# Patient Record
Sex: MALE | Race: BLACK OR AFRICAN AMERICAN | NOT HISPANIC OR LATINO | Employment: FULL TIME | URBAN - METROPOLITAN AREA
[De-identification: names, ages, dates, MRNs, and addresses within clinical notes are randomized per-mention and may not be internally consistent; named-entity substitution may affect disease eponyms.]

---

## 2017-03-06 ENCOUNTER — RESULTS ONLY (OUTPATIENT)
Dept: OTHER | Facility: CLINIC | Age: 32
End: 2017-03-06

## 2017-03-06 DIAGNOSIS — I10 HYPERTENSION: ICD-10-CM

## 2017-03-06 DIAGNOSIS — Z99.2 END STAGE RENAL DISEASE ON DIALYSIS (H): ICD-10-CM

## 2017-03-06 DIAGNOSIS — N18.6 END STAGE RENAL DISEASE ON DIALYSIS (H): ICD-10-CM

## 2017-03-06 PROCEDURE — 86832 HLA CLASS I HIGH DEFIN QUAL: CPT | Performed by: PHYSICIAN ASSISTANT

## 2017-03-06 PROCEDURE — 86833 HLA CLASS II HIGH DEFIN QUAL: CPT | Performed by: PHYSICIAN ASSISTANT

## 2017-03-09 LAB — PRA SINGLE ANTIGEN IGG ANTIBODY: NORMAL

## 2017-03-15 LAB
SA1 CELL: NORMAL
SA1 COMMENTS: NORMAL
SA1 HI RISK ABY: NORMAL
SA1 MOD RISK ABY: NORMAL
SA1 TEST METHOD: NORMAL
SA2 CELL: NORMAL
SA2 COMMENTS: NORMAL
SA2 HI RISK ABY UA: NORMAL
SA2 MOD RISK ABY: NORMAL
SA2 TEST METHOD: NORMAL

## 2017-10-06 DIAGNOSIS — Z76.82 ORGAN TRANSPLANT CANDIDATE: Primary | ICD-10-CM

## 2017-10-06 DIAGNOSIS — N18.6 END STAGE RENAL DISEASE (H): ICD-10-CM

## 2017-11-06 ENCOUNTER — RESULTS ONLY (OUTPATIENT)
Dept: OTHER | Facility: CLINIC | Age: 32
End: 2017-11-06

## 2017-11-06 DIAGNOSIS — Z76.82 ORGAN TRANSPLANT CANDIDATE: ICD-10-CM

## 2017-11-06 DIAGNOSIS — N18.6 END STAGE RENAL DISEASE (H): ICD-10-CM

## 2017-11-06 PROCEDURE — 86833 HLA CLASS II HIGH DEFIN QUAL: CPT | Performed by: PHYSICIAN ASSISTANT

## 2017-11-06 PROCEDURE — 86832 HLA CLASS I HIGH DEFIN QUAL: CPT | Performed by: PHYSICIAN ASSISTANT

## 2017-11-10 LAB — PRA SINGLE ANTIGEN IGG ANTIBODY: NORMAL

## 2018-04-09 ENCOUNTER — RESULTS ONLY (OUTPATIENT)
Dept: OTHER | Facility: CLINIC | Age: 33
End: 2018-04-09

## 2018-04-09 DIAGNOSIS — N18.6 END STAGE RENAL DISEASE (H): ICD-10-CM

## 2018-04-09 DIAGNOSIS — Z76.82 ORGAN TRANSPLANT CANDIDATE: ICD-10-CM

## 2018-04-12 LAB — PRA SINGLE ANTIGEN IGG ANTIBODY: NORMAL

## 2018-05-04 DIAGNOSIS — N18.6 END STAGE RENAL DISEASE (H): ICD-10-CM

## 2018-05-04 DIAGNOSIS — Z76.82 ORGAN TRANSPLANT CANDIDATE: Primary | ICD-10-CM

## 2018-05-31 ENCOUNTER — ALLIED HEALTH/NURSE VISIT (OUTPATIENT)
Dept: TRANSPLANT | Facility: CLINIC | Age: 33
End: 2018-05-31
Attending: INTERNAL MEDICINE
Payer: COMMERCIAL

## 2018-05-31 ENCOUNTER — OFFICE VISIT (OUTPATIENT)
Dept: NEPHROLOGY | Facility: CLINIC | Age: 33
End: 2018-05-31
Attending: INTERNAL MEDICINE
Payer: COMMERCIAL

## 2018-05-31 VITALS
SYSTOLIC BLOOD PRESSURE: 121 MMHG | HEART RATE: 65 BPM | BODY MASS INDEX: 27.92 KG/M2 | OXYGEN SATURATION: 95 % | WEIGHT: 173 LBS | DIASTOLIC BLOOD PRESSURE: 74 MMHG | TEMPERATURE: 98.3 F

## 2018-05-31 DIAGNOSIS — N18.6 END STAGE RENAL DISEASE (H): ICD-10-CM

## 2018-05-31 DIAGNOSIS — N18.6 END STAGE KIDNEY DISEASE (H): Primary | ICD-10-CM

## 2018-05-31 DIAGNOSIS — Z76.82 ORGAN TRANSPLANT CANDIDATE: ICD-10-CM

## 2018-05-31 DIAGNOSIS — D70.9 NEUTROPENIA, UNSPECIFIED TYPE (H): ICD-10-CM

## 2018-05-31 DIAGNOSIS — Z76.82 AWAITING ORGAN TRANSPLANT: Primary | ICD-10-CM

## 2018-05-31 LAB
ABO + RH BLD: NORMAL
ABO + RH BLD: NORMAL
BLD GP AB SCN SERPL QL: NORMAL
BLOOD BANK CMNT PATIENT-IMP: NORMAL
ERYTHROCYTE [DISTWIDTH] IN BLOOD BY AUTOMATED COUNT: 11.4 % (ref 10–15)
HCT VFR BLD AUTO: 35.5 % (ref 40–53)
HGB BLD-MCNC: 12.4 G/DL (ref 13.3–17.7)
MCH RBC QN AUTO: 30.5 PG (ref 26.5–33)
MCHC RBC AUTO-ENTMCNC: 34.9 G/DL (ref 31.5–36.5)
MCV RBC AUTO: 87 FL (ref 78–100)
PLATELET # BLD AUTO: 172 10E9/L (ref 150–450)
RBC # BLD AUTO: 4.06 10E12/L (ref 4.4–5.9)
SPECIMEN EXP DATE BLD: NORMAL
WBC # BLD AUTO: 4.7 10E9/L (ref 4–11)

## 2018-05-31 PROCEDURE — 86850 RBC ANTIBODY SCREEN: CPT | Performed by: INTERNAL MEDICINE

## 2018-05-31 PROCEDURE — 86901 BLOOD TYPING SEROLOGIC RH(D): CPT | Performed by: INTERNAL MEDICINE

## 2018-05-31 PROCEDURE — 86886 COOMBS TEST INDIRECT TITER: CPT | Performed by: INTERNAL MEDICINE

## 2018-05-31 PROCEDURE — 85027 COMPLETE CBC AUTOMATED: CPT | Performed by: NURSE PRACTITIONER

## 2018-05-31 PROCEDURE — 86900 BLOOD TYPING SEROLOGIC ABO: CPT | Performed by: INTERNAL MEDICINE

## 2018-05-31 PROCEDURE — G0463 HOSPITAL OUTPT CLINIC VISIT: HCPCS | Mod: ZF

## 2018-05-31 PROCEDURE — 36415 COLL VENOUS BLD VENIPUNCTURE: CPT | Performed by: INTERNAL MEDICINE

## 2018-05-31 ASSESSMENT — PAIN SCALES - GENERAL: PAINLEVEL: NO PAIN (0)

## 2018-05-31 NOTE — NURSING NOTE
Chief Complaint   Patient presents with     RECHECK     wait list follow up     Wt 78.5 kg (173 lb)  BMI 27.92 kg/m2   Marcela White

## 2018-05-31 NOTE — LETTER
2018       RE: Kailash Sorto  [address]  [facility] [address]     Dear Colleague,    Thank you for referring your patient, Kailash Sorto, to the [facility] NEPHROLOGY at [facility]. Please see a copy of my visit note below.    Assessment and Plan:  1. Kidney transplant wait list evaluation - Mr. Sorto is an good candidate overall. He should be active on the wait list.  2. 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 On hemodialysis since  2016 when he presented with renal failure, proteinuria (urine protein-to-creatinine ratio 1 g/g), hematuria and hypertensive crisis and no previous significant medical history. Renal ultrasound showed increased echogenicity of bilateral kidneys. He also had one episode of gross hematuria 2 months prior to starting dialysis. .    3. Cardiac risk - he has no history of cardiac disease or events. His last ECHO in   showed an EF of 60-65%, mild LVH.No exertional symptoms with regular exercise.   4. Health maintenance-UTD     Discussed the risks and benefits of a transplant, including the risk of surgery and immunosuppression medications.    KDPI: We discussed approximate remaining wait time and how that is influenced by issues such as blood type and sensitization (PRA) and access to a living donor. I contrasted potential waiting time for living vs  donor kidneys from  normal (0-85%) or higher (%) kidney donor profile index (KDPI) donors and their associated outcomes. I would not recommend Mr. Sorto to consider kidneys from high KDPI donors. The reason for this decision is best summarized as: improved long term graft survival.    Patient s overall re-evaluation may require further discussion in the Transplant Program s multidisciplinary selection committee for a final recommendation on the patient s suitability for transplant.     Reason for Visit:  Kailash Sorto is a 32 year old male with ESKD  from unclear etiology, who presents for Kidney transplant wait list evaluation.     Last Evaluation Clinic Visit Date:  8/2016 (Yadira)        Wait List Date: 5/13/2016  Current phase/status: Waitlist: Active as of 8/18/2016  Transplant coordinator: Maryjane Leroy Transplant Office phone number [phone]     Previous Medical Issues:  1.Leukopenia -  repeat WBC normal today.      HPI: Mr. 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 He started hemodialysis in 5/2016 when he presented with renal failure, proteinuria (urine protein-to-creatinine ratio 1 g/g), hematuria and hypertensive crisis and no previous significant medical history. Renal ultrasound showed increased echogenicity of bilateral kidneys. He also had one episode of gross hematuria 2 months prior to starting dialysis. .               Interim events/issues/events - since he was last seeen in 8/2016, he shortly after was treated for influenza, but otherwise has not had any other infections, hospitalizations or blood transfusions. He has continued to work full time at [employer] in the [place] and lives with his long time girlfriend, who is very supportive. They may be doing some traveling this summer to [facility] or [place] to see his family. Overall, he is feeling fairly well and denies fatigue, nausea, vomiting or  poor appetite. Denies fevers, chills or sweats    Cardiac   8/2016- ECHO showed an EF of 60-65%, mild LVH.   He exercises at least once per week by walking/jogging around [facility] and denies chest pain, shortness of breath or claudication symptoms with exertion.   ESKD- He currently is making urine 3-5 times per day and denies dysuria, hematuria or trouble emptying his bladder or starting his stream. No new donors. BPs have been averaging 120-130s/70s at dialysis and  he denies dizziness, lightheaded or syncope. 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             Kidney Disease Hx       Kidney Disease Dx: unclear etiology       Biopsy Proven: No        On Dialysis: Yes, Date initiated: 2012 and Dialysis Type: [facility];       Primary Nephrologist: Dr. Cobos           Uremic Symptoms: Fatigue: No; Nausea: No; Poor Appetite: No;          Potential Donor(s): No          Cardiac history:       Last cardiac risk assessment: N/A       Last stress test/coronary angiogram: N/A       Exertional symptoms: none       Recent cardiac events: none     Pertinent issues:   Blood transfusion: No  Jehovah s Witness: No  Pregnancies: No  Previous transplant: No  Urine output: Yes;   Bladder dysfunction: No  Claudication: No  Previous amputation: No  Cancer: No  Recurrent infection: No  Chronic anticoagulation: No      ROS:  A comprehensive review of systems was obtained and negative, except as noted in the HPI or PMH.     PMH:  Medical records were obtained and reviewed.  Past Medical History:   Diagnosis Date     Anemia of chronic kidney failure      Dialysis patient (H)      End stage kidney disease (H)      Hypertension         PSH:  Past Surgical History:   Procedure Laterality Date     CREATE FISTULA ARTERIOVENOUS UPPER EXTREMITY Right 5/15/2016    Procedure: CREATE FISTULA ARTERIOVENOUS UPPER EXTREMITY;  Surgeon: Ricardo Gallo MD;  Location:  OR     CREATE FISTULA ARTERIOVENOUS UPPER EXTREMITY Right 7/12/2016    Procedure: CREATE FISTULA ARTERIOVENOUS UPPER EXTREMITY;  Surgeon: Payam Denise MD;  Location:  OR     VASCULAR SURGERY      placement of jugular tunnel catheter        Personal Hx:  Social History     Social History     Marital status: Single     Spouse name: N/A     Number of children: N/A     Years of education: N/A     Occupational History     Not on file.     Social History Main Topics     Smoking status: Never Smoker     Smokeless tobacco: Never Used      Alcohol use No     Drug use: No     Sexual activity: Yes     Partners: Female     Other Topics Concern     Parent/Sibling W/ Cabg, Mi Or Angioplasty Before 65f 55m? No     Social History Narrative        Allergies:  No Known Allergies     Medications:  Prior to Admission medications    Medication Sig Start Date End Date Taking? Authorizing Provider   AMLODIPINE BESYLATE PO Take 10 mg by mouth daily    Reported, Patient   B Complex-C-Folic Acid (GARDENIA CAPS PO) Take 1 tablet by mouth daily    Reported, Patient   calcium acetate (PHOSLO) 667 MG CAPS Take 667 mg by mouth One tablet with every meal and snack    Reported, Patient   HYDRALAZINE HCL PO Take 25 mg by mouth 4 times daily    Reported, Patient   LABETALOL HCL PO Take 200 mg by mouth 2 times daily    Reported, Patient        Vitals:  /74 (BP Location: Left arm)  Pulse 65  Temp 98.3  F (36.8  C) (Oral)  Wt 78.5 kg (173 lb)  SpO2 95%  BMI 27.92 kg/m2     Exam:  GENERAL APPEARANCE: alert and no distress  HENT: mouth without ulcers or lesions. Good   LYMPHATICS: no cervical or supraclavicular nodes  RESP: lungs clear to auscultation - no rales, rhonchi or wheezes  CV: regular rhythm, normal rate, no rub, no murmur  FEMORAL PULSES: 2+ equal bilaterally.   EDEMA: no LE edema bilaterally  ABDOMEN: soft, nondistended, nontender, bowel sounds normal  MS: extremities normal - no gross deformities noted, no evidence of inflammation in joints, no muscle tenderness  SKIN: no rash     Results:  Orders Only on 05/31/2018   Component Date Value Ref Range Status     ABO 05/31/2018 B   Final     RH(D) 05/31/2018 Neg   Final     Antibody Screen 05/31/2018 Neg   Final     Test Valid Only At 05/31/2018 [facility]      Final     Specimen Expires 05/31/2018 06/03/2018   Final     Antibody Titer 05/31/2018    Final                    Value:ANTI A1: IgM 8, IgG 8 .  ANTI A2 : IgM 4, IgG 2 .         Again, thank you for allowing me  to participate in the care of your patient.      Sincerely,    CASIMIRO Bobo CNP

## 2018-05-31 NOTE — MR AVS SNAPSHOT
"              After Visit Summary   2018    Kailash Sorto    MRN: [id number]           Patient Information     Date Of Birth          1985        Visit Information        Provider Department      2018 3:00 PM Laura Zuniga MSW [facility] Solid Organ Transplant        Today's Diagnoses     Awaiting organ transplant    -  1       Follow-ups after your visit        Who to contact     If you have questions or need follow up information about today's clinic visit or your schedule please contact [facility] ORGAN TRANSPLANT directly at [phone].  Normal or non-critical lab and imaging results will be communicated to you by Your Policy Managerhart, letter or phone within 4 business days after the clinic has received the results. If you do not hear from us within 7 days, please contact the clinic through [employer] or phone. If you have a critical or abnormal lab result, we will notify you by phone as soon as possible.  Submit refill requests through [employer] or call your pharmacy and they will forward the refill request to us. Please allow 3 business days for your refill to be completed.          Additional Information About Your Visit        MyChart Information     [employer] lets you send messages to your doctor, view your test results, renew your prescriptions, schedule appointments and more. To sign up, go to [email or web address] . Click on \"Log in\" on the left side of the screen, which will take you to the Welcome page. Then click on \"Sign up Now\" on the right side of the page.     You will be asked to enter the access code listed below, as well as some personal information. Please follow the directions to create your username and password.     Your access code is: [email or web address]  Expires: 8/15/2018  6:31 AM     Your access code will  in 90 days. If you need help or a new code, please call your [place] clinic or [phone].        Care EveryWhere ID     This is your Care EveryWhere ID. This could " be used by other organizations to access your [place] medical records  [id number]         Blood Pressure from Last 3 Encounters:   05/31/18 121/74   08/04/16 134/86   08/04/16 134/86    Weight from Last 3 Encounters:   05/31/18 78.5 kg (173 lb)   08/04/16 75.1 kg (165 lb 8 oz)   08/04/16 75.1 kg (165 lb 8 oz)              Today, you had the following     No orders found for display       Primary Care Provider Office Phone # Fax #    Homero Dumont [phone]       [address] [facility] [address]        Equal Access to Services     MAURICE DEY : Hadii jazlyn Renner, wabrian vee, qaesterta kaalmada tawanna, elizabeth gallegos. So [facility] [phone].    ATENCIÓN: Si habla español, tiene a rod disposición servicios gratuitos de asistencia lingüística. Llame al [phone].    We comply with applicable federal civil rights laws and [place] laws. We do not discriminate on the basis of race, color, national origin, age, disability, sex, sexual orientation, or gender identity.            Thank you!     Thank you for choosing [facility]GAN TRANSPL[facility]  for your care. Our goal is always to provide you with excellent care. Hearing back from our patients is one way we can continue to improve our services. Please take a few minutes to complete the written survey that you may receive in the mail after your visit with us. Thank you!             Your Updated Medication List - Protect others around you: Learn how to safely use, store and throw away your medicines at www.disposemymeds.org.          This list is accurate as of 5/31/18 11:59 PM.  Always use your most recent med list.                   Brand Name Dispense Instructions for use Diagnosis    AMLODIPINE BESYLATE PO      Take 10 mg by mouth daily        calcium acetate 667 MG Caps capsule    PHOSLO     Take 667 mg by mouth One tablet with every meal and snack        HYDRALAZINE HCL PO      Take 25 mg  by mouth 4 times daily        LABETALOL HCL PO      Take 200 mg by mouth 2 times daily        GARDENIA CAPS PO      Take 1 tablet by mouth daily

## 2018-05-31 NOTE — PROGRESS NOTES
Assessment and Plan:  1. Kidney transplant wait list evaluation - Mr. Sorto is an good candidate overall. He should be active on the wait list.  2. 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 On hemodialysis since  2016 when he presented with renal failure, proteinuria (urine protein-to-creatinine ratio 1 g/g), hematuria and hypertensive crisis and no previous significant medical history. Renal ultrasound showed increased echogenicity of bilateral kidneys. He also had one episode of gross hematuria 2 months prior to starting dialysis. .    3. Cardiac risk - he has no history of cardiac disease or events. His last ECHO in   showed an EF of 60-65%, mild LVH.No exertional symptoms with regular exercise.   4. Health maintenance-UTD     Discussed the risks and benefits of a transplant, including the risk of surgery and immunosuppression medications.    KDPI: We discussed approximate remaining wait time and how that is influenced by issues such as blood type and sensitization (PRA) and access to a living donor. I contrasted potential waiting time for living vs  donor kidneys from  normal (0-85%) or higher (%) kidney donor profile index (KDPI) donors and their associated outcomes. I would not recommend Mr. Sorto to consider kidneys from high KDPI donors. The reason for this decision is best summarized as: improved long term graft survival.    Patient s overall re-evaluation may require further discussion in the Transplant Program s multidisciplinary selection committee for a final recommendation on the patient s suitability for transplant.     Reason for Visit:  Kailash Sorto is a 32 year old male with ESKD from unclear etiology, who presents for Kidney transplant wait list evaluation.     Last Evaluation Clinic Visit Date:  2016 (Yadira)        Wait List Date: 2016  Current phase/status: Waitlist: Active as of 2016  Transplant coordinator: Maryjane Leroy Transplant Office  phone number [phone]     Previous Medical Issues:  1.Leukopenia -  repeat WBC normal today.      HPI: Mr. 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 He started hemodialysis in 5/2016 when he presented with renal failure, proteinuria (urine protein-to-creatinine ratio 1 g/g), hematuria and hypertensive crisis and no previous significant medical history. Renal ultrasound showed increased echogenicity of bilateral kidneys. He also had one episode of gross hematuria 2 months prior to starting dialysis. .               Interim events/issues/events - since he was last seeen in 8/2016, he shortly after was treated for influenza, but otherwise has not had any other infections, hospitalizations or blood transfusions. He has continued to work full time at [employer] in the [place] [employer] and lives with his long time girlfriend, who is very supportive. They may be doing some traveling this summer to [place] or [place] to see his family. Overall, he is feeling fairly well and denies fatigue, nausea, vomiting or  poor appetite. Denies fevers, chills or sweats    Cardiac   8/2016- ECHO showed an EF of 60-65%, mild LVH.   He exercises at least once per week by walking/jogging around [facility] and denies chest pain, shortness of breath or claudication symptoms with exertion.   ESKD- He currently is making urine 3-5 times per day and denies dysuria, hematuria or trouble emptying his bladder or starting his stream. No new donors. BPs have been averaging 120-130s/70s at dialysis and  he denies dizziness, lightheaded or syncope. His right UE AVF has had some aneurysmal changes, but is being monitored closely at dialysis.             Kidney Disease Hx       Kidney Disease Dx: unclear etiology       Biopsy Proven: No        On Dialysis: Yes, Date initiated: 2012 and [facility]ialysis [facility]: [facility];       Primary Nephrologist: Dr. Coobs            Uremic Symptoms: Fatigue: No; Nausea: No; Poor Appetite: No;          Potential Donor(s): No          Cardiac history:       Last cardiac risk assessment: N/A       Last stress test/coronary angiogram: N/A       Exertional symptoms: none       Recent cardiac events: none     Pertinent issues:   Blood transfusion: No  Jehovah s Witness: No  Pregnancies: No  Previous transplant: No  Urine output: Yes;   Bladder dysfunction: No  Claudication: No  Previous amputation: No  Cancer: No  Recurrent infection: No  Chronic anticoagulation: No      ROS:  A comprehensive review of systems was obtained and negative, except as noted in the HPI or PMH.     PMH:  Medical records were obtained and reviewed.  Past Medical History:   Diagnosis Date     Anemia of chronic kidney failure      Dialysis patient (H)      End stage kidney disease (H)      Hypertension         PSH:  Past Surgical History:   Procedure Laterality Date     CREATE FISTULA ARTERIOVENOUS UPPER EXTREMITY Right 5/15/2016    Procedure: CREATE FISTULA ARTERIOVENOUS UPPER EXTREMITY;  Surgeon: Ricardo Gallo MD;  Location:  OR     CREATE FISTULA ARTERIOVENOUS UPPER EXTREMITY Right 7/12/2016    Procedure: CREATE FISTULA ARTERIOVENOUS UPPER EXTREMITY;  Surgeon: Payam Denise MD;  Location:  OR     VASCULAR SURGERY      placement of jugular tunnel catheter        Personal Hx:  Social History     Social History     Marital status: Single     Spouse name: N/A     Number of children: N/A     Years of education: N/A     Occupational History     Not on file.     Social History Main Topics     Smoking status: Never Smoker     Smokeless tobacco: Never Used     Alcohol use No     Drug use: No     Sexual activity: Yes     Partners: Female     Other Topics Concern     Parent/Sibling W/ Cabg, Mi Or Angioplasty Before 65f 55m? No     Social History Narrative        Allergies:  No Known Allergies     Medications:  Prior to Admission medications     Medication Sig Start Date End Date Taking? Authorizing Provider   AMLODIPINE BESYLATE PO Take 10 mg by mouth daily    Reported, Patient   B Complex-C-Folic Acid (GARDENIA CAPS PO) Take 1 tablet by mouth daily    Reported, Patient   calcium acetate (PHOSLO) 667 MG CAPS Take 667 mg by mouth One tablet with every meal and snack    Reported, Patient   HYDRALAZINE HCL PO Take 25 mg by mouth 4 times daily    Reported, Patient   LABETALOL HCL PO Take 200 mg by mouth 2 times daily    Reported, Patient        Vitals:  /74 (BP Location: Left arm)  Pulse 65  Temp 98.3  F (36.8  C) (Oral)  Wt 78.5 kg (173 lb)  SpO2 95%  BMI 27.92 kg/m2     Exam:  GENERAL APPEARANCE: alert and no distress  HENT: mouth without ulcers or lesions. Good   LYMPHATICS: no cervical or supraclavicular nodes  RESP: lungs clear to auscultation - no rales, rhonchi or wheezes  CV: regular rhythm, normal rate, no rub, no murmur  FEMORAL PULSES: 2+ equal bilaterally.   EDEMA: no LE edema bilaterally  ABDOMEN: soft, nondistended, nontender, bowel sounds normal  MS: extremities normal - no gross deformities noted, no evidence of inflammation in joints, no muscle tenderness  SKIN: no rash     Results:  Orders Only on 05/31/2018   Component Date Value Ref Range Status     ABO 05/31/2018 B   Final     RH(D) 05/31/2018 Neg   Final     Antibody Screen 05/31/2018 Neg   Final     Test Valid Only At 05/31/2018 [facility]      Final     Specimen Expires 05/31/2018 06/03/2018   Final     Antibody Titer 05/31/2018    Final                    Value:ANTI A1: IgM 8, IgG 8 .  ANTI A2 : IgM 4, IgG 2 .

## 2018-06-01 LAB — BLD GP AB SCN TITR SERPL: NORMAL {TITER}

## 2018-06-08 NOTE — PROGRESS NOTES
"Patient Name: Kailash Sorto  : 1985  Age: 32 year old  MRN: [id number]  Date of Initial Social Work Evaluation: 16    Patient on kidney transplant wait list active.  Saw today to update psychosocial assessment.      Presenting Information   Living Situation: in [place], [place] with his girlfriend Sonja  If not local, plans for short term stay:  N/A  Previous Functional Status: Independent  Cultural/Language/Spiritual Considerations: N/A    Support System  Primary Support Person: Sonja  Other support:  Friends  Plan for support in immediate post-transplant period: Sonja    Health Care Directive  Decision Maker: Self  Alternate Decision Maker:  Legally it would be his father but Kailash was provided with a Health Care Directive and indicated he would name Sonja and his Uncle.  Health Care Directive: Provided Copy and Provided education    Mental Health/Coping:   History of Mental Health: No known history  History of Chemical Health: Kailash he may drink once a week.  Current status: Appropriate  Coping: Kailash indicated when under stress he will \"stay busy\", go to the [employer] or [employer].  Services Needed/Recommended: None at this time.    Financial   Income: Employed full time with [employer]  Impact of transplant on income: Should be able to return to work.  Insurance and medication coverage: [employer] through his employer  Financial concerns: None at this time.  Resources needed: None at this time.    Assessment and recommendations and plan:  Kailash continues to be an appropriate transplant candidate.  Reviewed transplant education (Medicare, rehabilitation, donor issues, community/financial resources, and psych/family adjustment) as well as psychosocial risks of transplant. Discussed information on potential costs of medications, Medicare ESRD, post-transplant lodging, etc. Patient seemed to process information well. Appeared well informed, motivated, and able to follow post transplant requirements. " Behavior was appropriate during interview. Has adequate income and insurance coverage. Adequate social support. No major contraindications noted for transplant. At this time, patient appears to understand the risks and benefits of transplant.

## 2018-07-19 ENCOUNTER — HOSPITAL ENCOUNTER (INPATIENT)
Facility: CLINIC | Age: 33
LOS: 2 days | Discharge: HOME | DRG: 157 | End: 2018-07-21
Attending: EMERGENCY MEDICINE | Admitting: INTERNAL MEDICINE
Payer: COMMERCIAL

## 2018-07-19 ENCOUNTER — APPOINTMENT (OUTPATIENT)
Dept: CT IMAGING | Facility: CLINIC | Age: 33
DRG: 157 | End: 2018-07-19
Attending: EMERGENCY MEDICINE
Payer: COMMERCIAL

## 2018-07-19 DIAGNOSIS — M27.2 ODONTOGENIC INFECTION OF JAW: ICD-10-CM

## 2018-07-19 LAB
ANION GAP SERPL CALCULATED.3IONS-SCNC: 10 MMOL/L (ref 3–14)
BASOPHILS # BLD AUTO: 0 10E9/L (ref 0–0.2)
BASOPHILS NFR BLD AUTO: 0.1 %
BUN SERPL-MCNC: 49 MG/DL (ref 7–30)
CALCIUM SERPL-MCNC: 9.4 MG/DL (ref 8.5–10.1)
CHLORIDE SERPL-SCNC: 100 MMOL/L (ref 94–109)
CO2 SERPL-SCNC: 27 MMOL/L (ref 20–32)
CREAT SERPL-MCNC: 7.02 MG/DL (ref 0.66–1.25)
DIFFERENTIAL METHOD BLD: ABNORMAL
EOSINOPHIL # BLD AUTO: 0.1 10E9/L (ref 0–0.7)
EOSINOPHIL NFR BLD AUTO: 0.6 %
ERYTHROCYTE [DISTWIDTH] IN BLOOD BY AUTOMATED COUNT: 13.1 % (ref 10–15)
GFR SERPL CREATININE-BSD FRML MDRD: 9 ML/MIN/1.7M2
GLUCOSE SERPL-MCNC: 104 MG/DL (ref 70–99)
HCT VFR BLD AUTO: 32.3 % (ref 40–53)
HGB BLD-MCNC: 11 G/DL (ref 13.3–17.7)
IMM GRANULOCYTES # BLD: 0 10E9/L (ref 0–0.4)
IMM GRANULOCYTES NFR BLD: 0.1 %
LYMPHOCYTES # BLD AUTO: 0.4 10E9/L (ref 0.8–5.3)
LYMPHOCYTES NFR BLD AUTO: 4.2 %
MCH RBC QN AUTO: 30.4 PG (ref 26.5–33)
MCHC RBC AUTO-ENTMCNC: 34.1 G/DL (ref 31.5–36.5)
MCV RBC AUTO: 89 FL (ref 78–100)
MONOCYTES # BLD AUTO: 1 10E9/L (ref 0–1.3)
MONOCYTES NFR BLD AUTO: 11.1 %
NEUTROPHILS # BLD AUTO: 7.4 10E9/L (ref 1.6–8.3)
NEUTROPHILS NFR BLD AUTO: 83.9 %
NRBC # BLD AUTO: 0 10*3/UL
NRBC BLD AUTO-RTO: 0 /100
PLATELET # BLD AUTO: 247 10E9/L (ref 150–450)
POTASSIUM SERPL-SCNC: 4.2 MMOL/L (ref 3.4–5.3)
RBC # BLD AUTO: 3.62 10E12/L (ref 4.4–5.9)
SODIUM SERPL-SCNC: 137 MMOL/L (ref 133–144)
WBC # BLD AUTO: 8.8 10E9/L (ref 4–11)

## 2018-07-19 PROCEDURE — 87181 SC STD AGAR DILUTION PER AGT: CPT | Performed by: EMERGENCY MEDICINE

## 2018-07-19 PROCEDURE — 25000132 ZZH RX MED GY IP 250 OP 250 PS 637: Performed by: INTERNAL MEDICINE

## 2018-07-19 PROCEDURE — 87040 BLOOD CULTURE FOR BACTERIA: CPT | Performed by: EMERGENCY MEDICINE

## 2018-07-19 PROCEDURE — 80048 BASIC METABOLIC PNL TOTAL CA: CPT | Performed by: EMERGENCY MEDICINE

## 2018-07-19 PROCEDURE — 12000000 ZZH R&B MED SURG/OB

## 2018-07-19 PROCEDURE — 87076 CULTURE ANAEROBE IDENT EACH: CPT | Performed by: EMERGENCY MEDICINE

## 2018-07-19 PROCEDURE — 99285 EMERGENCY DEPT VISIT HI MDM: CPT | Mod: 25

## 2018-07-19 PROCEDURE — 70490 CT SOFT TISSUE NECK W/O DYE: CPT

## 2018-07-19 PROCEDURE — 25000128 H RX IP 250 OP 636: Performed by: EMERGENCY MEDICINE

## 2018-07-19 PROCEDURE — 87800 DETECT AGNT MULT DNA DIREC: CPT | Performed by: EMERGENCY MEDICINE

## 2018-07-19 PROCEDURE — 99223 1ST HOSP IP/OBS HIGH 75: CPT | Mod: AI | Performed by: INTERNAL MEDICINE

## 2018-07-19 PROCEDURE — 99207 ZZC CDG-MDM COMPONENT: MEETS MODERATE - UP CODED: CPT | Performed by: INTERNAL MEDICINE

## 2018-07-19 PROCEDURE — 96365 THER/PROPH/DIAG IV INF INIT: CPT

## 2018-07-19 PROCEDURE — 85025 COMPLETE CBC W/AUTO DIFF WBC: CPT | Performed by: EMERGENCY MEDICINE

## 2018-07-19 RX ORDER — LABETALOL 200 MG/1
200 TABLET, FILM COATED ORAL 2 TIMES DAILY
Status: DISCONTINUED | OUTPATIENT
Start: 2018-07-19 | End: 2018-07-21 | Stop reason: HOSPADM

## 2018-07-19 RX ORDER — ONDANSETRON 4 MG/1
4 TABLET, ORALLY DISINTEGRATING ORAL EVERY 6 HOURS PRN
Status: DISCONTINUED | OUTPATIENT
Start: 2018-07-19 | End: 2018-07-21 | Stop reason: HOSPADM

## 2018-07-19 RX ORDER — ACETAMINOPHEN 325 MG/1
650 TABLET ORAL EVERY 4 HOURS PRN
Status: DISCONTINUED | OUTPATIENT
Start: 2018-07-19 | End: 2018-07-21 | Stop reason: HOSPADM

## 2018-07-19 RX ORDER — OXYCODONE HYDROCHLORIDE 5 MG/1
5-10 TABLET ORAL
Status: DISCONTINUED | OUTPATIENT
Start: 2018-07-19 | End: 2018-07-21 | Stop reason: HOSPADM

## 2018-07-19 RX ORDER — AMLODIPINE BESYLATE 10 MG/1
10 TABLET ORAL DAILY
Status: DISCONTINUED | OUTPATIENT
Start: 2018-07-19 | End: 2018-07-21 | Stop reason: HOSPADM

## 2018-07-19 RX ORDER — ONDANSETRON 2 MG/ML
4 INJECTION INTRAMUSCULAR; INTRAVENOUS EVERY 6 HOURS PRN
Status: DISCONTINUED | OUTPATIENT
Start: 2018-07-19 | End: 2018-07-21 | Stop reason: HOSPADM

## 2018-07-19 RX ORDER — HYDRALAZINE HYDROCHLORIDE 25 MG/1
25 TABLET, FILM COATED ORAL 3 TIMES DAILY
Status: DISCONTINUED | OUTPATIENT
Start: 2018-07-19 | End: 2018-07-21 | Stop reason: HOSPADM

## 2018-07-19 RX ORDER — ACETAMINOPHEN 650 MG/1
650 SUPPOSITORY RECTAL EVERY 4 HOURS PRN
Status: DISCONTINUED | OUTPATIENT
Start: 2018-07-19 | End: 2018-07-21 | Stop reason: HOSPADM

## 2018-07-19 RX ORDER — NALOXONE HYDROCHLORIDE 0.4 MG/ML
.1-.4 INJECTION, SOLUTION INTRAMUSCULAR; INTRAVENOUS; SUBCUTANEOUS
Status: DISCONTINUED | OUTPATIENT
Start: 2018-07-19 | End: 2018-07-21 | Stop reason: HOSPADM

## 2018-07-19 RX ORDER — LIDOCAINE 40 MG/G
CREAM TOPICAL
Status: DISCONTINUED | OUTPATIENT
Start: 2018-07-19 | End: 2018-07-21 | Stop reason: HOSPADM

## 2018-07-19 RX ORDER — AMOXICILLIN 250 MG
1 CAPSULE ORAL 2 TIMES DAILY PRN
Status: DISCONTINUED | OUTPATIENT
Start: 2018-07-19 | End: 2018-07-21 | Stop reason: HOSPADM

## 2018-07-19 RX ORDER — AMOXICILLIN 250 MG
2 CAPSULE ORAL 2 TIMES DAILY PRN
Status: DISCONTINUED | OUTPATIENT
Start: 2018-07-19 | End: 2018-07-21 | Stop reason: HOSPADM

## 2018-07-19 RX ORDER — HYDROMORPHONE HYDROCHLORIDE 1 MG/ML
.3-.5 INJECTION, SOLUTION INTRAMUSCULAR; INTRAVENOUS; SUBCUTANEOUS
Status: DISCONTINUED | OUTPATIENT
Start: 2018-07-19 | End: 2018-07-21 | Stop reason: HOSPADM

## 2018-07-19 RX ORDER — PIPERACILLIN SODIUM, TAZOBACTAM SODIUM 2; .25 G/10ML; G/10ML
2.25 INJECTION, POWDER, LYOPHILIZED, FOR SOLUTION INTRAVENOUS EVERY 8 HOURS
Status: DISCONTINUED | OUTPATIENT
Start: 2018-07-20 | End: 2018-07-20

## 2018-07-19 RX ORDER — PIPERACILLIN SODIUM, TAZOBACTAM SODIUM 2; .25 G/10ML; G/10ML
2.25 INJECTION, POWDER, LYOPHILIZED, FOR SOLUTION INTRAVENOUS ONCE
Status: COMPLETED | OUTPATIENT
Start: 2018-07-19 | End: 2018-07-19

## 2018-07-19 RX ADMIN — LABETALOL HCL 200 MG: 200 TABLET, FILM COATED ORAL at 22:29

## 2018-07-19 RX ADMIN — ACETAMINOPHEN 650 MG: 325 TABLET, FILM COATED ORAL at 17:57

## 2018-07-19 RX ADMIN — AMLODIPINE BESYLATE 10 MG: 10 TABLET ORAL at 17:57

## 2018-07-19 RX ADMIN — PIPERACILLIN SODIUM,TAZOBACTAM SODIUM 2.25 G: 2; .25 INJECTION, POWDER, FOR SOLUTION INTRAVENOUS at 16:24

## 2018-07-19 RX ADMIN — CALCIUM ACETATE 667 MG: 667 CAPSULE ORAL at 18:39

## 2018-07-19 RX ADMIN — ACETAMINOPHEN 650 MG: 325 TABLET, FILM COATED ORAL at 22:29

## 2018-07-19 RX ADMIN — HYDRALAZINE HYDROCHLORIDE 25 MG: 25 TABLET ORAL at 22:29

## 2018-07-19 RX ADMIN — HYDRALAZINE HYDROCHLORIDE 25 MG: 25 TABLET ORAL at 17:57

## 2018-07-19 ASSESSMENT — ACTIVITIES OF DAILY LIVING (ADL)
RETIRED_EATING: 0-->INDEPENDENT
SWALLOWING: 0-->SWALLOWS FOODS/LIQUIDS WITHOUT DIFFICULTY
TRANSFERRING: 0-->INDEPENDENT
BATHING: 0-->INDEPENDENT
FALL_HISTORY_WITHIN_LAST_SIX_MONTHS: NO
AMBULATION: 0-->INDEPENDENT
TOILETING: 0-->INDEPENDENT
ADLS_ACUITY_SCORE: 9
COGNITION: 0 - NO COGNITION ISSUES REPORTED
RETIRED_COMMUNICATION: 0-->UNDERSTANDS/COMMUNICATES WITHOUT DIFFICULTY
DRESS: 0-->INDEPENDENT

## 2018-07-19 ASSESSMENT — PAIN DESCRIPTION - DESCRIPTORS: DESCRIPTORS: DISCOMFORT;SORE

## 2018-07-19 ASSESSMENT — ENCOUNTER SYMPTOMS
TROUBLE SWALLOWING: 1
FEVER: 0
VOMITING: 0
NAUSEA: 0
CHILLS: 1
SHORTNESS OF BREATH: 0
ABDOMINAL PAIN: 0

## 2018-07-19 NOTE — PROGRESS NOTES
RECEIVING UNIT ED HANDOFF REVIEW    ED Nurse Handoff Report was reviewed by: Leelee Blue on July 19, 2018 at 5:11 PM

## 2018-07-19 NOTE — IP AVS SNAPSHOT
MRN:[id number]                      After Visit Summary   7/19/2018    Kailash Sorto    MRN: [id number]           Thank you!     Thank you for choosing [facility] for your care. Our goal is always to provide you with excellent care. Hearing back from our patients is one way we can continue to improve our services. Please take a few minutes to complete the written survey that you may receive in the mail after you visit with us. Thank you!        Patient Information     Date Of Birth          1985        Designated Caregiver       Most Recent Value    Caregiver    Will someone help with your care after discharge? yes    Name of designated caregiver Sonja Miller    Phone number of caregiver [phone]    Caregiver address [place]      About your hospital stay     You were admitted on:  July 19, 2018 You last received care in the:  [facility] [address] [facility]    You were discharged on:  July 21, 2018        Reason for your hospital stay       Odontogenic infection of the jaw                  Who to Call     For medical emergencies, please call 911.  For non-urgent questions about your medical care, please call your primary care provider or clinic, [phone]          Attending Provider     Provider Specialty    Juan Manuel Parham MD Emergency Medicine    [facility], Gabi Barajas MD Internal Medicine       Primary Care Provider Office Phone # Fax #    Homero Dumont [phone]      After Care Instructions     Activity       Your activity upon discharge: activity as tolerated            Diet       Follow this diet upon discharge: Orders Placed This Encounter      Combination Diet Regular Diet Adult; Renal Diet; Mechanical/Dental Soft Diet                  Follow-up Appointments     Follow-up and recommended labs and tests        Follow up with primary care provider, Homero Dumont, within 7 days for hospital follow- up.  The following labs/tests are recommended:  "CBC/BMP.    FU with Dr Flowers next available   FU with Dialysis clinic and nephrologist as per previous schedule                  Pending Results     Date and Time Order Name Status Description    2018 1455 Blood culture Preliminary     2018 1455 Blood culture Preliminary             Statement of Approval     Ordered          18 1136  I have reviewed and agree with all the recommendations and orders detailed in this document.  EFFECTIVE NOW     Approved and electronically signed by:  Naye Carter MD             Admission Information     Date & Time Provider Department Dept. Phone    2018 Gabi Sparks MD [place] [address] [facility] [phone]      Your Vitals Were     Blood Pressure Temperature Respirations Height Weight Pulse Oximetry    121/63 97.6  F (36.4  C) (Oral) 16 1.676 m (5' 6\") 74.4 kg (164 lb 0.4 oz) 98%    BMI (Body Mass Index)                   26.47 kg/m2           MyChart Information     [facility] lets you send messages to your doctor, view your test results, renew your prescriptions, schedule appointments and more. To sign up, go to [email or web address] . Click on \"Log in\" on the left side of the screen, which will take you to the Welcome page. Then click on \"Sign up Now\" on the right side of the page.     You will be asked to enter the access code listed below, as well as some personal information. Please follow the directions to create your username and password.     Your access code is: [email or web address]  Expires: 8/15/2018  6:31 AM     Your access code will  in 90 days. If you need help or a new code, please call your [facility] clinic or [phone].        Care EveryWhere ID     This is your Care EveryWhere ID. This could be used by other organizations to access your [facility] medical records  [id number]        Equal Access to Services     PATRICK DEY AH: Audra Renner, mohini vee, qaybta erick stacy, elizabeth holcomb " lizet wahlaan ah. So [facility] [phone].    ATENCIÓN: Si louann vuong, tiene a rod disposición servicios gratuitos de asistencia lingüística. Paula al [phone].    We comply with applicable federal civil rights laws and [place] laws. We do not discriminate on the basis of race, color, national origin, age, disability, sex, sexual orientation, or gender identity.               Review of your medicines      START taking        Dose / Directions    amoxicillin-clavulanate 500-125 MG per tablet   Commonly known as:  AUGMENTIN        Dose:  1 tablet   Take 1 tablet by mouth daily   Quantity:  10 tablet   Refills:  0       chlorhexidine 0.12 % solution   Commonly known as:  PERIDEX        Dose:  15 mL   Swish and spit 15 mLs in mouth 2 times daily   Quantity:  473 mL   Refills:  0         CONTINUE these medicines which have NOT CHANGED        Dose / Directions    AMLODIPINE BESYLATE PO        Dose:  10 mg   Take 10 mg by mouth daily   Refills:  0       * calcium acetate 667 MG Caps capsule   Commonly known as:  PHOSLO        Dose:  667 mg   Take 667 mg by mouth 3 times daily (with meals)   Refills:  0       * calcium acetate 667 MG Caps capsule   Commonly known as:  PHOSLO        Dose:  667 mg   Take 667 mg by mouth Take with snacks or supplements   Refills:  0       HYDRALAZINE HCL PO        Dose:  25 mg   Take 25 mg by mouth 3 times daily   Refills:  0       LABETALOL HCL PO        Dose:  200 mg   Take 200 mg by mouth 2 times daily   Refills:  0       * Notice:  This list has 2 medication(s) that are the same as other medications prescribed for you. Read the directions carefully, and ask your doctor or other care provider to review them with you.         Where to get your medicines      These medications were sent to [facility] - [address]  [address], [place] [address]     Phone:  [phone]     amoxicillin-clavulanate 500-125 MG per tablet    chlorhexidine 0.12  % solution                Protect others around you: Learn how to safely use, store and throw away your medicines at www.disposemymeds.org.        ANTIBIOTIC INSTRUCTION     You've Been Prescribed an Antibiotic - Now What?  Your healthcare team thinks that you or your loved one might have an infection. Some infections can be treated with antibiotics, which are powerful, life-saving drugs. Like all medications, antibiotics have side effects and should only be used when necessary. There are some important things you should know about your antibiotic treatment.      Your healthcare team may run tests before you start taking an antibiotic.    Your team may take samples (e.g., from your blood, urine or other areas) to run tests to look for bacteria. These test can be important to determine if you need an antibiotic at all and, if you do, which antibiotic will work best.      Within a few days, your healthcare team might change or even stop your antibiotic.    Your team may start you on an antibiotic while they are working to find out what is making you sick.    Your team might change your antibiotic because test results show that a different antibiotic would be better to treat your infection.    In some cases, once your team has more information, they learn that you do not need an antibiotic at all. They may find out that you don't have an infection, or that the antibiotic you're taking won't work against your infection. For example, an infection caused by a virus can't be treated with antibiotics. Staying on an antibiotic when you don't need it is more likely to be harmful than helpful.      You may experience side effects from your antibiotic.    Like all medications, antibiotics have side effects. Some of these can be serious.    Let you healthcare team know if you have any known allergies when you are admitted to the hospital.    One significant side effect of nearly all antibiotics is the risk of severe and  sometimes deadly diarrhea caused by Clostridium difficile (C. Difficile). This occurs when a person takes antibiotics because some good germs are destroyed. Antibiotic use allows C. diificile to take over, putting patients at high risk for this serious infection.    As a patient or caregiver, it is important to understand your or your loved one's antibiotic treatment. It is especially important for caregivers to speak up when patients can't speak for themselves. Here are some important questions to ask your healthcare team.    What infection is this antibiotic treating and how do you know I have that infection?    What side effects might occur from this antibiotic?    How long will I need to take this antibiotic?    Is it safe to take this antibiotic with other medications or supplements (e.g., vitamins) that I am taking?     Are there any special directions I need to know about taking this antibiotic? For example, should I take it with food?    How will I be monitored to know whether my infection is responding to the antibiotic?    What tests may help to make sure the right antibiotic is prescribed for me?      Information provided by:  www.cdc.gov/getsmart  U.S. Department of Health and Human Services  Centers for disease Control and Prevention  National Center for Emerging and Zoonotic Infectious Diseases  Division of Healthcare Quality Promotion             Medication List: This is a list of all your medications and when to take them. Check marks below indicate your daily home schedule. Keep this list as a reference.      Medications           Morning Afternoon Evening Bedtime As Needed    AMLODIPINE BESYLATE PO   Take 10 mg by mouth daily   Last time this was given:  10 mg on 7/21/2018 10:22 AM   Next Dose Due:  7/22                                   amoxicillin-clavulanate 500-125 MG per tablet   Commonly known as:  AUGMENTIN   Take 1 tablet by mouth daily   Next Dose Due:  7/21                                    * calcium acetate 667 MG Caps capsule   Commonly known as:  PHOSLO   Take 667 mg by mouth 3 times daily (with meals)   Last time this was given:  667 mg on 7/21/2018 10:23 AM   Next Dose Due:  7/21                                   * calcium acetate 667 MG Caps capsule   Commonly known as:  PHOSLO   Take 667 mg by mouth Take with snacks or supplements   Last time this was given:  667 mg on 7/21/2018 10:23 AM                                chlorhexidine 0.12 % solution   Commonly known as:  PERIDEX   Swish and spit 15 mLs in mouth 2 times daily                                HYDRALAZINE HCL PO   Take 25 mg by mouth 3 times daily   Last time this was given:  25 mg on 7/21/2018 10:23 AM   Next Dose Due:  7/21                                   LABETALOL HCL PO   Take 200 mg by mouth 2 times daily   Last time this was given:  200 mg on 7/21/2018 10:23 AM   Next Dose Due:  7/21                                   * Notice:  This list has 2 medication(s) that are the same as other medications prescribed for you. Read the directions carefully, and ask your doctor or other care provider to review them with you.

## 2018-07-19 NOTE — PHARMACY-ADMISSION MEDICATION HISTORY
Admission medication history interview status for the 7/19/2018  admission is complete. See EPIC admission navigator for prior to admission medications     Medication history source reliability:Good    Actions taken by pharmacist (provider contacted, etc):  Spoke w/ patient.  Reviewed prescription bottles brought in with the patient.      Additional medication history information not noted on PTA med list :None    Medication reconciliation/reorder completed by provider prior to medication history? No    Time spent in this activity: 15 minutes    Prior to Admission medications    Medication Sig Last Dose Taking? Auth Provider   AMLODIPINE BESYLATE PO Take 10 mg by mouth daily 7/18/2018 at Unknown time Yes Reported, Patient   calcium acetate (PHOSLO) 667 MG CAPS capsule Take 667 mg by mouth Take with snacks or supplements 7/18/2018 at Unknown time Yes Unknown, Entered By History   calcium acetate (PHOSLO) 667 MG CAPS Take 667 mg by mouth 3 times daily (with meals)  7/18/2018 at Unknown time Yes Reported, Patient   HYDRALAZINE HCL PO Take 25 mg by mouth 3 times daily  7/18/2018 at Unknown time Yes Reported, Patient   LABETALOL HCL PO Take 200 mg by mouth 2 times daily 7/18/2018 at Unknown time Yes Reported, Patient     María Jackson, BryD, BCPS

## 2018-07-19 NOTE — ED NOTES
[facility]  ED Nurse Handoff Report    ED Chief complaint: Mouth Problem (pt had a wisdom tooth pulled today. told to come here for infection. pt is a dialysis pt)      ED Diagnosis:   Final diagnoses:   Odontogenic infection of jaw       Code Status: Full Code    Allergies: No Known Allergies    Activity level - Baseline/Home:  Independent    Activity Level - Current:   Independent     Needed?: No    Isolation: No  Infection: Not Applicable  Bariatric?: No    Vital Signs:   Vitals:    07/19/18 1402   BP: 139/75   Resp: 20   Temp: 99.5  F (37.5  C)   TempSrc: Oral   SpO2: 100%       Cardiac Rhythm: ,        Pain level: 0-10 Pain Scale: 3    Is this patient confused?: No   [place] - Suicide Severity Rating Scale Completed?  Yes  If yes, what color did the patient score?  White    Patient Report: Initial Complaint: mouth problem/infection  Focused Assessment:   Pt got his infected wisdom tooth pulled today. Pt noticed the infection on Monday. When he was at the Dentist/Oral surgeon today they told him to come to the ED due to the extensiveness of the infection and due to patient's known kidney/dialysis history. Patient on scheduled dialysis for many years.  Tests Performed: labs,   Abnormal Results: CT and kidney function.   Treatments provided: antibiotics    Family Comments: SO at bedside    OBS brochure/video discussed/provided to patient: N/A    ED Medications:   Medications   piperacillin-tazobactam (ZOSYN) 2.25 g vial to attach to  ml bag (0 g Intravenous Stopped 7/19/18 1701)       Drips infusing?:  No    For the majority of the shift this patient was Green.   Interventions performed were n/a.    Severe Sepsis OR Septic Shock Diagnosis Present: No      ED NURSE PHONE NUMBER: [phone]

## 2018-07-19 NOTE — IP AVS SNAPSHOT
[address], [address]    [facility] [address]    Phone:  [phone]                                       After Visit Summary   7/19/2018    Kailash Sorto    MRN: [id number]           After Visit Summary Signature Page     I have received my discharge instructions, and my questions have been answered. I have discussed any challenges I see with this plan with the nurse or doctor.    ..........................................................................................................................................  Patient/Patient Representative Signature      ..........................................................................................................................................  Patient Representative Print Name and Relationship to Patient    ..................................................               ................................................  Date                                            Time    ..........................................................................................................................................  Reviewed by Signature/Title    ...................................................              ..............................................  Date                                                            Time

## 2018-07-19 NOTE — ED PROVIDER NOTES
History     Chief Complaint:  Mouth infection      HPI   Kailash Sorto is a 32 year old male with a history of kidney disease (MWF dialysis) who presents with a mouth infection. The patient states that 4 days ago he started experiencing pain in his left lower wisdom tooth. 2 days ago, a dentist found that he has an infection in the area of the painful tooth and the patient was started on penicillin. Today, he had an oral surgeon remove the infected tooth and drained pus out of the mouth. The oral surgeon was concerned for a serious infection and instructed the patient to come to the ED. The patient also reports chills, tongue swelling, and inability to eat due to the pain. He denies any fevers, breathing issues, abdominal pain, vomiting, or nausea. He also denies a history of mouth infections. The patient missed dialysis yesterday due to this acute issue.         Additional history provided by Dr. Flowers  I discussed the patient s case with his oral surgeon who referred him to the ED, Dr. Flowers. He states that the patient has significant infection of the left lower mandibular wisdom tooth, extending into the mandible, and along the ramus of the mandible up into the lateral tonsillar area. Dr. Flowers performed extraction of the wisdom tooth with expression of >10 mLs of adela pus surrounding and involving the mandible. Following this , he reports that he dissected along the lateral ramus up to the lateral tonsillar area where he encountered a second pocket of a large amount of pus. He was able to decompress this as well. He states he is concerned for other possible areas of infection or extending deep tissue infection. He recommended admission to the hospital with observation and IV antibiotics given the extent of the infection, which he states is the worst one he has seen all year.     Allergies:  No known allergies    Medications:    Amlodipine besylate  Phoslo  Hydralazine  Labetalol     Past Medical History:     Anemia of chronic kidney failure  Dialysis patient  End stage kidney disease  Hypertension    Past Surgical History:    Fistula creating  Vascular surgery    Family History:    Diabetes  Lung cancer  Hypertension    Social History:  Patient presents with family.  Negative for tobacco use.  Negative for alcohol use.     Review of Systems   Constitutional: Positive for chills. Negative for fever.   HENT: Positive for dental problem and trouble swallowing.    Respiratory: Negative for shortness of breath.    Gastrointestinal: Negative for abdominal pain, nausea and vomiting.   All other systems reviewed and are negative.      Physical Exam   First Vitals:  BP: 139/75  Heart Rate: 88  Temp: 99.5  F (37.5  C)  Resp: 20  SpO2: 100 %      Physical Exam  General: Well appearing, nontoxic. Resting comfortably  Head:  Scalp, face, and head appear normal  Eyes:  Pupils are equal, round, and reactive to light    Conjunctivae non-injected and sclerae white  ENT:    The external nose is normal    Pinnae are normal    The oropharynx is normal, mucous membranes moist    + mild trismus    Tooth #17 (left mandibular wisdom tooth) s/p extraction with gauze packing in place. No significant associated gingival swelling or fluctuance. No active bleeding or purulent drainage. Tongue normal. No sublingual swelling or tongue elevation. Remainder of teeth appear normal.     Uvula is in the midline  Neck:  Normal range of motion. Soft tissues of the anterior and lateral neck without swelling, erythema, induration or fluctuance. Minimal tenderness to palpation to soft tissues inferior to angle of left mandible.     There is no rigidity noted    Trachea is in the midline  CV:  Regular rate and rhythm     Normal S1/S2, no S3/S4    No murmur or rub  Resp:  Lungs are clear and equal bilaterally    There is no tachypnea    No increased work of breathing    No rales, wheezing, or rhonchi  GI:  Abdomen is soft, no rigidity or guarding    No  tenderness or rebound tenderness   MS:  Normal muscular tone    Symmetric motor strength    No lower extremity edema  Skin:  No rash or acute skin lesions noted  Neuro:  Awake and alert    Speech is normal and fluent    Moves all extremities spontaneously  Psych: Normal affect.  Appropriate interactions.     Emergency Department Course     Imaging:  Radiographic findings were communicated with the patient who voiced understanding of the findings.  CT Soft Tissue Neck w/o Contrast:   1. Complex gas collection lateral to the body of the mandible adjacent  to the site of the tooth extraction. This could represent a abscess.  It could also be due to packing material within the bone defect in the  mandible extending to the lateral cheek region. Clinical correlation  suggested.  2. No deep space abscess is identified but there is significant soft  tissue edema medial to the mandible extending to the level of the left  palatine tonsil.  3. Soft tissue edema extending into the left submandibular space. This  would be compatible with cellulitis.        Laboratory:  Blood culture x2: Pending  CBC: WBC: 8.88, HGB: 11.0, PLT: 247  BMP: Glucose 104 (H), Bun: 49 (H), Creatinine: 7.02 (H), GFR: 11 (L), o/w WNL    Interventions:  1624 - Zosyn 2.25g IV      Emergency Department Course:  Nursing notes and vitals reviewed.  1434: I performed an exam of the patient as documented above.     1450: I discussed the case with oral surgeon Dr. Flowers regarding the patient.     1636: I discussed the case with ENT Dr. Kennedy regarding the patient. Dr. Kennedy felt that no surgical intervention is needed at this time.     1645: Discussed the patient with Dr. Sparks, who will admit the patient  for further monitoring, evaluation, and treatment.     1650 -Findings and plan explained to the Patient who consents to admission.       Impression & Plan    Medical Decision Making:  Kailash Sorto is a 32 year old male with a history of end stage renal  disease, currently listed for possible renal transplant referred to the ED by Dr. Flowers of [facility] for complicated mandibular left third molar odontogenic abscess and soft tissue infection. Patient had the wisdom tooth extracted today and description of the procedure is noted above in the HPI. Patient apparently had extensive purulence tracking along the mandible and into the soft tissues of the neck, therefore he was referred to the ED for further evaluation and treatment. Clinically, the patient is well appearing, afebrile, and does not appear to be systemically ill or septic at this time. Patient is symptomatically feeling improved since his wisdom tooth removal and drainage of abscess. CT scan was obtained which does not reveal any definitive evidence of deep space infection, although there is significant facial and neck edema, consistent with underlying cellulitis. I discussed the case with Dr. Brent TEMPLE who felt that no further surgical intervention needed at this time. Dr. Flowers felt very strongly that the patient should be admitted for IV antibiotics and observation given the extensive nature of his infection. Source control appears to have been obtained by Dr. Flowers. Zosyn is started in the ED at renal dosing. The case was discussed with the hospitalist who will admit the patient for further evaluation and treatment. Patient was admitted in stable condition.       Diagnosis:    ICD-10-CM    1. Odontogenic infection of jaw M27.2      Juan Manuel MONAE, am serving as a scribe on 7/19/2018 at 2:34 PM to personally document services performed by Juan Manuel Parham MD based on my observations and the provider's statements to me.       Juan Manuel Stinson  7/19/2018    EMERGENCY DEPARTMENT       Juan Manuel Parham MD  07/19/18 2006

## 2018-07-20 ENCOUNTER — DOCUMENTATION ONLY (OUTPATIENT)
Dept: TRANSPLANT | Facility: CLINIC | Age: 33
End: 2018-07-20

## 2018-07-20 ENCOUNTER — TELEPHONE (OUTPATIENT)
Dept: TRANSPLANT | Facility: CLINIC | Age: 33
End: 2018-07-20

## 2018-07-20 LAB
ANION GAP SERPL CALCULATED.3IONS-SCNC: 10 MMOL/L (ref 3–14)
BUN SERPL-MCNC: 53 MG/DL (ref 7–30)
CALCIUM SERPL-MCNC: 9.2 MG/DL (ref 8.5–10.1)
CHLORIDE SERPL-SCNC: 102 MMOL/L (ref 94–109)
CO2 SERPL-SCNC: 24 MMOL/L (ref 20–32)
CREAT SERPL-MCNC: 6.99 MG/DL (ref 0.66–1.25)
ERYTHROCYTE [DISTWIDTH] IN BLOOD BY AUTOMATED COUNT: 13.1 % (ref 10–15)
GFR SERPL CREATININE-BSD FRML MDRD: 9 ML/MIN/1.7M2
GLUCOSE SERPL-MCNC: 97 MG/DL (ref 70–99)
HCT VFR BLD AUTO: 30.9 % (ref 40–53)
HGB BLD-MCNC: 10.4 G/DL (ref 13.3–17.7)
MCH RBC QN AUTO: 29.8 PG (ref 26.5–33)
MCHC RBC AUTO-ENTMCNC: 33.7 G/DL (ref 31.5–36.5)
MCV RBC AUTO: 89 FL (ref 78–100)
PLATELET # BLD AUTO: 234 10E9/L (ref 150–450)
POTASSIUM SERPL-SCNC: 3.8 MMOL/L (ref 3.4–5.3)
RBC # BLD AUTO: 3.49 10E12/L (ref 4.4–5.9)
SODIUM SERPL-SCNC: 136 MMOL/L (ref 133–144)
WBC # BLD AUTO: 4.9 10E9/L (ref 4–11)

## 2018-07-20 PROCEDURE — 90937 HEMODIALYSIS REPEATED EVAL: CPT

## 2018-07-20 PROCEDURE — 12000000 ZZH R&B MED SURG/OB

## 2018-07-20 PROCEDURE — 63400005 ZZH RX 634: Performed by: INTERNAL MEDICINE

## 2018-07-20 PROCEDURE — 25000128 H RX IP 250 OP 636: Performed by: INTERNAL MEDICINE

## 2018-07-20 PROCEDURE — 85027 COMPLETE CBC AUTOMATED: CPT | Performed by: INTERNAL MEDICINE

## 2018-07-20 PROCEDURE — 80048 BASIC METABOLIC PNL TOTAL CA: CPT | Performed by: INTERNAL MEDICINE

## 2018-07-20 PROCEDURE — 25000132 ZZH RX MED GY IP 250 OP 250 PS 637: Performed by: INTERNAL MEDICINE

## 2018-07-20 PROCEDURE — 5A1D70Z PERFORMANCE OF URINARY FILTRATION, INTERMITTENT, LESS THAN 6 HOURS PER DAY: ICD-10-PCS | Performed by: INTERNAL MEDICINE

## 2018-07-20 PROCEDURE — 36415 COLL VENOUS BLD VENIPUNCTURE: CPT | Performed by: INTERNAL MEDICINE

## 2018-07-20 PROCEDURE — 99232 SBSQ HOSP IP/OBS MODERATE 35: CPT | Performed by: INTERNAL MEDICINE

## 2018-07-20 RX ORDER — DOXERCALCIFEROL 4 UG/2ML
1 INJECTION INTRAVENOUS
Status: DISCONTINUED | OUTPATIENT
Start: 2018-07-20 | End: 2018-07-21 | Stop reason: HOSPADM

## 2018-07-20 RX ADMIN — SODIUM CHLORIDE 500 MG: 9 INJECTION, SOLUTION INTRAVENOUS at 14:24

## 2018-07-20 RX ADMIN — AMLODIPINE BESYLATE 10 MG: 10 TABLET ORAL at 08:05

## 2018-07-20 RX ADMIN — LABETALOL HCL 200 MG: 200 TABLET, FILM COATED ORAL at 08:05

## 2018-07-20 RX ADMIN — EPOETIN ALFA 5000 UNITS: 3000 SOLUTION INTRAVENOUS; SUBCUTANEOUS at 13:22

## 2018-07-20 RX ADMIN — CALCIUM ACETATE 667 MG: 667 CAPSULE ORAL at 16:56

## 2018-07-20 RX ADMIN — LABETALOL HCL 200 MG: 200 TABLET, FILM COATED ORAL at 22:06

## 2018-07-20 RX ADMIN — SODIUM CHLORIDE 250 ML: 9 INJECTION, SOLUTION INTRAVENOUS at 10:25

## 2018-07-20 RX ADMIN — ACETAMINOPHEN 650 MG: 325 TABLET, FILM COATED ORAL at 14:32

## 2018-07-20 RX ADMIN — PIPERACILLIN SODIUM,TAZOBACTAM SODIUM 2.25 G: 2; .25 INJECTION, POWDER, FOR SOLUTION INTRAVENOUS at 00:52

## 2018-07-20 RX ADMIN — HYDRALAZINE HYDROCHLORIDE 25 MG: 25 TABLET ORAL at 16:56

## 2018-07-20 RX ADMIN — HYDRALAZINE HYDROCHLORIDE 25 MG: 25 TABLET ORAL at 22:06

## 2018-07-20 RX ADMIN — ACETAMINOPHEN 650 MG: 325 TABLET, FILM COATED ORAL at 22:10

## 2018-07-20 RX ADMIN — HYDRALAZINE HYDROCHLORIDE 25 MG: 25 TABLET ORAL at 08:05

## 2018-07-20 RX ADMIN — SODIUM CHLORIDE 300 ML: 9 INJECTION, SOLUTION INTRAVENOUS at 10:25

## 2018-07-20 RX ADMIN — DOXERCALCIFEROL 1 MCG: 4 INJECTION INTRAVENOUS at 13:22

## 2018-07-20 ASSESSMENT — ACTIVITIES OF DAILY LIVING (ADL)
ADLS_ACUITY_SCORE: 9

## 2018-07-20 ASSESSMENT — PAIN DESCRIPTION - DESCRIPTORS
DESCRIPTORS: DISCOMFORT
DESCRIPTORS: ACHING

## 2018-07-20 NOTE — CONSULTS
Consult Date:  07/20/2018      REQUESTING PHYSICIAN:  Gabi Sparks MD      HISTORY OF PRESENT ILLNESS:  Kailash Sorto is a 32-year-old man whom we were asked to see to assist in evaluation and management of ESRD requirements.  The patient has been admitted yesterday afternoon following emergency room evaluation of a nonhealing odontogenic infection involving the left mandible following extraction of an infected wisdom tooth.  The patient's oral surgeon has been concerned about the need for IV antibiotic therapy.  The patient has consultations pending with Otolaryngology and also with Infectious Disease.      Mr. Sorto is well known to our group.  He has end-stage renal disease secondary to hypertensive nephrosclerosis.  He has been on maintenance dialysis for approximately 2 years and in general has done well.  He has a well-functioning right upper arm AV fistula.  His dialysis care is every Monday, Wednesday and Friday at the [facility] under the care of Dr. Cobos from our group.  The patient's dialysis duration is 3-1/2-hours.  His target post-run weight is 77 kg.  He manages his fluids well and seldom gains as much as 2 kilograms between dialysis runs.  He requires intravenous erythropoietin for chronic anemia and Hectorol for secondary hyperparathyroidism, which he receives each dialysis run.      Mr. Sorto ran as usual on 07/18 without complication.  Recent lab results in the dialysis unit have been stable.  He has good electrolyte balance.  Recent potassium is 4.6.  His calcium and phosphorus levels are satisfactory on the current regimen of Hectorol, mealtime calcium acetate and a daily vitamin D3 supplement.  A recent hemoglobin level is about 10.      He is on an antihypertensive regimen of amlodipine, hydralazine and labetalol chronically, and these have been continued during the hospitalization.  He was given Zosyn in a dose-adjusted fashion initially, which has been switched by   Shyam after Infectious Disease consultation to ertapenem in a 500 mg every 24-hour dose.      Mr. Sorto's admission labs show no findings of concern.  He has normal electrolytes.  His BUN and creatinine are approximately 50 and 7.0, respectively.  Calcium is normal.  He has no history of glucose intolerance, and glucoses have been good.  His hemoglobin is between 10 and 11.  His platelets and white blood cell count are in normal range.  Blood culture is pending from admission and is negative thus far.  The left side of his neck is scanned by CT.  There is soft tissue swelling lateral to the mandible and a small amount of gas in the tissues laterally.  The platysmus is thickened.  There is also soft tissue edema medial to the mandible, and the left palatine tonsil is edematous as well.      PHYSICAL EXAMINATION:   GENERAL:  Mr. Sorto is examined during the course of a stable dialysis run.  He is afebrile at this time.   VITAL SIGNS:  Temperature was 101 yesterday on admission.  Pulse is regular, sinus rhythm at about 80.  Blood pressure is 135/72.  Respirations are unlabored at 16.  Room air saturation is 100%.   SKIN:  Shows satisfactory color and turgor.  There is no lymphadenopathy.  The patient's left jaw area is moderately swollen.  Otherwise, his facial features seem normal.   HEENT:  His mucous membranes are pink and moist.  He has no stridor and, in general, appears quite comfortable.   NECK:  There is no jugular venous distention.   LUNGS:  Clear.   HEART:  Normal without murmur, rub or gallop.   ABDOMEN:  Negative without tenderness, masses or organomegaly.  Bowel sounds are normally active.   EXTREMITIES:  Warm.  There is no peripheral edema.  Peripheral pulses are normal.  Right upper arm AV fistula looks fine and is performing well on dialysis.   NEUROLOGIC:  Demonstrates no focal deficit.      ASSESSMENT:  Mr. Sorto's end-stage renal disease appears well managed in the outpatient setting.  We will  continue his usual dialysis orders during the inpatient stay.  We expect a satisfactory dialysis run today.  We will provide dialysis treatment on an ongoing Monday, Wednesday, Friday schedule and will await further input by other physicians with regard to the timing of hospital discharge.      Thank you for the opportunity to assist again in Mr. Cooper's care.  Our group will follow as appropriate during the remainder of his hospital stay.         ANDREA HOLDER MD             D: 2018   T: 2018   MT: DT      Name:     DIANELYS COOPER   MRN:      [id number]        Account:       [id number]   :      1985           Consult Date:  2018      Document: [id number]       cc: Gabi Dumont MD

## 2018-07-20 NOTE — PLAN OF CARE
Problem: Patient Care Overview  Goal: Plan of Care/Patient Progress Review  Outcome: No Change    Pt is  A and O X 4. VSS on RA. Afebrile this shift. Went down for dialysis today. Iv antibiotics held until dialysis completed.  Pt is on Renal/ mechanical soft diet.  Up independently. Reports mild pain this shift to L side jaw/neck. Refused pain intervention prior to going down to dialysis.  Renal/mechanical soft diet: refused to eat this shift due to face/mouth discomfort.  Fistula to RUE. Dialysis MWF. LPIV SL, IV abx,. Pt arrived back to floor at about 1400. Plans for discharge tomorrow evening per reuben/surgery report. Continue to monitor.

## 2018-07-20 NOTE — PROGRESS NOTES
Inpatient Dialysis Progress Note           Assessment and Plan:   ESRD status quo.  Stable dialysis today.  Expect good chemical exchange and satisfactory fluid balance by end of run.  Next HD 7/23.  Await further input from other MD's re: expected duration of in-pt stay.      Odontogenic infection of jaw    * No resolved hospital problems. *               Interval History:   no new complaints, up and ambulating, alert, oriented to person, place and time and doing well; no cp, sob, n/v/d, or abd pain.  Examined during run.  VS ok.  Earlier fever resolved.  Meds and labs reviewed.                   Dialysis Details:   Current weight: 74.4 kg (actual weight)  Dry Weight: 77  Dialysis Temp: 36.5  C  Access Device: AVF  Access Site: right  Dialyzer: Optiflux 160  Dialysis Bath: K+ 3  Sodium Profile: no  UF Goal: keep even  Blood Flow Rate (mL/min): 450  Total Treatment Time (hrs): 3.5  Heparin: none    Medications:  EPO dose: 5000  Zemplar: 1 mcg  IV Fe: no            Medications:       - MEDICATION INSTRUCTIONS for Dialysis Patients -   Does not apply See Admin Instructions     amLODIPine (NORVASC) tablet 10 mg  10 mg Oral Daily     calcium acetate  667 mg Oral TID w/meals     doxercalciferol  1 mcg Intravenous Once per day on Mon Wed Fri     epoetin manny (EPOGEN,PROCRIT) inj ESRD  5,000 Units Intravenous Once per day on Mon Wed Fri     ertapenem (INVanz) IV  500 mg Intravenous Q24H     hydrALAZINE (APRESOLINE) tablet 25 mg  25 mg Oral TID     labetalol (NORMODYNE) tablet 200 mg  200 mg Oral BID     - MEDICATION INSTRUCTIONS -   Does not apply Once     sodium chloride (PF)  3 mL Intracatheter Q8H                      Physical Exam:       Vital Sign Ranges  Temp:  [98.3  F (36.8  C)-101  F (38.3  C)] 98.5  F (36.9  C)  Heart Rate:  [74-94] 85  Resp:  [16-20] 16  BP: (111-143)/(58-80) 135/72  SpO2:  [98 %-100 %] 100 %    Weight, current: 74.4 kg (actual weight)  Weight change:     I/O last 3 completed shifts:  In: 240  [P.O.:240]  Out: -     Physical Exam:   General:  Patient comfortable, in no apparent distress.  Awake, alert, oriented x3.  Neck:  Supple, no JVD.  Swelling on left.  No stridor.  Lungs:  Clear to auscultation bilaterally.  Cardiac:  Regular rate and rhythm, no murmurs, rub, or gallops.  Abdomen:  Soft, nontender, physiologic sounds.  Extremities:  Without edema.  2+ pulses.  Skin:  Warm, dry.  Neurologic:  No focal deficits.             Data:        Lab Results   Component Value Date     07/20/2018    Lab Results   Component Value Date    CHLORIDE 102 07/20/2018    Lab Results   Component Value Date    BUN 53 (H) 07/20/2018      Lab Results   Component Value Date    POTASSIUM 3.8 07/20/2018    Lab Results   Component Value Date    CO2 24 07/20/2018    Lab Results   Component Value Date    CR 6.99 (H) 07/20/2018        Lab Results   Component Value Date     07/20/2018     07/19/2018     08/04/2016     Lab Results   Component Value Date    POTASSIUM 3.8 07/20/2018    POTASSIUM 4.2 07/19/2018    POTASSIUM 3.4 08/04/2016     Lab Results   Component Value Date    CHLORIDE 102 07/20/2018    CHLORIDE 100 07/19/2018    CHLORIDE 100 08/04/2016     Lab Results   Component Value Date    CO2 24 07/20/2018    CO2 27 07/19/2018    CO2 30 08/04/2016     Lab Results   Component Value Date    CR 6.99 (H) 07/20/2018    CR 7.02 (H) 07/19/2018    CR 5.40 (H) 08/04/2016     Lab Results   Component Value Date    BUN 53 (H) 07/20/2018    BUN 49 (H) 07/19/2018    BUN 25 08/04/2016     Lab Results   Component Value Date    HGB 10.4 (L) 07/20/2018    HGB 11.0 (L) 07/19/2018    HGB 12.4 (L) 05/31/2018     No results found for: PH, PHARTERIAL, PO2, [id number], SAT, PCO2, HCO3, BASEEXCESS, YONATAN, BEB        Levi Mo MD  Nephrology; [employer]  [phone]

## 2018-07-20 NOTE — PROGRESS NOTES
[facility]    Hospitalist Progress Note    Assessment & Plan    Mr. Kailash Sorto is a very pleasant 32-year-old gentleman with past medical history of hypertension, end-stage renal disease on hemodialysis and anemia of chronic kidney disease who was sent from Oral [facility]ill[facility] Surgery because of concern for a jaw infection.       Dental abscess/odontogenic jaw infection.  He started having swelling and pain of his left side of the jaw on Monday.  He started taking penicillin on Tuesday prescribed by his dentist.  He saw Dr. Flowers of Oral Maxillofacial Surgery . He was found to have significant infection of the left lower mandibular wisdom tooth that was extracted and 10 mL of adela pus surrounding the infected tooth was I and Ds, but then he dissected along the lateral ramus of the mandible and there was a second pocket of a large amount of pus that was decompressed as well.  Because he was concerned for possible areas of infection extending into the deep tissues of the neck, he was sent to ER for IV antibiotics.   - The CT of the soft tissues of the neck showed some complex gas collection lateral to the body of the mandible adjacent to the site of the tooth extraction that could represent an abscess versus packing material within the bone defect.  There is no deep space abscess identified.  There is some soft tissue edema extending into the left submandibular space compatible with cellulitis.   -started on IV zosyn . Appreciate ID and OMFS input   -no further surgical indication per OMFS , abx changed to ertapenem, continue , await final culture data      Hypertension.    His blood pressure seems to be reasonably well controlled at this time on his prior to admission Norvasc, hydralazine and labetalol, continue with holding parameters.     End-stage renal disease, on hemodialysis.  His schedule is Monday, Wednesday, Friday.   He is on the kidney transplant list.    Getting dialysis while inhouse  , appreciate nephrology input     Pain assessment :  Current Pain Score 7/20/2018   Patient currently in pain? no   Pain score (0-10) 1   Pain location Face   Pain descriptors -   Kailash rao pain level was assessed and he currently denies pain.      DVT Prophylaxis: Pneumatic Compression Devices  Code Status: Full Code    Disposition: Expected discharge in 1-2 days once cleared by all consultants and antibiotic plan formulated.    Naye Carter MD  Text page (7am - 6pm)    Interval History   Patient without any new complaints.  Wanted to go home today.  No new nursing concerns.  Seen patient at dialysis.  He reports that his swelling and pain has significantly improved    -Data reviewed today: I reviewed all new labs and imaging results over the last 24 hours. I personally reviewed no images or EKG's today.    Physical Exam   Temp: 98  F (36.7  C) Temp src: Axillary BP: 129/72   Heart Rate: 87 Resp: 16 SpO2: 98 % O2 Device: None (Room air)    Vitals:    07/20/18 0945   Weight: 74.4 kg (164 lb 0.4 oz)     Vital Signs with Ranges  Temp:  [98  F (36.7  C)-101  F (38.3  C)] 98  F (36.7  C)  Heart Rate:  [74-94] 87  Resp:  [16-18] 16  BP: (111-143)/(58-80) 129/72  SpO2:  [98 %-100 %] 98 %  I/O last 3 completed shifts:  In: 240 [P.O.:240]  Out: 0     Exam:  Constitutional: Awake, alert and no distress. Appears comfortable  Head: Normocephalic. No masses, lesions, or abnormalities.  Dressing in place at the  Left mandibular region.  Oral opening limited.  Some swelling on the left mandibular region   ENT: no neck nodes or sinus tenderness  Cardiovascular: RRR. no Murmurs, no rub or gallop no JVD  Respiratory normal WOB, no wheezes or crackles   Gastrointestinal: Abdomen soft, non-tender. BS normal. No masses, organomegaly  : Deferred  Extremities:no   edema , no clubbing /cyanosis   Skin: Warm and dry, no rash        Medications       - MEDICATION INSTRUCTIONS for Dialysis Patients -   Does not apply See Admin Instructions      amLODIPine (NORVASC) tablet 10 mg  10 mg Oral Daily     calcium acetate  667 mg Oral TID w/meals     doxercalciferol  1 mcg Intravenous Once per day on Mon Wed Fri     epoetin manny (EPOGEN,PROCRIT) inj ESRD  5,000 Units Intravenous Once per day on Mon Wed Fri     ertapenem (INVanz) IV  500 mg Intravenous Q24H     hydrALAZINE (APRESOLINE) tablet 25 mg  25 mg Oral TID     labetalol (NORMODYNE) tablet 200 mg  200 mg Oral BID     - MEDICATION INSTRUCTIONS -   Does not apply Once     sodium chloride (PF)  3 mL Intracatheter Q8H       Data     Recent Labs  Lab 07/20/18  0750 07/19/18  1458   WBC 4.9 8.8   HGB 10.4* 11.0*   MCV 89 89    247    137   POTASSIUM 3.8 4.2   CHLORIDE 102 100   CO2 24 27   BUN 53* 49*   CR 6.99* 7.02*   ANIONGAP 10 10   GRAHAM 9.2 9.4   GLC 97 104*       Imaging:  No results found for this or any previous visit (from the past 24 hour(s)).

## 2018-07-20 NOTE — H&P
Admitted:     07/19/2018      PRIMARY NEPHROLOGIST:  Dr. Cobos.      CHIEF COMPLAINT:  Jaw infection.      HISTORY OF PRESENT ILLNESS:  Has been obtained from the patient who is a relatively good historian.  I also discussed with ER attending, Dr. Parham.      Mr. Kailash Sorto is a very pleasant 32-year-old -American gentleman with past medical history of hypertension, end-stage renal disease on hemodialysis and anemia of chronic kidney disease who was sent from oral maxillofacial office because of concern for a jaw infection.      The patient has a history of end-stage renal disease, thought to be due to hypertensive nephrosclerosis.  He is on hemodialysis, scheduled Monday, Wednesday and Friday.  He follows up with Dr. Cobos.  Apparently, he is on the transplant list.  He states that for a while he had problems with his left lower wisdom teeth.  He was planning to have his wisdom teeth removed eventually.  On Monday morning, he states that he started having swelling and pain associated with his left lower wisdom tooth.  He started having facial swelling extending to his submandibular area.  He denies having fevers at home, but he reported having some chills.  He does have some mild pain with swallowing, but able to swallow.  He saw his dentist on Tuesday who started him on penicillin every 6 hours and ibuprofen.  He did start taking the penicillin and the NSAIDs.  He was referred to see an oral and maxillofacial surgeon today.  He saw Dr. Tom Flowers earlier today who found that the patient was having significant infection of the left lower mandibular wisdom tooth extending into the mandible along with the ramus of the mandible and up into tonsillar area where Dr. Flowers extracted a wisdom tooth with draining of 10 mL of pus.  Apparently, he also dissected along the lateral ramus up to the lateral tonsillar area where there was a second pocket of a large amount of pus.  Because he was concerned for other  possible areas of infection extending deep to his neck tissues, he recommended the patient to come to the ER for IV antibiotics and further management.      Upon further questioning, the patient reported that the pain improved after he had the procedure earlier today.  He reported feeling better.  He still cannot open his mouth completely.  He denies any headaches.  He denies any chest pain, no shortness of breath, no palpitations, no abdominal pain, no nausea, no vomiting, no diarrhea, no constipation, no leg swelling.  Of note, he missed hemodialysis on Wednesday because he was not feeling well.      In the ER, he was seen by Dr. Parham.  His initial vitals showed a blood pressure of 139/75, heart rate was 88, respiratory rate 20, oxygen saturation 100% on room air and he was febrile at 101 in the ER.  He had basic blood work done in the ER, which showed the BMP with elevated BUN of 49, creatinine 7 due to his end-stage renal disease.  White blood cells was normal at 8.8, hemoglobin 11, hematocrit 32.3 and normal platelet count.  He had blood cultures checked in the ER, pending at this time.  He had a CT of the soft tissues of the neck without contrast, which showed a complex gas collection lateral to the body of the mandible adjacent to the site of the tooth extraction that could represent abscess, but it may be also due to packing material within the bone defect in the mandible.  There is no deep space abscess identified, but there is significant soft tissue edema medial to the mandible extending to the level of the left palatine tonsil.  There is also soft tissue edema extending into the left submandibular space compatible with cellulitis.  In the ER, he received 1 dose of Zosyn IV.  Dr. Parham discussed with Dr. Flowers who confirmed the above-mentioned findings.  Dr. Parham also discussed with ENT on-call, Dr. Kennedy who reviewed his CAT scan.  As per report, no further surgical interventions recommended at this  time and Hospitalist Service was called regarding the admission.      PAST MEDICAL HISTORY:   1.  Hypertension.   2.  End-stage renal disease, on hemodialysis.  It was felt that his end-stage renal disease was due to hypertensive nephrosclerosis.  He is on hemodialysis for the last 2 years.  His schedule is Monday, Wednesday, Friday.  He missed hemodialysis last Wednesday.  He is on the kidney transplant list.   3.  Anemia of chronic kidney disease.      PAST SURGICAL HISTORY:    Past Surgical History:   Procedure Laterality Date     CREATE FISTULA ARTERIOVENOUS UPPER EXTREMITY Right 5/15/2016    Procedure: CREATE FISTULA ARTERIOVENOUS UPPER EXTREMITY;  Surgeon: Ricardo Gallo MD;  Location:  OR     CREATE FISTULA ARTERIOVENOUS UPPER EXTREMITY Right 2016    Procedure: CREATE FISTULA ARTERIOVENOUS UPPER EXTREMITY;  Surgeon: Payam Denise MD;  Location:  OR     VASCULAR SURGERY      placement of jugular tunnel catheter        SOCIAL HISTORY:  The patient denies smoking.  He denies illicit drug abuse.  He states that he drinks once a week, a few drinks at a time.      FAMILY HISTORY:  Reviewed with the patient.  His father had diabetes mellitus.  His mother  when she was in her 30s in a car accident.  He does not have children.      PRIOR TO ADMISSION MEDICATIONS:   1.  Norvasc 10 mg p.o. daily.   2.  Hydralazine 25 mg p.o. 3 times daily.   3.  Labetalol 200 mg p.o. twice daily.   4.  PhosLo 600 mg p.o. 3 times daily with meals and with snacks.      ALLERGIES:  NO KNOWN DRUG ALLERGIES.      REVIEW OF SYSTEMS:  A 10-point review of systems was conducted and it was negative except for pertinent positives mentioned in history of present illness.      PHYSICAL EXAMINATION:   VITAL SIGNS:  Blood pressure is 143/69, heart rate 94, temperature 101 in ER, respiratory rate 18, oxygen saturation 98% on room air.   GENERAL:  The patient is awake, alert, no acute distress at the time of my  examination.   HEENT:  Head is normocephalic, atraumatic.  Pupils are equally round and reactive to light.  He has mild swelling of the left side of the mandible extending to the left submandibular area.  He has mild trismus.  I did not examine his mouth as he is having packing in place and also difficult for him to open his mouth.     NECK:  Mild swelling over left lateral side of the neck without erythema or fluctuance.   RESPIRATORY:  There is bilateral air entry, no wheezing, no rales, no crackles.   CARDIOVASCULAR:  There is normal S1 and S2, regular rate and rhythm, no murmurs, no rubs.   ABDOMEN:  Soft, nontender, nondistended.  Bowel sounds are present.   EXTREMITIES:  There is no leg swelling, no calf tenderness, 2+ peripheral pulses are palpable.  He has an AV fistula with good thrill over his left upper extremity.     NEUROLOGICAL:  The patient is awake, alert, oriented to self, place and time.  No focal neurological deficits.   PSYCHIATRIC:  Normal mood, normal affect.   SKIN:  No rashes, no cyanosis.   MUSCULOSKELETAL:  He moves all extremities freely.  There are no obvious joint deformities.      LABORATORY DATA:  BMP with sodium 137, potassium 4.2, chloride 100, bicarb 27, BUN 49, creatinine 7, calcium is 9.4, anion gap of 10, glucose 104.  White blood cells 8.8, hemoglobin 11, hematocrit 32.3, platelet count 247.  Blood cultures are pending.      IMAGING:  CT of the soft tissues of the neck without contrast with findings as above.      ASSESSMENT:  Mr. Kailash Sorto is a very pleasant 32-year-old gentleman with past medical history of hypertension, end-stage renal disease on hemodialysis and anemia of chronic kidney disease who was sent from Oral Maxillofacial Surgery because of concern for a jaw infection.      PLAN:   1.  Dental abscess/odontogenic jaw infection.  He started having swelling and pain of his left side of the jaw on Monday.  He started taking penicillin on Tuesday prescribed by his  dentist.  He saw Dr. Flowers of Oral Maxillofacial Surgery today.  He was found to have significant infection of the left lower mandibular wisdom tooth that was extracted.  It seems that the infection was extending into the mandible, along the ramus of the mandible up to the lateral tonsillar area.  Dr. Flowers drained 10 mL of adela pus surrounding the infected tooth, but then he dissected along the lateral ramus of the mandible and there was a second pocket of a large amount of pus that was decompressed as well.  Because he was concerned for possible areas of infection extending into the deep tissues of the neck, he was sent to ER for IV antibiotics.  The CT of the soft tissues of the neck showed some complex gas collection lateral to the body of the mandible adjacent to the site of the tooth extraction that could represent an abscess versus packing material within the bone defect.  There is no deep space abscess identified.  There is some soft tissue edema extending into the left submandibular space compatible with cellulitis.  Dr. Parham discussed this with Dr. Flowers of [facility] and also with ENT on-call, Dr. Kennedy, who did not feel that he needs any further surgical intervention at this time.  He was given 1 dose of Zosyn.  The plan for now is to admit him to medical floor.  We will continue with IV Zosyn for now.  We will follow up fever curve and white blood cells trend.  We will provide supportive management with Tylenol p.r.n. for fever and mild pain as well as oxycodone and Dilaudid IV for more severe pain.  A mechanical soft diet has been ordered.  An ENT consult was requested as well as Oral Surgery and Infectious Disease consults.   2.  Hypertension.  His blood pressure seems to be reasonably well controlled at this time.  We will resume his prior to admission Norvasc, hydralazine and labetalol with holding parameters.   3.  End-stage renal disease, on hemodialysis.  His schedule is Monday, Wednesday, Friday.   He is being followed by Dr. Cobos.  He is on the kidney transplant list.  He missed hemodialysis yesterday because he was not feeling well.  His labs showed a potassium of 4.2.  He does not seem to be in fluid overload.  He does not need emergent hemodialysis today.  We will place a Nephrology consult as likely he will have hemodialysis tomorrow on Friday.      DEEP VEIN THROMBOSIS PROPHYLAXIS:  Ambulation at this time.      CODE STATUS:  Discussed with the patient and the patient is full code.      DISPOSITION:  Admit to inpatient.  Anticipate 2 days of hospitalization given the need of IV antibiotics given the extent of his infection.         CHAI LEWIS MD             D: 2018   T: 2018   MT: DAVID      Name:     DIANELYS COOPER   MRN:      [id number]        Account:      [id number]   :      1985        Admitted:     2018                   Document: [id number]       cc: Homero Dumont MD

## 2018-07-20 NOTE — PROGRESS NOTES
Potassium   Date Value Ref Range Status   07/20/2018 3.8 3.4 - 5.3 mmol/L Final   ]  Lab Results   Component Value Date    HGB 10.4 07/20/2018     Weight: 74.4 kg (164 lb 0.4 oz)    DIALYSIS PROCEDURE NOTE    Patient dialyzed for 3.5 hrs on a 3 K bath with a  net fluid removal of 0L.  A BFR of 450ml/min was obtained via RUAF and all connections secured.   Patient was seen by  during treatment.  Total heparin received during treatment:: 0units.   Meds given:EPO, Hectoral. Complications:none   Procedure and ESRD teaching done and questions answered.  See flowsheet in EPIC for further details.  Hepatitis status confirmed on previous patient.    RO log completed  Prime given: NS  Saline double clamped and Arterial/Venous parameters set.   Patient transported via wc to the dialysis unit   Aseptic prep done for both on/off.  Transducer connectors checked q15 minutes with vital sign check  :  Report received from: LONG Larson RN  Report given to: LONG Larson RN  Outpatient Dialysis at  [place]    Hepatitis Antigen neg 12/12/16  Hepatitis Antibody immune 10/9/17

## 2018-07-20 NOTE — CONSULTS
"ORAL AND MAXILLOFACIAL SURGERY CONSULTATION    Kailash is a 32 year black old male admitted to the hospitalist service for left pharyngeal and mandibular swelling secondary to odontogenic infection.  Patient had tooth #17 extracted and incision and drainage on 7/19 (POD #1) with Dr. Flowers, who instructed the patient to go to the ED for admission for IV antibiotics.  Patient reports swelling since Tuesday, prior to extraction.  Patient is admitted to hospitalist service and is on IV ertapenem, as recommended by infectious disease.      Patient was seen on PM rounds resting comfortably in bed, in good spirits, does not appear to be in any distress.  He reports tonsillar swelling, and that his voice sounds different when he is laying down.  Denies dyspnea or dysphagia.  Trismus reported.  Pain is minimal.  He had dialysis today for his kidney failure, and is followed by nephrology.  Denies nausea, vomiting, diarrhea.  He is ambulatory and voiding to the bathroom.    Vital signs:  Temp: 98.5  F (36.9  C) Temp src: Oral BP: 135/72   Heart Rate: 85 Resp: 16 SpO2: 100 % O2 Device: None (Room air)     Weight: 74.4 kg (164 lb 0.4 oz)  Estimated body mass index is 26.47 kg/(m^2) as calculated from the following:    Height as of 8/4/16: 1.676 m (5' 6\").    Weight as of this encounter: 74.4 kg (164 lb 0.4 oz).    WBC 4.9 (8.0 yesterday)    Maxillofacial Exam:  Minimal left mandibular swelling, soft, non-tender  V3 intact  Trismus noted, CHELSEY 15mm with hard stop  Pharynx minimally visible due to trismus, however left tonsillar pillar noted to be moderately edematous  Extraction site #17 hemostatic, no drainage noted  Grossly intact remaining dentition    Imaging: neck CT with contrast (7/19)  Soft tissue swelling to left tonsillar pillar  Socket #17 as expected post-extraction  Gas noted to left lateral mandible  No fluid collection noted    Assessment: Resolving abscess to the left pterygoid and lateral pharyngeal spaces s/p " extraction of tooth #17 and incision and drainage.  No acute collection noted, and patient appears to be improving on IV ertapenem; extraction of tooth #17 and I&D has certainly improved condition as well.     Plan: No surgical intervention required at this time.  Patient understands that improvement ceases or swelling increases, incision and drainage of left tonsillar area may be warranted.  At this time, patient is to continue IV antibiotics on schedule as recommended by ID.  Gauze can be removed from #17 site as bleeding has ceased.  OMS will continue to follow while admitted.  Please page OMS with concerns.    Bernardo Ferrer DDS

## 2018-07-20 NOTE — TELEPHONE ENCOUNTER
Called and left Jaycee message stating that due to his admission today for an extensive mandible jaw infection requiring excision of tooth and pus in 2 area, he is now inactive to receive  donor offers. He is not off the list, only ineligible at this time during his hospitalization. Please notify me when you are clear and discharged. My contact name and number is left on cell number.

## 2018-07-20 NOTE — CONSULTS
[facility] Consultation by ENT    Kailash Sorto MRN# [id number]   Age: 32 year old YOB: 1985     Date of Admission:  7/19/2018    Reason for consult:  odontogenic infection        Requesting physician: Dr. Yancey       Level of consult: One-time consult to assist in determining a diagnosis and to recommend an appropriate treatment plan           Impression and Recommendation:   Impression:    dental abscess, improved       Recommendations:    The patient is improving on antibiotics. If the patient is tolerating oral diet, he could be discharged home and treated with oral antibiotics. I would like to follow-up disease has resolution, and to discuss his recurrent tonsil issues in 2 to 3 weeks.              Chief Complaint:   Neck swelling     History is obtained from the patient         History of Present Illness:   This patient is a 32 year old -American male with a significant past medical history of recurrent tonsil infection/problems, and dental issues who presents with a dental abscess, after a sore throat for about 1 week, that was treated by tooth extraction and drainage of the abscess by his dentist as an outpatient. The patient was referred to the hospital for inpatient IV antibiotic management. ENT was consulted because of the severity of the infection and for recommendations.              Past Medical History:     Past Medical History:   Diagnosis Date     Anemia of chronic kidney failure      Dialysis patient (H)      End stage kidney disease (H)      Hypertension              Past Surgical History:     Past Surgical History:   Procedure Laterality Date     CREATE FISTULA ARTERIOVENOUS UPPER EXTREMITY Right 5/15/2016    Procedure: CREATE FISTULA ARTERIOVENOUS UPPER EXTREMITY;  Surgeon: Ricardo Gallo MD;  Location:  OR     CREATE FISTULA ARTERIOVENOUS UPPER EXTREMITY Right 7/12/2016    Procedure: CREATE FISTULA ARTERIOVENOUS UPPER EXTREMITY;  Surgeon: Camelia  Payam Vallejo MD;  Location: [facility] OR     VASCULAR SURGERY      placement of jugular tunnel catheter             Social History:     Social History   Substance Use Topics     Smoking status: Never Smoker     Smokeless tobacco: Never Used     Alcohol use No             Family History:     Family History   Problem Relation Age of Onset     Diabetes Father      Lung Cancer Maternal Grandmother      Hypertension Other      uncle     Hypertension Maternal Grandmother      Family history reviewed          Immunizations:     Immunization History   Administered Date(s) Administered     Tdap (Adacel,Boostrix) 08/25/2008             Allergies:   No Known Allergies          Medications:     Current Facility-Administered Medications   Medication     - MEDICATION INSTRUCTIONS for Dialysis Patients -     0.9% sodium chloride BOLUS     acetaminophen (TYLENOL) Suppository 650 mg     acetaminophen (TYLENOL) tablet 650 mg     amLODIPine (NORVASC) tablet 10 mg     calcium acetate (PHOSLO) capsule 667 mg     calcium acetate (PHOSLO) capsule 667 mg     doxercalciferol (HECTOROL) injection 1 mcg     epoetin manny (EPOGEN/PROCRIT) 5,000 Units injection     ertapenem (INVanz) 500 mg in sodium chloride 0.9 % 50 mL intermittent infusion     hydrALAZINE (APRESOLINE) tablet 25 mg     HYDROmorphone (PF) (DILAUDID) injection 0.3-0.5 mg     labetalol (NORMODYNE) tablet 200 mg     lidocaine (LMX4) cream     lidocaine 1 % 1 mL     naloxone (NARCAN) injection 0.1-0.4 mg     No heparin via hemodialysis machine     ondansetron (ZOFRAN-ODT) ODT tab 4 mg    Or     ondansetron (ZOFRAN) injection 4 mg     oxyCODONE IR (ROXICODONE) tablet 5-10 mg     senna-docusate (SENOKOT-S;PERICOLACE) 8.6-50 MG per tablet 1 tablet    Or     senna-docusate (SENOKOT-S;PERICOLACE) 8.6-50 MG per tablet 2 tablet     sodium chloride (PF) 0.9% PF flush 3 mL     sodium chloride (PF) 0.9% PF flush 3 mL             Review of Systems:   CONSTITUTIONAL: NEGATIVE for fever, chills,  change in weight  INTEGUMENTARY/SKIN: NEGATIVE for worrisome rashes, moles or lesions  EYES: NEGATIVE for vision changes or irritation  ENT/MOUTH: NEGATIVE for ear problems, positive for neck swelling, trismus, sore throat  RESP: NEGATIVE for significant cough or SOB  CV: NEGATIVE for chest pain, palpitations or peripheral edema  GI: NEGATIVE for nausea, abdominal pain, heartburn, or change in bowel habits  : NEGATIVE for frequency, dysuria, or hematuria  MUSCULOSKELETAL: NEGATIVE for significant arthralgias or myalgia  NEURO: NEGATIVE for weakness, dizziness or paresthesias  ENDOCRINE: NEGATIVE for temperature intolerance, skin/hair changes  HEME: NEGATIVE for bleeding problems  PSYCHIATRIC: NEGATIVE for changes in mood or affect          Physical Exam:   Vitals were reviewed  Temp: 98  F (36.7  C) Temp src: Axillary BP: 129/72   Heart Rate: 87 Resp: 16 SpO2: 98 % O2 Device: None (Room air)    Constitutional:   awake, alert, cooperative, no apparent distress, and appears stated age     Eyes:   Lids and lashes normal, pupils equal, round and reactive to light, extra ocular muscles intact, sclera clear, conjunctiva normal     ENT:   Normocephalic, without obvious abnormality, atraumatic, sinuses nontender on palpation, external ears without lesions, oral pharynx with moist mucous membranes, trismus, tonsils symmetric, insistent site noted, no pus. Dental extraction noted. This is the lower left jaw molar region.     Neck:   Supple, symmetrical, trachea midline, no adenopathy, no masses are tenderness.     Hematologic / Lymphatic:   no cervical lymphadenopathy     Neurologic:   Awake, alert, oriented to name, place and time.  Cranial nerves II-XII are grossly intact.  Motor is 5 out of 5 bilaterally.  Cerebellar finger to nose, heel to shin intact.  Sensory is intact.  Babinski down going, Romberg negative, and gait is normal.     Skin:   Normal             Data:     Lab Results   Component Value Date    WBC 4.9  07/20/2018    HGB 10.4 (L) 07/20/2018    HCT 30.9 (L) 07/20/2018    MCV 89 07/20/2018     07/20/2018       CT scan of the neck:   Soft tissue swelling in the pterygoid space on the left side, at the side of the abscess drainage, no recurrent or persistent fluid collection noted.        Attestation:  I have reviewed today's vital signs, notes, medications, labs and imaging.    Mark Kennedy MD

## 2018-07-20 NOTE — PLAN OF CARE
Problem: Patient Care Overview  Goal: Plan of Care/Patient Progress Review  Pt arrived to the unit at 1800 from ED. A&Ox4. T max 101, other VSS on RA. PRN tylenol x2 for fever and mild L sided mouth/neck pain 1-4/10. Fistula to RUE, on dialysis MWF. Mechanical soft + renal diet, good appetite. Speech slightly slurred 2/2 L sided neck/jaw swelling. Guaze packing in place for bleeding. Pt up independently. Will continued to monitor.

## 2018-07-20 NOTE — CONSULTS
Consult Date:  07/20/2018      INFECTIOUS DISEASE CONSULTATION      DATE OF SERVICE: 07/20/2018.      ATTENDING PHYSICIAN:  Gabi Sparks MD.      REASON FOR CONSULTATION:  Asked to assess for odontogenic infection.      IMPRESSION:   1.  A 32-year-old male with end-stage renal disease.  On hemodialysis.   2.  Admitted on this occasion with a left mandibular odontogenic infection after extraction of an infected wisdom tooth.  A CT scan shows a complex gas collection lateral to the body of the mandible, adjacent to the site of tooth extraction, possibly representing abscess.  No osteomyelitis described.   3.  Hypertension.      RECOMMENDATIONS:   1.  Will switch to IV ertapenem.   2.  Surgeons to see. Question of whether he needs further drainage of possible abscess.   3.  Follow clinical progress on IV antibiotic therapy.      HISTORY OF PRESENT ILLNESS:  This is a 32-year-old -American male with a history of hypertension and hypertensive nephrosclerosis, who is now on hemodialysis for end-stage renal disease.  He describes having issues with his wisdom teeth intermittently over several years.  Four days ago, he began to note more pain and swelling in the left mandibular area with some accompanying fever.  He saw his dentist on Tuesday and was started on oral penicillin.  Despite this, he noted progression of his symptoms and yesterday was seen by Oral Surgery, who extracted an infected left lower wisdom tooth, reportedly with drainage of accompanying 10 mL of purulent material.  He reportedly also dissected along the lateral ramus to the lateral tonsillar area where there was a second pocket of pus.  The patient was then advised on hospital admission.  A CT scan was done in the Emergency Department with the above findings.  White blood count was 8800.  After admission, the patient was started on IV Zosyn.  He feels that pain and swelling are reduced in comparison today.  He has accompanying trismus and has  only been able to take liquids and eat a soft diet.  Admission blood cultures are negative to date.      PAST MEDICAL HISTORY:   1.  Hypertension.   2.  Hypertensive nephrosclerosis with end-stage renal disease.  On hemodialysis.   3.  Anemia of chronic disease.      SOCIAL HISTORY:  Tobacco, none.  Alcohol, rare.  Drug use, none.      FAMILY HISTORY:  Positive for diabetes mellitus.  Mother  in a car accident.      ALLERGIES:  None known.      REVIEW OF SYSTEMS:  Negative other than that described above.      PHYSICAL EXAMINATION:   VITAL SIGNS:  Temperature 99.3, blood pressure 119/58, heart rate 83 and regular.   GENERAL:  Well-developed, well-nourished, alert and oriented, does not look acutely ill.   SKIN:  No rashes or nodules.   HEENT:  Eyes:  No subconjunctival hemorrhages or scleral icterus.  Oropharynx without erythema or exudate. There is mild to moderate swelling in the left mandibular area. The patient has evidence of trismus.   NECK:  Supple, no thyromegaly.   LYMPH:  No cervical or axillary lymphadenopathy.   LUNGS:  Lungs clear to auscultation.  No use of accessory muscles of respiration.   COR:  S1, S2 normal, no S3, S4 or murmur.   ABDOMEN:  Soft, nontender, no mass or hepatosplenomegaly.   EXTREMITIES:  No calf tenderness or pedal edema.  Right arm dialysis fistula present.      For further details of the patient's history, please refer to the chart.  Thank you very much for this consultation.         FLORA RAMIREZ MD             D: 2018   T: 2018   MT: JORDY      Name:     DIANELYS COOPER   MRN:      [id number]        Account:       [id number]   :      1985           Consult Date:  2018      Document: [id number]       cc: Gabi Sparks MD

## 2018-07-20 NOTE — PLAN OF CARE
Problem: Patient Care Overview  Goal: Plan of Care/Patient Progress Review  Outcome: No Change  Pt is A&Ox4. Tmax 99.3 this shift. All other VSS on RA. Renal/mechanical soft diet. Up independently in room. PRN Tylenol and Oxycodone available for pain. L side jaw/neck swollen, speech slightly slurred d/t swelling. Gauze packing in place for bleeding. Fistula to RUE, dialysis on MWF. L PIV SL, IV abx. Plans for discharge pending. Will continue to monitor.

## 2018-07-21 VITALS
WEIGHT: 164.02 LBS | BODY MASS INDEX: 26.36 KG/M2 | OXYGEN SATURATION: 98 % | RESPIRATION RATE: 16 BRPM | DIASTOLIC BLOOD PRESSURE: 63 MMHG | TEMPERATURE: 97.6 F | HEIGHT: 66 IN | SYSTOLIC BLOOD PRESSURE: 121 MMHG

## 2018-07-21 PROCEDURE — 99239 HOSP IP/OBS DSCHRG MGMT >30: CPT | Performed by: INTERNAL MEDICINE

## 2018-07-21 PROCEDURE — 25000132 ZZH RX MED GY IP 250 OP 250 PS 637: Performed by: INTERNAL MEDICINE

## 2018-07-21 RX ORDER — AMOXICILLIN AND CLAVULANATE POTASSIUM 500; 125 MG/1; MG/1
1 TABLET, FILM COATED ORAL DAILY
Qty: 10 TABLET | Refills: 0 | Status: SHIPPED | OUTPATIENT
Start: 2018-07-21 | End: 2019-01-24

## 2018-07-21 RX ORDER — CHLORHEXIDINE GLUCONATE ORAL RINSE 1.2 MG/ML
15 SOLUTION DENTAL 2 TIMES DAILY
Qty: 473 ML | Refills: 0 | Status: SHIPPED | OUTPATIENT
Start: 2018-07-21 | End: 2019-01-24

## 2018-07-21 RX ADMIN — ACETAMINOPHEN 650 MG: 325 TABLET, FILM COATED ORAL at 10:25

## 2018-07-21 RX ADMIN — LABETALOL HCL 200 MG: 200 TABLET, FILM COATED ORAL at 10:23

## 2018-07-21 RX ADMIN — AMLODIPINE BESYLATE 10 MG: 10 TABLET ORAL at 10:22

## 2018-07-21 RX ADMIN — HYDRALAZINE HYDROCHLORIDE 25 MG: 25 TABLET ORAL at 10:23

## 2018-07-21 RX ADMIN — CALCIUM ACETATE 667 MG: 667 CAPSULE ORAL at 10:23

## 2018-07-21 ASSESSMENT — ACTIVITIES OF DAILY LIVING (ADL)
ADLS_ACUITY_SCORE: 9

## 2018-07-21 NOTE — PHARMACY
Antibiotic Recommendation Note    Per request from MD. Recommend dosing Augmentin 500 mg po daily on discharge (give dose after dialysis on dialysis days)  for antimicrobial therapy.    Suhas Galeas  h

## 2018-07-21 NOTE — DISCHARGE SUMMARY
[facility]    Discharge Summary  Hospitalist    Date of Admission:  7/19/2018  Date of Discharge:  7/21/2018  1:54 PM  Discharging Provider: Naye Carter MD    Discharge Diagnoses   Dental abscess, odontogenic infection  End-stage renal disease on hemodialysis  Hypertension  Anemia of chronic disease    History of Present Illness    Mr. Kailash Sorto is a very pleasant 32-year-old gentleman with past medical history of hypertension, end-stage renal disease on hemodialysis and anemia of chronic kidney disease who was sent from Oral Maxillofacial Surgery because of concern for a jaw infection.  Please see admission H&P for details        Hospital Course   Kailash Sorto was admitted on 7/19/2018.  The following problems were addressed during his hospitalization:      Dental abscess/odontogenic jaw infection.  He started having swelling and pain of his left side of the jaw on Monday.  He started taking penicillin on Tuesday prescribed by his dentist.  He saw Dr. Flowers of Oral Maxillofacial Surgery . He was found to have significant infection of the left lower mandibular wisdom tooth that was extracted and 10 mL of adela pus surrounding the infected tooth was I and Ds, but then he dissected along the lateral ramus of the mandible and there was a second pocket of a large amount of pus that was decompressed as well.  Because he was concerned for possible areas of infection extending into the deep tissues of the neck, he was sent to ER for IV antibiotics.   - The CT of the soft tissues of the neck showed some complex gas collection lateral to the body of the mandible adjacent to the site of the tooth extraction that could represent an abscess versus packing material within the bone defect.  There is no deep space abscess identified.  There is some soft tissue edema extending into the left submandibular space compatible with cellulitis.   -started on IV zosyn .  ID and OMFS consulted  -no further surgical  indication per OMFS , abx changed to ertapenem,   Patient continued to improve and did not have any further trismus, OMFS recommended discharging patient on 10 days of Augmentin and infectious disease approved.  Patient was discharged with renally dosed Augmentin as per his dialysis      Hypertension.    His blood pressure seems to be reasonably well controlled during hospital stay on his prior to admission Norvasc, hydralazine and labetalol, continued  and no changes made at the time of discharge     End-stage renal disease, on hemodialysis.  His schedule is Monday, Wednesday, Friday.   He is on the kidney transplant list.    Continued dialysis while inhouse , nephrology followed patient in house.  Follow-up with his regular dialysis center after discharge     # Discharge Pain Plan:   - Patient currently has NO PAIN and is not being prescribed pain medications on discharge.    Active Problems:    Odontogenic infection of jaw      Naye Carter MD    Significant Results and Procedures   See below    Pending Results   These results will be followed up by PCP  Unresulted Labs Ordered in the Past 30 Days of this Admission     Date and Time Order Name Status Description    7/19/2018 1455 Blood culture Preliminary     7/19/2018 1455 Blood culture Preliminary           Code Status   Full Code       Primary Care Physician   Homero Dumont    Physical Exam   Temp: 97.6  F (36.4  C) Temp src: Oral BP: 121/63   Heart Rate: 84 Resp: 16 SpO2: 98 % O2 Device: None (Room air)    Vitals:    07/20/18 0945 07/21/18 0913   Weight: 74.4 kg (164 lb 0.4 oz) 74.4 kg (164 lb 0.4 oz)     Vital Signs with Ranges  Temp:  [97.6  F (36.4  C)-98.5  F (36.9  C)] 97.6  F (36.4  C)  Heart Rate:  [76-89] 84  Resp:  [16] 16  BP: (112-139)/(59-80) 121/63  SpO2:  [98 %] 98 %       Exam:  Constitutional: Awake, alert and no distress. Appears comfortable  Head: Normocephalic. No masses, lesions, tenderness or abnormalities  ENT: ENT exam normal, no  neck nodes or sinus tenderness  Cardiovascular: RRR.  No murmurs, no rubs or JVD  Respiratory: Normal WOB,b/l equal air entry, no wheezes or crackles   Gastrointestinal: Abdomen soft, non-tender. BS normal. No masses, organomegaly  : Deferred   extremities : No edema , no clubbing or cyanosis    Musculoakeletal : No joint swelling , erythema . No obvious deformity     Discharge Disposition   Discharged to home  Condition at discharge: Stable    Consultations This Hospital Stay   NEPHROLOGY IP CONSULT  ENT IP CONSULT  INFECTIOUS DISEASES IP CONSULT  ORAL SURGERY IP CONSULT  ENT IP CONSULT    Time Spent on this Encounter   I Naye Carter, personally saw the patient today and spent greater than 30 minutes discharging this patient.    Discharge Orders     Reason for your hospital stay   Odontogenic infection of the jaw     Follow-up and recommended labs and tests    Follow up with primary care provider, Homero Dumont, within 7 days for hospital follow- up.  The following labs/tests are recommended: CBC/BMP.    FU with Dr Flowers next available   FU with Dialysis clinic and nephrologist as per previous schedule     Activity   Your activity upon discharge: activity as tolerated     Full Code     Diet   Follow this diet upon discharge: Orders Placed This Encounter     Combination Diet Regular Diet Adult; Renal Diet; Mechanical/Dental Soft Diet       Discharge Medications   Current Discharge Medication List      START taking these medications    Details   amoxicillin-clavulanate (AUGMENTIN) 500-125 MG per tablet Take 1 tablet by mouth daily  Qty: 10 tablet, Refills: 0    Comments: Take after dialysis on dialysis days  Associated Diagnoses: Odontogenic infection of jaw      chlorhexidine (PERIDEX) 0.12 % solution Swish and spit 15 mLs in mouth 2 times daily  Qty: 473 mL, Refills: 0    Associated Diagnoses: Odontogenic infection of jaw         CONTINUE these medications which have NOT CHANGED    Details   AMLODIPINE  BESYLATE PO Take 10 mg by mouth daily      !! calcium acetate (PHOSLO) 667 MG CAPS capsule Take 667 mg by mouth Take with snacks or supplements      !! calcium acetate (PHOSLO) 667 MG CAPS Take 667 mg by mouth 3 times daily (with meals)       HYDRALAZINE HCL PO Take 25 mg by mouth 3 times daily       LABETALOL HCL PO Take 200 mg by mouth 2 times daily       !! - Potential duplicate medications found. Please discuss with provider.        Allergies   No Known Allergies  Data   Most Recent 3 CBC's:  Recent Labs   Lab Test  07/20/18   0750  07/19/18   1458  05/31/18   1412   WBC  4.9  8.8  4.7   HGB  10.4*  11.0*  12.4*   MCV  89  89  87   PLT  234  247  172      Most Recent 3 BMP's:  Recent Labs   Lab Test  07/20/18   0750  07/19/18   1458  08/04/16   0711   NA  136  137  139   POTASSIUM  3.8  4.2  3.4   CHLORIDE  102  100  100   CO2  24  27  30   BUN  53*  49*  25   CR  6.99*  7.02*  5.40*   ANIONGAP  10  10  9   GRAHAM  9.2  9.4  9.2   GLC  97  104*  92     Most Recent 2 LFT's:  Recent Labs   Lab Test  08/04/16   0711  05/11/16   0450   AST  20  19   ALT  46  19   ALKPHOS  120  118   BILITOTAL  0.4  0.5     Most Recent INR's and Anticoagulation Dosing History:  Anticoagulation Dose History     Recent Dosing and Labs Latest Ref Rng & Units 5/11/2016 5/15/2016 8/4/2016    INR 0.86 - 1.14 0.99 1.01 0.95        Most Recent 3 Troponin's:  Recent Labs   Lab Test  05/12/16   1327  05/11/16   1052  05/11/16   0450   05/10/16   2012   TROPI  6.950*  2.507*  1.163*   < >   --    TROPONIN   --    --    --    --   0.12*    < > = values in this interval not displayed.     Most Recent Cholesterol Panel:  Recent Labs   Lab Test  08/04/16   0711   CHOL  190   LDL  104*   HDL  73   TRIG  62     Most Recent 6 Bacteria Isolates From Any Culture (See EPIC Reports for Culture Details):  Recent Labs   Lab Test  07/19/18   1550  07/19/18   1500  03/09/16   1850   CULT  No growth after 2 days  No growth after 2 days  No growth     Most Recent  TSH, T4 and A1c Labs:  Recent Labs   Lab Test  05/10/16   2325   TSH  0.89     Results for orders placed or performed during the hospital encounter of 07/19/18   CT Soft Tissue Neck w/o Contrast    Narrative    CT SCAN OF THE NECK WITHOUT CONTRAST  7/19/2018 3:29 PM     HISTORY: Left mandibular 3rd molar infection with extension into the  mandible, and soft tissues of the neck s/p tooth removal, dissection  along ramus of mandible and lateral tonsillar area. Eval for retained  abscess/deep tissue infection;     TECHNIQUE:  Axial images and coronal reformations. No IV contrast. No  contrast was administered because of the patient is on dialysis.  Radiation dose for this scan was reduced using automated exposure  control, adjustment of the mA and/or kV according to patient size, or  iterative reconstruction technique.    COMPARISON: None.    FINDINGS: The patient is status post a tooth extraction of the left  third mandibular molar, tooth 17. There is soft tissue swelling  lateral to the mandible and there is a small amount of gas in the soft  tissues lateral to the mandible. The gas collection measures  approximately 1.7 cm in AP dimension by 1 cm in transverse dimension.  This could represent a small soft tissue abscess. There is soft tissue  swelling extending inferior to the level of the mandible. The  platysmas muscle is thickened. There is soft tissue edema medial to  the mandible extending inferiorly to the level of the left palatine  tonsil. The left palatine tonsil appears edematous. No deep space  fluid collections are identified on this noncontrast scan. Small  polyps or retention cysts are seen in the left maxillary sinus.      Impression    IMPRESSION:     1. Complex gas collection lateral to the body of the mandible adjacent  to the site of the tooth extraction. This could represent a abscess.  It could also be due to packing material within the bone defect in the  mandible extending to the lateral cheek  region. Clinical correlation  suggested.  2. No deep space abscess is identified but there is significant soft  tissue edema medial to the mandible extending to the level of the left  palatine tonsil.  3. Soft tissue edema extending into the left submandibular space. This  would be compatible with cellulitis.    NIKO ESTRADA MD

## 2018-07-21 NOTE — PROGRESS NOTES
Doing well.  Assume discharge later today on oral antibiotic.  Next dialysis 7/23, per usual schedule.  I have called Shelli Tabor with updated orders.    Thanks.    Levi Mo MD  Nephrology; [facility]  [phone]

## 2018-07-21 NOTE — PLAN OF CARE
Problem: Patient Care Overview  Goal: Plan of Care/Patient Progress Review  Outcome: Improving  A/O x4. VSS. C/o mild left jaw pain at bedtime, prn tylenol given x1, effective. Renal mechanical diet, tolerating well. Ambulated halls this evening and took a shower. Plan to transition to oral abx and discharge home tomorrow.

## 2018-07-21 NOTE — PLAN OF CARE
Problem: Patient Care Overview  Goal: Plan of Care/Patient Progress Review  Outcome: No Change  A&Ox4. VSS. Pt. denies pain and nausea. Localized swelling on left side of face from wisdom tooth infection. Pt. continues on IV antibiotics. Dialysis fistula on right arm, bruit and thrill felt. Renal mechanical diet. Voiding spontaneously. No BM overnight. Up independently ambulating in room. No acute events. Pt. slept comfortably between cares. Will continue POC and notify MD with changes.

## 2018-07-21 NOTE — PROGRESS NOTES
Page to ID on call answering service, regarding pt ok to discharge from Oral Surg standpoint and hospitalist would like recommendation on oral antibiotics such as Augmentin. Awaiting call back    Addendum: Phsycian phoned back. From his standpoint PO augmentin ok. Differ to hospitalist for duration of course. Writer will update hospitalist.

## 2018-07-21 NOTE — PLAN OF CARE
Problem: Patient Care Overview  Goal: Plan of Care/Patient Progress Review  Outcome: Adequate for Discharge Date Met: 07/21/18  Pt A&O. VSS on RA. Fistulas in L arm. Bowel sounds active, voiding. Renal/mechanical soft diet, tolerating well. Slight swelling in L side of face. C/o mild pain to jaw decreased with tylenol. Patient discharge instructions, and follow up appointments reviewed and given to patient. Patient knows to make follow up appointments with PCP, dialysis and Oral surgery and was provided contacts previously. Prescriptions reviewed and given to patient. Patient discharge to home via significant other with belongings.

## 2018-07-21 NOTE — PROGRESS NOTES
ORAL AND MAXILLOFACIAL SURGERY PROGRESS NOTE    Kailash was seen on AM rounds, found ambulating in his room, slept well last night, no acute events overnight.  He reports now absence of dysphonia when supine.  Pain is minimal.  No reported bleeding from extraction #17 site.  States feels better.  Hopes that he can go home today.      Vital signs:  Blood pressure 112/59, temperature 98.3  F (36.8  C), temperature source Oral, resp. rate 16, weight 74.4 kg (164 lb 0.4 oz), SpO2 98 %.    Maxillofacial Exam:   No facial swelling  V3/VII intact  Trismus improved, CHELSEY 18mm with right deviation  Occlusion stable and reproducible  Floor of mouth soft, nontender  Soft palate visible today, improved left tonsillar swelling  Masticatory trauma to left buccal mucosa secondary to swelling  Uvula visible, midline, airway patent    Assessment/Plan: Improvement since yesterday afternoon.  Edema is resolving and there appears to be no abscess collection present.  Patient may be discharged to home from OMS point of view.  DC to home with peridex rinse bid, augmentin 875mg po bid x 10 days or as per ID recommendations.  Patient may consider follow up with his treating OMS Dr. Flowers for update on status.  No OMS intervention required at this time.  Please page OMS for concerns.  Thank you for the opportunity to participate in this patient's care.    Bernardo Ferrer DDS

## 2018-07-23 ENCOUNTER — TELEPHONE (OUTPATIENT)
Dept: INTERNAL MEDICINE | Facility: CLINIC | Age: 33
End: 2018-07-23

## 2018-07-23 ENCOUNTER — TELEPHONE (OUTPATIENT)
Dept: NURSING | Facility: CLINIC | Age: 33
End: 2018-07-23

## 2018-07-23 ENCOUNTER — HOSPITAL ENCOUNTER (EMERGENCY)
Facility: CLINIC | Age: 33
Discharge: HOME | End: 2018-07-24
Attending: EMERGENCY MEDICINE | Admitting: EMERGENCY MEDICINE
Payer: COMMERCIAL

## 2018-07-23 DIAGNOSIS — M27.2 ODONTOGENIC INFECTION OF JAW: ICD-10-CM

## 2018-07-23 DIAGNOSIS — R78.81 POSITIVE BLOOD CULTURE: ICD-10-CM

## 2018-07-23 LAB
ANION GAP SERPL CALCULATED.3IONS-SCNC: 6 MMOL/L (ref 3–14)
BASOPHILS # BLD AUTO: 0 10E9/L (ref 0–0.2)
BASOPHILS NFR BLD AUTO: 0.7 %
BUN SERPL-MCNC: 22 MG/DL (ref 7–30)
CALCIUM SERPL-MCNC: 8.8 MG/DL (ref 8.5–10.1)
CHLORIDE SERPL-SCNC: 100 MMOL/L (ref 94–109)
CO2 SERPL-SCNC: 33 MMOL/L (ref 20–32)
CREAT SERPL-MCNC: 3.45 MG/DL (ref 0.66–1.25)
DIFFERENTIAL METHOD BLD: ABNORMAL
EOSINOPHIL # BLD AUTO: 0.3 10E9/L (ref 0–0.7)
EOSINOPHIL NFR BLD AUTO: 4.8 %
ERYTHROCYTE [DISTWIDTH] IN BLOOD BY AUTOMATED COUNT: 12.6 % (ref 10–15)
GFR SERPL CREATININE-BSD FRML MDRD: 21 ML/MIN/1.7M2
GLUCOSE SERPL-MCNC: 93 MG/DL (ref 70–99)
HCT VFR BLD AUTO: 31.4 % (ref 40–53)
HGB BLD-MCNC: 10.7 G/DL (ref 13.3–17.7)
IMM GRANULOCYTES # BLD: 0 10E9/L (ref 0–0.4)
IMM GRANULOCYTES NFR BLD: 0.4 %
LACTATE BLD-SCNC: 1.7 MMOL/L (ref 0.7–2)
LYMPHOCYTES # BLD AUTO: 1.2 10E9/L (ref 0.8–5.3)
LYMPHOCYTES NFR BLD AUTO: 22.7 %
MCH RBC QN AUTO: 30 PG (ref 26.5–33)
MCHC RBC AUTO-ENTMCNC: 34.1 G/DL (ref 31.5–36.5)
MCV RBC AUTO: 88 FL (ref 78–100)
MONOCYTES # BLD AUTO: 0.8 10E9/L (ref 0–1.3)
MONOCYTES NFR BLD AUTO: 13.9 %
NEUTROPHILS # BLD AUTO: 3.2 10E9/L (ref 1.6–8.3)
NEUTROPHILS NFR BLD AUTO: 57.5 %
NRBC # BLD AUTO: 0 10*3/UL
NRBC BLD AUTO-RTO: 0 /100
PLATELET # BLD AUTO: 298 10E9/L (ref 150–450)
POTASSIUM SERPL-SCNC: 3.4 MMOL/L (ref 3.4–5.3)
RBC # BLD AUTO: 3.57 10E12/L (ref 4.4–5.9)
SODIUM SERPL-SCNC: 139 MMOL/L (ref 133–144)
WBC # BLD AUTO: 5.5 10E9/L (ref 4–11)

## 2018-07-23 PROCEDURE — 85025 COMPLETE CBC W/AUTO DIFF WBC: CPT | Performed by: EMERGENCY MEDICINE

## 2018-07-23 PROCEDURE — 83605 ASSAY OF LACTIC ACID: CPT | Performed by: EMERGENCY MEDICINE

## 2018-07-23 PROCEDURE — 80048 BASIC METABOLIC PNL TOTAL CA: CPT | Performed by: EMERGENCY MEDICINE

## 2018-07-23 PROCEDURE — 99283 EMERGENCY DEPT VISIT LOW MDM: CPT

## 2018-07-23 NOTE — ED AVS SNAPSHOT
Emergency Department    [address] [facility] [address]    Phone:  [phone]    Fax:  [phone]                                       Kailash Sorto   MRN: [id number]    Department:   Emergency Department   Date of Visit:  7/23/2018           Patient Information     Date Of Birth          1985        Your diagnoses for this visit were:     Odontogenic infection of jaw     Positive blood culture        You were seen by Parish Steward MD.      Follow-up Information     Follow up with Homero Dumont MD. Schedule an appointment as soon as possible for a visit in 2 days.    Specialty:  Internal Medicine    Why:  For repeat evaluation and symptom check ( I believe you already have this scheduled)     Contact information:    [address] [facility] [address] [phone]          Follow up with  Emergency Department.    Specialty:  EMERGENCY MEDICINE    Why:  If symptoms worsen    Contact information:    [address] [facility] [address] [phone]        Discharge Instructions         Understanding Jaw (Orthognathic) Anatomy and Problems  A jaw that s too small, too large, or crooked can cause problems with chewing, speaking, breathing, and even sleeping. The shape of your jaws also affects the way your face looks. This sheet helps you understand how the teeth and jaws work. It also describes common jaw problems that may need treatment.    How bones and teeth shape the face  Bones are the framework for the face. The size and position of facial bones determine how well the teeth fit together. Together, the positions of the jaws and teeth affect chewing, speaking, and the working of the jaw joint. The jaws also hold and support soft tissues, like the muscles, lips, and tongue. And of course the jaws and teeth are factors in the face s shape and appearance.  How the jaws work  The lower jaw holds the tongue, which moves freely as you  speak and eat. The upper jaw shapes the floor of the nasal cavity, allowing normal airflow. Normally, muscles are evenly developed on both sides of the face.  Common problems  Some common jaw alignment problems are described below. It s also common to have a combination of these problems.    Lower jaw too far back. When the lower jaw is too far back (retrognathia), biting can be difficult. The chin appears weak or receding.    Lower jaw too far forward. A lower jaw that is too far forward (prognathia) causes the chin to protrude. Lower teeth may jut outward to overlap the upper teeth.    Open bite (teeth don t meet). An open bite is often due to a long upper jaw. This can cause a  gummy smile.  Or the problem may be that the rear of the lower jaw is too short. An open bite can also be caused by prolonged thumb sucking or improper tongue position at rest and when swallowing. An open bite can make it impossible to close the lips.    Asymmetry (jaws are uneven). Uneven jaws are larger or smaller on 1 side than on the other. Or 1 side may be too far forward or back. The face may look off-center or crooked.  When jaws are not aligned  Poorly aligned jaws can result in a variety of problems, including:    Chewing problems. You may find it difficult to bite into a sandwich or an apple, or difficult to keep food in your mouth as you chew. The TMJs may be stiff or painful.    Speech problems. It may be difficult to make certain sounds or to speak clearly.    Breathing problems. If the airway is narrow or blocked, breathing may be noisy or difficult. You may have sleep apnea (breathing that stops during sleep).    Problems with appearance. You may be unhappy with the way you look. This can make you self-conscious and may affect your confidence.  Date Last Reviewed: 7/5/2015 2000-2017 [employer]. [address] [place] [address], [place] [address]. All rights reserved. This information is not intended as a substitute  for professional medical care. Always follow your healthcare professional's instructions.          Bacteremia, Suspected (Adult)  Bacteremia is a bacterial infection that has spread to the bloodstream. This is serious because it can spread to other organs, including the kidneys, brain, and lungs.  Your healthcare provider will order lab and imaging tests, including blood cultures to check for bacteremia and to determine the type of bacteria that may be present. Antibiotics are often started before the results of the blood cultures are available.  Causes  Bacteremia usually starts with a local infection, but it then spreads to the blood. Almost any type of infection can cause bacteremia, including a urinary tract infection, skin infection, gastrointestinal problem, surgical complication, or pneumonia.  Symptoms  At first symptoms may seem like any local infection or illness, but then they worsen. Symptoms of bacteremia can include:    Fever and chills    Loss of appetite    Nausea or vomiting    Trouble breathing or fast breathing    Fast heart rate    Feeling lightheaded or faint    Skin rashes or blotches    Confusion, severe sleepiness, or loss of consciousness  Home care  People with bacteremia are almost always treated in the hospital. After the most severe part of the illness has gotten better, you may be discharged home to complete treatment.  Follow these guidelines when caring for yourself at home.    Rest at home for the first 2 to 3 days. When resuming activity, don't let yourself become overly tired.    You can take acetaminophen or ibuprofen for pain, unless you were given a different pain medicine to use. (Note: If you have chronic liver or kidney disease or have ever had a stomach ulcer or gastrointestinal bleeding, talk with your healthcare provider before using these medicines. Also talk to your provider if you are taking medicine to prevent blood clots.) Aspirin should never be given to anyone  younger than 18 years of age who is ill with a viral infection or fever. It may cause severe liver or brain damage.    If you were given antibiotics, take them until they are used up, or your healthcare provider tells you to stop. It is important to finish the antibiotics even though you feel better. This is to make sure the infection has cleared.    Your appetite may be poor, so a light diet is fine. Drink plenty of fluids (6 to 8 glasses of fluid per day). This includs water, soft drinks, sports drinks, juices, tea, or soup.  Follow-up care  Follow up with your healthcare provider, or as advised.    Once the results of the blood culture are available, your healthcare provider may change your antibiotic. You can call as directed for the results.    If X-rays, a CT, or an ultrasound were done, a specialist will review them. You will be notified of any findings that may affect your care.  Call 911  Call 911 if any of these occur:    Trouble breathing or swallowing, or wheezing    Chest pain    Confusion or sudden change in behavior    Extreme drowsiness or trouble awakening    Fainting or loss of consciousness    Rapid heart rate    Low blood pressure    Vomiting blood, or large amounts of blood in stool    Seizure  When to seek medical advice  Call your healthcare provider right away if any of these occur:    Cough with lots of colored mucus, or blood in your mucus    Severe headache    Severe face, neck, throat, or ear pain    Pain in the abdomen    Weakness, dizziness, repeated vomiting, or diarrhea    Joint pain or a new rash    Burning when urinating    Fever of 100.4 F (38 C) or higher, or as directed by your healthcare provider  Date Last Reviewed: 11/1/2017 2000-2017 [employer]. [address] [place] [address], [place] [address]. All rights reserved. This information is not intended as a substitute for professional medical care. Always follow your healthcare professional's instructions.          Your  next 10 appointments already scheduled     Jul 26, 2018  6:40 AM CDT   Office Visit with Homero Dumont MD   [facility]    [address] [address] [phone]           Bring a current list of meds and any records pertaining to this visit. For Physicals, please bring immunization records and any forms needing to be filled out. Please arrive 10 minutes early to complete paperwork.              24 Hour Appointment Hotline       To make an appointment at any [facility], call [phone] ([phone]). If you don't have a family doctor or clinic, we will help you find one. [place] clinics are conveniently located to serve the needs of you and your family.             Review of your medicines      Our records show that you are taking the medicines listed below. If these are incorrect, please call your family doctor or clinic.        Dose / Directions Last dose taken    AMLODIPINE BESYLATE PO   Dose:  10 mg        Take 10 mg by mouth daily   Refills:  0        amoxicillin-clavulanate 500-125 MG per tablet   Commonly known as:  AUGMENTIN   Dose:  1 tablet   Quantity:  10 tablet        Take 1 tablet by mouth daily   Refills:  0        * calcium acetate 667 MG Caps capsule   Commonly known as:  PHOSLO   Dose:  667 mg        Take 667 mg by mouth 3 times daily (with meals)   Refills:  0        * calcium acetate 667 MG Caps capsule   Commonly known as:  PHOSLO   Dose:  667 mg        Take 667 mg by mouth Take with snacks or supplements   Refills:  0        chlorhexidine 0.12 % solution   Commonly known as:  PERIDEX   Dose:  15 mL   Quantity:  473 mL        Swish and spit 15 mLs in mouth 2 times daily   Refills:  0        HYDRALAZINE HCL PO   Dose:  25 mg        Take 25 mg by mouth 3 times daily   Refills:  0        LABETALOL HCL PO   Dose:  200 mg        Take 200 mg by mouth 2 times daily   Refills:  0        * Notice:  This  list has 2 medication(s) that are the same as other medications prescribed for you. Read the directions carefully, and ask your doctor or other care provider to review them with you.            Procedures and tests performed during your visit     Basic metabolic panel    CBC with platelets differential    Lactic acid whole blood      Orders Needing Specimen Collection     None      Pending Results     No orders found from 7/21/2018 to 7/24/2018.            Pending Culture Results     No orders found from 7/21/2018 to 7/24/2018.            Pending Results Instructions     If you had any lab results that were not finalized at the time of your Discharge, you can call the ED Lab Result RN at [phone]. You will be contacted by this team for any positive Lab results or changes in treatment. The nurses are available 7 days a week from 10A to 6:30P.  You can leave a message 24 hours per day and they will return your call.        Test Results From Your Hospital Stay        7/23/2018 11:33 PM      Component Results     Component Value Ref Range & Units Status    WBC 5.5 4.0 - 11.0 10e9/L Final    RBC Count 3.57 (L) 4.4 - 5.9 10e12/L Final    Hemoglobin 10.7 (L) 13.3 - 17.7 g/dL Final    Hematocrit 31.4 (L) 40.0 - 53.0 % Final    MCV 88 78 - 100 fl Final    MCH 30.0 26.5 - 33.0 pg Final    MCHC 34.1 31.5 - 36.5 g/dL Final    RDW 12.6 10.0 - 15.0 % Final    Platelet Count 298 150 - 450 10e9/L Final    Diff Method Automated Method  Final    % Neutrophils 57.5 % Final    % Lymphocytes 22.7 % Final    % Monocytes 13.9 % Final    % Eosinophils 4.8 % Final    % Basophils 0.7 % Final    % Immature Granulocytes 0.4 % Final    Nucleated RBCs 0 0 /100 Final    Absolute Neutrophil 3.2 1.6 - 8.3 10e9/L Final    Absolute Lymphocytes 1.2 0.8 - 5.3 10e9/L Final    Absolute Monocytes 0.8 0.0 - 1.3 10e9/L Final    Absolute Eosinophils 0.3 0.0 - 0.7 10e9/L Final    Absolute Basophils 0.0 0.0 - 0.2 10e9/L Final    Abs Immature Granulocytes  0.0 0 - 0.4 10e9/L Final    Absolute Nucleated RBC 0.0  Final         7/23/2018 11:41 PM      Component Results     Component Value Ref Range & Units Status    Lactic Acid 1.7 0.7 - 2.0 mmol/L Final         7/23/2018 11:49 PM      Component Results     Component Value Ref Range & Units Status    Sodium 139 133 - 144 mmol/L Final    Potassium 3.4 3.4 - 5.3 mmol/L Final    Chloride 100 94 - 109 mmol/L Final    Carbon Dioxide 33 (H) 20 - 32 mmol/L Final    Anion Gap 6 3 - 14 mmol/L Final    Glucose 93 70 - 99 mg/dL Final    Urea Nitrogen 22 7 - 30 mg/dL Final    Creatinine 3.45 (H) 0.66 - 1.25 mg/dL Final    GFR Estimate 21 (L) >60 mL/min/1.7m2 Final    Non  GFR Calc    GFR Estimate If Black 25 (L) >60 mL/min/1.7m2 Final    African American GFR Calc    Calcium 8.8 8.5 - 10.1 mg/dL Final                Clinical Quality Measure: Blood Pressure Screening     Your blood pressure was checked while you were in the emergency department today. The last reading we obtained was  BP: 124/71 . Please read the guidelines below about what these numbers mean and what you should do about them.  If your systolic blood pressure (the top number) is less than 120 and your diastolic blood pressure (the bottom number) is less than 80, then your blood pressure is normal. There is nothing more that you need to do about it.  If your systolic blood pressure (the top number) is 120-139 or your diastolic blood pressure (the bottom number) is 80-89, your blood pressure may be higher than it should be. You should have your blood pressure rechecked within a year by a primary care provider.  If your systolic blood pressure (the top number) is 140 or greater or your diastolic blood pressure (the bottom number) is 90 or greater, you may have high blood pressure. High blood pressure is treatable, but if left untreated over time it can put you at risk for heart attack, stroke, or kidney failure. You should have your blood pressure  "rechecked by a primary care provider within the next 4 weeks.  If your provider in the emergency department today gave you specific instructions to follow-up with your doctor or provider even sooner than that, you should follow that instruction and not wait for up to 4 weeks for your follow-up visit.        Thank you for choosing [facility]       Thank you for choosing [facility] for your care. Our goal is always to provide you with excellent care. Hearing back from our patients is one way we can continue to improve our services. Please take a few minutes to complete the written survey that you may receive in the mail after you visit with us. Thank you!        [employer] Information     [employer] lets you send messages to your doctor, view your test results, renew your prescriptions, schedule appointments and more. To sign up, go to [email or web address] . Click on \"Log in\" on the left side of the screen, which will take you to the Welcome page. Then click on \"Sign up Now\" on the right side of the page.     You will be asked to enter the access code listed below, as well as some personal information. Please follow the directions to create your username and password.     Your access code is: [email or web address]  Expires: 8/15/2018  6:31 AM     Your access code will  in 90 days. If you need help or a new code, please call your [facility] clinic or [phone].        Care EveryWhere ID     This is your Care EveryWhere ID. This could be used by other organizations to access your [facility] medical records  [id number]        Equal Access to Services     MAURICE DEY : Hadii jazlyn camarena hadasho Sobraxtonali, waaxda luqadaha, qaybta kaalmada adeegyada, elizabeth jacob . So [facility] [phone].    ATENCIÓN: Si habla español, tiene a rod disposición servicios gratuitos de asistencia lingüística. Llame al [phone].    We comply with applicable federal civil rights laws and [place] laws. We do not discriminate on " the basis of race, color, national origin, age, disability, sex, sexual orientation, or gender identity.            After Visit Summary       This is your record. Keep this with you and show to your community pharmacist(s) and doctor(s) at your next visit.

## 2018-07-23 NOTE — TELEPHONE ENCOUNTER
Pt calling. Informed pt that ID rec & Dr. Dumont rec that pt return to [facility]-ED for evaluation, re-assessment, and to re-check his labs, and blood cultures. Pt currently has appt pending with Dr. Dumont for Thurs 7/26.  Patient stated understanding, and agreed to plan of care. He will go back to [facility] ER this evening.

## 2018-07-23 NOTE — ED AVS SNAPSHOT
Emergency Department    [address] [place] [address]    Phone:  [phone]    Fax:  [phone]                                       Kailash Sorto   MRN: [id number]    Department:   Emergency Department   Date of Visit:  7/23/2018           After Visit Summary Signature Page     I have received my discharge instructions, and my questions have been answered. I have discussed any challenges I see with this plan with the nurse or doctor.    ..........................................................................................................................................  Patient/Patient Representative Signature      ..........................................................................................................................................  Patient Representative Print Name and Relationship to Patient    ..................................................               ................................................  Date                                            Time    ..........................................................................................................................................  Reviewed by Signature/Title    ...................................................              ..............................................  Date                                                            Time

## 2018-07-23 NOTE — TELEPHONE ENCOUNTER
Left message for patient to call back ASAP.      Also, called emergency contact (patient's relative John) to call back.

## 2018-07-23 NOTE — TELEPHONE ENCOUNTER
I have not been involved in this patient's care and have not seen this patient in the clinic for more than 1 year.  I did contact the infectious disease clinic who saw the patient over the hospitalization and they advised that this patient be seen back in the emergency room for potential readmission for IV antibiotics.  I would suggest we contact patient to inform him of these positive cultures and guide him back to the emergency room for potential readmission and IV antibiotics per ID recommendations.

## 2018-07-23 NOTE — TELEPHONE ENCOUNTER
Dr. Ahn from [facility] calling.  Dr. Ahn received a call from micro lab just now.  Was notified of preliminary positive blood cultures from labs drawn on 7/19/18.      Culture grew gram positive cocci in pairs and chains.  Patient discharged from hospital 2 days ago.  Discharged on Augmentin.  Please advise.

## 2018-07-24 VITALS
OXYGEN SATURATION: 98 % | RESPIRATION RATE: 16 BRPM | DIASTOLIC BLOOD PRESSURE: 71 MMHG | BODY MASS INDEX: 27.32 KG/M2 | TEMPERATURE: 99 F | SYSTOLIC BLOOD PRESSURE: 124 MMHG | HEIGHT: 66 IN | HEART RATE: 82 BPM | WEIGHT: 170 LBS

## 2018-07-24 NOTE — ED PROVIDER NOTES
"  History     Chief Complaint:  Abnormal Labs    The history is provided by the patient.      Kailash Sorto is a 32 year old male with a recent history of wisdom tooth removal who presents for evaluation of abnormal labs. Per chart review, the patient was admitted to the hospital on 7/19/2018 after a dental infection secondary to wisdom tooth removal. He was given IV Zosyn at that time and subsequently sent home on Augmentin. Patient states that just prior to presentation, he received a call from his general practitioners office telling him to come into the emergency department due to an abnormal blood culture result. On presentation, the patient states that he feels well. Patient notes that he consulted an infectious disease specialist on the phone and was again prompted to come into the emergency department. Patient denies pain or other complaint at this time.    Allergies:  No known drug allergies     Medications:    Augmentin  Phoslo  Hydralazine  Labetalol    Past Medical History:    Odontogenic infection of jaw  Hypertension  Anemia of chronic kidney failure  Dialysis patient    Past Surgical History:    Create fistula arteriovenous upper extremity  Placement of jugular tunnel catheter    Family History:    Diabetes  Lung cancer  Hypertension     Social History:  Presents with spouse   Tobacco use: Never smoker   Alcohol use: No  PCP: Homero Dumont    Marital Status:  Single     Review of Systems   All other systems reviewed and are negative.    Physical Exam     Patient Vitals for the past 24 hrs:   BP Temp Temp src Pulse Heart Rate Resp SpO2 Height Weight   07/24/18 0006 - - - - 80 16 98 % - -   07/23/18 2159 124/71 99  F (37.2  C) Oral 82 - 16 98 % 1.676 m (5' 6\") 77.1 kg (170 lb)     Physical Exam  Vitals: reviewed by me  General: Pt seen on [facility], pleasant, cooperative, and alert to conversation, well-appearing, nondiaphoretic.  Eyes: Tracking well, clear conjunctiva BL  ENT: MMM, midline " trachea.  Left mandibular molar is status post removal, no erythema, no induration noted to this area, nontender to palpation.  No trismus noted.  Full range of motion to neck, no tenderness to palpation in the submandibular splace.  Lymph: No lymphadenopathy or masses noted  CV: Rate as above, regular rhythm.    MSK: no peripheral edema or joint effusion.  No evidence of trauma  Skin: No rash, normal turgor and temperature  Neuro: Clear speech and no facial droop.  Psych: Not RIS, no e/o AH/VH      Emergency Department Course     Laboratory:  CBC: HGB 10.7 (L) o/w WNL (WBC 5.5, )   BMP: CO2 33 (H), Creatinine 3.45 (H), GFR 21 (L) o/w WNL   Lactic Acid: 1.7     Emergency Department Course:  Past medical records, nursing notes, and vitals reviewed.  2256: I performed an exam of the patient and obtained history, as documented above.    Blood drawn. This was sent to the lab for further testing, results above.    2354: I rechecked the patient. Findings and plan explained to the Patient and spouse. Patient discharged home with instructions regarding supportive care, medications, and reasons to return. The importance of close follow-up was reviewed.      Impression & Plan      Medical Decision Making:  Kailash Sorto is a 32 year old male who presents to the emergency department with a positive blood culture as a call back. Four days ago, he did have what appears to be gram positive cocci in chains and clusters that only today grew out a positive result. He has been on antibiotics including Zosyn and Augmentin for a number of days and he looks to be clinically improving. Most importantly, he states that the feels much improved, has no trismus, no lingering fevers at home, and states he has no drainage or pain at the site of his tooth extraction. Out of an abundance of caution, white count and lactate was also checked and these were reassuringly within normal limits as well. I think the benefit of having the patient  be admitted for IV antibiotics is quite low as he does appear to be tolerating his orals, is taking his Augmentin as directed, and does appear to be motivated to participate in his own follow up care in the next 48 - 72 hours. I do appreciate his dialysis, but this appears to be a chronic issue and is not interfering with his odontogenic infection. Will discharge with very clear return to ED precautions. Patient is very pleased to be going home and states he will follow up with his primary care doctor, Dr. Britt.     Diagnosis:    ICD-10-CM   1. Odontogenic infection of jaw M27.2   2. Positive blood culture R78.81       Disposition:  Discharged to home with plan as outlined.       Scribe Disclosure:  I, Iban Reyna, am serving as a scribe at 10:59 PM on 7/23/2018 to document services personally performed by Parish Steward MD based on my observations and the provider's statements to me.   7/23/2018    EMERGENCY DEPARTMENT     Parish Steward MD  07/24/18 0559

## 2018-07-24 NOTE — DISCHARGE INSTRUCTIONS
Understanding Jaw (Orthognathic) Anatomy and Problems  A jaw that s too small, too large, or crooked can cause problems with chewing, speaking, breathing, and even sleeping. The shape of your jaws also affects the way your face looks. This sheet helps you understand how the teeth and jaws work. It also describes common jaw problems that may need treatment.    How bones and teeth shape the face  Bones are the framework for the face. The size and position of facial bones determine how well the teeth fit together. Together, the positions of the jaws and teeth affect chewing, speaking, and the working of the jaw joint. The jaws also hold and support soft tissues, like the muscles, lips, and tongue. And of course the jaws and teeth are factors in the face s shape and appearance.  How the jaws work  The lower jaw holds the tongue, which moves freely as you speak and eat. The upper jaw shapes the floor of the nasal cavity, allowing normal airflow. Normally, muscles are evenly developed on both sides of the face.  Common problems  Some common jaw alignment problems are described below. It s also common to have a combination of these problems.    Lower jaw too far back. When the lower jaw is too far back (retrognathia), biting can be difficult. The chin appears weak or receding.    Lower jaw too far forward. A lower jaw that is too far forward (prognathia) causes the chin to protrude. Lower teeth may jut outward to overlap the upper teeth.    Open bite (teeth don t meet). An open bite is often due to a long upper jaw. This can cause a  gummy smile.  Or the problem may be that the rear of the lower jaw is too short. An open bite can also be caused by prolonged thumb sucking or improper tongue position at rest and when swallowing. An open bite can make it impossible to close the lips.    Asymmetry (jaws are uneven). Uneven jaws are larger or smaller on 1 side than on the other. Or 1 side may be too far forward or back. The  face may look off-center or crooked.  When jaws are not aligned  Poorly aligned jaws can result in a variety of problems, including:    Chewing problems. You may find it difficult to bite into a sandwich or an apple, or difficult to keep food in your mouth as you chew. The TMJs may be stiff or painful.    Speech problems. It may be difficult to make certain sounds or to speak clearly.    Breathing problems. If the airway is narrow or blocked, breathing may be noisy or difficult. You may have sleep apnea (breathing that stops during sleep).    Problems with appearance. You may be unhappy with the way you look. This can make you self-conscious and may affect your confidence.  Date Last Reviewed: 7/5/2015 2000-2017 [employer]. [address] [place] [address], [place] [address]. All rights reserved. This information is not intended as a substitute for professional medical care. Always follow your healthcare professional's instructions.          Bacteremia, Suspected (Adult)  Bacteremia is a bacterial infection that has spread to the bloodstream. This is serious because it can spread to other organs, including the kidneys, brain, and lungs.  Your healthcare provider will order lab and imaging tests, including blood cultures to check for bacteremia and to determine the type of bacteria that may be present. Antibiotics are often started before the results of the blood cultures are available.  Causes  Bacteremia usually starts with a local infection, but it then spreads to the blood. Almost any type of infection can cause bacteremia, including a urinary tract infection, skin infection, gastrointestinal problem, surgical complication, or pneumonia.  Symptoms  At first symptoms may seem like any local infection or illness, but then they worsen. Symptoms of bacteremia can include:    Fever and chills    Loss of appetite    Nausea or vomiting    Trouble breathing or fast breathing    Fast heart rate    Feeling  lightheaded or faint    Skin rashes or blotches    Confusion, severe sleepiness, or loss of consciousness  Home care  People with bacteremia are almost always treated in the hospital. After the most severe part of the illness has gotten better, you may be discharged home to complete treatment.  Follow these guidelines when caring for yourself at home.    Rest at home for the first 2 to 3 days. When resuming activity, don't let yourself become overly tired.    You can take acetaminophen or ibuprofen for pain, unless you were given a different pain medicine to use. (Note: If you have chronic liver or kidney disease or have ever had a stomach ulcer or gastrointestinal bleeding, talk with your healthcare provider before using these medicines. Also talk to your provider if you are taking medicine to prevent blood clots.) Aspirin should never be given to anyone younger than 18 years of age who is ill with a viral infection or fever. It may cause severe liver or brain damage.    If you were given antibiotics, take them until they are used up, or your healthcare provider tells you to stop. It is important to finish the antibiotics even though you feel better. This is to make sure the infection has cleared.    Your appetite may be poor, so a light diet is fine. Drink plenty of fluids (6 to 8 glasses of fluid per day). This includs water, soft drinks, sports drinks, juices, tea, or soup.  Follow-up care  Follow up with your healthcare provider, or as advised.    Once the results of the blood culture are available, your healthcare provider may change your antibiotic. You can call as directed for the results.    If X-rays, a CT, or an ultrasound were done, a specialist will review them. You will be notified of any findings that may affect your care.  Call 911  Call 911 if any of these occur:    Trouble breathing or swallowing, or wheezing    Chest pain    Confusion or sudden change in behavior    Extreme drowsiness or trouble  awakening    Fainting or loss of consciousness    Rapid heart rate    Low blood pressure    Vomiting blood, or large amounts of blood in stool    Seizure  When to seek medical advice  Call your healthcare provider right away if any of these occur:    Cough with lots of colored mucus, or blood in your mucus    Severe headache    Severe face, neck, throat, or ear pain    Pain in the abdomen    Weakness, dizziness, repeated vomiting, or diarrhea    Joint pain or a new rash    Burning when urinating    Fever of 100.4 F (38 C) or higher, or as directed by your healthcare provider  Date Last Reviewed: 11/1/2017 2000-2017 [employer]. [address] [place] [address]. All rights reserved. This information is not intended as a substitute for professional medical care. Always follow your healthcare professional's instructions.

## 2018-07-24 NOTE — TELEPHONE ENCOUNTER
FYI patient was able to go home from ED yesterday with instructions to see PCP in 2 days. Has appt that was previously scheduled for 7/26, and patient said ED MD was ok with this.

## 2018-07-24 NOTE — TELEPHONE ENCOUNTER
"Hospital/TCU/ED for chronic condition Discharge Protocol    \"Hi, my name is Max Cerrato, a registered nurse, and I am calling from [facility].  I am calling to follow up and see how things are going for you after your recent emergency visit/hospital/TCU stay.\"    Tell me how you are doing now that you are home?\" feeling ok, everything going well at home, glad that he got to go home from ED yesterday      Discharge Instructions    \"Let's review your discharge instructions.  What is/are the follow-up recommendations?  Pt. Response: seeing Dr. Dumont Thursday, f.u with Dr. Flowers, has dialysis and sees nephrology    \"Has an appointment with your primary care provider been scheduled?\"   Yes. (confirm)    \"When you see the provider, I would recommend that you bring your medications with you.\"    Medications    \"Tell me what changed about your medicines when you discharged?\"    Changes to chronic meds?    0-1    \"What questions do you have about your medications?\"    None     New diagnoses of heart failure, COPD, diabetes, or MI?    No              Medication reconciliation completed? Yes  Was MTM referral placed (*Make sure to put transitions as reason for referral)?   No    Call Summary    \"What questions or concerns do you have about your recent visit and your follow-up care?\"     none    \"If you have questions or things don't continue to improve, we encourage you contact us through the main clinic number (give number).  Even if the clinic is not open, triage nurses are available 24/7 to help you.     We would like you to know that our clinic has extended hours (provide information).  We also have urgent care (provide details on closest location and hours/contact info)\"      \"Thank you for your time and take care!\"             "

## 2018-07-25 ENCOUNTER — TEAM CONFERENCE (OUTPATIENT)
Dept: TRANSPLANT | Facility: CLINIC | Age: 33
End: 2018-07-25

## 2018-07-25 LAB
BACTERIA SPEC CULT: NO GROWTH
Lab: NORMAL
SPECIMEN SOURCE: NORMAL

## 2018-07-25 NOTE — TELEPHONE ENCOUNTER
Team Conference:      Attendees: Mario Abdi Keys, Matas, Schenk,   Coordinator, , Dietician, Pharmacy    Discussion and Outcome: Patient status changed to Inactive approved. Patient temporarily too ill.

## 2018-07-26 ENCOUNTER — OFFICE VISIT (OUTPATIENT)
Dept: INTERNAL MEDICINE | Facility: CLINIC | Age: 33
End: 2018-07-26
Payer: COMMERCIAL

## 2018-07-26 VITALS
SYSTOLIC BLOOD PRESSURE: 116 MMHG | HEIGHT: 66 IN | HEART RATE: 73 BPM | OXYGEN SATURATION: 99 % | TEMPERATURE: 98.4 F | BODY MASS INDEX: 27.69 KG/M2 | DIASTOLIC BLOOD PRESSURE: 70 MMHG | RESPIRATION RATE: 14 BRPM | WEIGHT: 172.3 LBS

## 2018-07-26 DIAGNOSIS — N18.6 END STAGE KIDNEY DISEASE (H): ICD-10-CM

## 2018-07-26 DIAGNOSIS — M27.2 ODONTOGENIC INFECTION OF JAW: ICD-10-CM

## 2018-07-26 DIAGNOSIS — Z09 HOSPITAL DISCHARGE FOLLOW-UP: Primary | ICD-10-CM

## 2018-07-26 DIAGNOSIS — I10 ESSENTIAL HYPERTENSION: ICD-10-CM

## 2018-07-26 PROCEDURE — 87040 BLOOD CULTURE FOR BACTERIA: CPT | Performed by: INTERNAL MEDICINE

## 2018-07-26 PROCEDURE — 36415 COLL VENOUS BLD VENIPUNCTURE: CPT | Performed by: INTERNAL MEDICINE

## 2018-07-26 PROCEDURE — 99495 TRANSJ CARE MGMT MOD F2F 14D: CPT | Performed by: INTERNAL MEDICINE

## 2018-07-26 NOTE — PROGRESS NOTES
SUBJECTIVE:   Kailash Sorto is a 32 year old male who presents to clinic today for the following health issues:        Hospital Follow-up Visit:    Hospital/Nursing Home/IP Rehab Facility: [facility]  Date of Admission: 7/19/18  Date of Discharge: 7/21/18  Reason(s) for Admission: Odontogenic infection of jaw            Problems taking medications regularly:  None       Medication changes since discharge: noted       Problems adhering to non-medication therapy:  None    Summary of hospitalization:  [facility] discharge summary reviewed  Diagnostic Tests/Treatments reviewed.  Follow up needed: Dr. Flowers  Other Healthcare Providers Involved in Patient s Care:         None  Update since discharge: stable.     Post Discharge Medication Reconciliation: discharge medications reconciled, continue medications without change.  Plan of care communicated with patient     Coding guidelines for this visit:  Type of Medical   Decision Making Face-to-Face Visit       within 7 Days of discharge Face-to-Face Visit        within 14 days of discharge   Moderate Complexity [id number]   High Complexity 94251 [id number]          Dental abscess/odontogenic jaw infection.  He started having swelling and pain of his left side of the jaw on Monday.  He started taking penicillin on Tuesday prescribed by his dentist.  He saw Dr. Flowers of Oral Maxillofacial Surgery . He was found to have significant infection of the left lower mandibular wisdom tooth that was extracted and 10 mL of adela pus surrounding the infected tooth was I and Ds, but then he dissected along the lateral ramus of the mandible and there was a second pocket of a large amount of pus that was decompressed as well.  Because he was concerned for possible areas of infection extending into the deep tissues of the neck, he was sent to ER for IV antibiotics.   - The CT of the soft tissues of the neck showed some complex gas collection lateral to the body of the  mandible adjacent to the site of the tooth extraction that could represent an abscess versus packing material within the bone defect.  There is no deep space abscess identified.  There is some soft tissue edema extending into the left submandibular space compatible with cellulitis.   -started on IV zosyn .  ID and OMFS consulted  -no further surgical indication per OMFS , abx changed to ertapenem,   Patient continued to improve and did not have any further trismus, OMFS recommended discharging patient on 10 days of Augmentin and infectious disease approved.  Patient was discharged with renally dosed Augmentin as per his dialysis       Hypertension.    His blood pressure seems to be reasonably well controlled during hospital stay on his prior to admission Norvasc, hydralazine and labetalol, continued  and no changes made at the time of discharge     End-stage renal disease, on hemodialysis.  His schedule is Monday, Wednesday, Friday.   He is on the kidney transplant list.    Continued dialysis while inhouse , nephrology followed patient in house.  Follow-up with his regular dialysis center after discharge     Problem list and histories reviewed & adjusted, as indicated.  Additional history: as documented    Patient Active Problem List   Diagnosis     Hyperlipidemia LDL goal <100     End stage renal failure on dialysis (H)     End stage kidney disease (H)     Anemia of chronic kidney failure     Odontogenic infection of jaw     Essential hypertension     Past Surgical History:   Procedure Laterality Date     CREATE FISTULA ARTERIOVENOUS UPPER EXTREMITY Right 5/15/2016    Procedure: CREATE FISTULA ARTERIOVENOUS UPPER EXTREMITY;  Surgeon: Ricardo Gallo MD;  Location:  OR     CREATE FISTULA ARTERIOVENOUS UPPER EXTREMITY Right 7/12/2016    Procedure: CREATE FISTULA ARTERIOVENOUS UPPER EXTREMITY;  Surgeon: Payam Denise MD;  Location:  OR     VASCULAR SURGERY      placement of jugular tunnel  "catheter       Social History   Substance Use Topics     Smoking status: Never Smoker     Smokeless tobacco: Never Used     Alcohol use No     Family History   Problem Relation Age of Onset     Diabetes Father      Lung Cancer Maternal Grandmother      Hypertension Other      uncle     Hypertension Maternal Grandmother          Current Outpatient Prescriptions   Medication Sig Dispense Refill     AMLODIPINE BESYLATE PO Take 10 mg by mouth daily       amoxicillin-clavulanate (AUGMENTIN) 500-125 MG per tablet Take 1 tablet by mouth daily 10 tablet 0     calcium acetate (PHOSLO) 667 MG CAPS capsule Take 667 mg by mouth Take with snacks or supplements       chlorhexidine (PERIDEX) 0.12 % solution Swish and spit 15 mLs in mouth 2 times daily 473 mL 0     HYDRALAZINE HCL PO Take 25 mg by mouth 3 times daily        LABETALOL HCL PO Take 200 mg by mouth 2 times daily       No Known Allergies  BP Readings from Last 3 Encounters:   07/23/18 124/71   07/21/18 121/63   05/31/18 121/74    Wt Readings from Last 3 Encounters:   07/23/18 170 lb (77.1 kg)   07/21/18 164 lb 0.4 oz (74.4 kg)   05/31/18 173 lb (78.5 kg)            Reviewed and updated as needed this visit by clinical staff       Reviewed and updated as needed this visit by Provider         ROS:  CONSTITUTIONAL: NEGATIVE for fever, chills, change in weight  RESP: NEGATIVE for significant cough or SOB  CV: NEGATIVE for chest pain, palpitations or peripheral edema  GI: NEGATIVE for nausea, abdominal pain, heartburn, or change in bowel habits  : NEGATIVE for frequency, dysuria, or hematuria  MUSCULOSKELETAL: NEGATIVE for significant arthralgias or myalgia  HEME: NEGATIVE for bleeding problems  PSYCHIATRIC: NEGATIVE for changes in mood or affect    OBJECTIVE:                                                    /70  Pulse 73  Temp 98.4  F (36.9  C) (Oral)  Resp 14  Ht 5' 6\" (1.676 m)  Wt 172 lb 4.8 oz (78.2 kg)  SpO2 99%  BMI 27.81 kg/m2  Body mass index is " 27.81 kg/(m^2).  GENERAL: healthy, alert and no distress  EYES: Eyes grossly normal to inspection, extraocular movements - intact, and PERRL  HENT: ear canals- normal; TMs- normal; Nose- normal; Mouth- no ulcers, no lesions  NECK: no tenderness, no adenopathy, no asymmetry, no masses, no stiffness; thyroid- normal to palpation  RESP: lungs clear to auscultation - no rales, no rhonchi, no wheezes  CV: regular rates and rhythm, normal S1 S2, no S3 or S4 and no click or rub   MS: extremities- no gross deformities noted, no edema with a noted right forearm dialysis access site that is intact  PSYCH: Alert and oriented times 3; speech- coherent , normal rate and volume; able to articulate logical thoughts, able to abstract reason, no tangential thoughts, no hallucinations or delusions, affect- normal     ASSESSMENT/PLAN:                                                      (Z09) Hospital discharge follow-up  (primary encounter diagnosis)  Comment: Stable and doing well without associated symptoms.  Plan:     (M27.2) Odontogenic infection of jaw  Comment: Completing oral antibiotics and has no current complaints of fevers chills or sweats.  Plan: Blood culture, Blood culture ×2 recommended for repeat to ensure clearing of bacteremia            (N18.6) End stage kidney disease (H)  Comment: Dialysis as directed tolerating without difficulty.  Plan: Patient is being assessed for transplant status    (I10) Essential hypertension  Comment: Stable at goal on therapy continue with medical management  Plan:       See Patient Instructions    Homero Dumont MD  [facility]    25 minutes spent with this patient, face to face, discussing treatment options for listed problems above as well as side effects of appropriate medications.  Counseling time extended beyond 50% of the clinic visit.  Medication dosing, treatment plan and follow-up were discussed. Also reviewed need for primary care testing for  patient.

## 2018-07-26 NOTE — MR AVS SNAPSHOT
"              After Visit Summary   7/26/2018    Kailash Sorto    MRN: [id number]           Patient Information     Date Of Birth          1985        Visit Information        Provider Department      7/26/2018 6:40 AM Homero Dumont MD [facility]        Today's Diagnoses     Hospital discharge follow-up    -  1    Odontogenic infection of jaw        End stage kidney disease (H)        Essential hypertension           Follow-ups after your visit        Follow-up notes from your care team     Return if symptoms worsen or fail to improve.      Who to contact     If you have questions or need follow up information about today's clinic visit or your schedule please contact [facility] directly at [phone].  Normal or non-critical lab and imaging results will be communicated to you by MyChart, letter or phone within 4 business days after the clinic has received the results. If you do not hear from us within 7 days, please contact the clinic through MyChart or phone. If you have a critical or abnormal lab result, we will notify you by phone as soon as possible.  Submit refill requests through [employer] or call your pharmacy and they will forward the refill request to us. Please allow 3 business days for your refill to be completed.          Additional Information About Your Visit        Care EveryWhere ID     This is your Care EveryWhere ID. This could be used by other organizations to access your [facility] medical records  [id number]        Your Vitals Were     Pulse Temperature Respirations Height Pulse Oximetry BMI (Body Mass Index)    73 98.4  F (36.9  C) (Oral) 14 5' 6\" (1.676 m) 99% 27.81 kg/m2       Blood Pressure from Last 3 Encounters:   07/26/18 116/70   07/23/18 124/71   07/21/18 121/63    Weight from Last 3 Encounters:   07/26/18 172 lb 4.8 oz (78.2 kg)   07/23/18 170 lb (77.1 kg)   07/21/18 164 lb 0.4 oz (74.4 kg)              We Performed the " Following     Blood culture     Blood culture        Primary Care Provider Office Phone # Fax #    Homero Dumont [phone]       [address] [facility] [address]        Equal Access to Services     PATRICK DEY : Audra hobson ku marvin Soab, waaxda luqadaha, qaybta kaalmada adeegyada, elizabeth wahlnisha gallegos. So [facility] [phone].    ATENCIÓN: Si habla español, tiene a rod disposición servicios gratuitos de asistencia lingüística. Llame al [phone].    We comply with applicable federal civil rights laws and [place] laws. We do not discriminate on the basis of race, color, national origin, age, disability, sex, sexual orientation, or gender identity.            Thank you!     Thank you for choosing [facility]  for your care. Our goal is always to provide you with excellent care. Hearing back from our patients is one way we can continue to improve our services. Please take a few minutes to complete the written survey that you may receive in the mail after your visit with us. Thank you!             Your Updated Medication List - Protect others around you: Learn how to safely use, store and throw away your medicines at www.disposemymeds.org.          This list is accurate as of 7/26/18  7:12 AM.  Always use your most recent med list.                   Brand Name Dispense Instructions for use Diagnosis    AMLODIPINE BESYLATE PO      Take 10 mg by mouth daily        amoxicillin-clavulanate 500-125 MG per tablet    AUGMENTIN    10 tablet    Take 1 tablet by mouth daily    Odontogenic infection of jaw       calcium acetate 667 MG Caps capsule    PHOSLO     Take 667 mg by mouth Take with snacks or supplements        chlorhexidine 0.12 % solution    PERIDEX    473 mL    Swish and spit 15 mLs in mouth 2 times daily    Odontogenic infection of jaw       HYDRALAZINE HCL PO      Take 25 mg by mouth 3 times daily        LABETALOL HCL PO      Take 200 mg  by mouth 2 times daily

## 2018-07-26 NOTE — LETTER
[facility]  [address] [id number] [phone]      7/30/2018       Kailash Sorto  [address]   [facility] [address]        Dear Kailash,    After 4 days your blood cultures have returned normal.    Sincerely,      Homero Dumont MD  Internal Medicine

## 2018-07-27 LAB
BACTERIA SPEC CULT: ABNORMAL
Lab: ABNORMAL
SPECIMEN SOURCE: ABNORMAL

## 2018-08-01 LAB
BACTERIA SPEC CULT: NO GROWTH
BACTERIA SPEC CULT: NO GROWTH
Lab: NORMAL
Lab: NORMAL
SPECIMEN SOURCE: NORMAL
SPECIMEN SOURCE: NORMAL

## 2018-09-12 ENCOUNTER — TELEPHONE (OUTPATIENT)
Dept: TRANSPLANT | Facility: CLINIC | Age: 33
End: 2018-09-12

## 2018-09-12 NOTE — TELEPHONE ENCOUNTER
Called to leave a message about his mandible infection and to see if he completed the antibiotic treatment and who he has seen for follow up. He is in active due to the infection, and I would like to make him active if infection is cleared up.

## 2018-10-04 ENCOUNTER — TELEPHONE (OUTPATIENT)
Dept: TRANSPLANT | Facility: CLINIC | Age: 33
End: 2018-10-04

## 2018-10-04 ENCOUNTER — DOCUMENTATION ONLY (OUTPATIENT)
Dept: TRANSPLANT | Facility: CLINIC | Age: 33
End: 2018-10-04

## 2018-10-04 NOTE — TELEPHONE ENCOUNTER
Left message for Kailash that he completed his Augmentin dose and that I have reviewed his chart for any further indication. I do not see any further issues with his care, however should you have any health issues you want to disclose to me, please call me.   Name and contact number left. He is active now.

## 2018-10-10 ENCOUNTER — TEAM CONFERENCE (OUTPATIENT)
Dept: TRANSPLANT | Facility: CLINIC | Age: 33
End: 2018-10-10

## 2018-10-10 NOTE — TELEPHONE ENCOUNTER
Team Conference:      Attendees: Mario Abdi Keys, Matas, Schumacher, Schenk,   Coordinator, , Dietician, Pharmacy    Discussion and Outcome: Patient status changed to Active approved. Status changed with treatment of dental infection.

## 2019-01-24 ENCOUNTER — OFFICE VISIT (OUTPATIENT)
Dept: INTERNAL MEDICINE | Facility: CLINIC | Age: 34
End: 2019-01-24
Payer: COMMERCIAL

## 2019-01-24 VITALS
SYSTOLIC BLOOD PRESSURE: 116 MMHG | DIASTOLIC BLOOD PRESSURE: 72 MMHG | OXYGEN SATURATION: 98 % | WEIGHT: 172.7 LBS | TEMPERATURE: 98.4 F | HEART RATE: 71 BPM | BODY MASS INDEX: 27.87 KG/M2 | RESPIRATION RATE: 14 BRPM

## 2019-01-24 DIAGNOSIS — R60.0 LOCALIZED EDEMA: Primary | ICD-10-CM

## 2019-01-24 DIAGNOSIS — Z30.9 ENCOUNTER FOR CONTRACEPTIVE MANAGEMENT, UNSPECIFIED TYPE: ICD-10-CM

## 2019-01-24 PROCEDURE — 99214 OFFICE O/P EST MOD 30 MIN: CPT | Performed by: INTERNAL MEDICINE

## 2019-01-24 NOTE — PROGRESS NOTES
SUBJECTIVE:   Kailash Sorto is a 33 year old male who presents to clinic today for the following health issues:      Swelling       Duration: Began in September    Description (location/character/radiation): R foot swelling    Intensity:  moderate    Accompanying signs and symptoms: none     History (similar episodes/previous evaluation): CKD     Precipitating or alleviating factors: None    Therapies tried and outcome: None     Other concerns:  1. Bilateral mid back pain- began last Friday, subsided by Monday. Patients significant other concerned as he is currently in dialysis  2. Discuss fertility testing.    Problem list and histories reviewed & adjusted, as indicated.  Additional history: as documented    Patient Active Problem List   Diagnosis     Hyperlipidemia LDL goal <100     End stage renal failure on dialysis (H)     End stage kidney disease (H)     Anemia of chronic kidney failure     Odontogenic infection of jaw     Essential hypertension     Past Surgical History:   Procedure Laterality Date     CREATE FISTULA ARTERIOVENOUS UPPER EXTREMITY Right 5/15/2016    Procedure: CREATE FISTULA ARTERIOVENOUS UPPER EXTREMITY;  Surgeon: Ricardo Gallo MD;  Location:  OR     CREATE FISTULA ARTERIOVENOUS UPPER EXTREMITY Right 7/12/2016    Procedure: CREATE FISTULA ARTERIOVENOUS UPPER EXTREMITY;  Surgeon: Payam Denise MD;  Location:  OR     VASCULAR SURGERY      placement of jugular tunnel catheter       Social History     Tobacco Use     Smoking status: Never Smoker     Smokeless tobacco: Never Used   Substance Use Topics     Alcohol use: Yes     Alcohol/week: 0.0 oz     Comment: socially      Family History   Problem Relation Age of Onset     Diabetes Father      Lung Cancer Maternal Grandmother      Hypertension Maternal Grandmother      Hypertension Other         uncle         Current Outpatient Medications   Medication Sig Dispense Refill     AMLODIPINE BESYLATE PO Take 10 mg by  mouth daily       calcium acetate (PHOSLO) 667 MG CAPS capsule Take 667 mg by mouth Take with snacks or supplements       chlorhexidine (PERIDEX) 0.12 % solution Swish and spit 15 mLs in mouth 2 times daily 473 mL 0     HYDRALAZINE HCL PO Take 25 mg by mouth 3 times daily        LABETALOL HCL PO Take 200 mg by mouth 2 times daily       No Known Allergies  BP Readings from Last 3 Encounters:   07/26/18 116/70   07/23/18 124/71   07/21/18 121/63    Wt Readings from Last 3 Encounters:   07/26/18 78.2 kg (172 lb 4.8 oz)   07/23/18 77.1 kg (170 lb)   07/21/18 74.4 kg (164 lb 0.4 oz)            Reviewed and updated as needed this visit by clinical staff       Reviewed and updated as needed this visit by Provider       ROS:  CONSTITUTIONAL: NEGATIVE for fever, chills, change in weight  ENT/MOUTH: NEGATIVE for ear, mouth and throat problems  RESP: NEGATIVE for significant cough or SOB  CV: NEGATIVE for chest pain, palpitations or peripheral edema  GI: NEGATIVE for nausea, abdominal pain, heartburn, or change in bowel habits  : NEGATIVE for frequency, dysuria, or hematuria  MUSCULOSKELETAL: NEGATIVE for significant arthralgias or myalgia  NEURO: NEGATIVE for weakness, dizziness or paresthesias  HEME: NEGATIVE for bleeding problems  PSYCHIATRIC: NEGATIVE for changes in mood or affect    OBJECTIVE:                                                    /72   Pulse 71   Temp 98.4  F (36.9  C) (Oral)   Resp 14   Wt 78.3 kg (172 lb 11.2 oz)   SpO2 98%   BMI 27.87 kg/m    Body mass index is 27.87 kg/m .  GENERAL: alert and no distress  EYES: Eyes grossly normal to inspection, extraocular movements - intact, and PERRL  HENT: ear canals- normal; TMs- normal; Nose- normal; Mouth- no ulcers, no lesions  NECK: no tenderness, no adenopathy, no asymmetry, no masses, no stiffness; thyroid- normal to palpation  RESP: lungs clear to auscultation - no rales, no rhonchi, no wheezes  CV: regular rates and rhythm, normal S1 S2, no S3  or S4 and no murmur, no click or rub -  MS: extremities- no gross deformities noted, mild edema noted dorsum left foot, minimal, non pitting  NEURO: strength and tone- normal, sensory exam- grossly normal, mentation- intact, speech- normal, reflexes- symmetric  PSYCH: Alert and oriented times 3; speech- coherent , normal rate and volume; able to articulate logical thoughts, able to abstract reason, no tangential thoughts, no hallucinations or delusions, affect- normal     ASSESSMENT/PLAN:                                                      (R60.0) Localized edema  (primary encounter diagnosis)  Comment: No focal changes noted on exam to suggest progressive course or concerning pathology.  Question component of dependency exacerbated by medication, specifically amlodipine.  Plan: Suggest trial of cutting amlodipine to 5 mg and observing response.  Patient states legs are also dependent a lot during working hours in between dialysis days.    (Z30.9) Encounter for contraceptive management, unspecified type  Comment: Discussed options of fertility and suggest observation at present time as they have not been attempting to be come pregnant that frequently but would consider for motility testing in the form of semen sample pending response  Plan: UROLOGY ADULT REFERRAL              See Patient Instructions    Homero Dumont MD  [facility]    I spent 25 minutes spent with this patient, face to face, discussing treatment options for listed problems above as well as side effects of appropriate medications.  Counseling time extended beyond 50% of the clinic visit.  Medication dosing, treatment plan and follow-up were discussed. Also reviewed need for primary care testing for patient.

## 2019-01-31 ENCOUNTER — DOCUMENTATION ONLY (OUTPATIENT)
Dept: TRANSPLANT | Facility: CLINIC | Age: 34
End: 2019-01-31

## 2019-02-04 DIAGNOSIS — Z76.82 ORGAN TRANSPLANT CANDIDATE: ICD-10-CM

## 2019-02-04 DIAGNOSIS — N18.6 END STAGE RENAL DISEASE (H): ICD-10-CM

## 2019-02-08 LAB
PROTOCOL CUTOFF: NORMAL
SA1 CELL: NORMAL
SA1 COMMENTS: NORMAL
SA1 HI RISK ABY: NORMAL
SA1 MOD RISK ABY: NORMAL
SA1 TEST METHOD: NORMAL
SA2 CELL: NORMAL
SA2 COMMENTS: NORMAL
SA2 HI RISK ABY UA: NORMAL
SA2 MOD RISK ABY: NORMAL
SA2 TEST METHOD: NORMAL
UNACCEPTABLE ANTIGEN: NORMAL
UNOS CPRA: 2

## 2019-04-04 ENCOUNTER — TELEPHONE (OUTPATIENT)
Dept: OTHER | Facility: CLINIC | Age: 34
End: 2019-04-04

## 2019-04-04 DIAGNOSIS — N18.6 ESRD (END STAGE RENAL DISEASE) ON DIALYSIS (H): ICD-10-CM

## 2019-04-04 DIAGNOSIS — I77.0 AVF (ARTERIOVENOUS FISTULA) (H): Primary | ICD-10-CM

## 2019-04-04 DIAGNOSIS — Z99.2 ESRD (END STAGE RENAL DISEASE) ON DIALYSIS (H): ICD-10-CM

## 2019-04-04 NOTE — TELEPHONE ENCOUNTER
Reason for call:  Symptom   Symptom or request: Appointment    Duration (how long have symptoms been present): A few weeks  Have you been treated for this before? Yes    Additional comments: Patient called to schedule an appointment with Dr. Denise.    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    Phone number to reach patient:  Home number on file [phone] (home)    Best Time:  Any    Can we leave a detailed message on this number?  YES

## 2019-04-04 NOTE — TELEPHONE ENCOUNTER
Pt is s/p right vein patch upper extremity brachial to cephalic AVF stenosis on 7/12/16 with Dr. Denise.  RN contacted pt to discuss further.  Pt reports he has two larger aneurysms at his fistula.  Denies post treatment bleeding, thinning skin at arm.  Pt reports no concerns or issues with his dialysis runs.  As of note, no calls from [facility] regarding any concerns.  Pt dialyzes MWF at [facility].  Will route to scheduling to coordinate AVF US and consult with Dr. Denise.  Sweta Valera, LEELEEN, RN

## 2019-04-17 NOTE — PROGRESS NOTES
[place] [facility]    Kailash Sorto for returns for follow-up of his fistula.  He had a right upper arm brachial cephalic intravenous fistula that developed a stenosis at the antecubital level.  On 7/12/2016 a vein patch was placed on the stenosis at the antecubital level of the fistula.  Since otherwise the fistula was functioning well  We removed the Tunnel dialysis at that same time.    Patient dialyzes 3 times weekly at the [facility] dialysis unit.  He is noted progressive aneurysmal dilatation with thinning of the skin but no ulcerations in the midportion of his upper arm fistula.  Venous pressures have been normal with good dialysis function.  Due to the increasing aneurysmal dilatation he comes to discuss this with me today.    PMH: Medications: Labetalol, Norvasc, PhosLo, hydralazine.  No aspirin.            Non-smoker.    ROS: No specific problems except for his chronic hemodialysis.  Well-controlled hypertension.  Never smoked.  Remains very active.    Exam: Very pleasant alert talkative young man.              Blood pressure 146/87 left arm.  Pulse 64.              Chest= clear              Cardiovascular= regular rate               Dilated right upper arm fistula.               2 areas of aneurysmal dilatation with the more proximal being larger.  Skin is somewhat thin but no ulcerations.    Duplex was performed and reviewed with the patient.  Large brachial artery measuring 6.6 mm.  The anastomotic area has turbulent flow as expected but measures 10.3 mm.  Area of narrowing for a length of a centimeter measuring 5.5 mm with aneurysmal dilatation beyond this a 24.6 mm as larger aneurysm and somewhat smaller aneurysm close to the shoulder.  The rest of the fistula is widely patent measuring over 9 mm into the subclavian vein.  One side branche located close to the humeral head.  Excellent outflow volume of almost 3 L/min.      Impression: Progressive aneurysmal dilatation of right  upper arm brachial to cephalic fistula with otherwise excellent flow with some thinning of the skin.  Also some mild narrowing just before the aneurysm closer to the elbow at 5.5 mm which can add to poststenotic dilatation.  It may be somewhat more difficult to maintain dialysis while the surgical site is healing if we fix both aneurysms and the stenosis and thus I would recommend a two-stage procedure.  We will fix the narrowed area with a patch angioplasty and correct the aneurysmal dilatation larger aneurysm with excision of overlying skin.  Dialysis unit will be able to use the mid upper arm portion of the fistula during the healing phase for 3 to 4 weeks.  Once this has healed we could do the second stage procedure on the other aneurysm.    We spent 50 minutes with the patient today with over 50% in counseling.  We will schedule the procedure at his convenience.      Payam Denise MD     Copy to [facility]

## 2019-04-18 ENCOUNTER — HOSPITAL ENCOUNTER (OUTPATIENT)
Dept: ULTRASOUND IMAGING | Facility: CLINIC | Age: 34
Discharge: HOME | End: 2019-04-18
Attending: SURGERY | Admitting: SURGERY
Payer: MEDICARE

## 2019-04-18 ENCOUNTER — OFFICE VISIT (OUTPATIENT)
Dept: OTHER | Facility: CLINIC | Age: 34
End: 2019-04-18
Attending: SURGERY
Payer: MEDICARE

## 2019-04-18 VITALS — HEART RATE: 64 BPM | DIASTOLIC BLOOD PRESSURE: 87 MMHG | SYSTOLIC BLOOD PRESSURE: 146 MMHG

## 2019-04-18 DIAGNOSIS — N18.6 ESRD (END STAGE RENAL DISEASE) ON DIALYSIS (H): ICD-10-CM

## 2019-04-18 DIAGNOSIS — Z99.2 ESRD (END STAGE RENAL DISEASE) ON DIALYSIS (H): ICD-10-CM

## 2019-04-18 DIAGNOSIS — I77.0 AVF (ARTERIOVENOUS FISTULA) (H): ICD-10-CM

## 2019-04-18 DIAGNOSIS — I77.0 AVF (ARTERIOVENOUS FISTULA) (H): Primary | ICD-10-CM

## 2019-04-18 PROCEDURE — 99214 OFFICE O/P EST MOD 30 MIN: CPT | Mod: ZP | Performed by: SURGERY

## 2019-04-18 PROCEDURE — G0463 HOSPITAL OUTPT CLINIC VISIT: HCPCS

## 2019-04-18 PROCEDURE — 93990 DOPPLER FLOW TESTING: CPT

## 2019-04-18 NOTE — LETTER
[facility] [address]  [place] [address]  Phone: [phone]  Fax: [phone]      2019       Re: Kailash Sorto - 1985    Kailash Sorto for returns for follow-up of his fistula.  He had a right upper arm brachial cephalic intravenous fistula that developed a stenosis at the antecubital level.  On 2016 a vein patch was placed on the stenosis at the antecubital level of the fistula.  Since otherwise the fistula was functioning well  We removed the Tunnel dialysis at that same time.     Patient dialyzes 3 times weekly at the [facility] dialysis unit.  He is noted progressive aneurysmal dilatation with thinning of the skin but no ulcerations in the midportion of his upper arm fistula.  Venous pressures have been normal with good dialysis function.  Due to the increasing aneurysmal dilatation he comes to discuss this with me today.     PMH: Medications: Labetalol, Norvasc, PhosLo, hydralazine.  No aspirin.  Non-smoker.     ROS: No specific problems except for his chronic hemodialysis.  Well-controlled hypertension.  Never smoked.  Remains very active.     Exam: Very pleasant alert talkative young man.             Blood pressure 146/87 left arm.  Pulse 64.             Chest= clear             Cardiovascular= regular rate             Dilated right upper arm fistula.             2 areas of aneurysmal dilatation with the more proximal being larger.  Skin is somewhat thin but no ulcerations.     Duplex was performed and reviewed with the patient.  Large brachial artery measuring 6.6 mm. The anastomotic area has turbulent flow as expected but measures 10.3 mm.  Area of narrowing for a length of a centimeter measuring 5.5 mm with aneurysmal dilatation beyond this a 24.6 mm as larger aneurysm and somewhat smaller aneurysm close to the shoulder.  The rest of the fistula is widely patent measuring over 9 mm into the subclavian vein.  One side branche  located close to the humeral head.  Excellent outflow volume of almost 3 L/min.      Impression: Progressive aneurysmal dilatation of right upper arm brachial to cephalic fistula with otherwise excellent flow with some thinning of the skin.  Also some mild narrowing just before the aneurysm closer to the elbow at 5.5 mm which can add to poststenotic dilatation.  It may be somewhat more difficult to maintain dialysis while the surgical site is healing if we fix both aneurysms and the stenosis and thus I would recommend a two-stage procedure.  We will fix the narrowed area with a patch angioplasty and correct the aneurysmal dilatation larger aneurysm with excision of overlying skin.  Dialysis unit will be able to use the mid upper arm portion of the fistula during the healing phase for 3 to 4 weeks.  Once this has healed we could do the second stage procedure on the other aneurysm.     We will schedule the procedure at his convenience.     Payam Denise MD

## 2019-04-18 NOTE — NURSING NOTE
Patient Education    Procedure: First stage excision of right AV fistula aneurysms  Diagnosis: Right AVF aneurysm  Anticoagulation Instruction: none  Pre-Operative Physical Exam: You need to have a pre-op physical exam within 30 days of your procedure. Your procedure may be cancelled if you do not have a current History and Physical. Call your PCP's office to schedule.  Allergies:  Updated in Epic  Bowel Prep: n/a  Post Procedure Education: [facility] post-procedure fact sheet reviewed with patient.    Learner(s):patient  Method: Listening, Reading  Barriers to Learning:No Barrier  Outcome: Patient did verbalize understanding of above education.    Sweta Valera, LEELEEN, RN

## 2019-04-18 NOTE — NURSING NOTE
"Kailash Sorto is a 33 year old male who presents for:  Chief Complaint   Patient presents with     RECHECK     AVF (12:00 FSH; 1:00 WRO) History of right vein patch upper extremity brachial to cephalic AVF stenosis on 7/12/16, Shelli requesting pt's AVF aneurysms to be evaluated; next available follow up to 7/28/16 appointment with Dr. Camelia Rincons:    Vitals:    04/18/19 1300   BP: 146/87   BP Location: Left arm   Patient Position: Chair   Cuff Size: Adult Regular   Pulse: 64       BMI:  Estimated body mass index is 27.87 kg/m  as calculated from the following:    Height as of 7/26/18: 5' 6\" (1.676 m).    Weight as of 1/24/19: 172 lb 11.2 oz (78.3 kg).    Pain Score:  Data Unavailable        María Nixon MA    "

## 2019-05-16 ENCOUNTER — TELEPHONE (OUTPATIENT)
Dept: TRANSPLANT | Facility: CLINIC | Age: 34
End: 2019-05-16

## 2019-05-16 NOTE — TELEPHONE ENCOUNTER
"Call from wife to ask about his time and how much longer he has to wait. She also wondered if his age means he will have priority over someone older.   Reviewed that we do not have access to a \"list\" that is in real time. Explained the blood type, tissue typing and antibodies.  Explained our average wait time.     "

## 2019-05-23 ENCOUNTER — TELEPHONE (OUTPATIENT)
Dept: OTHER | Facility: CLINIC | Age: 34
End: 2019-05-23

## 2019-05-23 NOTE — TELEPHONE ENCOUNTER
Type of surgery: FIRST STAGE EXCISION OF RIGHT AV FISTULA ANEURYSMS  Location of surgery: [facility]  Date and time of surgery: 05/28/19 @ 12:15PM  Surgeon: DR. KELLY  Pre-Op Appt Date: PT TO SCHEDULE AT [facility]   Post-Op Appt Date: PT TO SCHEDULE   Packet sent out: GIVEN TO PT IN CLINIC ON 04/18/19  Pre-cert/Authorization completed:  Yes  Date: 05/23/19

## 2019-05-28 ENCOUNTER — HOSPITAL ENCOUNTER (OUTPATIENT)
Facility: CLINIC | Age: 34
Discharge: HOME | End: 2019-05-28
Attending: SURGERY | Admitting: SURGERY
Payer: MEDICARE

## 2019-05-28 ENCOUNTER — ANESTHESIA (OUTPATIENT)
Dept: SURGERY | Facility: CLINIC | Age: 34
End: 2019-05-28
Payer: MEDICARE

## 2019-05-28 ENCOUNTER — APPOINTMENT (OUTPATIENT)
Dept: SURGERY | Facility: PHYSICIAN GROUP | Age: 34
End: 2019-05-28
Payer: MEDICARE

## 2019-05-28 ENCOUNTER — ANESTHESIA EVENT (OUTPATIENT)
Dept: SURGERY | Facility: CLINIC | Age: 34
End: 2019-05-28
Payer: MEDICARE

## 2019-05-28 VITALS
WEIGHT: 171.9 LBS | DIASTOLIC BLOOD PRESSURE: 69 MMHG | OXYGEN SATURATION: 97 % | SYSTOLIC BLOOD PRESSURE: 109 MMHG | HEART RATE: 63 BPM | BODY MASS INDEX: 27.63 KG/M2 | TEMPERATURE: 97.3 F | HEIGHT: 66 IN | RESPIRATION RATE: 12 BRPM

## 2019-05-28 DIAGNOSIS — G89.18 ACUTE POST-OPERATIVE PAIN: Primary | ICD-10-CM

## 2019-05-28 LAB
ANION GAP SERPL CALCULATED.3IONS-SCNC: 5 MMOL/L (ref 3–14)
BUN SERPL-MCNC: 35 MG/DL (ref 7–30)
CALCIUM SERPL-MCNC: 9.7 MG/DL (ref 8.5–10.1)
CHLORIDE SERPL-SCNC: 104 MMOL/L (ref 94–109)
CO2 SERPL-SCNC: 33 MMOL/L (ref 20–32)
CREAT SERPL-MCNC: 4.19 MG/DL (ref 0.66–1.25)
GFR SERPL CREATININE-BSD FRML MDRD: 17 ML/MIN/{1.73_M2}
GLUCOSE SERPL-MCNC: 101 MG/DL (ref 70–99)
POTASSIUM SERPL-SCNC: 3.8 MMOL/L (ref 3.4–5.3)
SODIUM SERPL-SCNC: 142 MMOL/L (ref 133–144)

## 2019-05-28 PROCEDURE — C1758 CATHETER, URETERAL: HCPCS | Performed by: SURGERY

## 2019-05-28 PROCEDURE — 37000009 ZZH ANESTHESIA TECHNICAL FEE, EACH ADDTL 15 MIN: Performed by: SURGERY

## 2019-05-28 PROCEDURE — 40000170 ZZH STATISTIC PRE-PROCEDURE ASSESSMENT II: Performed by: SURGERY

## 2019-05-28 PROCEDURE — 25800030 ZZH RX IP 258 OP 636: Performed by: SURGERY

## 2019-05-28 PROCEDURE — 36832 AV FISTULA REVISION OPEN: CPT | Mod: RT | Performed by: SURGERY

## 2019-05-28 PROCEDURE — 25000125 ZZHC RX 250: Performed by: SURGERY

## 2019-05-28 PROCEDURE — 36415 COLL VENOUS BLD VENIPUNCTURE: CPT | Performed by: SURGERY

## 2019-05-28 PROCEDURE — 37000008 ZZH ANESTHESIA TECHNICAL FEE, 1ST 30 MIN: Performed by: SURGERY

## 2019-05-28 PROCEDURE — 25000128 H RX IP 250 OP 636: Performed by: NURSE ANESTHETIST, CERTIFIED REGISTERED

## 2019-05-28 PROCEDURE — 25800030 ZZH RX IP 258 OP 636: Performed by: NURSE ANESTHETIST, CERTIFIED REGISTERED

## 2019-05-28 PROCEDURE — 36000056 ZZH SURGERY LEVEL 3 1ST 30 MIN: Performed by: SURGERY

## 2019-05-28 PROCEDURE — 25000128 H RX IP 250 OP 636: Performed by: SURGERY

## 2019-05-28 PROCEDURE — 25000132 ZZH RX MED GY IP 250 OP 250 PS 637: Mod: GY | Performed by: STUDENT IN AN ORGANIZED HEALTH CARE EDUCATION/TRAINING PROGRAM

## 2019-05-28 PROCEDURE — 25000125 ZZHC RX 250: Performed by: NURSE ANESTHETIST, CERTIFIED REGISTERED

## 2019-05-28 PROCEDURE — 71000027 ZZH RECOVERY PHASE 2 EACH 15 MINS: Performed by: SURGERY

## 2019-05-28 PROCEDURE — 36000058 ZZH SURGERY LEVEL 3 EA 15 ADDTL MIN: Performed by: SURGERY

## 2019-05-28 PROCEDURE — 80048 BASIC METABOLIC PNL TOTAL CA: CPT | Performed by: SURGERY

## 2019-05-28 PROCEDURE — 71000012 ZZH RECOVERY PHASE 1 LEVEL 1 FIRST HR: Performed by: SURGERY

## 2019-05-28 PROCEDURE — A9270 NON-COVERED ITEM OR SERVICE: HCPCS | Mod: GY | Performed by: STUDENT IN AN ORGANIZED HEALTH CARE EDUCATION/TRAINING PROGRAM

## 2019-05-28 PROCEDURE — 27210794 ZZH OR GENERAL SUPPLY STERILE: Performed by: SURGERY

## 2019-05-28 RX ORDER — ONDANSETRON 2 MG/ML
4 INJECTION INTRAMUSCULAR; INTRAVENOUS EVERY 30 MIN PRN
Status: DISCONTINUED | OUTPATIENT
Start: 2019-05-28 | End: 2019-05-28 | Stop reason: HOSPADM

## 2019-05-28 RX ORDER — ONDANSETRON 4 MG/1
4 TABLET, ORALLY DISINTEGRATING ORAL EVERY 30 MIN PRN
Status: DISCONTINUED | OUTPATIENT
Start: 2019-05-28 | End: 2019-05-28 | Stop reason: HOSPADM

## 2019-05-28 RX ORDER — FENTANYL CITRATE 50 UG/ML
INJECTION, SOLUTION INTRAMUSCULAR; INTRAVENOUS PRN
Status: DISCONTINUED | OUTPATIENT
Start: 2019-05-28 | End: 2019-05-28

## 2019-05-28 RX ORDER — SODIUM CHLORIDE, SODIUM LACTATE, POTASSIUM CHLORIDE, CALCIUM CHLORIDE 600; 310; 30; 20 MG/100ML; MG/100ML; MG/100ML; MG/100ML
INJECTION, SOLUTION INTRAVENOUS CONTINUOUS
Status: DISCONTINUED | OUTPATIENT
Start: 2019-05-28 | End: 2019-05-28 | Stop reason: HOSPADM

## 2019-05-28 RX ORDER — HEPARIN SODIUM 1000 [USP'U]/ML
INJECTION, SOLUTION INTRAVENOUS; SUBCUTANEOUS PRN
Status: DISCONTINUED | OUTPATIENT
Start: 2019-05-28 | End: 2019-05-28 | Stop reason: HOSPADM

## 2019-05-28 RX ORDER — CEFAZOLIN SODIUM 2 G/100ML
2 INJECTION, SOLUTION INTRAVENOUS
Status: COMPLETED | OUTPATIENT
Start: 2019-05-28 | End: 2019-05-28

## 2019-05-28 RX ORDER — LIDOCAINE HYDROCHLORIDE 20 MG/ML
INJECTION, SOLUTION INFILTRATION; PERINEURAL PRN
Status: DISCONTINUED | OUTPATIENT
Start: 2019-05-28 | End: 2019-05-28

## 2019-05-28 RX ORDER — HYDROMORPHONE HYDROCHLORIDE 1 MG/ML
.3-.5 INJECTION, SOLUTION INTRAMUSCULAR; INTRAVENOUS; SUBCUTANEOUS EVERY 5 MIN PRN
Status: DISCONTINUED | OUTPATIENT
Start: 2019-05-28 | End: 2019-05-28 | Stop reason: HOSPADM

## 2019-05-28 RX ORDER — HEPARIN SODIUM 1000 [USP'U]/ML
INJECTION, SOLUTION INTRAVENOUS; SUBCUTANEOUS PRN
Status: DISCONTINUED | OUTPATIENT
Start: 2019-05-28 | End: 2019-05-28

## 2019-05-28 RX ORDER — PROPOFOL 10 MG/ML
INJECTION, EMULSION INTRAVENOUS CONTINUOUS PRN
Status: DISCONTINUED | OUTPATIENT
Start: 2019-05-28 | End: 2019-05-28

## 2019-05-28 RX ORDER — FENTANYL CITRATE 50 UG/ML
25-50 INJECTION, SOLUTION INTRAMUSCULAR; INTRAVENOUS
Status: DISCONTINUED | OUTPATIENT
Start: 2019-05-28 | End: 2019-05-28 | Stop reason: HOSPADM

## 2019-05-28 RX ORDER — HYDROCODONE BITARTRATE AND ACETAMINOPHEN 5; 325 MG/1; MG/1
1 TABLET ORAL
Status: COMPLETED | OUTPATIENT
Start: 2019-05-28 | End: 2019-05-28

## 2019-05-28 RX ORDER — SODIUM CHLORIDE 9 MG/ML
INJECTION, SOLUTION INTRAVENOUS CONTINUOUS PRN
Status: DISCONTINUED | OUTPATIENT
Start: 2019-05-28 | End: 2019-05-28

## 2019-05-28 RX ORDER — BUPIVACAINE HYDROCHLORIDE 5 MG/ML
INJECTION, SOLUTION PERINEURAL PRN
Status: DISCONTINUED | OUTPATIENT
Start: 2019-05-28 | End: 2019-05-28 | Stop reason: HOSPADM

## 2019-05-28 RX ORDER — HYDROCODONE BITARTRATE AND ACETAMINOPHEN 5; 325 MG/1; MG/1
1-2 TABLET ORAL EVERY 4 HOURS PRN
Qty: 10 TABLET | Refills: 0 | Status: SHIPPED | OUTPATIENT
Start: 2019-05-28 | End: 2020-08-20

## 2019-05-28 RX ORDER — NALOXONE HYDROCHLORIDE 0.4 MG/ML
.1-.4 INJECTION, SOLUTION INTRAMUSCULAR; INTRAVENOUS; SUBCUTANEOUS
Status: DISCONTINUED | OUTPATIENT
Start: 2019-05-28 | End: 2019-05-28 | Stop reason: HOSPADM

## 2019-05-28 RX ADMIN — FENTANYL CITRATE 50 MCG: 50 INJECTION, SOLUTION INTRAMUSCULAR; INTRAVENOUS at 12:35

## 2019-05-28 RX ADMIN — PROPOFOL 200 MCG/KG/MIN: 10 INJECTION, EMULSION INTRAVENOUS at 12:39

## 2019-05-28 RX ADMIN — SODIUM CHLORIDE: 9 INJECTION, SOLUTION INTRAVENOUS at 12:34

## 2019-05-28 RX ADMIN — FENTANYL CITRATE 50 MCG: 50 INJECTION, SOLUTION INTRAMUSCULAR; INTRAVENOUS at 12:42

## 2019-05-28 RX ADMIN — HEPARIN SODIUM 3000 UNITS: 1000 INJECTION, SOLUTION INTRAVENOUS; SUBCUTANEOUS at 13:10

## 2019-05-28 RX ADMIN — CEFAZOLIN SODIUM 2 G: 2 INJECTION, SOLUTION INTRAVENOUS at 12:44

## 2019-05-28 RX ADMIN — FENTANYL CITRATE 50 MCG: 50 INJECTION, SOLUTION INTRAMUSCULAR; INTRAVENOUS at 13:45

## 2019-05-28 RX ADMIN — PHENYLEPHRINE HYDROCHLORIDE 100 MCG: 10 INJECTION INTRAVENOUS at 13:07

## 2019-05-28 RX ADMIN — DEXMEDETOMIDINE HYDROCHLORIDE 8 MCG: 100 INJECTION, SOLUTION INTRAVENOUS at 13:45

## 2019-05-28 RX ADMIN — MIDAZOLAM 2 MG: 1 INJECTION INTRAMUSCULAR; INTRAVENOUS at 12:35

## 2019-05-28 RX ADMIN — HYDROCODONE BITARTRATE AND ACETAMINOPHEN 1 TABLET: 5; 325 TABLET ORAL at 15:16

## 2019-05-28 RX ADMIN — LIDOCAINE HYDROCHLORIDE 40 MG: 20 INJECTION, SOLUTION INFILTRATION; PERINEURAL at 12:39

## 2019-05-28 RX ADMIN — PHENYLEPHRINE HYDROCHLORIDE 100 MCG: 10 INJECTION INTRAVENOUS at 12:50

## 2019-05-28 ASSESSMENT — MIFFLIN-ST. JEOR: SCORE: 1672.48

## 2019-05-28 NOTE — PROGRESS NOTES
Admission medication history interview status for the 5/28/2019  admission is complete. See EPIC admission navigator for prior to admission medications     Medication history source reliability:Good    Medication history interview source(s):Patient    Medication history resources (including written lists, pill bottles, clinic record):None    Primary pharmacy.    Additional medication history information not noted on PTA med list :None    Time spent in this activity: 45 minutes    Prior to Admission medications    Medication Sig Last Dose Taking? Auth Provider   amLODIPine (NORVASC) 10 MG tablet Take 10 mg by mouth daily  5/28/2019 at 0900 Yes Reported, Patient   hydrALAZINE (APRESOLINE) 50 MG tablet Take 50 mg by mouth 3 times daily  5/28/2019 at 0900 Yes Reported, Patient   labetalol (NORMODYNE) 200 MG tablet Take 200 mg by mouth 2 times daily  5/28/2019 at 0900 Yes Reported, Patient

## 2019-05-28 NOTE — ANESTHESIA CARE TRANSFER NOTE
Patient: Kailash Sorto    Procedure(s):  FIRST STAGE EXCISION OF RIGHT ARTERIOVENOUS FISTULA ANEURYMS & Vein patch angioplasty    Diagnosis: RIGHT ARTERIOVENOUS FISTULA ANEURYSMS  Diagnosis Additional Information: No value filed.    Anesthesia Type:   LMA, MAC     Note:  Airway :Face Mask  Patient transferred to:PACU  Comments: To recovery, Manan Report: Identifed the Patient, Identified the Reponsible Provider, Reviewed the pertinent medical history, Discussed the surgical course, Reviewed Intra-OP anesthesia mangement and issues during anesthesia, Set expectations for post-procedure period and Allowed opportunity for questions and acknowledgement of understanding      Vitals: (Last set prior to Anesthesia Care Transfer)    CRNA VITALS  5/28/2019 1359 - 5/28/2019 1435      5/28/2019             Pulse:  84    SpO2:  100 %    Resp Rate (set):  10                Electronically Signed By: CASIMIRO Kim CRNA  May 28, 2019  2:35 PM

## 2019-05-28 NOTE — ANESTHESIA PREPROCEDURE EVALUATION
Anesthesia Evaluation     . Pt has had prior anesthetic. Type: MAC    No history of anesthetic complications          ROS/MED HX    ENT/Pulmonary:  - neg pulmonary ROS     Neurologic:  - neg neurologic ROS     Cardiovascular:     (+) hypertension----. : . . . :. . Previous cardiac testing Echodate:results:Interpretation Summary  Global and regional left ventricular function is normal with an EF of 60-65%.  Mild concentric wall thickening consistent with left ventricular hypertrophy  is present.  Right ventricular function, chamber size, wall motion, and thickness are  normal.  The inferior vena cava is normal.  Trivial pericardial effusion is present.date: results: date: results: date: results:         Congenital heart disease: 2016.   METS/Exercise Tolerance:  >4 METS   Hematologic:         Musculoskeletal:         GI/Hepatic:         Renal/Genitourinary:     (+) chronic renal disease, type: ESRD, Pt requires dialysis, type: Hemodialysis,       Endo: Comment: seconday hyperparathyroidism     (-) Type II DM   Psychiatric:         Infectious Disease:         Malignancy:         Other:                     Physical Exam  Normal systems: dental    Airway   Mallampati: II  TM distance: >3 FB  Neck ROM: full    Dental     Cardiovascular   Rhythm and rate: regular and normal      Pulmonary    breath sounds clear to auscultation                        Anesthesia Plan      History & Physical Review  History and physical reviewed and following examination; no interval change.    ASA Status:  3 .        Plan for MAC Reason for MAC:  Deep or markedly invasive procedure (G8)  PONV prophylaxis:  Ondansetron (or other 5HT-3)       Postoperative Care  Postoperative pain management:  IV analgesics and Multi-modal analgesia.      Consents  Anesthetic plan, risks, benefits and alternatives discussed with:  Patient or representative and Patient..      .

## 2019-05-28 NOTE — BRIEF OP NOTE
[facility]    Brief Operative Note    Pre-operative diagnosis: RIGHT ARTERIOVENOUS FISTULA ANEURYSMS  Post-operative diagnosis SAME  Procedure: Procedure(s):  FIRST STAGE EXCISION OF RIGHT ARTERIOVENOUS FISTULA ANEURYMS & Vein patch angioplasty  Surgeon: Surgeon(s) and Role:     * Payam Denise MD - Primary     * Dmitriy Gamez MD - Resident - Assisting  Anesthesia: General   Estimated blood loss: 20 mL  Drains: None  Specimens: * No specimens in log *  Findings:   Large aneurysmal dilation of the venous outflow of the AVF just distal to an area of narrowing.  Complications: None.  Implants:  * No implants in log *

## 2019-05-28 NOTE — DISCHARGE INSTRUCTIONS
Same Day Surgery Discharge Instructions for  Sedation and General Anesthesia       It's not unusual to feel dizzy, light-headed or faint for up to 24 hours after surgery or while taking pain medication.  If you have these symptoms: sit for a few minutes before standing and have someone assist you when you get up to walk or use the bathroom.      You should rest and relax for the next 24 hours. We recommend you make arrangements to have an adult stay with you for at least 24 hours after your discharge.  Avoid hazardous and strenuous activity.      DO NOT DRIVE any vehicle or operate mechanical equipment for 24 hours following the end of your surgery.  Even though you may feel normal, your reactions may be affected by the medication you have received.      Do not drink alcoholic beverages for 24 hours following surgery.       Slowly progress to your regular diet as you feel able. It's not unusual to feel nauseated and/or vomit after receiving anesthesia.  If you develop these symptoms, drink clear liquids (apple juice, ginger ale, broth, 7-up, etc. ) until you feel better.  If your nausea and vomiting persists for 24 hours, please notify your surgeon.        All narcotic pain medications, along with inactivity and anesthesia, can cause constipation. Drinking plenty of liquids and increasing fiber intake will help.      For any questions of a medical nature, call your surgeon.      Do not make important decisions for 24 hours.      If you had general anesthesia, you may have a sore throat for a couple of days related to the breathing tube used during surgery.  You may use Cepacol lozenges to help with this discomfort.  If it worsens or if you develop a fever, contact your surgeon.       If you feel your pain is not well managed with the pain medications prescribed by your surgeon, please contact your surgeon's office to let them know so they can address your concerns.     **If you have questions or concerns about your  procedure  call Dr Payam Denise at [phone]**

## 2019-05-28 NOTE — OP NOTE
Procedure Date: 05/28/2019      PREOPERATIVE  DIAGNOSES:     1.  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   2.  Stenotic area of antecubital portion of arteriovenous fistula.      PROCEDURES:   1.  Excision and repair of aneurysmal segment right upper arm brachial to cephalic arteriovenous fistula.        a.  Excision of overlying thin skin.   2.  Vein patch angioplasty to stenotic area of fistula.      SURGEON:  Payam Denise MD      :  Dmitriy Gamez MD ([facility] Surgery resident)      ANESTHESIA:  Local with intravenous supplementation.      PREOPERATIVE MEDICATIONS:  Ancef 2 grams IV.      INDICATIONS:  A 33-year-old patient who is on chronic hemodialysis via right upper arm brachiocephalic arteriovenous fistula placed in 2016.  He has developed 2 areas of progressive aneurysmal dilatation approximately 6 cm from the anastomosis which itself was widely patent.  The larger area is in the distal 1/3 of the upper arm and the overlying skin is somewhat thin.  He has an area more proximally which was also aneurysmal, but to a lesser degree.  We have also found a relative stenosis of 5.5 mm before the aneurysmal dilatation compared to the inflow fistula of 10.6 mm.  We felt that a staged repair of the aneurysm was indicated and also repair of the stenotic area.  This will allow him to continue to use the upper portion of the fistula as the surgical site heals.  He comes to the operating today under informed consent.      DESCRIPTION OF PROCEDURE:  The patient was brought to the operating room.  Right arm was prepped and draped.  Timeout was called and the sites were identified.      VASCULAR EXPOSURE:  An elliptical incision was made around the thin skin over the aneurysmal segment in the right upper arm.  We extended this beyond the stenotic area to the perianastomotic area that measured at least 10 mm in diameter.  A significant  thrill was noted at the stenotic area.  We then dissected free towards the mid upper arm and encircled the fistula at this level which is only mildly aneurysmal, measuring approximately 10 mm in diameter.      We then mobilized the aneurysmal segment,  both medially and laterally to create the skin flaps, since we would be excising the overlying thin skin that measured 5 cm x 2.5 cm.      EXCISION OF ANEURYSM:  3000 units of intravenous heparin were given.  After 5 minutes, a vascular clamp was applied to the inflow slightly dilated area of the fistula in the vessel, distally after decompressing the fistula.  We excised the overlying thin skin.  There was no ulceration.  We then opened up into the aneurysmal segment.  The posterior, medial and lateral walls were completely normal.  We excised the redundant area.  We did save a small segment of this for our vein patch.       VEIN PATCH ANGIOPLASTY:   We extended the venotomy towards the antecubital portion beyond the stenotic area.  The wall of the vessel was otherwise unremarkable.  Using loupe magnification, and a 6-0 Prolene suture, we used the segment from the aneurysm for a vein patch angioplasty, which measured 2 cm in length upon completion.  This easily admitted a 6 mm dilator and heparinized saline solution was injected.      REPAIR OF ANEURYSM:  We were then able to primarily repair the aneurysmal segment that we excised the redundant walls with a running 5-0 Prolene suture.  Upon completion release of the vascular clamps and vessel loops, we had a strong palpable pulse and thrill.  The vein patch area measured approximately 8 mm in diameter which was a nice transition to the 10 mm in diameter area of the repaired aneurysm.  Excellent hemostasis was noted.      The wound was infiltrated with 0.5% Marcaine for post-analgesia.  Subcutaneous tissue was easily approximated over the repair site with interrupted 3-0 Vicryl suture, and skin was closed with 4-0  Monocryl in subcutaneous fashion.  Steri-Strips, gauze and Cynthia dressing was applied.      The patient tolerated the procedure well.      ESTIMATED BLOOD LOSS:  Less than 25 mL.      COMPLICATIONS:  None.      We will allow approximately 4 weeks for the repaired segment to heal, at which time the dialysis unit can start using this segment.  The patient will then have the more upper arm segment of the aneurysmal fistula repaired at that time.         BASSEM KELLY MD             D: 2019   T: 2019   MT: PJ      Name:     DIANELYS COOPER   MRN:      [id number]        Account:        [id number]   :      1985           Procedure Date: 2019      Document: [id number]

## 2019-06-26 ENCOUNTER — TELEPHONE (OUTPATIENT)
Dept: OTHER | Facility: CLINIC | Age: 34
End: 2019-06-26

## 2019-06-26 NOTE — TELEPHONE ENCOUNTER
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 Denise.    Are you having any issues with pain? No  Do you have any concerns about your incision? No  Do you have fever, nausea or vomiting? No  Do you have any additional concerns? No    Will discuss with Dr. Denise about post op appointment.  Pt is aware he will receive a call tomorrow regarding scheduling, and he was comfortable with this plan.    Sweta Valera, LEELEEN, RN

## 2019-06-26 NOTE — TELEPHONE ENCOUNTER
Patient called to schedule 1st PO appointment with Dr Denise from 5/28/19 surgery. Please call him when he can be seen [phone].

## 2019-06-27 NOTE — TELEPHONE ENCOUNTER
Discussed with Dr. Denise.  Pt is scheduled for 1st post op on 7/2/19 at 1600.  Pt aware Dr. Denise will be coming from surgery.  Pt was in agreement with this plan and had no further questions.  Sweta Valera, LEELEEN, RN

## 2019-07-01 ENCOUNTER — TELEPHONE (OUTPATIENT)
Dept: TRANSPLANT | Facility: CLINIC | Age: 34
End: 2019-07-01

## 2019-07-01 ENCOUNTER — TRANSFERRED RECORDS (OUTPATIENT)
Dept: HEALTH INFORMATION MANAGEMENT | Facility: CLINIC | Age: 34
End: 2019-07-01

## 2019-07-02 ENCOUNTER — OFFICE VISIT (OUTPATIENT)
Dept: OTHER | Facility: CLINIC | Age: 34
End: 2019-07-02
Attending: SURGERY
Payer: MEDICARE

## 2019-07-02 VITALS — TEMPERATURE: 98 F | DIASTOLIC BLOOD PRESSURE: 74 MMHG | SYSTOLIC BLOOD PRESSURE: 121 MMHG | HEART RATE: 81 BPM

## 2019-07-02 DIAGNOSIS — Z99.2 ESRD (END STAGE RENAL DISEASE) ON DIALYSIS (H): ICD-10-CM

## 2019-07-02 DIAGNOSIS — I77.0 AVF (ARTERIOVENOUS FISTULA) (H): Primary | ICD-10-CM

## 2019-07-02 DIAGNOSIS — N18.6 ESRD (END STAGE RENAL DISEASE) ON DIALYSIS (H): ICD-10-CM

## 2019-07-02 PROCEDURE — 99024 POSTOP FOLLOW-UP VISIT: CPT | Mod: ZP | Performed by: SURGERY

## 2019-07-02 PROCEDURE — G0463 HOSPITAL OUTPT CLINIC VISIT: HCPCS

## 2019-07-02 NOTE — NURSING NOTE
"Kailash Sorto is a 33 year old male who presents for:  Chief Complaint   Patient presents with     RECHECK     1st PO - s/p first stage excision of right AVF aneurysm w/ vein patch angioplasty on 5/28/19         Vitals:    Vitals:    07/02/19 1605   BP: 121/74   BP Location: Left arm   Patient Position: Chair   Cuff Size: Adult Regular   Pulse: 81   Temp: 98  F (36.7  C)   TempSrc: Oral       BMI:  Estimated body mass index is 27.75 kg/m  as calculated from the following:    Height as of 5/28/19: 5' 6\" (1.676 m).    Weight as of 5/28/19: 171 lb 14.4 oz (78 kg).    Pain Score:  Data Unavailable        aMría Nixon MA    "

## 2019-07-02 NOTE — LETTER
[facility] [address]  [place] [address]  Phone: [phone]  Fax: [phone]      2019       Re: Kailash Sorto - 1985    Kailash Sorto returns for follow-up.  He has a right upper arm brachiocephalic arteriovenous fistula.  This is developed to aneurysmal areas.  Larger was closer to the elbow.  He underwent repair of the site along with excision of the overlying thin skin on 2019 with no complications.  We also placed a vein patch just before the aneurysmal area on the stenotic area of the fistula to improve the flow.     Since the surgery the dialysis unit has been using the midportion of the fistula which also has a aneurysmal site.      Exam: Alert and appropriate.             Blood pressure 121/74.  Pulse 81 regular             Very well-healed fistula incision.             Good flow in caliber the fistula at this site.             They are using the fistula presently in the mid upper arm where there is the somewhat thin skin and aneurysmal dilatation which is unchanged from preoperatively.      Impression: Doing well following repair of an aneurysmal segment of the fistula with a vein patch to the inflow site.  The dialysis unit may start using the repaired segment for dialysis. Patient will call us when he like to have the second fistula aneurysm portion repaired in a similar fashion.       Payam Denise MD

## 2019-07-02 NOTE — PROGRESS NOTES
[facility]    Kailash Sorto returns for follow-up.  He has a right upper arm brachiocephalic arteriovenous fistula.  This is developed to aneurysmal areas.  Larger was closer to the elbow.  He underwent repair of the site along with excision of the overlying thin skin on 5/28/2019 with no complications.  We also placed a vein patch just before the aneurysmal area on the stenotic area of the fistula to improve the flow.    Since the surgery the dialysis unit has been using the midportion of the fistula which also has a aneurysmal site.      Exam: Alert and appropriate.             Blood pressure 121/74.  Pulse 81 regular             Very well-healed fistula incision.              Good flow in caliber the fistula at this site.             They are using the fistula presently in the mid upper arm where there is the somewhat thin skin and aneurysmal dilatation which is unchanged from preoperatively.      Impression: Doing well following repair of an aneurysmal segment of the fistula with a vein patch to the inflow site.  The dialysis unit may start using the repaired segment for dialysis.  Patient will call us when he like to have the second fistula aneurysm portion repaired in a similar fashion.      Payam Denise MD     CC: [facility] dialysis unit

## 2019-07-22 DIAGNOSIS — N18.6 END STAGE RENAL DISEASE (H): ICD-10-CM

## 2019-07-22 DIAGNOSIS — Z76.82 ORGAN TRANSPLANT CANDIDATE: ICD-10-CM

## 2019-08-01 ENCOUNTER — TRANSFERRED RECORDS (OUTPATIENT)
Dept: HEALTH INFORMATION MANAGEMENT | Facility: CLINIC | Age: 34
End: 2019-08-01

## 2019-08-01 ENCOUNTER — DOCUMENTATION ONLY (OUTPATIENT)
Dept: TRANSPLANT | Facility: CLINIC | Age: 34
End: 2019-08-01

## 2019-10-07 DIAGNOSIS — Z94.9 ORGAN TRANSPLANT: ICD-10-CM

## 2019-10-07 DIAGNOSIS — N18.6 ESRD (END STAGE RENAL DISEASE) (H): ICD-10-CM

## 2019-10-10 DIAGNOSIS — N18.6 ESRD (END STAGE RENAL DISEASE) (H): Primary | ICD-10-CM

## 2019-10-10 DIAGNOSIS — Z94.9 ORGAN TRANSPLANT: ICD-10-CM

## 2019-11-06 ENCOUNTER — HEALTH MAINTENANCE LETTER (OUTPATIENT)
Age: 34
End: 2019-11-06

## 2020-01-27 DIAGNOSIS — N18.6 ESRD (END STAGE RENAL DISEASE) (H): ICD-10-CM

## 2020-01-27 DIAGNOSIS — Z94.9 ORGAN TRANSPLANT: ICD-10-CM

## 2020-01-31 ENCOUNTER — DOCUMENTATION ONLY (OUTPATIENT)
Dept: TRANSPLANT | Facility: CLINIC | Age: 35
End: 2020-01-31

## 2020-02-05 ENCOUNTER — COMMITTEE REVIEW (OUTPATIENT)
Dept: TRANSPLANT | Facility: CLINIC | Age: 35
End: 2020-02-05

## 2020-02-05 NOTE — COMMITTEE REVIEW
Abdominal Committee Review Note     Evaluation Date: 8/4/2016  Committee Review Date: 2/5/2020    Organ being evaluated for: Kidney    Transplant Phase: Waitlist  Transplant Status: Inactive    Transplant Coordinator: Maryjaen Leroy  Transplant Surgeon:       Referring Physician: MENDEZ Cobos    Primary Diagnosis: Hypertensive Nephrosclerosis  Secondary Diagnosis:     Committee Review Members:  Jayro, Andres Camara RD   Nephrology Cristino Martin MD   Nurse Era Vallejo RN   Pharmacist Alfonso Cha MD   Physician Assistant Blank May PA-C    - Clinical Connie Dumont, [facility]   Transplant Blank May PA-C, Maryjane Leroy, SHAYY, Elida Rivers, SHAYY, Amado Vale MD, Tameka Galo, SHAYY, Adelina Liao, RN, Jaymie Puckett, SHAYY, Sahra Toscano MD, Yoni Abdi MD, Breana Hurst, SHAYY   Transplant Surgery Amado Vale MD       Transplant Eligibility: Irreversible chronic kidney disease treated w/dialysis or expected need for dialysis    Committee Review Decision: Remain Inactive    Relative Contraindications: None    Absolute Contraindications: None    Committee Chair Sahra Toscano MD verbally attested to the committee's decision.    Committee Discussion Details: Insurance not verified; need to be inactive.

## 2020-02-16 ENCOUNTER — HEALTH MAINTENANCE LETTER (OUTPATIENT)
Age: 35
End: 2020-02-16

## 2020-04-03 DIAGNOSIS — Z94.9 ORGAN TRANSPLANT: ICD-10-CM

## 2020-04-03 DIAGNOSIS — N18.6 ESRD (END STAGE RENAL DISEASE) (H): ICD-10-CM

## 2020-06-22 LAB
AST SERPL-CCNC: 23 U/L (ref 0–34)
CREAT SERPL-MCNC: 5.37 MG/DL (ref 0.7–1.3)
POTASSIUM SERPL-SCNC: 3.9 MEQ/L (ref 3.5–5.5)

## 2020-06-29 ENCOUNTER — TRANSFERRED RECORDS (OUTPATIENT)
Dept: HEALTH INFORMATION MANAGEMENT | Facility: CLINIC | Age: 35
End: 2020-06-29

## 2020-07-13 ENCOUNTER — TRANSFERRED RECORDS (OUTPATIENT)
Dept: HEALTH INFORMATION MANAGEMENT | Facility: CLINIC | Age: 35
End: 2020-07-13

## 2020-07-13 DIAGNOSIS — Z76.82 ORGAN TRANSPLANT CANDIDATE: ICD-10-CM

## 2020-07-13 DIAGNOSIS — N18.6 ESRD (END STAGE RENAL DISEASE) (H): ICD-10-CM

## 2020-07-13 DIAGNOSIS — Z94.9 ORGAN TRANSPLANT: ICD-10-CM

## 2020-07-13 LAB
ALT SERPL-CCNC: 20 U/L (ref 10–49)
AST SERPL-CCNC: 20 U/L (ref 0–34)
CREAT SERPL-MCNC: 5.2 MG/DL (ref 0.7–1.3)
POTASSIUM SERPL-SCNC: 4 MEQ/L (ref 3.5–5.5)

## 2020-07-23 ENCOUNTER — DOCUMENTATION ONLY (OUTPATIENT)
Dept: TRANSPLANT | Facility: CLINIC | Age: 35
End: 2020-07-23

## 2020-07-23 ENCOUNTER — VIRTUAL VISIT (OUTPATIENT)
Dept: TRANSPLANT | Facility: CLINIC | Age: 35
End: 2020-07-23
Attending: INTERNAL MEDICINE
Payer: MEDICARE

## 2020-07-23 DIAGNOSIS — Z76.82 AWAITING ORGAN TRANSPLANT: Primary | ICD-10-CM

## 2020-07-23 NOTE — PROGRESS NOTES
Patient Name: Kailash Sorto  : 1985  Age: 34 year old  MRN: [id number]  Date of Initial Social Work Evaluation: 16     Patient on kidney transplant wait list active.  Met today by telephone to update psychosocial assessment.       Presenting Information   Living Situation: in [place], [place] with his wife, Sonja (33) and son Rex (9 months)  If not local, plans for short term stay:  N/A  Previous Functional Status: Independent  Cultural/Language/Spiritual Considerations: N/A     Support System  Primary Support Person: Sonja  Other support:  Friends, uncle. Sonja's family.  Patient's family lives in [place].   Plan for support in immediate post-transplant period: Sonja works  Full time as a  for [employer].   She can take FMLA to care for him.       Health Care Directive  Decision Maker: Self  Alternate Decision Maker: since Sonja and he are now legally , she would be his decision maker.  Encouraged completion of HCD anyway.  He is comfortable that Sonja could make decisions for him.   Health Care Directive: Provided Copy and Provided education     Mental Health/Coping:   History of Mental Health: No mental health hx.  Denies depression, anxiety or any other mental health issue.    History of Chemical Health: hx or weekly drinking. Now states has 2-3 beers every couple months.   Current status: Appropriate  Coping:  very busy with care of son.   Services Needed/Recommended: None at this time.     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  Insurance and medication coverage: Medicare and Medica through his employer  Financial concerns: None at this time.  Resources needed: None at this time.    Adherence:  Patient is scheduled for dialysis 3 days per week.   He states he often doesn't feel he needs it that frequently and has asked his doctor to cut back to two  times per week.  His doctor did not agree to this.  He states that a couple of times per month, he only goes twice per week.  But, he has gone every time this month.  He states that with working full time and now having a 9 month old child  It is sometimes hard to go 3 times per week.      Assessment and recommendations and plan:  Patient remains an adequate candidate for transplant, although he occasionally chooses not to go to dialysis.    Reviewed transplant education (Medicare, rehabilitation, donor issues, community/financial resources, and psych/family adjustment) as well as psychosocial risks of transplant. Discussed information on potential costs of medications, Medicare ESRD, post-transplant lodging, etc. Patient seemed to process information well. Appeared well informed, motivated, and able to follow post transplant requirements. Behavior was appropriate during interview. Has adequate income and insurance coverage. Adequate social support. No major contraindications noted for transplant. At this time, patient appears to understand the risks and benefits of transplant.

## 2020-07-23 NOTE — LETTER
2020         RE: Kailash Sorto  [address]  [place] [address]        Dear Colleague,    Thank you for referring your patient, Kailash Sotro, to the [facility]. Please see a copy of my visit note below.    Patient Name: Kailash Sorto  : 1985  Age: 34 year old  MRN: [id number]  Date of Initial Social Work Evaluation: 16     Patient on kidney transplant wait list active.  Met today by telephone to update psychosocial assessment.       Presenting Information   Living Situation: in [place], [place] with his wife, Sonja (33) and son Rex (9 months)  If not local, plans for short term stay:  N/A  Previous Functional Status: Independent  Cultural/Language/Spiritual Considerations: N/A     Support System  Primary Support Person: Sonja  Other support:  Friends, uncle. Sonja's family.  Patient's family lives in [place].   Plan for support in immediate post-transplant period: Sonja works  Full time as a  for [employer].   She can take [place] to care for him.       Health Care Directive  Decision Maker: Self  Alternate Decision Maker: since Sonja and he are now legally , she would be his decision maker.  Encouraged completion of HCD anyway.  He is comfortable that Sonja could make decisions for him.   Health Care Directive: Provided Copy and Provided education     Mental Health/Coping:   History of Mental Health: No mental health hx.  Denies depression, anxiety or any other mental health issue.    History of Chemical Health: hx or weekly drinking. Now states has 2-3 beers every couple months.   Current status: Appropriate  Coping:  very busy with care of son.   Services Needed/Recommended: None at this time.     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  Insurance and medication coverage: Medicare and Medica through his  employer  Financial concerns: None at this time.  Resources needed: None at this time.    Adherence:  Patient is scheduled for dialysis 3 days per week.   He states he often doesn't feel he needs it that frequently and has asked his doctor to cut back to two times per week.  His doctor did not agree to this.  He states that a couple of times per month, he only goes twice per week.  But, he has gone every time this month.  He states that with working full time and now having a 9 month old child  It is sometimes hard to go 3 times per week.    Assessment and recommendations and plan:  Patient remains an adequate candidate for transplant, although he occasionally chooses not to go to dialysis.    Reviewed transplant education (Medicare, rehabilitation, donor issues, community/financial resources, and psych/family adjustment) as well as psychosocial risks of transplant. Discussed information on potential costs of medications, Medicare ESRD, post-transplant lodging, etc. Patient seemed to process information well. Appeared well informed, motivated, and able to follow post transplant requirements. Behavior was appropriate during interview. Has adequate income and insurance coverage. Adequate social support. No major contraindications noted for transplant. At this time, patient appears to understand the risks and benefits of transplant.       Again, thank you for allowing me to participate in the care of your patient.        Sincerely,        RAFAEL Aquino

## 2020-07-27 ENCOUNTER — TRANSFERRED RECORDS (OUTPATIENT)
Dept: HEALTH INFORMATION MANAGEMENT | Facility: CLINIC | Age: 35
End: 2020-07-27

## 2020-08-19 NOTE — PROGRESS NOTES
[facility]    Kailash Sorto is a right upper arm brachial to cephalic artery venous fistula.  Vein patch was placed at the antecubital level stenosis on 7/12/2016.  He developed aneurysmal dilatation and underwent excision repair of the aneurysmal segment along with a vein patch again to the antecubital stenosis on 5/28/2019.    He did have 2 aneurysmal areas on this fistula that time-we repaired the antecubital and distal upper arm segment initially.  I discussed repairing a more proximal mid upper arm fistula aneurysm at a later date which would allow him to use the repaired segment and avoid a tunneled catheter.    He continues to do well with his dialysis at the [facility] unit every [facility] afternoon.  He has noticed thinning of the skin and a small scab over the unrepaired mid upper arm aneurysm.    PMH: Medications: Labetalol, Norvasc, Apresoline            Non-smoker.       with a 52-wswvo-srr child.  Very active.  Never smoked.    Exam: Alert and appropriate.  Normal affect.  Very healthy.             Blood pressure 116/71 left arm.  Pulse 82 regular.              Chest= clear to auscultation              Cardiovascular= regular rate with no murmur              Right upper arm fistula.  This measures approximately 10 to 12 mm in diameter from the anastomosis to the mid upper arm where we had repaired this in 2019.  Just above this segment in the mid upper arm fistula measures approximately 3 cm in diameter.  There is thickened skin on the anterior surface with a small scab.  No evidence of infection.  Very minimal scab on the medial adjacent repaired fistula segment.      Impression: Worsening aneurysmal dilatation of the mid upper arm portion of his fistula in the right arm and I am concerned about the scab that is present that may result in bleeding.  As we discussed last year we will proceed with repair of this aneurysm and excision of the thin skin and  scab with primary closure.  His dialysis unit will be able to use the distal upper arm/antecubital portion for the dialysis as this heals.  This will be performed under local anesthetic with sedation.    I discussed this with him today and will schedule the surgery accordingly.  Need to be very careful about the thin skin in the upper arm.  I have given him a fistula tourniquet to place in the antecubital portion of the fistula if any bleeding should occur before the surgical procedure.    Spent 25 minutes with the patient today with over 50% in counseling.    Payam Denise MD     CC  Patient Care Team:  Homero Dumont MD as PCP - General (Internal Medicine)  Homero Dumont MD as Assigned PCP  MENDEZ Cobos MD as Referring Physician (Nephrology)

## 2020-08-20 ENCOUNTER — OFFICE VISIT (OUTPATIENT)
Dept: OTHER | Facility: CLINIC | Age: 35
End: 2020-08-20
Attending: SURGERY
Payer: MEDICARE

## 2020-08-20 VITALS — DIASTOLIC BLOOD PRESSURE: 71 MMHG | SYSTOLIC BLOOD PRESSURE: 116 MMHG | HEART RATE: 82 BPM | RESPIRATION RATE: 18 BRPM

## 2020-08-20 DIAGNOSIS — Z99.2 ESRD (END STAGE RENAL DISEASE) ON DIALYSIS (H): ICD-10-CM

## 2020-08-20 DIAGNOSIS — T82.898D ANEURYSM OF ARTERIOVENOUS DIALYSIS FISTULA, SUBSEQUENT ENCOUNTER: Primary | ICD-10-CM

## 2020-08-20 DIAGNOSIS — N18.6 ESRD (END STAGE RENAL DISEASE) ON DIALYSIS (H): ICD-10-CM

## 2020-08-20 DIAGNOSIS — I77.0 AVF (ARTERIOVENOUS FISTULA) (H): Primary | ICD-10-CM

## 2020-08-20 DIAGNOSIS — Z11.59 ENCOUNTER FOR SCREENING FOR OTHER VIRAL DISEASES: Primary | ICD-10-CM

## 2020-08-20 PROCEDURE — 99214 OFFICE O/P EST MOD 30 MIN: CPT | Mod: ZP | Performed by: SURGERY

## 2020-08-20 PROCEDURE — G0463 HOSPITAL OUTPT CLINIC VISIT: HCPCS

## 2020-08-20 NOTE — NURSING NOTE
Patient Education    Procedure: Repair right upper extremity AVF aneurysm and skin ulcer  Diagnosis: AVF aneurysms, AVF skin ulcer  Anticoagulation Instruction: n/a  Pre-Operative Physical Exam: You need to have a pre-op physical exam within 30 days of your procedure. Your procedure may be cancelled if you do not have a current History and Physical. Call your PCP's office to schedule.  Allergies:  Updated in Epic  Bowel Prep:  NPO per protocol.   Post Procedure Education: [facility] patient post-procedure fact sheet reviewed with patient.    COVID-19 instructions for isolation and pre testing reviewed with pt.    Learner(s):patient  Method: Listening, Reading  Barriers to Learning:No Barrier  Outcome: Patient did verbalize understanding of above education.    Sweta Valera, LEELEEN, RN-[facility]

## 2020-08-20 NOTE — PROGRESS NOTES
"Kailash oSrto is a 34 year old male who presents for:  Chief Complaint   Patient presents with     RECHECK     Follow-up to 7/2/19 appointment with Dr. Denise. H/o AVF creation on 5/28/2019. NV        Vitals:    Vitals:    08/20/20 1139   BP: 116/71   BP Location: Left arm   Patient Position: Chair   Cuff Size: Adult Regular   Pulse: 82   Resp: 18       BMI:  Estimated body mass index is 27.75 kg/m  as calculated from the following:    Height as of 5/28/19: 5' 6\" (1.676 m).    Weight as of 5/28/19: 171 lb 14.4 oz (78 kg).    Pain Score:  Data Unavailable        Gilda Zelaya CMA    "

## 2020-08-21 ENCOUNTER — TELEPHONE (OUTPATIENT)
Dept: OTHER | Facility: CLINIC | Age: 35
End: 2020-08-21

## 2020-08-21 NOTE — TELEPHONE ENCOUNTER
Type of surgery: REPAIR RIGHT UPPER EXTREMITY ARTERIOVENOUS FISTULA ANEURYSM WITH SKIN ULCER   Location of surgery: [facility] OR  Date and time of surgery: 8/25/20 @ 4:45 PM  Surgeon: DR. KELLY  Pre-Op Appt Date: 8/24/20  Post-Op Appt Date: PT TO SCHEDULE   Packet sent out: Yes  Pre-cert/Authorization completed:  Yes  Date: 8/21/20

## 2020-08-22 DIAGNOSIS — Z11.59 ENCOUNTER FOR SCREENING FOR OTHER VIRAL DISEASES: ICD-10-CM

## 2020-08-22 PROCEDURE — U0003 INFECTIOUS AGENT DETECTION BY NUCLEIC ACID (DNA OR RNA); SEVERE ACUTE RESPIRATORY SYNDROME CORONAVIRUS 2 (SARS-COV-2) (CORONAVIRUS DISEASE [COVID-19]), AMPLIFIED PROBE TECHNIQUE, MAKING USE OF HIGH THROUGHPUT TECHNOLOGIES AS DESCRIBED BY CMS-2020-01-R: HCPCS | Performed by: SURGERY

## 2020-08-23 LAB
LABORATORY COMMENT REPORT: NORMAL
SARS-COV-2 RNA SPEC QL NAA+PROBE: NEGATIVE
SARS-COV-2 RNA SPEC QL NAA+PROBE: NORMAL
SPECIMEN SOURCE: NORMAL
SPECIMEN SOURCE: NORMAL

## 2020-08-24 ENCOUNTER — TELEPHONE (OUTPATIENT)
Dept: OTHER | Facility: CLINIC | Age: 35
End: 2020-08-24

## 2020-08-24 ENCOUNTER — OFFICE VISIT (OUTPATIENT)
Dept: INTERNAL MEDICINE | Facility: CLINIC | Age: 35
End: 2020-08-24
Payer: MEDICARE

## 2020-08-24 VITALS
DIASTOLIC BLOOD PRESSURE: 68 MMHG | TEMPERATURE: 98.2 F | HEART RATE: 76 BPM | BODY MASS INDEX: 28.57 KG/M2 | WEIGHT: 177 LBS | OXYGEN SATURATION: 97 % | SYSTOLIC BLOOD PRESSURE: 124 MMHG | RESPIRATION RATE: 16 BRPM

## 2020-08-24 DIAGNOSIS — Z99.2 END STAGE RENAL FAILURE ON DIALYSIS (H): ICD-10-CM

## 2020-08-24 DIAGNOSIS — Z01.818 PREOP GENERAL PHYSICAL EXAM: Primary | ICD-10-CM

## 2020-08-24 DIAGNOSIS — I10 ESSENTIAL HYPERTENSION: ICD-10-CM

## 2020-08-24 DIAGNOSIS — N18.6 END STAGE RENAL FAILURE ON DIALYSIS (H): ICD-10-CM

## 2020-08-24 DIAGNOSIS — I77.0 AVF (ARTERIOVENOUS FISTULA) (H): ICD-10-CM

## 2020-08-24 LAB
HGB BLD-MCNC: 10 G/DL (ref 13.3–17.7)
PLATELET # BLD AUTO: 214 10E9/L (ref 150–450)
POTASSIUM SERPL-SCNC: 3.3 MMOL/L (ref 3.4–5.3)

## 2020-08-24 PROCEDURE — 84132 ASSAY OF SERUM POTASSIUM: CPT | Performed by: INTERNAL MEDICINE

## 2020-08-24 PROCEDURE — 99215 OFFICE O/P EST HI 40 MIN: CPT | Performed by: INTERNAL MEDICINE

## 2020-08-24 PROCEDURE — 85049 AUTOMATED PLATELET COUNT: CPT | Performed by: INTERNAL MEDICINE

## 2020-08-24 PROCEDURE — 82565 ASSAY OF CREATININE: CPT | Performed by: INTERNAL MEDICINE

## 2020-08-24 PROCEDURE — 85018 HEMOGLOBIN: CPT | Performed by: INTERNAL MEDICINE

## 2020-08-24 PROCEDURE — 93000 ELECTROCARDIOGRAM COMPLETE: CPT | Performed by: INTERNAL MEDICINE

## 2020-08-24 PROCEDURE — 36415 COLL VENOUS BLD VENIPUNCTURE: CPT | Performed by: INTERNAL MEDICINE

## 2020-08-24 NOTE — TELEPHONE ENCOUNTER
[facility]    I called Kailash Sorto about his fistula aneurysm with skin ulceration revision tomorrow afternoon.  He has had no bleeding though I am concerned that this can occur and I want to get this done as soon as possible after seeing him in the office last week.  We put it on for the end of the day but I do have 3 cases prior to this which if they run late we may have to reschedule he understands.    We will have an early breakfast but n.p.o. after 8 AM.  COVID-19 testing is negative from 2 days ago.  He has hemodialysis later today and also a preop with Dr. Dumont.  It would be able to use the fistula not repaired segment for his dialysis the following day.ami Denise MD

## 2020-08-24 NOTE — PROGRESS NOTES
[address] [address]  Phone: [phone]  Primary Provider: Homero Dumont      PREOPERATIVE EVALUATION:  Today's date: 8/24/2020    Kailash Sorto is a 34 year old male who presents for a preoperative evaluation.    Patient has not seen me since January 2019.    Surgical Information:  No flowsheet data found.  Fax number for surgical facility: Note does not need to be faxed, will be available electronically in Epic.  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  No - Have you ever had surgery on your heart or blood vessels, such as a stent, coronary (heart) bypass, or surgery on an artery in the head, neck, heart, or legs?  No - Do you have chest pain when you are physically active?  No - Do you have a history of heart failure?  No - Do you currently have a cold, bronchitis, or symptoms of other respiratory (head and chest) infections?  No - Do you have a cough, shortness of breath, or wheezing?  No - Do you or anyone in your family have a history of blood clots?  No - Do you or anyone in your family have a serious bleeding problem, such as long-lasting bleeding after surgeries or cuts?  YES - HAVE YOU EVERY HAD ANEMIA OR BEEN TOLD TO TAKE IRON PILLS?   No - Have you had any abnormal blood loss such as black, tarry or bloody stools, or abnormal vaginal bleeding?  No - Have you ever had a blood transfusion?  Yes - Are you willing to have a blood transfusion if it is medically needed before, during, or after your surgery?  No - Have you or anyone in your family ever had problems with anesthesia (sedation for surgery)?  No - Do you have sleep apnea, excessive snoring, or daytime drowsiness?   No - Do you have any artifical heart valves  or other implanted medical devices, such as a pacemaker, defibrillator, or continuous glucose monitor?  No - Do you have any artifical joints?  No - Are you allergic to latex?    Review of Systems  CONSTITUTIONAL: NEGATIVE for fever, chills, change in weight  EYES: NEGATIVE for vision changes or irritation  ENT/MOUTH: NEGATIVE for ear, mouth and throat problems  RESP: NEGATIVE for significant cough or SOB  CV: NEGATIVE for chest pain, palpitations or peripheral edema  GI: NEGATIVE for nausea, abdominal pain, heartburn, or change in bowel habits  : NEGATIVE for frequency, dysuria, or hematuria  MUSCULOSKELETAL: NEGATIVE for significant arthralgias or myalgia  NEURO: NEGATIVE for weakness, dizziness or paresthesias  HEME: NEGATIVE for bleeding problems  PSYCHIATRIC: NEGATIVE for changes in mood or affect    Patient Active Problem List    Diagnosis Date Noted     AVF (arteriovenous fistula) (H) 08/20/2020     Priority: Medium     Added automatically from request for surgery [id number]       Essential hypertension 07/26/2018     Priority: Medium     Odontogenic infection of jaw 07/19/2018     Priority: Medium     End stage kidney disease (H)      Priority: Medium     Anemia of chronic kidney failure      Priority: Medium     Hyperlipidemia LDL goal <100 05/19/2016     Priority: Medium     End stage renal failure on dialysis (H) 05/19/2016     Priority: Medium      Past Medical History:   Diagnosis Date     Anemia of chronic kidney failure      Dialysis patient (H)      End stage kidney disease (H)      Hypertension      Past Surgical History:   Procedure Laterality Date     CREATE FISTULA ARTERIOVENOUS UPPER EXTREMITY Right 5/15/2016    Procedure: CREATE FISTULA ARTERIOVENOUS UPPER EXTREMITY;  Surgeon: Ricardo Gallo MD;  Location: [facility] OR     CREATE FISTULA ARTERIOVENOUS UPPER EXTREMITY Right 7/12/2016    Procedure: CREATE FISTULA ARTERIOVENOUS UPPER EXTREMITY;  Surgeon: Payam Denise MD;   Location:  OR     EXCISE FISTULA ARTERIOVENOUS UPPER EXTREMITY Right 5/28/2019    Procedure: FIRST STAGE EXCISION OF RIGHT ARTERIOVENOUS FISTULA ANEURYMS & Vein patch angioplasty;  Surgeon: Payam Denise MD;  Location:  OR     VASCULAR SURGERY      placement of jugular tunnel catheter     wisdom teeth       Current Outpatient Medications   Medication Sig Dispense Refill     amLODIPine (NORVASC) 10 MG tablet Take 10 mg by mouth daily        hydrALAZINE (APRESOLINE) 50 MG tablet Take 50 mg by mouth 3 times daily        labetalol (NORMODYNE) 200 MG tablet Take 200 mg by mouth 2 times daily          No Known Allergies     Social History     Tobacco Use     Smoking status: Never Smoker     Smokeless tobacco: Never Used   Substance Use Topics     Alcohol use: Yes     Alcohol/week: 0.0 standard drinks     Comment: socially      History   Drug Use No            Objective   /68   Pulse 76   Temp 98.2  F (36.8  C) (Temporal)   Resp 16   Wt 80.3 kg (177 lb)   SpO2 97%   BMI 28.57 kg/m    Physical Exam    GENERAL APPEARANCE: alert and no distress     EYES: EOMI,  PERRL     HENT: ear canals and TM's normal and nose and mouth without ulcers or lesions     NECK: no adenopathy, no asymmetry, masses, or scars and thyroid normal to palpation     RESP: lungs clear to auscultation - no rales, rhonchi or wheezes     CV: regular rates and rhythm, normal S1 S2, no S3 or S4 and no click or rub     MS: extremities normal.     SKIN: Dialysis access noted with RUE FISTULA WITH DILATION      NEURO: No focal changes     PSYCH: mentation appears normal and affect normal/bright    Recent Labs   Lab Test 07/13/20 06/22/20 05/28/19  1045   NA  --   --  142   POTASSIUM 4.0 3.9 3.8   CR 5.20* 5.37* 4.19*        PRE-OP Diagnostics:  Labs pending at this time.  Results will be reviewed when available.    EKG: Normal Sinus Rhythm @ 75, LVH by voltage criteria, nonspecific ST-T changes   Noted LVH on prior Echocardiogram  7/2016.    Assessment & Plan   The proposed surgical procedure is considered LOW/mild risk for this procedure.    REVISED CARDIAC RISK INDEX  The patient has the following serious cardiovascular risks for perioperative complications:  Serum Creatinine >2.0 mg/dl = 1 point    INTERPRETATION: 2 points: Class III (moderate risk - 6.6% complication rate)    - Performs 4 METS exercise without symptoms (e.g., light housework, stairs, 4 mph walk, 7 mph bike, slow step dance)    1. Preop general physical exam  As ordered per Vascular  - Potassium  - Hemoglobin  - Platelet count  - EKG 12-lead complete w/read - Clinics    2. End stage renal failure on dialysis (H)  Dialysis as scheduled M-W-F, due to dialyze this afternoon  - Potassium  - Creatinine    3. AVF (arteriovenous fistula) (H)  Surgery as scheduled    4. Essential hypertension  Stable on therapy    The patient has the following additional risks and recommendations for perioperative complications:     MEDICATION INSTRUCTIONS:    CARDIAC Medications:   - BETA BLOCKERS: Continue taking on the day of surgery.     It appears patient's procedure may not be scheduled for 430 tomorrow afternoon and therefore he will dose his routine medications the a.m. of the procedure as otherwise scheduled    RECOMMENDATION:  APPROVAL GIVEN to proceed with proposed procedure, without further diagnostic evaluation.    Patient advised to stop all anti-inflammatories and/or aspirin-like products per vascular recommendations    Return for f/u with surgery for routine post-op.    Signed Electronically by: Homero Dumont MD    Copy of this evaluation report is provided to requesting physician, Dr. Camelia Pennington [facility] Guidelines    Revised Cardiac Risk Index

## 2020-08-25 ENCOUNTER — HOSPITAL ENCOUNTER (OUTPATIENT)
Facility: CLINIC | Age: 35
Discharge: HOME | End: 2020-08-25
Attending: SURGERY | Admitting: SURGERY
Payer: MEDICARE

## 2020-08-25 ENCOUNTER — ANESTHESIA EVENT (OUTPATIENT)
Dept: SURGERY | Facility: CLINIC | Age: 35
End: 2020-08-25
Payer: MEDICARE

## 2020-08-25 ENCOUNTER — APPOINTMENT (OUTPATIENT)
Dept: SURGERY | Facility: PHYSICIAN GROUP | Age: 35
End: 2020-08-25
Payer: MEDICARE

## 2020-08-25 ENCOUNTER — ANESTHESIA (OUTPATIENT)
Dept: SURGERY | Facility: CLINIC | Age: 35
End: 2020-08-25
Payer: MEDICARE

## 2020-08-25 VITALS
OXYGEN SATURATION: 97 % | DIASTOLIC BLOOD PRESSURE: 68 MMHG | SYSTOLIC BLOOD PRESSURE: 115 MMHG | TEMPERATURE: 99 F | RESPIRATION RATE: 16 BRPM | WEIGHT: 173.9 LBS | BODY MASS INDEX: 27.95 KG/M2 | HEIGHT: 66 IN | HEART RATE: 67 BPM

## 2020-08-25 DIAGNOSIS — N18.6 END STAGE RENAL FAILURE ON DIALYSIS (H): Primary | ICD-10-CM

## 2020-08-25 DIAGNOSIS — Z99.2 END STAGE RENAL FAILURE ON DIALYSIS (H): Primary | ICD-10-CM

## 2020-08-25 DIAGNOSIS — I77.0 AVF (ARTERIOVENOUS FISTULA) (H): ICD-10-CM

## 2020-08-25 LAB
CREAT SERPL-MCNC: 5.67 MG/DL (ref 0.66–1.25)
GFR SERPL CREATININE-BSD FRML MDRD: 12 ML/MIN/{1.73_M2}

## 2020-08-25 PROCEDURE — 25000128 H RX IP 250 OP 636: Performed by: SURGERY

## 2020-08-25 PROCEDURE — 36000058 ZZH SURGERY LEVEL 3 EA 15 ADDTL MIN: Performed by: SURGERY

## 2020-08-25 PROCEDURE — 25800030 ZZH RX IP 258 OP 636: Performed by: SURGERY

## 2020-08-25 PROCEDURE — 37000008 ZZH ANESTHESIA TECHNICAL FEE, 1ST 30 MIN: Performed by: SURGERY

## 2020-08-25 PROCEDURE — 40000170 ZZH STATISTIC PRE-PROCEDURE ASSESSMENT II: Performed by: SURGERY

## 2020-08-25 PROCEDURE — 71000012 ZZH RECOVERY PHASE 1 LEVEL 1 FIRST HR: Performed by: SURGERY

## 2020-08-25 PROCEDURE — 25000128 H RX IP 250 OP 636: Performed by: NURSE ANESTHETIST, CERTIFIED REGISTERED

## 2020-08-25 PROCEDURE — 25000125 ZZHC RX 250: Performed by: NURSE ANESTHETIST, CERTIFIED REGISTERED

## 2020-08-25 PROCEDURE — 36000056 ZZH SURGERY LEVEL 3 1ST 30 MIN: Performed by: SURGERY

## 2020-08-25 PROCEDURE — 71000027 ZZH RECOVERY PHASE 2 EACH 15 MINS: Performed by: SURGERY

## 2020-08-25 PROCEDURE — 25000132 ZZH RX MED GY IP 250 OP 250 PS 637: Mod: GY

## 2020-08-25 PROCEDURE — 37000009 ZZH ANESTHESIA TECHNICAL FEE, EACH ADDTL 15 MIN: Performed by: SURGERY

## 2020-08-25 PROCEDURE — 25800030 ZZH RX IP 258 OP 636: Performed by: NURSE ANESTHETIST, CERTIFIED REGISTERED

## 2020-08-25 PROCEDURE — 25000125 ZZHC RX 250: Performed by: SURGERY

## 2020-08-25 PROCEDURE — 27210794 ZZH OR GENERAL SUPPLY STERILE: Performed by: SURGERY

## 2020-08-25 RX ORDER — HYDRALAZINE HYDROCHLORIDE 20 MG/ML
2.5-5 INJECTION INTRAMUSCULAR; INTRAVENOUS EVERY 10 MIN PRN
Status: DISCONTINUED | OUTPATIENT
Start: 2020-08-25 | End: 2020-08-25 | Stop reason: HOSPADM

## 2020-08-25 RX ORDER — NALOXONE HYDROCHLORIDE 0.4 MG/ML
.1-.4 INJECTION, SOLUTION INTRAMUSCULAR; INTRAVENOUS; SUBCUTANEOUS
Status: DISCONTINUED | OUTPATIENT
Start: 2020-08-25 | End: 2020-08-25 | Stop reason: HOSPADM

## 2020-08-25 RX ORDER — PROPOFOL 10 MG/ML
INJECTION, EMULSION INTRAVENOUS PRN
Status: DISCONTINUED | OUTPATIENT
Start: 2020-08-25 | End: 2020-08-25

## 2020-08-25 RX ORDER — HYDROCODONE BITARTRATE AND ACETAMINOPHEN 5; 325 MG/1; MG/1
2 TABLET ORAL
Status: COMPLETED | OUTPATIENT
Start: 2020-08-25 | End: 2020-08-25

## 2020-08-25 RX ORDER — LIDOCAINE HYDROCHLORIDE 20 MG/ML
INJECTION, SOLUTION INFILTRATION; PERINEURAL PRN
Status: DISCONTINUED | OUTPATIENT
Start: 2020-08-25 | End: 2020-08-25

## 2020-08-25 RX ORDER — ONDANSETRON 2 MG/ML
4 INJECTION INTRAMUSCULAR; INTRAVENOUS EVERY 30 MIN PRN
Status: DISCONTINUED | OUTPATIENT
Start: 2020-08-25 | End: 2020-08-25 | Stop reason: HOSPADM

## 2020-08-25 RX ORDER — BUPIVACAINE HYDROCHLORIDE 5 MG/ML
INJECTION, SOLUTION EPIDURAL; INTRACAUDAL
Status: DISCONTINUED
Start: 2020-08-25 | End: 2020-08-25 | Stop reason: HOSPADM

## 2020-08-25 RX ORDER — HEPARIN SODIUM 1000 [USP'U]/ML
INJECTION, SOLUTION INTRAVENOUS; SUBCUTANEOUS PRN
Status: DISCONTINUED | OUTPATIENT
Start: 2020-08-25 | End: 2020-08-25

## 2020-08-25 RX ORDER — HEPARIN SODIUM 1000 [USP'U]/ML
INJECTION, SOLUTION INTRAVENOUS; SUBCUTANEOUS
Status: DISCONTINUED
Start: 2020-08-25 | End: 2020-08-25 | Stop reason: HOSPADM

## 2020-08-25 RX ORDER — BUPIVACAINE HYDROCHLORIDE 5 MG/ML
INJECTION, SOLUTION PERINEURAL PRN
Status: DISCONTINUED | OUTPATIENT
Start: 2020-08-25 | End: 2020-08-25 | Stop reason: HOSPADM

## 2020-08-25 RX ORDER — PROPOFOL 10 MG/ML
INJECTION, EMULSION INTRAVENOUS CONTINUOUS PRN
Status: DISCONTINUED | OUTPATIENT
Start: 2020-08-25 | End: 2020-08-25

## 2020-08-25 RX ORDER — FENTANYL CITRATE 50 UG/ML
25-50 INJECTION, SOLUTION INTRAMUSCULAR; INTRAVENOUS
Status: DISCONTINUED | OUTPATIENT
Start: 2020-08-25 | End: 2020-08-25 | Stop reason: HOSPADM

## 2020-08-25 RX ORDER — EPHEDRINE SULFATE 50 MG/ML
INJECTION, SOLUTION INTRAMUSCULAR; INTRAVENOUS; SUBCUTANEOUS PRN
Status: DISCONTINUED | OUTPATIENT
Start: 2020-08-25 | End: 2020-08-25

## 2020-08-25 RX ORDER — SODIUM CHLORIDE 9 MG/ML
INJECTION, SOLUTION INTRAVENOUS CONTINUOUS PRN
Status: DISCONTINUED | OUTPATIENT
Start: 2020-08-25 | End: 2020-08-25

## 2020-08-25 RX ORDER — ONDANSETRON 4 MG/1
4 TABLET, ORALLY DISINTEGRATING ORAL EVERY 30 MIN PRN
Status: DISCONTINUED | OUTPATIENT
Start: 2020-08-25 | End: 2020-08-25 | Stop reason: HOSPADM

## 2020-08-25 RX ORDER — FENTANYL CITRATE 50 UG/ML
INJECTION, SOLUTION INTRAMUSCULAR; INTRAVENOUS PRN
Status: DISCONTINUED | OUTPATIENT
Start: 2020-08-25 | End: 2020-08-25

## 2020-08-25 RX ORDER — LABETALOL HYDROCHLORIDE 5 MG/ML
10 INJECTION, SOLUTION INTRAVENOUS
Status: DISCONTINUED | OUTPATIENT
Start: 2020-08-25 | End: 2020-08-25 | Stop reason: HOSPADM

## 2020-08-25 RX ORDER — TRAMADOL HYDROCHLORIDE 50 MG/1
50-100 TABLET ORAL EVERY 6 HOURS PRN
Qty: 12 TABLET | Refills: 0 | Status: SHIPPED | OUTPATIENT
Start: 2020-08-25 | End: 2020-08-28

## 2020-08-25 RX ORDER — MEPERIDINE HYDROCHLORIDE 25 MG/ML
12.5 INJECTION INTRAMUSCULAR; INTRAVENOUS; SUBCUTANEOUS
Status: DISCONTINUED | OUTPATIENT
Start: 2020-08-25 | End: 2020-08-25 | Stop reason: HOSPADM

## 2020-08-25 RX ORDER — ONDANSETRON 2 MG/ML
INJECTION INTRAMUSCULAR; INTRAVENOUS PRN
Status: DISCONTINUED | OUTPATIENT
Start: 2020-08-25 | End: 2020-08-25

## 2020-08-25 RX ORDER — CEFAZOLIN SODIUM 2 G/100ML
2 INJECTION, SOLUTION INTRAVENOUS
Status: COMPLETED | OUTPATIENT
Start: 2020-08-25 | End: 2020-08-25

## 2020-08-25 RX ORDER — SODIUM CHLORIDE, SODIUM LACTATE, POTASSIUM CHLORIDE, CALCIUM CHLORIDE 600; 310; 30; 20 MG/100ML; MG/100ML; MG/100ML; MG/100ML
INJECTION, SOLUTION INTRAVENOUS CONTINUOUS
Status: DISCONTINUED | OUTPATIENT
Start: 2020-08-25 | End: 2020-08-25 | Stop reason: HOSPADM

## 2020-08-25 RX ADMIN — FENTANYL CITRATE 50 MCG: 50 INJECTION, SOLUTION INTRAMUSCULAR; INTRAVENOUS at 16:54

## 2020-08-25 RX ADMIN — CEFAZOLIN SODIUM 2 G: 2 INJECTION, SOLUTION INTRAVENOUS at 16:55

## 2020-08-25 RX ADMIN — ONDANSETRON 4 MG: 2 INJECTION INTRAMUSCULAR; INTRAVENOUS at 17:48

## 2020-08-25 RX ADMIN — PROPOFOL 30 MG: 10 INJECTION, EMULSION INTRAVENOUS at 16:56

## 2020-08-25 RX ADMIN — LIDOCAINE HYDROCHLORIDE 60 MG: 20 INJECTION, SOLUTION INFILTRATION; PERINEURAL at 16:56

## 2020-08-25 RX ADMIN — SODIUM CHLORIDE: 9 INJECTION, SOLUTION INTRAVENOUS at 16:49

## 2020-08-25 RX ADMIN — PROPOFOL 100 MCG/KG/MIN: 10 INJECTION, EMULSION INTRAVENOUS at 16:54

## 2020-08-25 RX ADMIN — Medication 5 MG: at 17:52

## 2020-08-25 RX ADMIN — HEPARIN SODIUM 3000 UNITS: 1000 INJECTION INTRAVENOUS; SUBCUTANEOUS at 17:20

## 2020-08-25 RX ADMIN — MIDAZOLAM 2 MG: 1 INJECTION INTRAMUSCULAR; INTRAVENOUS at 16:50

## 2020-08-25 RX ADMIN — HYDROCODONE BITARTRATE AND ACETAMINOPHEN 1 TABLET: 5; 325 TABLET ORAL at 18:34

## 2020-08-25 ASSESSMENT — MIFFLIN-ST. JEOR: SCORE: 1676.56

## 2020-08-25 NOTE — BRIEF OP NOTE
[facility]    Brief Operative Note    Pre-operative diagnosis: AVF (arteriovenous fistula) (H) [I77.0]  Post-operative diagnosis: Same as pre-operative diagnosis    Procedure(s):  REPAIR RIGHT UPPER EXTREMITY ARTERIOVENOUS FISTULA  ANEURYSM WITH SKIN ULCER     Surgeon(s) and Role:     * Payam Denise MD - Primary     * Mitchel Pisano MD - Resident - Assisting     Anesthesia: Monitor Anesthesia Care     Estimated blood loss: 10 mL    Drains: None    Specimens: * No specimens in log *    Findings: 1 cm diameter ulcerative skin lesion and golf ball size aneurysm resected leaving 1/2 circumference of the aneurysm intact, edges re-approximated with running 5-0 prolene. Good pulse through fistula at conclusion.    Complications: None.    Implants: * No implants in log *    Post-op plan:  - discharge home  - #12 tablets of tramadol  - do not use fistula for 4 weeks for skin to heal    Mitchel Pisano MD  General Surgery Resident, PGY-4  [phone]

## 2020-08-25 NOTE — ANESTHESIA POSTPROCEDURE EVALUATION
Patient: Kailash Sorto    Procedure(s):  REPAIR RIGHT UPPER EXTREMITY ARTERIOVENOUS FISTULA  ANEURYSM WITH SKIN ULCER    Diagnosis:AVF (arteriovenous fistula) (H) [I77.0]  Diagnosis Additional Information: No value filed.    Anesthesia Type:  MAC    Note:  Anesthesia Post Evaluation    Patient location during evaluation: bedside  Patient participation: Able to fully participate in evaluation  Level of consciousness: awake  Pain management: adequate  Airway patency: patent  Cardiovascular status: acceptable  Respiratory status: acceptable  Hydration status: acceptable  PONV: none     Anesthetic complications: None          Last vitals:  Vitals:    08/25/20 1456 08/25/20 1810   BP: 110/69 120/68   Pulse: 70 77   Resp: 16 28   Temp: 37.2  C (99  F) 37.3  C (99.2  F)   SpO2: 98% 100%         Electronically Signed By: Carmelo Mccoy DO, DO  August 25, 2020  6:26 PM

## 2020-08-25 NOTE — ANESTHESIA PREPROCEDURE EVALUATION
Anesthesia Pre-Procedure Evaluation    Patient: Kailash Sorto   MRN: [id number] : 1985          Preoperative Diagnosis: AVF (arteriovenous fistula) (H) [I77.0]    Procedure(s):  REPAIR RIGHT UPPER EXTREMITY ARTERIOVENOUS FISTULA  ANEURYSM WITH SKIN ULCER    Past Medical History:   Diagnosis Date     Anemia of chronic kidney failure      Dialysis patient (H)      End stage kidney disease (H)      Hypertension      Past Surgical History:   Procedure Laterality Date     CREATE FISTULA ARTERIOVENOUS UPPER EXTREMITY Right 5/15/2016    Procedure: CREATE FISTULA ARTERIOVENOUS UPPER EXTREMITY;  Surgeon: Ricardo Gallo MD;  Location:  OR     CREATE FISTULA ARTERIOVENOUS UPPER EXTREMITY Right 2016    Procedure: CREATE FISTULA ARTERIOVENOUS UPPER EXTREMITY;  Surgeon: Payam Denise MD;  Location:  OR     EXCISE FISTULA ARTERIOVENOUS UPPER EXTREMITY Right 2019    Procedure: FIRST STAGE EXCISION OF RIGHT ARTERIOVENOUS FISTULA ANEURYMS & Vein patch angioplasty;  Surgeon: Payam Denise MD;  Location:  OR     VASCULAR SURGERY      placement of jugular tunnel catheter     wisdom teeth       No Known Allergies  Social History     Tobacco Use     Smoking status: Never Smoker     Smokeless tobacco: Never Used   Substance Use Topics     Alcohol use: Yes     Alcohol/week: 0.0 standard drinks     Comment: socially      Prior to Admission medications    Medication Sig Start Date End Date Taking? Authorizing Provider   amLODIPine (NORVASC) 10 MG tablet Take 10 mg by mouth daily    Yes Reported, Patient   hydrALAZINE (APRESOLINE) 50 MG tablet Take 50 mg by mouth 3 times daily    Yes Reported, Patient   labetalol (NORMODYNE) 200 MG tablet Take 200 mg by mouth 2 times daily    Yes Reported, Patient     Current Facility-Administered Medications Ordered in Epic   Medication Dose Route Frequency Last Rate Last Dose     ceFAZolin (ANCEF) intermittent infusion 2 g in 100 mL dextrose PRE-MIX   2 g Intravenous Pre-Op/Pre-procedure x 1 dose         No current [facility]-ordered outpatient medications on file.       Recent Labs   Lab Test 08/24/20  1416 07/13/20 05/28/19  1045 07/23/18  2316   NA  --   --   --  142 139   POTASSIUM 3.3* 4.0   < > 3.8 3.4   CHLORIDE  --   --   --  104 100   CO2  --   --   --  33* 33*   ANIONGAP  --   --   --  5 6   GLC  --   --   --  101* 93   BUN  --   --   --  35* 22   CR 5.67* 5.20*   < > 4.19* 3.45*   GRAHAM  --   --   --  9.7 8.8    < > = values in this interval not displayed.     Recent Labs   Lab Test 08/24/20  1416 07/23/18  2316 07/20/18  0750   WBC  --  5.5 4.9   HGB 10.0* 10.7* 10.4*    298 234     Recent Labs   Lab Test 05/31/18  1412   ABO B   RH Neg     Recent Labs   Lab Test 05/12/16  1327 05/11/16  1052 05/11/16  0450   TROPI 6.950* 2.507* 1.163*     No results for input(s): PH, PCO2, PO2, HCO3 in the last 87247 hours.  No results for input(s): HCG in the last 54616 hours.  No results found for this or any previous visit (from the past 744 hour(s)).  No results found for this or any previous visit (from the past 4320 hour(s)).      RECENT LABS:       Anesthesia Evaluation     . Pt has had prior anesthetic. Type: MAC    No history of anesthetic complications          ROS/MED HX    ENT/Pulmonary:  - neg pulmonary ROS     Neurologic:  - neg neurologic ROS     Cardiovascular:     (+) hypertension----. : . . . :. . Previous cardiac testing Echodate:results:Interpretation Summary  Global and regional left ventricular function is normal with an EF of 60-65%.  Mild concentric wall thickening consistent with left ventricular hypertrophy  is present.  Right ventricular function, chamber size, wall motion, and thickness are  normal.  The inferior vena cava is normal.  Trivial pericardial effusion is present.date: results: date: results: date: results:         Congenital heart disease: 2016.   METS/Exercise Tolerance:  >4 METS   Hematologic:     (+) Anemia, -     "  Musculoskeletal:         GI/Hepatic:         Renal/Genitourinary:     (+) chronic renal disease, type: ESRD, Pt requires dialysis, type: Hemodialysis,       Endo: Comment: seconday hyperparathyroidism     (-) Type II DM   Psychiatric:         Infectious Disease:         Malignancy:         Other:                          Physical Exam  Normal systems: dental    Airway   Mallampati: II  TM distance: >3 FB  Neck ROM: full    Dental     Cardiovascular   Rhythm and rate: regular and normal      Pulmonary    breath sounds clear to auscultation            Lab Results   Component Value Date    WBC 5.5 07/23/2018    HGB 10.0 (L) 08/24/2020    HCT 31.4 (L) 07/23/2018     08/24/2020     05/28/2019    POTASSIUM 3.3 (L) 08/24/2020    CHLORIDE 104 05/28/2019    CO2 33 (H) 05/28/2019    BUN 35 (H) 05/28/2019    CR 5.67 (H) 08/24/2020     (H) 05/28/2019    GRAHAM 9.7 05/28/2019    PHOS 5.8 (H) 05/15/2016    MAG 2.5 (H) 05/13/2016    ALBUMIN 4.4 08/04/2016    PROTTOTAL 8.2 08/04/2016    ALT 20 07/13/2020    AST 20 07/13/2020    ALKPHOS 120 08/04/2016    BILITOTAL 0.4 08/04/2016    PTT 26 08/04/2016    INR 0.95 08/04/2016    TSH 0.89 05/10/2016       Preop Vitals  BP Readings from Last 3 Encounters:   08/25/20 110/69   08/24/20 124/68   08/20/20 116/71    Pulse Readings from Last 3 Encounters:   08/25/20 70   08/24/20 76   08/20/20 82      Resp Readings from Last 3 Encounters:   08/25/20 16   08/24/20 16   08/20/20 18    SpO2 Readings from Last 3 Encounters:   08/25/20 98%   08/24/20 97%   05/28/19 97%      Temp Readings from Last 1 Encounters:   08/25/20 37.2  C (99  F) (Temporal)    Ht Readings from Last 1 Encounters:   08/25/20 1.676 m (5' 6\")      Wt Readings from Last 1 Encounters:   08/25/20 78.9 kg (173 lb 14.4 oz)    Estimated body mass index is 28.07 kg/m  as calculated from the following:    Height as of this encounter: 1.676 m (5' 6\").    Weight as of this encounter: 78.9 kg (173 lb 14.4 oz). "       Anesthesia Plan      History & Physical Review  History and physical reviewed and following examination; no interval change.    ASA Status:  3 .    NPO Status:  > 8 hours    Plan for MAC Reason for MAC:  Deep or markedly invasive procedure (G8)  PONV prophylaxis:  Ondansetron (or other 5HT-3)         Postoperative Care  Postoperative pain management:  IV analgesics and Multi-modal analgesia.      Consents  Anesthetic plan, risks, benefits and alternatives discussed with:  Patient or representative and Patient..                 Ashok Valle MD

## 2020-08-25 NOTE — DISCHARGE INSTRUCTIONS
Same Day Surgery Discharge Instructions for  Sedation and General Anesthesia       It's not unusual to feel dizzy, light-headed or faint for up to 24 hours after surgery or while taking pain medication.  If you have these symptoms: sit for a few minutes before standing and have someone assist you when you get up to walk or use the bathroom.      You should rest and relax for the next 24 hours. We recommend you make arrangements to have an adult stay with you for at least 24 hours after your discharge.  Avoid hazardous and strenuous activity.      DO NOT DRIVE any vehicle or operate mechanical equipment for 24 hours following the end of your surgery.  Even though you may feel normal, your reactions may be affected by the medication you have received.      Do not drink alcoholic beverages for 24 hours following surgery.       Slowly progress to your regular diet as you feel able. It's not unusual to feel nauseated and/or vomit after receiving anesthesia.  If you develop these symptoms, drink clear liquids (apple juice, ginger ale, broth, 7-up, etc. ) until you feel better.  If your nausea and vomiting persists for 24 hours, please notify your surgeon.        All narcotic pain medications, along with inactivity and anesthesia, can cause constipation. Drinking plenty of liquids and increasing fiber intake will help.      For any questions of a medical nature, call your surgeon.      Do not make important decisions for 24 hours.      If you had general anesthesia, you may have a sore throat for a couple of days related to the breathing tube used during surgery.  You may use Cepacol lozenges to help with this discomfort.  If it worsens or if you develop a fever, contact your surgeon.       If you feel your pain is not well managed with the pain medications prescribed by your surgeon, please contact your surgeon's office to let them know so they can address your concerns.       CoVid 19 Information    We want to give you  information regarding Covid. Please consult your primary care provider with any questions you might have.     Patient who have symptoms (cough, fever, or shortness of breath), need to isolate for 7 days from when symptoms started OR 72 hours after fever resolves (without fever reducing medications) AND improvement of respiratory symptoms (whichever is longer).      Isolate yourself at home (in own room/own bathroom if possible)    Do Not allow any visitors    Do Not go to work or school    Do Not go to Denominational,  centers, shopping, or other public places.    Do Not shake hands.    Avoid close and intimate contact with others (hugging, kissing).    Follow CDC recommendations for household cleaning of frequently touched services.     After the initial 7 days, continue to isolate yourself from household members as much as possible. To continue decrease the risk of community spread and exposure, you and any members of your household should limit activities in public for 14 days after starting home isolation.     You can reference the following CDC link for helpful home isolation/care tips:  https://www.cdc.gov/coronavirus/2019-ncov/downloads/10Things.pdf    Protect Others:    Cover Your Mouth and Nose with a mask, disposable tissue or wash cloth to avoid spreading germs to others.    Wash your hands and face frequently with soap and water    Call Your Primary Doctor If: Breathing difficulty develops or you become worse.    For more information about COVID19 and options for caring for yourself at home, please visit the CDC website at https://www.cdc.gov/coronavirus/2019-ncov/about/steps-when-sick.html  For more options for care at [facility], please visit our website at [email or web address]    **If you have questions or concerns about your procedure  call Dr Payam Denise at [phone]**    ARTERIAL VENOUS FISTULA  Discharge Instructions    Dr. Hare  Camelia  [phone]        In 48 hours, remove dressing.  Leave steri-strips in place until they fall off by themselves--usually 7-10 days.      Ok to shower once dressing is removed.  No bathing or soaking for 4 weeks      Follow up with Dr. Denise in 2 weeks.     Call Dr Denise's office for problems:    Signs of infection--redness, swelling, drainage, temperature.     Fever over 101     Persistent nausea and vomiting    Any questions or concerns

## 2020-08-25 NOTE — ANESTHESIA CARE TRANSFER NOTE
Patient: Kailash Sorto    Procedure(s):  REPAIR RIGHT UPPER EXTREMITY ARTERIOVENOUS FISTULA  ANEURYSM WITH SKIN ULCER    Diagnosis: AVF (arteriovenous fistula) (H) [I77.0]  Diagnosis Additional Information: No value filed.    Anesthesia Type:   MAC     Note:  Airway :Face Mask  Patient transferred to:PACU  Comments: Pt awake and able to verbalize needs. All monitors on and audible. VSS. Spontaneous respiration without difficulty. o2 sats 99% on 10L o2 per simple facemask. Pt denies pain. Report given to PACU RN.Handoff Report: Identifed the Patient, Identified the Reponsible Provider, Reviewed the pertinent medical history, Discussed the surgical course, Reviewed Intra-OP anesthesia mangement and issues during anesthesia, Set expectations for post-procedure period and Allowed opportunity for questions and acknowledgement of understanding      Vitals: (Last set prior to Anesthesia Care Transfer)    CRNA VITALS  8/25/2020 1742 - 8/25/2020 1812      8/25/2020             Resp Rate (set):  10                Electronically Signed By: CASIMIRO Polanco CRNA  August 25, 2020  6:12 PM

## 2020-08-26 NOTE — OP NOTE
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      PROCEDURE:     1.  Excision of skin ulceration.   2.  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      SURGEON:  Payam Denise MD      :  Mitchel Pisano MD ([facility]).      ANESTHESIA:  Local with intravenous supplementation.      PREOPERATIVE MEDICATIONS:  Ancef 2 grams IV.      INDICATIONS:  A 34-year-old patient on chronic hemodialysis via right upper arm cephalic arteriovenous fistula.  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  Approximately a year ago, we repaired the antecubital aneurysm and had planned to repair the mid upper arm aneurysm shortly thereafter.  Due to various reasons, he did not come back for followup.  Aneurysm is now increased to 2.5 cm but there is an overlying skin ulceration with fortunately no bleeding.  I was very concerned about this and felt that we should move ahead to repair the pseudoaneurysm.  He has enough fistula close to the antecubital area to use for dialysis while the surgical site heals.      DESCRIPTION OF PROCEDURE:  The patient was brought to the operating room.  Right arm was prepped and draped.  Time-out was called and the sites were identified.      EXCISION OF SKIN ULCER:  1% lidocaine with bicarbonate was injected in a field block fashion.  Using a #15 blade scalpel, an elliptical incision was made around the skin ulcer that measured approximately 1.5 x 1.5 cm.  Dissection was carried down to the fistula.  We then created medial and lateral flaps down to the base of the aneurysmal fistula with excellent skin perfusion being noted.  We encircled the inflow and outflow portions of the  fistula that was not an aneurysm measuring approximately 10 mm in diameter with Silastic vessel loops.      REPAIR OF ANEURYSM:  3000 units of intravenous heparin were given.  After 3 minutes, the vessel loops were sequentially tightened.  We excised the overlying skin ulceration and the anterior portion of the aneurysm.  The medial-lateral-posterior walls were intact and completely disease free.  We then repaired this aneurysm for a length of 4 cm with a running 5-0 mattress suture and an additional running 5-0 Prolene suture.  With release of the vessel loops, we had a strong pulse.  The repaired segment measured approximately 12 mm in diameter.  We had excellent flow and good hemostasis.      Subcutaneous tissue was approximated with interrupted 3-0 Vicryl with no tension.  Skin was closed with 5-0 Monocryl in subcuticular fashion.  Steri-Strips and gauze dressing were applied.      The patient tolerated the procedure well.      ESTIMATED BLOOD LOSS:  10 mL      COMPLICATIONS:  None.      Dialysis unit will continue to use the fistula in the distal upper arm and antecubital area and allow approximately 4 weeks for the newly repaired aneurysm segment to heal before accessing this.         BASSEM KELLY MD             D: 2020   T: 2020   MT: DAVID      Name:     DIANELYS COOPER   MRN:      [id number]        Account:        [id number]   :      1985           Procedure Date: 2020      Document: [id number]

## 2020-10-01 ENCOUNTER — VIRTUAL VISIT (OUTPATIENT)
Dept: NEPHROLOGY | Facility: CLINIC | Age: 35
End: 2020-10-01
Attending: INTERNAL MEDICINE
Payer: MEDICARE

## 2020-10-01 DIAGNOSIS — N18.6 END STAGE RENAL FAILURE ON DIALYSIS (H): ICD-10-CM

## 2020-10-01 DIAGNOSIS — Z99.2 END STAGE RENAL FAILURE ON DIALYSIS (H): ICD-10-CM

## 2020-10-01 DIAGNOSIS — I15.0 RENOVASCULAR HYPERTENSION: Primary | ICD-10-CM

## 2020-10-01 PROBLEM — M27.2 ODONTOGENIC INFECTION OF JAW: Status: RESOLVED | Noted: 2018-07-19 | Resolved: 2020-10-01

## 2020-10-01 PROCEDURE — 99214 OFFICE O/P EST MOD 30 MIN: CPT | Mod: 95 | Performed by: NURSE PRACTITIONER

## 2020-10-01 NOTE — PROGRESS NOTES
"Kailash Sorto is a 34 year old male who is being evaluated via a billable video visit.      The patient has been notified of following:     \"This video visit will be conducted via a call between you and your physician/provider. We have found that certain health care needs can be provided without the need for an in-person physical exam.  This service lets us provide the care you need with a video conversation.  If a prescription is necessary we can send it directly to your pharmacy.  If lab work is needed we can place an order for that and you can then stop by our lab to have the test done at a later time.    Video visits are billed at different rates depending on your insurance coverage.  Please reach out to your insurance provider with any questions.    If during the course of the call the physician/provider feels a video visit is not appropriate, you will not be charged for this service.\"    Patient has given verbal consent for Video visit? Yes  How would you like to obtain your AVS? MyChart  If you are dropped from the video visit, the video invite should be resent to: Text to cell phone: [phone]  Will anyone else be joining your video visit? No  {If patient encounters technical issues they should call [phone] :565810}      Video-Visit Details    Type of service:  Video Visit    Video Start Time: {video visit start/end time:152948}  Video End Time: {video visit start/end time:152948}    Originating Location (pt. Location): {video visit patient location:343581::\"Home\"}    Distant Location (provider location):  [facility] [place]     Platform used for Video Visit: {Virtual Visit Platforms:827309::\"Wi-Chi\"}    {signature options:948713}      "

## 2020-10-01 NOTE — PROGRESS NOTES
"Kailash Sorto is a 34 year old male who is being evaluated via a billable video visit.      The patient has been notified of following:     \"This video visit will be conducted via a call between you and your physician/provider. We have found that certain health care needs can be provided without the need for an in-person physical exam.  This service lets us provide the care you need with a video conversation.  If a prescription is necessary we can send it directly to your pharmacy.  If lab work is needed we can place an order for that and you can then stop by our lab to have the test done at a later time.    Video visits are billed at different rates depending on your insurance coverage.  Please reach out to your insurance provider with any questions.    If during the course of the call the physician/provider feels a video visit is not appropriate, you will not be charged for this service.\"    Patient has given verbal consent for Video visit? Yes  How would you like to obtain your AVS? MyChart  If you are dropped from the video visit, the video invite should be resent to: Send to e-mail at: [email or web address]  Will anyone else be joining your video visit? No        Video-Visit Details    Type of service:  Video Visit    Video Start Time: 3:00 pm   Video End Time: 3:22 pm     Originating Location (pt. Location): Home    Distant Location (provider location):  [facility] [place]     Platform used for Video Visit: CASIMIRO Watt CNP    Assessment and Plan:  #Kidney Transplant Wait List Evaluation: Patient is a excellent candidate overall. Patient should be active on the wait list.    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  Doing OK on hemodialysis since 5/2016 and would likely benefit from a kidney transplant.       # Cardiac Risk: no history of cardiac " disease. His last ECHO in  showed normal left ventricular function. He is asymptomatic with exertion.     # Health Maintenance: dental check scheduled in the next few weeks with no concerns or signs of infection at this time.      Discussed the risks and benefits of a transplant, including the risk of surgery and immunosuppression medications.    KDPI: We discussed approximate remaining wait time and how that is influenced by issues such as blood type and sensitization (PRA) and access to a living donor. I contrasted potential waiting time for living vs  donor kidneys from  normal (0-85%) or higher (%) kidney donor profile index (KDPI) donors and their associated outcomes. I would not recommend Mr. Sorto to consider kidneys from high KDPI donors. The reason for this decision is best summarized as: improved long term graft survival.    Patient s overall re-evaluation may require further discussion in the Transplant Program s multidisciplinary selection committee for a final recommendation on the patient s suitability for transplant.     Reason for Visit:  Kailash Sorto is a 34 year old male with ESKD from unknown etiology (no kidney biopsy), who presents for kidney transplant wait list evaluation.     Date of Initial Transplant Evaluation:  2016        Current Transplant Phase: Waitlist: Inactive  Official UNOS Listing Date: 2016  Blood Type: B        cPRA: 2%       Date of cPRA: 2020  Transplant Coordinator: Elida Rivers Transplant Office phone number [phone]     Previous Medical Issues:  # insurance issues : resolved.      History of Present Illness:   Mr. 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 He is currently inactive due to insurance issues which has since resolved.          Interim Events:         Since he was last seen in 2018, he was  admitted in 7/2018 with a dental abscess, odontogenic jaw infection for which he had wisdom tooth extracted and completed IV Zosyn course, home Augmentin and his infection resolved. No other admits or infection problems. Doing well on dialysis. Had his first baby this year.          Kidney Disease:        Kidney Disease Dx: Unknown etiology (no kidney biopsy)        On Dialysis: Yes, Date initiated:  and Dialysis Type: Incenter HD; making minimal urine        Primary Nephrologist: Dr. Cobos          Cardiac/Vascular Disease History:       Known CAD: No       Known PAD/Claudication Symptoms: No       Known Heart Failure: No       Arrhythmia:  No       Pulmonary Hypertension: No        Valvular Disease: No       Other: None       New Cardiac/Vascular Events: No         Functional Capacity/Frailty:        He and his wife walk every other day. 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 Work full time for [employer].          Fatigue/Decreased Energy: [x] No [] Yes    Chest Pain or SOB with Exertion: [x] No [] Yes    Significant Weight Change: [x] No [] Yes    Nausea, Vomiting or Diarrhea: [x] No [] Yes    Fever, Sweats or Chills:  [x] No [] Yes    Leg Swelling [x] No [] Yes        Other Pertinent Transplant Surgical Issues:  Recent Blood Transfusion: No  Previous Abdominal Transplant: No  Bladder Dysfunction: No  Chronic/Recurrent Infections: No  Chronic Anticoagulation: No  Jehovah s Witness: No    Review Of Systems:  A comprehensive review of systems was obtained and negative, except as noted in the HPI or PMH.     Active Problem List:   Patient Active Problem List   Diagnosis     Hyperlipidemia LDL goal <100     End stage renal failure on dialysis (H)     End stage kidney disease (H)     Anemia of chronic kidney failure     Odontogenic infection of jaw     Essential hypertension     AVF (arteriovenous fistula) (H)       Personal History:  Social History     Socioeconomic History     Marital status:      Spouse  name: Not on file     Number of children: Not on file     Years of education: Not on file     Highest education level: Not on file   Occupational History     Not on file   Social Needs     Financial resource strain: Not on file     Food insecurity     Worry: Not on file     Inability: Not on file     Transportation needs     Medical: Not on file     Non-medical: Not on file   Tobacco Use     Smoking status: Never Smoker     Smokeless tobacco: Never Used   Substance and Sexual Activity     Alcohol use: Yes     Alcohol/week: 0.0 standard drinks     Comment: socially      Drug use: No     Sexual activity: Yes     Partners: Female   Lifestyle     Physical activity     Days per week: Not on file     Minutes per session: Not on file     Stress: Not on file   Relationships     Social connections     Talks on phone: Not on file     Gets together: Not on file     Attends [employer] service: Not on file     Active member of club or organization: Not on file     Attends meetings of clubs or organizations: Not on file     Relationship status: Not on file     Intimate partner violence     Fear of current or ex partner: Not on file     Emotionally abused: Not on file     Physically abused: Not on file     Forced sexual activity: Not on file   Other Topics Concern     Parent/sibling w/ CABG, MI or angioplasty before 65F 55M? No   Social History Narrative     Not on file        Allergies:  No Known Allergies     Medications:  Current Outpatient Medications   Medication Sig     amLODIPine (NORVASC) 10 MG tablet Take 10 mg by mouth daily      hydrALAZINE (APRESOLINE) 50 MG tablet Take 50 mg by mouth 3 times daily      labetalol (NORMODYNE) 200 MG tablet Take 200 mg by mouth 2 times daily      No current facility-administered medications for this visit.         Vitals:  There were no vitals taken for this visit.     Exam:  GENERAL: Healthy, alert and no distress  EYES: Eyes grossly normal to inspection.  No discharge or erythema, or  obvious scleral/conjunctival abnormalities.  RESP: No audible wheeze, cough, or visible cyanosis.  No visible retractions or increased work of breathing.    SKIN: Visible skin clear. No significant rash, abnormal pigmentation or lesions.  NEURO: Cranial nerves grossly intact.  Mentation and speech appropriate for age.  PSYCH: Mentation appears normal, affect normal/bright, judgement and insight intact, normal speech and appearance well-groomed.

## 2020-10-01 NOTE — LETTER
"10/1/2020       RE: Kailash Sorto  [address]  [place] [address]     Dear Colleague,    Thank you for referring your patient, Kailash Sorto, to the [facility] [place] [facility]. Please see a copy of my visit note below.    Kailash Sorto is a 34 year old male who is being evaluated via a billable video visit.      The patient has been notified of following:     \"This video visit will be conducted via a call between you and your physician/provider. We have found that certain health care needs can be provided without the need for an in-person physical exam.  This service lets us provide the care you need with a video conversation.  If a prescription is necessary we can send it directly to your pharmacy.  If lab work is needed we can place an order for that and you can then stop by our lab to have the test done at a later time.    Video visits are billed at different rates depending on your insurance coverage.  Please reach out to your insurance provider with any questions.    If during the course of the call the physician/provider feels a video visit is not appropriate, you will not be charged for this service.\"    Patient has given verbal consent for Video visit? Yes  How would you like to obtain your AVS? MyChart  If you are dropped from the video visit, the video invite should be resent to: Send to e-mail at: [email or web address]  Will anyone else be joining your video visit? No        Video-Visit Details    Type of service:  Video Visit    Video Start Time: 3:00 pm   Video End Time: 3:22 pm     Originating Location (pt. Location): Home    Distant Location (provider location):  [facility]     Platform used for Video Visit: CASIMIRO Watt CNP    Assessment and Plan:  #Kidney Transplant Wait List Evaluation: Patient is a excellent candidate overall. Patient should be " active on the wait list.    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  Doing OK on hemodialysis since 2016 and would likely benefit from a kidney transplant.       # Cardiac Risk: no history of cardiac disease. His last ECHO in  showed normal left ventricular function. He is asymptomatic with exertion.     # Health Maintenance: dental check scheduled in the next few weeks with no concerns or signs of infection at this time.      Discussed the risks and benefits of a transplant, including the risk of surgery and immunosuppression medications.    KDPI: We discussed approximate remaining wait time and how that is influenced by issues such as blood type and sensitization (PRA) and access to a living donor. I contrasted potential waiting time for living vs  donor kidneys from  normal (0-85%) or higher (%) kidney donor profile index (KDPI) donors and their associated outcomes. I would not recommend Mr. Sorto to consider kidneys from high KDPI donors. The reason for this decision is best summarized as: improved long term graft survival.    Patient s overall re-evaluation may require further discussion in the Transplant Program s multidisciplinary selection committee for a final recommendation on the patient s suitability for transplant.     Reason for Visit:  Kailash Sorto is a 34 year old male with ESKD from unknown etiology (no kidney biopsy), who presents for kidney transplant wait list evaluation.     Date of Initial Transplant Evaluation:  2016        Current Transplant Phase: Waitlist: Inactive  Official UNOS Listing Date: 2016  Blood Type: B        cPRA: 2%       Date of cPRA: 2020  Transplant Coordinator: Elida Rivers Transplant Office phone number [phone]     Previous Medical Issues:  # insurance issues : resolved.      History of Present Illness:   Mr. 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 He is currently inactive due to insurance issues which has since resolved.          Interim Events:         Since he was last seen in 2018, he was admitted in 7/2018 with a dental abscess, odontogenic jaw infection for which he had wisdom tooth extracted and completed IV Zosyn course, home Augmentin and his infection resolved. No other admits or infection problems. Doing well on dialysis. Had his first baby this year.          Kidney Disease:        Kidney Disease Dx: Unknown etiology (no kidney biopsy)        On Dialysis: Yes, Date initiated:  and Dialysis Type: Incenter HD; making minimal urine        Primary Nephrologist: Dr. Cobos          Cardiac/Vascular Disease History:       Known CAD: No       Known PAD/Claudication Symptoms: No       Known Heart Failure: No       Arrhythmia:  No       Pulmonary Hypertension: No        Valvular Disease: No       Other: None       New Cardiac/Vascular Events: No         Functional Capacity/Frailty:        He and his wife walk every other day. 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 Work full time for [employer].          Fatigue/Decreased Energy: [x] No [] Yes    Chest Pain or SOB with Exertion: [x] No [] Yes    Significant Weight Change: [x] No [] Yes    Nausea, Vomiting or Diarrhea: [x] No [] Yes    Fever, Sweats or Chills:  [x] No [] Yes    Leg Swelling [x] No [] Yes        Other Pertinent Transplant Surgical Issues:  Recent Blood Transfusion: No  Previous Abdominal Transplant: No  Bladder Dysfunction: No  Chronic/Recurrent Infections: No  Chronic Anticoagulation: No  Jehovah s Witness: No    Review Of Systems:  A comprehensive review of systems was obtained and negative, except as noted in the HPI or PMH.     Active Problem List:   Patient Active Problem List   Diagnosis     Hyperlipidemia LDL goal <100      End stage renal failure on dialysis (H)     End stage kidney disease (H)     Anemia of chronic kidney failure     Odontogenic infection of jaw     Essential hypertension     AVF (arteriovenous fistula) (H)       Personal History:  Social History     Socioeconomic History     Marital status:      Spouse name: Not on file     Number of children: Not on file     Years of education: Not on file     Highest education level: Not on file   Occupational History     Not on file   Social Needs     Financial resource strain: Not on file     Food insecurity     Worry: Not on file     Inability: Not on file     Transportation needs     Medical: Not on file     Non-medical: Not on file   Tobacco Use     Smoking status: Never Smoker     Smokeless tobacco: Never Used   Substance and Sexual Activity     Alcohol use: Yes     Alcohol/week: 0.0 standard drinks     Comment: socially      Drug use: No     Sexual activity: Yes     Partners: Female   Lifestyle     Physical activity     Days per week: Not on file     Minutes per session: Not on file     Stress: Not on file   Relationships     Social connections     Talks on phone: Not on file     Gets together: Not on file     Attends Yarsani service: Not on file     Active member of club or organization: Not on file     Attends meetings of clubs or organizations: Not on file     Relationship status: Not on file     Intimate partner violence     Fear of current or ex partner: Not on file     Emotionally abused: Not on file     Physically abused: Not on file     Forced sexual activity: Not on file   Other Topics Concern     Parent/sibling w/ CABG, MI or angioplasty before 65F 55M? No   Social History Narrative     Not on file        Allergies:  No Known Allergies     Medications:  Current Outpatient Medications   Medication Sig     amLODIPine (NORVASC) 10 MG tablet Take 10 mg by mouth daily      hydrALAZINE (APRESOLINE) 50 MG tablet Take 50 mg by mouth 3 times daily       labetalol (NORMODYNE) 200 MG tablet Take 200 mg by mouth 2 times daily      No current facility-administered medications for this visit.         Vitals:  There were no vitals taken for this visit.     Exam:  GENERAL: Healthy, alert and no distress  EYES: Eyes grossly normal to inspection.  No discharge or erythema, or obvious scleral/conjunctival abnormalities.  RESP: No audible wheeze, cough, or visible cyanosis.  No visible retractions or increased work of breathing.    SKIN: Visible skin clear. No significant rash, abnormal pigmentation or lesions.  NEURO: Cranial nerves grossly intact.  Mentation and speech appropriate for age.  PSYCH: Mentation appears normal, affect normal/bright, judgement and insight intact, normal speech and appearance well-groomed.          Again, thank you for allowing me to participate in the care of your patient.      Sincerely,    CASIMIRO Bobo CNP

## 2020-10-08 ENCOUNTER — TELEPHONE (OUTPATIENT)
Dept: TRANSPLANT | Facility: CLINIC | Age: 35
End: 2020-10-08

## 2020-10-08 NOTE — TELEPHONE ENCOUNTER
Left voice message for patient I am changing his status to Active on the kidney list as have medical approvals and insurance review.  Noted patient is again eligible to receive calls regarding  organ offers.  He will be due to have a new PRA sample drawn next week.  Change of status letter and new PRA will be sent to patient.  Requested return call should patient have any questions.

## 2020-10-09 ENCOUNTER — DOCUMENTATION ONLY (OUTPATIENT)
Dept: TRANSPLANT | Facility: CLINIC | Age: 35
End: 2020-10-09

## 2020-10-09 DIAGNOSIS — N18.6 ESRD (END STAGE RENAL DISEASE) (H): ICD-10-CM

## 2020-10-09 DIAGNOSIS — Z76.82 ORGAN TRANSPLANT CANDIDATE: ICD-10-CM

## 2020-10-12 ENCOUNTER — DOCUMENTATION ONLY (OUTPATIENT)
Dept: TRANSPLANT | Facility: CLINIC | Age: 35
End: 2020-10-12

## 2020-10-12 DIAGNOSIS — N18.6 ESRD (END STAGE RENAL DISEASE) (H): ICD-10-CM

## 2020-10-12 DIAGNOSIS — Z76.82 ORGAN TRANSPLANT CANDIDATE: ICD-10-CM

## 2020-10-14 ENCOUNTER — COMMITTEE REVIEW (OUTPATIENT)
Dept: TRANSPLANT | Facility: CLINIC | Age: 35
End: 2020-10-14

## 2020-10-15 NOTE — COMMITTEE REVIEW
Abdominal Patient Discussion Note Transplant Coordinator: Elida Rivers  Transplant Surgeon:       Referring Physician: MENDEZ Cobos    Committee Review Members:  Nephrology Rober Goodwin MD, Sammy Branch, APRN CNP, Idalmis Cote MD, Cristino Martin MD   Nurse Montez Gross, SHAYY   Nutrition Lesa Eden, RD   Pharmacy Ricardo Cha, [facility]    - Clinical Connie Dumont, [facility]   Transplant Blank May PA-C, Elida Rivers, RN, Tameka Galo, SHAYY, Lluvia Hernandez, LILLIANA, Adelina Liao, SHAYY, Sahra Toscano MD, Yoni Abdi MD, Breana Hurst RN, Phuc López MD       Additional Discussion Notes and Findings:   Per Sammy Branch's review, patient's status changed to Active on the kidney transplant list activation on 10/08/2020

## 2020-11-29 ENCOUNTER — HEALTH MAINTENANCE LETTER (OUTPATIENT)
Age: 35
End: 2020-11-29

## 2021-01-21 ENCOUNTER — TELEPHONE (OUTPATIENT)
Dept: TRANSPLANT | Facility: CLINIC | Age: 36
End: 2021-01-21

## 2021-01-21 NOTE — TELEPHONE ENCOUNTER
Confirmed patient is Active status on the kidney list and eligible to receive organ offer calls; PRA sample is due this month, but patient current with all waitlist update appointments at this time.  Per Connie's question, reviewed the UNOS multiple listing policy as well as advance donation in the paired exchange program.  Also provided the link for 02/05/2021 [employer] virtual conference and requested she give to patient.  Also provided new waitlist coordinator's contact information for future calls/questions.

## 2021-03-01 DIAGNOSIS — Z76.82 ORGAN TRANSPLANT CANDIDATE: ICD-10-CM

## 2021-03-01 DIAGNOSIS — N18.6 ESRD (END STAGE RENAL DISEASE) (H): ICD-10-CM

## 2021-05-04 ENCOUNTER — TELEPHONE (OUTPATIENT)
Dept: OTHER | Facility: CLINIC | Age: 36
End: 2021-05-04

## 2021-05-04 DIAGNOSIS — T82.898D ANEURYSM OF ARTERIOVENOUS DIALYSIS FISTULA, SUBSEQUENT ENCOUNTER: Primary | ICD-10-CM

## 2021-05-04 NOTE — TELEPHONE ENCOUNTER
Patient called to schedule an appointment with Dr. Denise. He was last seen for surgery last year for an aneurysm on his AVF. Patient states that he has another concerning aneurysm and would like to be seen to discuss possible repair. Patient can be reached at [phone]   Subjective:      Patient ID: Katy Soto is a 81 y.o. female.    Chief Complaint: Follow-up      HPI   bp follow up; son jacob ; pt upset; takingher meds; nose had quit bleeding until Monday; takes isordil 60 mg daily    Review of Systems   Constitutional: Negative.    HENT: Positive for nosebleeds.    Respiratory: Positive for shortness of breath.    Cardiovascular: Positive for leg swelling.   Gastrointestinal: Negative.    Endocrine: Negative.    Genitourinary: Negative.    Musculoskeletal: Negative.    Psychiatric/Behavioral: Negative.    All other systems reviewed and are negative.    Objective:     Physical Exam   Constitutional: She is oriented to person, place, and time. She appears well-developed and well-nourished.   obese   HENT:   Head: Normocephalic.   Eyes: Pupils are equal, round, and reactive to light. Conjunctivae and EOM are normal.   Neck: Normal range of motion. Neck supple.   Cardiovascular: Normal rate, regular rhythm and normal heart sounds.   Pulmonary/Chest: Effort normal and breath sounds normal.   Musculoskeletal: Normal range of motion. She exhibits edema.   One plus   Neurological: She is alert and oriented to person, place, and time. She has normal reflexes.   Skin: Skin is warm and dry.   Psychiatric: She has a normal mood and affect. Her behavior is normal. Judgment and thought content normal.   Nursing note and vitals reviewed.    Assessment:     1. Essential hypertension    2. Uncontrolled hypertension      Plan:        Medication List           Accurate as of 7/3/19  3:51 PM. If you have any questions, ask your nurse or doctor.               CONTINUE taking these medications    amLODIPine 5 MG tablet  Commonly known as:  NORVASC  TAKE 1 TABLET(5 MG) BY MOUTH EVERY DAY     aspirin 81 MG EC tablet  Commonly known as:  ECOTRIN     atorvastatin 40 MG tablet  Commonly known as:  LIPITOR  Take 1 tablet (40 mg total) by mouth once daily.     cetirizine 10 MG tablet  Commonly  known as:  ZYRTEC  Take 1 tablet (10 mg total) by mouth once daily.     * fluticasone propionate 50 mcg/actuation nasal spray  Commonly known as:  FLONASE  SHAKE LIQUID AND USE 1 SPRAY(50 MCG) IN EACH NOSTRIL EVERY DAY     * fluticasone propionate 50 mcg/actuation nasal spray  Commonly known as:  FLONASE  SHAKE LIQUID AND USE 1 SPRAY(50 MCG) IN EACH NOSTRIL EVERY DAY     furosemide 40 MG tablet  Commonly known as:  LASIX  Take 1 tablet (40 mg total) by mouth once daily. As leg swelling     isosorbide mononitrate 60 MG 24 hr tablet  Commonly known as:  IMDUR  TAKE 1 TABLET(60 MG) BY MOUTH EVERY DAY     potassium chloride SA 20 MEQ tablet  Commonly known as:  K-DUR,KLOR-CON  TAKE 1 TABLET BY MOUTH ONCE DAILY WITH LASIX AS NEEDED     sucralfate 1 gram tablet  Commonly known as:  CARAFATE  Take 1 tablet (1 g total) by mouth 3 (three) times daily.         * This list has 2 medication(s) that are the same as other medications prescribed for you. Read the directions carefully, and ask your doctor or other care provider to review them with you.              Essential hypertension    Uncontrolled hypertension    add clonidine 0.1 tid  contineu imdur 60  Pt won't take amlodipine  RTC one week

## 2021-05-05 NOTE — TELEPHONE ENCOUNTER
Patient needs AVF US and in clinic visit with Dr. eDnise at next available.      Routing to scheduling to coordinate.    LEELEE WilsonN, RN-[facility] [place]

## 2021-05-06 ENCOUNTER — HOSPITAL ENCOUNTER (OUTPATIENT)
Dept: ULTRASOUND IMAGING | Facility: CLINIC | Age: 36
Discharge: HOME | End: 2021-05-06
Attending: SURGERY | Admitting: SURGERY
Payer: MEDICARE

## 2021-05-06 DIAGNOSIS — T82.898D ANEURYSM OF ARTERIOVENOUS DIALYSIS FISTULA, SUBSEQUENT ENCOUNTER: ICD-10-CM

## 2021-05-06 PROCEDURE — 93990 DOPPLER FLOW TESTING: CPT

## 2021-05-12 NOTE — PROGRESS NOTES
[facility]    Kailash Sorto is on chronic hemodialysis via right upper arm brachial to cephalic fistula.  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  On 8/25/2020 repair of his aneurysmal segment and excise the overlying skin ulcer with no complications.    Ultrasound performed 5/6/2021 revealed a patent fistula.  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  Excellent outflow volume of 2160 mL/min    Patient reports that the fistula is been working well.  No excessive needle hole bleeding.  Aneurysmal dilatation has been fairly stable with no skin issues.    He remains quite active.  Plays softball and other sports and also shooting.    Exam: Alert and appropriate.  Normal affect.  Very pleasant.   Blood pressure 128/75.  Pulse 78.   Strong pulse within the fistula.  Aneurysmal dilatation there is diffuse starting approximately 4 cm from the anastomosis extending up to the proximal one third of the upper arm at which location at this was a more normal diameter.  Overlying skin is slightly thin from the multiple accesses but it of no concern.    Impression: Well-functioning right upper arm brachial to cephalic fistula.  Stable aneurysmal dilatation with acceptable overlying skin.  Some concerns about the outflow stenosis but there is also a sidebranch keeping the fistula patent and decreasing the back pressure.     Stressed the importance of caution with medial rotation to protect the overlying skin.     Revision could be performed if the aneurysms would enlarge and there be skin issues.  The decision that would be made would be whether we prepared the aneurysms or place a bovine graft in that segment.  Would also consider an outflow revision to the axillary vein due to the stenosis but the axillary/cephalic junction.  I do not feel that  angioplastied this area would be a good long-term solution due to the high incidence of scar tissue developing especially in a very young patient.     As long as things are stable we will continue to monitor him.  He will let me know if there is any changes and will discussed the various options for outflow revision and dealing with the aneurysmal areas of the fistula.    Payam Denise MD    CC: [facility] dialysis unit

## 2021-05-13 ENCOUNTER — OFFICE VISIT (OUTPATIENT)
Dept: OTHER | Facility: CLINIC | Age: 36
End: 2021-05-13
Attending: SURGERY
Payer: MEDICARE

## 2021-05-13 VITALS — SYSTOLIC BLOOD PRESSURE: 128 MMHG | DIASTOLIC BLOOD PRESSURE: 75 MMHG | HEART RATE: 78 BPM | TEMPERATURE: 98.2 F

## 2021-05-13 DIAGNOSIS — N18.6 ESRD (END STAGE RENAL DISEASE) ON DIALYSIS (H): ICD-10-CM

## 2021-05-13 DIAGNOSIS — I77.0 AVF (ARTERIOVENOUS FISTULA) (H): Primary | ICD-10-CM

## 2021-05-13 DIAGNOSIS — Z99.2 ESRD (END STAGE RENAL DISEASE) ON DIALYSIS (H): ICD-10-CM

## 2021-05-13 PROCEDURE — G0463 HOSPITAL OUTPT CLINIC VISIT: HCPCS

## 2021-05-13 PROCEDURE — 99213 OFFICE O/P EST LOW 20 MIN: CPT | Performed by: SURGERY

## 2021-05-13 NOTE — PROGRESS NOTES
"Kailash Sorto is a 35 year old male who presents for:  Chief Complaint   Patient presents with     RECHECK      History of right brachial to cephalic mid upper arm fistula aneurysm repair on 8/25/20; next available follow up to 8/25/20 surgery *in clinic with Dr. Camelia Rincons:    Vitals:    05/13/21 1300   BP: 128/75   BP Location: Left arm   Patient Position: Chair   Cuff Size: Adult Regular   Pulse: 78   Temp: 98.2  F (36.8  C)   TempSrc: Temporal       BMI:  Estimated body mass index is 28.07 kg/m  as calculated from the following:    Height as of 8/25/20: 5' 6\" (1.676 m).    Weight as of 8/25/20: 173 lb 14.4 oz (78.9 kg).    Pain Score:  Data Unavailable        María Nixon MA    "

## 2021-06-29 ENCOUNTER — TELEPHONE (OUTPATIENT)
Dept: TRANSPLANT | Facility: CLINIC | Age: 36
End: 2021-06-29

## 2021-06-29 NOTE — TELEPHONE ENCOUNTER
Pt called stating that he might have a potential donor and asking for the donor website. E-mailed pt the website per request. Gave direct phone number. Pt verbalized understanding of information and has no further questions. Encouraged to reach out if questions arise.

## 2021-07-05 DIAGNOSIS — N18.6 ESRD (END STAGE RENAL DISEASE) (H): ICD-10-CM

## 2021-07-05 DIAGNOSIS — Z76.82 ORGAN TRANSPLANT CANDIDATE: ICD-10-CM

## 2021-07-20 ENCOUNTER — HOSPITAL ENCOUNTER (EMERGENCY)
Facility: CLINIC | Age: 36
Discharge: HOME | End: 2021-07-20
Attending: NURSE PRACTITIONER | Admitting: NURSE PRACTITIONER
Payer: MEDICARE

## 2021-07-20 VITALS
HEART RATE: 81 BPM | DIASTOLIC BLOOD PRESSURE: 67 MMHG | TEMPERATURE: 98.4 F | OXYGEN SATURATION: 99 % | SYSTOLIC BLOOD PRESSURE: 110 MMHG | RESPIRATION RATE: 18 BRPM

## 2021-07-20 DIAGNOSIS — T14.8XXA CONTUSION: ICD-10-CM

## 2021-07-20 PROCEDURE — 99282 EMERGENCY DEPT VISIT SF MDM: CPT

## 2021-07-21 NOTE — DISCHARGE INSTRUCTIONS
You may apply ice to area for 20 minutes at a time 3-5 times a day for the next 24 to 48 hours.  Again return to the emergency department if you develop increased swelling, pain, or further concerns.

## 2021-07-21 NOTE — ED PROVIDER NOTES
"  History   Chief Complaint:  Vascular Access Problem (Pt playing softball and ball bounced off the ground striking right upper arm dialysis fistula. Swelling around site. Recieves dialysis M/W/F.)       The history is provided by the patient.      Kailash Sorto is a 35 year old male who presents with concern about his fistula. The patient reports playing softball when a ball struck his upper right arm dialysis fistula occurring a couple hours ago. He notes that the size of the swelling has not increased since initial injury. He mentions that regularly the fistula is \"half\" the size it is after the ball struck his arm. The patient denies pain to the region.     Review of Systems   Musculoskeletal:        (+) arm swelling   All other systems reviewed and are negative.      Allergies:  No Known Allergies    Medications:  amlodipine  hydralazine  labetalol     Past Medical History:    Anemia of chronic kidney failure  Dialysis patient   End stage kidney disease  Hypertension  AVF  Renovascular hypertension  Anemia of CKD  Hyperlipidemia  End stage renal failure on dialysis     Past Surgical History:    Create fistula arteriovenous upper extremity x 2  Excise fistula arteriovenous upper extremity  Revision fistula arteriovenous upper extremity  Vascular surgery  [place] teeth surgery     Family History:    Diabetes     Social History:  Patient plays softball.  Patient presents with father-in-law.     Physical Exam     Patient Vitals for the past 24 hrs:   BP Temp Temp src Pulse Resp SpO2   07/20/21 2059 110/67 98.4  F (36.9  C) Oral 81 18 99 %       Physical Exam  General: Alert, No obvious discomfort, well kept   HENT:  Normal voice, No lymphadenopathy  Eyes:  The pupils are equal, round, and reactive to light, Conjunctiva normal, No scleral icterus   Neck:  Normal range of motion  CV:  Normal Pulses  Resp:  Non-labored, No cough  MS:  Normal muscular tone, moves all extremities, right upper arm dialysis fistula with " good thrill and pulses.  Area is nontender.  There is no color change or bruising.  Normal range of motion of the arm.  Skin:  No rash or acute skin lesions noted  Neuro:  Speech is normal and fluent  Psych: Awake. Alert.  Normal affect.  Appropriate interactions. Good eye contact      Emergency Department Course     Emergency Department Course:    Reviewed:  I reviewed nursing notes, vitals, past medical history and care everywhere    Assessments:  2212 I obtained history and examined the patient as noted above.     Disposition:  The patient was discharged to home.       Impression & Plan     Medical Decision Making:  Kailash Sorto is a 35 year old male who presents today for evaluation of possible injury to dialysis fistula.  He was struck by a softball that rebounded off the ground over his fistula.  He notes that there was some initial swelling but there has been no increased swelling or pain since that time.  This occurred a couple of hours prior to arrival.  His evaluation shows good thrill and pulses throughout his fistula.  Given the lack of evidence of expanding hematoma or evidence of injury to fistula there is no indication for testing or imaging at this time.  He appears to be safe and appropriate for outpatient management and follow-up he is discharged home.  We discussed concerning signs and symptoms and reasons to return to the emergency department.  No evidence of neurovascular compromise.  He is comfortable with this plan.      Diagnosis:    ICD-10-CM    1. Contusion  T14.8XXA        Discharge Medications:  New Prescriptions    No medications on file       Scribe Disclosure:  I, America Perez, am serving as a scribe at 10:14 PM on 7/20/2021 to document services personally performed by Vishal Odell based on my observations and the provider's statements to me.            Vishal Odell APRN CNP  07/20/21 1052

## 2021-07-21 NOTE — ED TRIAGE NOTES
Pt playing softball and ball bounced off the ground striking right upper arm dialysis fistula. Swelling around site. Recieves dialysis M/W/F.

## 2021-08-09 DIAGNOSIS — N18.6 ESRD (END STAGE RENAL DISEASE) (H): Primary | ICD-10-CM

## 2021-08-11 ENCOUNTER — LAB (OUTPATIENT)
Dept: LAB | Facility: CLINIC | Age: 36
End: 2021-08-11
Payer: MEDICARE

## 2021-08-11 DIAGNOSIS — N18.6 ESRD (END STAGE RENAL DISEASE) (H): ICD-10-CM

## 2021-08-11 PROCEDURE — 86825 HLA X-MATH NON-CYTOTOXIC: CPT

## 2021-08-16 LAB
CELL TYPE ALLO: NORMAL
CHANNELSHIFTALLOB1: -81
CHANNELSHIFTALLOB2: -80
CHANNELSHIFTALLOT1: -92
CHANNELSHIFTALLOT2: -91
CROSSMATCHDATEALLO: NORMAL
DONOR ALLO: NORMAL
DONORCELLDATE ALLO: NORMAL
POS CUT OFF ALLO B: >93
POS CUT OFF ALLO T: >79
RESULT ALLO B1: NORMAL
RESULT ALLO B2: NORMAL
RESULT ALLO T1: NORMAL
RESULT ALLO T2: NORMAL
SERUM DATE ALLO B1: NORMAL
SERUM DATE ALLO B2: NORMAL
SERUM DATE ALLO T1: NORMAL
SERUM DATE ALLO T2: NORMAL
TESTMETHODALLO: NORMAL
TREATMENT ALLO B1: NORMAL
TREATMENT ALLO B2: NORMAL
TREATMENT ALLO T1: NORMAL
TREATMENT ALLO T2: NORMAL
ZZZCOMMENT ALLOB2: NORMAL

## 2021-08-30 ENCOUNTER — LAB REQUISITION (OUTPATIENT)
Dept: LAB | Facility: CLINIC | Age: 36
End: 2021-08-30
Payer: COMMERCIAL

## 2021-08-30 ENCOUNTER — TRANSFERRED RECORDS (OUTPATIENT)
Dept: HEALTH INFORMATION MANAGEMENT | Facility: CLINIC | Age: 36
End: 2021-08-30

## 2021-09-01 LAB
SA 1 CELL: NORMAL
SA 1 TEST METHOD: NORMAL
SA 2 CELL: NORMAL
SA 2 TEST METHOD: NORMAL
SA1 HI RISK ABY: NORMAL
SA1 MOD RISK ABY: NORMAL
SA2 HI RISK ABY: NORMAL
SA2 MOD RISK ABY: NORMAL
ZZZSA 1  COMMENTS: NORMAL
ZZZSA 2 COMMENTS: NORMAL

## 2021-09-02 DIAGNOSIS — Z76.82 ORGAN TRANSPLANT CANDIDATE: ICD-10-CM

## 2021-09-02 DIAGNOSIS — N18.6 ESRD (END STAGE RENAL DISEASE) (H): ICD-10-CM

## 2021-09-03 ENCOUNTER — DOCUMENTATION ONLY (OUTPATIENT)
Dept: TRANSPLANT | Facility: CLINIC | Age: 36
End: 2021-09-03

## 2021-09-03 ENCOUNTER — TRANSFERRED RECORDS (OUTPATIENT)
Dept: HEALTH INFORMATION MANAGEMENT | Facility: CLINIC | Age: 36
End: 2021-09-03

## 2021-09-13 ENCOUNTER — ORGAN (OUTPATIENT)
Dept: TRANSPLANT | Facility: CLINIC | Age: 36
End: 2021-09-13

## 2021-09-13 ENCOUNTER — APPOINTMENT (OUTPATIENT)
Dept: GENERAL RADIOLOGY | Facility: CLINIC | Age: 36
DRG: 652 | End: 2021-09-13
Attending: TRANSPLANT SURGERY
Payer: MEDICARE

## 2021-09-13 ENCOUNTER — ANESTHESIA EVENT (OUTPATIENT)
Dept: SURGERY | Facility: CLINIC | Age: 36
DRG: 652 | End: 2021-09-13
Payer: MEDICARE

## 2021-09-13 ENCOUNTER — APPOINTMENT (OUTPATIENT)
Dept: ULTRASOUND IMAGING | Facility: CLINIC | Age: 36
DRG: 652 | End: 2021-09-13
Attending: SURGERY
Payer: MEDICARE

## 2021-09-13 ENCOUNTER — DOCUMENTATION ONLY (OUTPATIENT)
Dept: TRANSPLANT | Facility: CLINIC | Age: 36
End: 2021-09-13

## 2021-09-13 ENCOUNTER — ANESTHESIA (OUTPATIENT)
Dept: SURGERY | Facility: CLINIC | Age: 36
DRG: 652 | End: 2021-09-13
Payer: MEDICARE

## 2021-09-13 ENCOUNTER — HOSPITAL ENCOUNTER (INPATIENT)
Facility: CLINIC | Age: 36
LOS: 3 days | Discharge: HOME | DRG: 652 | End: 2021-09-16
Attending: TRANSPLANT SURGERY | Admitting: TRANSPLANT SURGERY
Payer: MEDICARE

## 2021-09-13 ENCOUNTER — LAB REQUISITION (OUTPATIENT)
Dept: LAB | Facility: CLINIC | Age: 36
End: 2021-09-13
Payer: COMMERCIAL

## 2021-09-13 ENCOUNTER — APPOINTMENT (OUTPATIENT)
Dept: GENERAL RADIOLOGY | Facility: CLINIC | Age: 36
DRG: 652 | End: 2021-09-13
Attending: SURGERY
Payer: MEDICARE

## 2021-09-13 DIAGNOSIS — Z76.82 AWAITING ORGAN TRANSPLANT: Primary | ICD-10-CM

## 2021-09-13 DIAGNOSIS — I15.0 RENOVASCULAR HYPERTENSION: ICD-10-CM

## 2021-09-13 DIAGNOSIS — Z94.0 KIDNEY TRANSPLANT RECIPIENT: Primary | ICD-10-CM

## 2021-09-13 LAB
ABO/RH(D): NORMAL
ALBUMIN SERPL-MCNC: 3.8 G/DL (ref 3.4–5)
ALBUMIN UR-MCNC: 30 MG/DL
ALP SERPL-CCNC: 122 U/L (ref 40–150)
ALT SERPL W P-5'-P-CCNC: 24 U/L (ref 0–70)
ANION GAP SERPL CALCULATED.3IONS-SCNC: 12 MMOL/L (ref 3–14)
ANION GAP SERPL CALCULATED.3IONS-SCNC: 8 MMOL/L (ref 3–14)
ANTIBODY SCREEN: NEGATIVE
APPEARANCE UR: CLEAR
APTT PPP: 25 SECONDS (ref 22–38)
AST SERPL W P-5'-P-CCNC: 15 U/L (ref 0–45)
BASOPHILS # BLD AUTO: 0 10E3/UL (ref 0–0.2)
BASOPHILS NFR BLD AUTO: 1 %
BILIRUB SERPL-MCNC: 0.4 MG/DL (ref 0.2–1.3)
BILIRUB UR QL STRIP: NEGATIVE
BKV DNA # SPEC NAA+PROBE: NOT DETECTED COPIES/ML
BUN SERPL-MCNC: 54 MG/DL (ref 7–30)
BUN SERPL-MCNC: 55 MG/DL (ref 7–30)
CALCIUM SERPL-MCNC: 8.2 MG/DL (ref 8.5–10.1)
CALCIUM SERPL-MCNC: 8.9 MG/DL (ref 8.5–10.1)
CHLORIDE BLD-SCNC: 102 MMOL/L (ref 94–109)
CHLORIDE BLD-SCNC: 105 MMOL/L (ref 94–109)
CHOLEST SERPL-MCNC: 211 MG/DL
CMV DNA SPEC NAA+PROBE-ACNC: NOT DETECTED IU/ML
CMV IGG SERPL IA-ACNC: <0.2 U/ML
CMV IGG SERPL IA-ACNC: NORMAL
CO2 SERPL-SCNC: 25 MMOL/L (ref 20–32)
CO2 SERPL-SCNC: 28 MMOL/L (ref 20–32)
COLOR UR AUTO: ABNORMAL
CREAT SERPL-MCNC: 6.37 MG/DL (ref 0.66–1.25)
CREAT SERPL-MCNC: 7.02 MG/DL (ref 0.66–1.25)
CREAT UR-MCNC: 201 MG/DL
EBV VCA IGG SER IA-ACNC: >750 U/ML
EBV VCA IGG SER IA-ACNC: POSITIVE
EBV VCA IGM SER IA-ACNC: <10 U/ML
EBV VCA IGM SER IA-ACNC: NORMAL
EOSINOPHIL # BLD AUTO: 0.4 10E3/UL (ref 0–0.7)
EOSINOPHIL NFR BLD AUTO: 8 %
ERYTHROCYTE [DISTWIDTH] IN BLOOD BY AUTOMATED COUNT: 12.4 % (ref 10–15)
ERYTHROCYTE [DISTWIDTH] IN BLOOD BY AUTOMATED COUNT: 12.4 % (ref 10–15)
FASTING STATUS PATIENT QL REPORTED: NO
GFR SERPL CREATININE-BSD FRML MDRD: 10 ML/MIN/1.73M2
GFR SERPL CREATININE-BSD FRML MDRD: 9 ML/MIN/1.73M2
GLUCOSE BLD-MCNC: 145 MG/DL (ref 70–99)
GLUCOSE BLD-MCNC: 94 MG/DL (ref 70–99)
GLUCOSE BLDC GLUCOMTR-MCNC: 98 MG/DL (ref 70–99)
GLUCOSE UR STRIP-MCNC: NEGATIVE MG/DL
HBA1C MFR BLD: 5.3 % (ref 0–5.6)
HBV CORE AB SERPL QL IA: NONREACTIVE
HBV SURFACE AB SERPL IA-ACNC: >1000 M[IU]/ML
HBV SURFACE AG SERPL QL IA: NONREACTIVE
HCT VFR BLD AUTO: 25.7 % (ref 40–53)
HCT VFR BLD AUTO: 31.4 % (ref 40–53)
HCV AB SERPL QL IA: NONREACTIVE
HDLC SERPL-MCNC: 56 MG/DL
HGB BLD-MCNC: 10.3 G/DL (ref 13.3–17.7)
HGB BLD-MCNC: 8.6 G/DL (ref 13.3–17.7)
HGB BLD-MCNC: 8.9 G/DL (ref 13.3–17.7)
HGB UR QL STRIP: NEGATIVE
HIV 1+2 AB+HIV1 P24 AG SERPL QL IA: NONREACTIVE
IMM GRANULOCYTES # BLD: 0 10E3/UL
IMM GRANULOCYTES NFR BLD: 0 %
INR PPP: 0.88 (ref 0.85–1.15)
KETONES UR STRIP-MCNC: NEGATIVE MG/DL
LDLC SERPL CALC-MCNC: 136 MG/DL
LEUKOCYTE ESTERASE UR QL STRIP: NEGATIVE
LYMPHOCYTES # BLD AUTO: 1.8 10E3/UL (ref 0.8–5.3)
LYMPHOCYTES NFR BLD AUTO: 37 %
MAGNESIUM SERPL-MCNC: 1.7 MG/DL (ref 1.6–2.3)
MCH RBC QN AUTO: 29.9 PG (ref 26.5–33)
MCH RBC QN AUTO: 30.2 PG (ref 26.5–33)
MCHC RBC AUTO-ENTMCNC: 32.8 G/DL (ref 31.5–36.5)
MCHC RBC AUTO-ENTMCNC: 33.5 G/DL (ref 31.5–36.5)
MCV RBC AUTO: 90 FL (ref 78–100)
MCV RBC AUTO: 91 FL (ref 78–100)
MONOCYTES # BLD AUTO: 0.6 10E3/UL (ref 0–1.3)
MONOCYTES NFR BLD AUTO: 12 %
NEUTROPHILS # BLD AUTO: 2.1 10E3/UL (ref 1.6–8.3)
NEUTROPHILS NFR BLD AUTO: 42 %
NITRATE UR QL: NEGATIVE
NONHDLC SERPL-MCNC: 155 MG/DL
NRBC # BLD AUTO: 0 10E3/UL
NRBC BLD AUTO-RTO: 0 /100
PH UR STRIP: 6.5 [PH] (ref 5–7)
PHOSPHATE SERPL-MCNC: 2.3 MG/DL (ref 2.5–4.5)
PLATELET # BLD AUTO: 154 10E3/UL (ref 150–450)
PLATELET # BLD AUTO: 237 10E3/UL (ref 150–450)
POTASSIUM BLD-SCNC: 3.3 MMOL/L (ref 3.4–5.3)
POTASSIUM BLD-SCNC: 4 MMOL/L (ref 3.4–5.3)
POTASSIUM BLD-SCNC: 4.2 MMOL/L (ref 3.4–5.3)
PROT SERPL-MCNC: 7.7 G/DL (ref 6.8–8.8)
PROT UR-MCNC: 0.44 G/L
PROT/CREAT 24H UR: 0.22 G/G CR (ref 0–0.2)
RBC # BLD AUTO: 2.85 10E6/UL (ref 4.4–5.9)
RBC # BLD AUTO: 3.45 10E6/UL (ref 4.4–5.9)
RBC URINE: 1 /HPF
SARS-COV-2 RNA RESP QL NAA+PROBE: NEGATIVE
SODIUM SERPL-SCNC: 139 MMOL/L (ref 133–144)
SODIUM SERPL-SCNC: 141 MMOL/L (ref 133–144)
SP GR UR STRIP: 1.01 (ref 1–1.03)
SPECIMEN EXPIRATION DATE: NORMAL
SQUAMOUS EPITHELIAL: <1 /HPF
TRIGL SERPL-MCNC: 97 MG/DL
UROBILINOGEN UR STRIP-MCNC: NORMAL MG/DL
WBC # BLD AUTO: 4.9 10E3/UL (ref 4–11)
WBC # BLD AUTO: 5.9 10E3/UL (ref 4–11)
WBC URINE: 1 /HPF

## 2021-09-13 PROCEDURE — 710N000011 HC RECOVERY PHASE 1, LEVEL 3, PER MIN: Performed by: TRANSPLANT SURGERY

## 2021-09-13 PROCEDURE — 250N000012 HC RX MED GY IP 250 OP 636 PS 637: Performed by: SURGERY

## 2021-09-13 PROCEDURE — 85018 HEMOGLOBIN: CPT | Performed by: SURGERY

## 2021-09-13 PROCEDURE — 84100 ASSAY OF PHOSPHORUS: CPT | Performed by: SURGERY

## 2021-09-13 PROCEDURE — 50360 RNL ALTRNSPLJ W/O RCP NFRCT: CPT | Mod: LT | Performed by: TRANSPLANT SURGERY

## 2021-09-13 PROCEDURE — 250N000013 HC RX MED GY IP 250 OP 250 PS 637

## 2021-09-13 PROCEDURE — 93005 ELECTROCARDIOGRAM TRACING: CPT

## 2021-09-13 PROCEDURE — 250N000011 HC RX IP 250 OP 636: Performed by: NURSE ANESTHETIST, CERTIFIED REGISTERED

## 2021-09-13 PROCEDURE — U0003 INFECTIOUS AGENT DETECTION BY NUCLEIC ACID (DNA OR RNA); SEVERE ACUTE RESPIRATORY SYNDROME CORONAVIRUS 2 (SARS-COV-2) (CORONAVIRUS DISEASE [COVID-19]), AMPLIFIED PROBE TECHNIQUE, MAKING USE OF HIGH THROUGHPUT TECHNOLOGIES AS DESCRIBED BY CMS-2020-01-R: HCPCS

## 2021-09-13 PROCEDURE — 71045 X-RAY EXAM CHEST 1 VIEW: CPT | Mod: 26 | Performed by: RADIOLOGY

## 2021-09-13 PROCEDURE — C2617 STENT, NON-COR, TEM W/O DEL: HCPCS | Performed by: TRANSPLANT SURGERY

## 2021-09-13 PROCEDURE — 0TY10Z0 TRANSPLANTATION OF LEFT KIDNEY, ALLOGENEIC, OPEN APPROACH: ICD-10-PCS | Performed by: TRANSPLANT SURGERY

## 2021-09-13 PROCEDURE — 250N000013 HC RX MED GY IP 250 OP 250 PS 637: Performed by: SURGERY

## 2021-09-13 PROCEDURE — 85730 THROMBOPLASTIN TIME PARTIAL: CPT

## 2021-09-13 PROCEDURE — 86833 HLA CLASS II HIGH DEFIN QUAL: CPT | Performed by: TRANSPLANT SURGERY

## 2021-09-13 PROCEDURE — 999N000065 XR CHEST PORT 1 VIEW

## 2021-09-13 PROCEDURE — 80061 LIPID PANEL: CPT

## 2021-09-13 PROCEDURE — 250N000011 HC RX IP 250 OP 636: Performed by: ANESTHESIOLOGY

## 2021-09-13 PROCEDURE — 999N000127 HC STATISTIC PERIPHERAL IV START W US GUIDANCE

## 2021-09-13 PROCEDURE — 86665 EPSTEIN-BARR CAPSID VCA: CPT

## 2021-09-13 PROCEDURE — 87340 HEPATITIS B SURFACE AG IA: CPT

## 2021-09-13 PROCEDURE — 86644 CMV ANTIBODY: CPT

## 2021-09-13 PROCEDURE — 272N000001 HC OR GENERAL SUPPLY STERILE: Performed by: TRANSPLANT SURGERY

## 2021-09-13 PROCEDURE — 87389 HIV-1 AG W/HIV-1&-2 AB AG IA: CPT

## 2021-09-13 PROCEDURE — 250N000009 HC RX 250: Performed by: ANESTHESIOLOGY

## 2021-09-13 PROCEDURE — 999N000141 HC STATISTIC PRE-PROCEDURE NURSING ASSESSMENT: Performed by: TRANSPLANT SURGERY

## 2021-09-13 PROCEDURE — 258N000003 HC RX IP 258 OP 636

## 2021-09-13 PROCEDURE — 86832 HLA CLASS I HIGH DEFIN QUAL: CPT | Performed by: TRANSPLANT SURGERY

## 2021-09-13 PROCEDURE — 250N000011 HC RX IP 250 OP 636: Performed by: TRANSPLANT SURGERY

## 2021-09-13 PROCEDURE — 71046 X-RAY EXAM CHEST 2 VIEWS: CPT | Mod: 26 | Performed by: STUDENT IN AN ORGANIZED HEALTH CARE EDUCATION/TRAINING PROGRAM

## 2021-09-13 PROCEDURE — 86901 BLOOD TYPING SEROLOGIC RH(D): CPT

## 2021-09-13 PROCEDURE — 86803 HEPATITIS C AB TEST: CPT

## 2021-09-13 PROCEDURE — 250N000009 HC RX 250: Performed by: NURSE ANESTHETIST, CERTIFIED REGISTERED

## 2021-09-13 PROCEDURE — 81001 URINALYSIS AUTO W/SCOPE: CPT

## 2021-09-13 PROCEDURE — 250N000009 HC RX 250: Performed by: TRANSPLANT SURGERY

## 2021-09-13 PROCEDURE — 71046 X-RAY EXAM CHEST 2 VIEWS: CPT

## 2021-09-13 PROCEDURE — 370N000017 HC ANESTHESIA TECHNICAL FEE, PER MIN: Performed by: TRANSPLANT SURGERY

## 2021-09-13 PROCEDURE — 85025 COMPLETE CBC W/AUTO DIFF WBC: CPT

## 2021-09-13 PROCEDURE — P9041 ALBUMIN (HUMAN),5%, 50ML: HCPCS | Performed by: NURSE ANESTHETIST, CERTIFIED REGISTERED

## 2021-09-13 PROCEDURE — 85610 PROTHROMBIN TIME: CPT

## 2021-09-13 PROCEDURE — 76776 US EXAM K TRANSPL W/DOPPLER: CPT | Mod: 26 | Performed by: RADIOLOGY

## 2021-09-13 PROCEDURE — 82040 ASSAY OF SERUM ALBUMIN: CPT

## 2021-09-13 PROCEDURE — 250N000011 HC RX IP 250 OP 636: Performed by: SURGERY

## 2021-09-13 PROCEDURE — 360N000077 HC SURGERY LEVEL 4, PER MIN: Performed by: TRANSPLANT SURGERY

## 2021-09-13 PROCEDURE — 258N000003 HC RX IP 258 OP 636: Performed by: TRANSPLANT SURGERY

## 2021-09-13 PROCEDURE — 86704 HEP B CORE ANTIBODY TOTAL: CPT

## 2021-09-13 PROCEDURE — 85027 COMPLETE CBC AUTOMATED: CPT | Performed by: SURGERY

## 2021-09-13 PROCEDURE — 84132 ASSAY OF SERUM POTASSIUM: CPT | Performed by: SURGERY

## 2021-09-13 PROCEDURE — 84156 ASSAY OF PROTEIN URINE: CPT

## 2021-09-13 PROCEDURE — 36415 COLL VENOUS BLD VENIPUNCTURE: CPT

## 2021-09-13 PROCEDURE — 36592 COLLECT BLOOD FROM PICC: CPT | Performed by: SURGERY

## 2021-09-13 PROCEDURE — 86706 HEP B SURFACE ANTIBODY: CPT

## 2021-09-13 PROCEDURE — 76776 US EXAM K TRANSPL W/DOPPLER: CPT

## 2021-09-13 PROCEDURE — 250N000024 HC ISOFLURANE, PER MIN: Performed by: TRANSPLANT SURGERY

## 2021-09-13 PROCEDURE — 812N000003 HC ACQUISITION KIDNEY CADAVER

## 2021-09-13 PROCEDURE — 87799 DETECT AGENT NOS DNA QUANT: CPT

## 2021-09-13 PROCEDURE — 87516 HEPATITIS B DNA AMP PROBE: CPT

## 2021-09-13 PROCEDURE — 999N000155 HC STATISTIC RAPCV CVP MONITORING

## 2021-09-13 PROCEDURE — 250N000011 HC RX IP 250 OP 636

## 2021-09-13 PROCEDURE — 258N000003 HC RX IP 258 OP 636: Performed by: SURGERY

## 2021-09-13 PROCEDURE — 82310 ASSAY OF CALCIUM: CPT | Performed by: SURGERY

## 2021-09-13 PROCEDURE — 999N000157 HC STATISTIC RCP TIME EA 10 MIN

## 2021-09-13 PROCEDURE — 83735 ASSAY OF MAGNESIUM: CPT | Performed by: SURGERY

## 2021-09-13 PROCEDURE — 120N000005 HC R&B MS OVERFLOW UMMC

## 2021-09-13 PROCEDURE — 99223 1ST HOSP IP/OBS HIGH 75: CPT | Mod: 25 | Performed by: INTERNAL MEDICINE

## 2021-09-13 PROCEDURE — 250N000009 HC RX 250

## 2021-09-13 PROCEDURE — 86825 HLA X-MATH NON-CYTOTOXIC: CPT | Performed by: TRANSPLANT SURGERY

## 2021-09-13 PROCEDURE — 83036 HEMOGLOBIN GLYCOSYLATED A1C: CPT

## 2021-09-13 PROCEDURE — 93010 ELECTROCARDIOGRAM REPORT: CPT | Mod: 59 | Performed by: INTERNAL MEDICINE

## 2021-09-13 DEVICE — STENT URETERAL PERCUFLEX PLUS 4.8FRX16CM M006175198050
Type: IMPLANTABLE DEVICE | Site: URETER | Status: NON-FUNCTIONAL
Removed: 2021-10-14

## 2021-09-13 RX ORDER — CALCIUM CHLORIDE 100 MG/ML
INJECTION INTRAVENOUS; INTRAVENTRICULAR PRN
Status: DISCONTINUED | OUTPATIENT
Start: 2021-09-13 | End: 2021-09-13

## 2021-09-13 RX ORDER — SODIUM CHLORIDE 450 MG/100ML
INJECTION, SOLUTION INTRAVENOUS CONTINUOUS PRN
Status: DISCONTINUED | OUTPATIENT
Start: 2021-09-13 | End: 2021-09-14

## 2021-09-13 RX ORDER — MYCOPHENOLATE MOFETIL 250 MG/1
1000 CAPSULE ORAL
Status: DISCONTINUED | OUTPATIENT
Start: 2021-09-13 | End: 2021-09-16 | Stop reason: HOSPADM

## 2021-09-13 RX ORDER — SODIUM CHLORIDE, SODIUM GLUCONATE, SODIUM ACETATE, POTASSIUM CHLORIDE AND MAGNESIUM CHLORIDE 526; 502; 368; 37; 30 MG/100ML; MG/100ML; MG/100ML; MG/100ML; MG/100ML
INJECTION, SOLUTION INTRAVENOUS CONTINUOUS PRN
Status: DISCONTINUED | OUTPATIENT
Start: 2021-09-13 | End: 2021-09-13

## 2021-09-13 RX ORDER — POLYETHYLENE GLYCOL 3350 17 G/17G
17 POWDER, FOR SOLUTION ORAL DAILY
Status: DISCONTINUED | OUTPATIENT
Start: 2021-09-14 | End: 2021-09-16 | Stop reason: HOSPADM

## 2021-09-13 RX ORDER — FENTANYL CITRATE 50 UG/ML
50 INJECTION, SOLUTION INTRAMUSCULAR; INTRAVENOUS EVERY 5 MIN PRN
Status: DISCONTINUED | OUTPATIENT
Start: 2021-09-13 | End: 2021-09-13 | Stop reason: HOSPADM

## 2021-09-13 RX ORDER — LIDOCAINE 40 MG/G
CREAM TOPICAL
Status: DISCONTINUED | OUTPATIENT
Start: 2021-09-13 | End: 2021-09-13 | Stop reason: HOSPADM

## 2021-09-13 RX ORDER — ALBUMIN HUMAN 5 %
INTRAVENOUS SOLUTION INTRAVENOUS PRN
Status: DISCONTINUED | OUTPATIENT
Start: 2021-09-13 | End: 2021-09-13

## 2021-09-13 RX ORDER — ONDANSETRON 2 MG/ML
4 INJECTION INTRAMUSCULAR; INTRAVENOUS EVERY 6 HOURS PRN
Status: DISCONTINUED | OUTPATIENT
Start: 2021-09-13 | End: 2021-09-16 | Stop reason: HOSPADM

## 2021-09-13 RX ORDER — ACETAMINOPHEN 325 MG/1
975 TABLET ORAL ONCE
Status: COMPLETED | OUTPATIENT
Start: 2021-09-13 | End: 2021-09-13

## 2021-09-13 RX ORDER — ONDANSETRON 2 MG/ML
4 INJECTION INTRAMUSCULAR; INTRAVENOUS EVERY 30 MIN PRN
Status: DISCONTINUED | OUTPATIENT
Start: 2021-09-13 | End: 2021-09-13 | Stop reason: HOSPADM

## 2021-09-13 RX ORDER — NALOXONE HYDROCHLORIDE 0.4 MG/ML
0.2 INJECTION, SOLUTION INTRAMUSCULAR; INTRAVENOUS; SUBCUTANEOUS
Status: DISCONTINUED | OUTPATIENT
Start: 2021-09-13 | End: 2021-09-16 | Stop reason: HOSPADM

## 2021-09-13 RX ORDER — AMOXICILLIN 250 MG
1 CAPSULE ORAL 2 TIMES DAILY
Status: DISCONTINUED | OUTPATIENT
Start: 2021-09-13 | End: 2021-09-16 | Stop reason: HOSPADM

## 2021-09-13 RX ORDER — ACETAMINOPHEN 325 MG/1
975 TABLET ORAL EVERY 8 HOURS
Status: COMPLETED | OUTPATIENT
Start: 2021-09-13 | End: 2021-09-16

## 2021-09-13 RX ORDER — DEXTROSE, SODIUM CHLORIDE, SODIUM LACTATE, POTASSIUM CHLORIDE, AND CALCIUM CHLORIDE 5; .6; .31; .03; .02 G/100ML; G/100ML; G/100ML; G/100ML; G/100ML
INJECTION, SOLUTION INTRAVENOUS CONTINUOUS
Status: DISCONTINUED | OUTPATIENT
Start: 2021-09-13 | End: 2021-09-16

## 2021-09-13 RX ORDER — OXYCODONE HYDROCHLORIDE 5 MG/1
5 TABLET ORAL EVERY 4 HOURS PRN
Status: DISCONTINUED | OUTPATIENT
Start: 2021-09-13 | End: 2021-09-16 | Stop reason: HOSPADM

## 2021-09-13 RX ORDER — HYDROMORPHONE HCL IN WATER/PF 6 MG/30 ML
0.4 PATIENT CONTROLLED ANALGESIA SYRINGE INTRAVENOUS
Status: DISCONTINUED | OUTPATIENT
Start: 2021-09-13 | End: 2021-09-15

## 2021-09-13 RX ORDER — ONDANSETRON 4 MG/1
4 TABLET, ORALLY DISINTEGRATING ORAL EVERY 30 MIN PRN
Status: DISCONTINUED | OUTPATIENT
Start: 2021-09-13 | End: 2021-09-13 | Stop reason: HOSPADM

## 2021-09-13 RX ORDER — NALOXONE HYDROCHLORIDE 0.4 MG/ML
0.4 INJECTION, SOLUTION INTRAMUSCULAR; INTRAVENOUS; SUBCUTANEOUS
Status: DISCONTINUED | OUTPATIENT
Start: 2021-09-13 | End: 2021-09-16 | Stop reason: HOSPADM

## 2021-09-13 RX ORDER — VALGANCICLOVIR 450 MG/1
450 TABLET, FILM COATED ORAL
Status: DISCONTINUED | OUTPATIENT
Start: 2021-09-14 | End: 2021-09-14

## 2021-09-13 RX ORDER — MAGNESIUM HYDROXIDE 1200 MG/15ML
LIQUID ORAL PRN
Status: DISCONTINUED | OUTPATIENT
Start: 2021-09-13 | End: 2021-09-13 | Stop reason: HOSPADM

## 2021-09-13 RX ORDER — ONDANSETRON 4 MG/1
4 TABLET, ORALLY DISINTEGRATING ORAL EVERY 6 HOURS PRN
Status: DISCONTINUED | OUTPATIENT
Start: 2021-09-13 | End: 2021-09-16 | Stop reason: HOSPADM

## 2021-09-13 RX ORDER — FUROSEMIDE 10 MG/ML
INJECTION INTRAMUSCULAR; INTRAVENOUS PRN
Status: DISCONTINUED | OUTPATIENT
Start: 2021-09-13 | End: 2021-09-13

## 2021-09-13 RX ORDER — HYDROMORPHONE HYDROCHLORIDE 1 MG/ML
0.4 INJECTION, SOLUTION INTRAMUSCULAR; INTRAVENOUS; SUBCUTANEOUS EVERY 5 MIN PRN
Status: DISCONTINUED | OUTPATIENT
Start: 2021-09-13 | End: 2021-09-13 | Stop reason: HOSPADM

## 2021-09-13 RX ORDER — BISACODYL 10 MG
10 SUPPOSITORY, RECTAL RECTAL DAILY PRN
Status: DISCONTINUED | OUTPATIENT
Start: 2021-09-13 | End: 2021-09-16 | Stop reason: HOSPADM

## 2021-09-13 RX ORDER — MEPERIDINE HYDROCHLORIDE 25 MG/ML
12.5 INJECTION INTRAMUSCULAR; INTRAVENOUS; SUBCUTANEOUS EVERY 5 MIN PRN
Status: DISCONTINUED | OUTPATIENT
Start: 2021-09-13 | End: 2021-09-13 | Stop reason: HOSPADM

## 2021-09-13 RX ORDER — TACROLIMUS 1 MG/1
3 CAPSULE ORAL
Status: DISCONTINUED | OUTPATIENT
Start: 2021-09-14 | End: 2021-09-16

## 2021-09-13 RX ORDER — SODIUM CHLORIDE 9 MG/ML
1000 INJECTION, SOLUTION INTRAVENOUS CONTINUOUS PRN
Status: DISCONTINUED | OUTPATIENT
Start: 2021-09-13 | End: 2021-09-14

## 2021-09-13 RX ORDER — CEFUROXIME SODIUM 1.5 G/16ML
1.5 INJECTION, POWDER, FOR SOLUTION INTRAVENOUS ONCE
Status: COMPLETED | OUTPATIENT
Start: 2021-09-13 | End: 2021-09-13

## 2021-09-13 RX ORDER — ACETAMINOPHEN 325 MG/1
650 TABLET ORAL EVERY 4 HOURS PRN
Status: DISCONTINUED | OUTPATIENT
Start: 2021-09-16 | End: 2021-09-16 | Stop reason: HOSPADM

## 2021-09-13 RX ORDER — MANNITOL 20 G/100ML
INJECTION, SOLUTION INTRAVENOUS PRN
Status: DISCONTINUED | OUTPATIENT
Start: 2021-09-13 | End: 2021-09-13

## 2021-09-13 RX ORDER — HALOPERIDOL 5 MG/ML
1 INJECTION INTRAMUSCULAR
Status: DISCONTINUED | OUTPATIENT
Start: 2021-09-13 | End: 2021-09-13 | Stop reason: HOSPADM

## 2021-09-13 RX ORDER — ATORVASTATIN CALCIUM 10 MG/1
10 TABLET, FILM COATED ORAL DAILY
Status: DISCONTINUED | OUTPATIENT
Start: 2021-09-14 | End: 2021-09-16 | Stop reason: HOSPADM

## 2021-09-13 RX ORDER — PROCHLORPERAZINE MALEATE 5 MG
10 TABLET ORAL EVERY 6 HOURS PRN
Status: DISCONTINUED | OUTPATIENT
Start: 2021-09-13 | End: 2021-09-16 | Stop reason: HOSPADM

## 2021-09-13 RX ORDER — DOPAMINE HYDROCHLORIDE 160 MG/100ML
2-20 INJECTION, SOLUTION INTRAVENOUS CONTINUOUS
Status: DISCONTINUED | OUTPATIENT
Start: 2021-09-13 | End: 2021-09-13 | Stop reason: HOSPADM

## 2021-09-13 RX ORDER — ONDANSETRON 2 MG/ML
INJECTION INTRAMUSCULAR; INTRAVENOUS PRN
Status: DISCONTINUED | OUTPATIENT
Start: 2021-09-13 | End: 2021-09-13

## 2021-09-13 RX ORDER — SULFAMETHOXAZOLE AND TRIMETHOPRIM 400; 80 MG/1; MG/1
1 TABLET ORAL
Status: DISCONTINUED | OUTPATIENT
Start: 2021-09-15 | End: 2021-09-15

## 2021-09-13 RX ORDER — HYDROMORPHONE HCL IN WATER/PF 6 MG/30 ML
0.2 PATIENT CONTROLLED ANALGESIA SYRINGE INTRAVENOUS
Status: DISCONTINUED | OUTPATIENT
Start: 2021-09-13 | End: 2021-09-15

## 2021-09-13 RX ORDER — MAGNESIUM OXIDE 400 MG/1
400 TABLET ORAL
Status: DISCONTINUED | OUTPATIENT
Start: 2021-09-15 | End: 2021-09-16 | Stop reason: HOSPADM

## 2021-09-13 RX ORDER — LIDOCAINE HYDROCHLORIDE 20 MG/ML
INJECTION, SOLUTION INFILTRATION; PERINEURAL PRN
Status: DISCONTINUED | OUTPATIENT
Start: 2021-09-13 | End: 2021-09-13

## 2021-09-13 RX ORDER — CEFUROXIME SODIUM 1.5 G/16ML
1.5 INJECTION, POWDER, FOR SOLUTION INTRAVENOUS SEE ADMIN INSTRUCTIONS
Status: DISCONTINUED | OUTPATIENT
Start: 2021-09-13 | End: 2021-09-13 | Stop reason: HOSPADM

## 2021-09-13 RX ORDER — FENTANYL CITRATE 50 UG/ML
INJECTION, SOLUTION INTRAMUSCULAR; INTRAVENOUS PRN
Status: DISCONTINUED | OUTPATIENT
Start: 2021-09-13 | End: 2021-09-13

## 2021-09-13 RX ORDER — PROPOFOL 10 MG/ML
INJECTION, EMULSION INTRAVENOUS PRN
Status: DISCONTINUED | OUTPATIENT
Start: 2021-09-13 | End: 2021-09-13

## 2021-09-13 RX ORDER — OXYCODONE HYDROCHLORIDE 10 MG/1
10 TABLET ORAL EVERY 4 HOURS PRN
Status: DISCONTINUED | OUTPATIENT
Start: 2021-09-13 | End: 2021-09-16 | Stop reason: HOSPADM

## 2021-09-13 RX ADMIN — ACETAMINOPHEN 975 MG: 325 TABLET, FILM COATED ORAL at 06:59

## 2021-09-13 RX ADMIN — CALCIUM CHLORIDE 200 MG: 100 INJECTION INTRAVENOUS; INTRAVENTRICULAR at 11:26

## 2021-09-13 RX ADMIN — FENTANYL CITRATE 50 MCG: 50 INJECTION, SOLUTION INTRAMUSCULAR; INTRAVENOUS at 14:07

## 2021-09-13 RX ADMIN — ACETAMINOPHEN 975 MG: 325 TABLET, FILM COATED ORAL at 17:47

## 2021-09-13 RX ADMIN — SODIUM CHLORIDE, SODIUM LACTATE, POTASSIUM CHLORIDE, CALCIUM CHLORIDE, AND DEXTROSE MONOHYDRATE: 600; 310; 30; 20; 5 INJECTION, SOLUTION INTRAVENOUS at 23:39

## 2021-09-13 RX ADMIN — ROCURONIUM BROMIDE 30 MG: 10 INJECTION INTRAVENOUS at 08:58

## 2021-09-13 RX ADMIN — SODIUM CHLORIDE 500 MG: 9 INJECTION, SOLUTION INTRAVENOUS at 08:23

## 2021-09-13 RX ADMIN — FENTANYL CITRATE 50 MCG: 50 INJECTION, SOLUTION INTRAMUSCULAR; INTRAVENOUS at 08:09

## 2021-09-13 RX ADMIN — ACETAMINOPHEN 975 MG: 325 TABLET, FILM COATED ORAL at 23:53

## 2021-09-13 RX ADMIN — OXYCODONE HYDROCHLORIDE 5 MG: 5 TABLET ORAL at 17:47

## 2021-09-13 RX ADMIN — MYCOPHENOLATE MOFETIL 1500 MG: 500 INJECTION, POWDER, LYOPHILIZED, FOR SOLUTION INTRAVENOUS at 08:53

## 2021-09-13 RX ADMIN — CEFUROXIME 1.5 G: 1.5 INJECTION, POWDER, FOR SOLUTION INTRAVENOUS at 08:27

## 2021-09-13 RX ADMIN — CALCIUM CHLORIDE 200 MG: 100 INJECTION INTRAVENOUS; INTRAVENTRICULAR at 11:29

## 2021-09-13 RX ADMIN — SODIUM CHLORIDE, SODIUM GLUCONATE, SODIUM ACETATE, POTASSIUM CHLORIDE AND MAGNESIUM CHLORIDE: 526; 502; 368; 37; 30 INJECTION, SOLUTION INTRAVENOUS at 08:10

## 2021-09-13 RX ADMIN — MIDAZOLAM 2 MG: 1 INJECTION INTRAMUSCULAR; INTRAVENOUS at 07:57

## 2021-09-13 RX ADMIN — SENNOSIDES AND DOCUSATE SODIUM 1 TABLET: 8.6; 5 TABLET ORAL at 21:03

## 2021-09-13 RX ADMIN — ROCURONIUM BROMIDE 20 MG: 10 INJECTION INTRAVENOUS at 11:01

## 2021-09-13 RX ADMIN — ALBUMIN (HUMAN) 250 ML: 12.5 SOLUTION INTRAVENOUS at 11:21

## 2021-09-13 RX ADMIN — SODIUM CHLORIDE, SODIUM GLUCONATE, SODIUM ACETATE, POTASSIUM CHLORIDE AND MAGNESIUM CHLORIDE: 526; 502; 368; 37; 30 INJECTION, SOLUTION INTRAVENOUS at 08:31

## 2021-09-13 RX ADMIN — HYDROMORPHONE HYDROCHLORIDE 0.2 MG: 0.2 INJECTION, SOLUTION INTRAMUSCULAR; INTRAVENOUS; SUBCUTANEOUS at 18:01

## 2021-09-13 RX ADMIN — MYCOPHENOLATE MOFETIL 1000 MG: 250 CAPSULE ORAL at 17:47

## 2021-09-13 RX ADMIN — ONDANSETRON 4 MG: 2 INJECTION INTRAMUSCULAR; INTRAVENOUS at 12:34

## 2021-09-13 RX ADMIN — HYDROMORPHONE HYDROCHLORIDE 0.5 MG: 1 INJECTION, SOLUTION INTRAMUSCULAR; INTRAVENOUS; SUBCUTANEOUS at 13:18

## 2021-09-13 RX ADMIN — PROPOFOL 200 MG: 10 INJECTION, EMULSION INTRAVENOUS at 08:13

## 2021-09-13 RX ADMIN — MANNITOL 25 G: 20 INJECTION, SOLUTION INTRAVENOUS at 11:14

## 2021-09-13 RX ADMIN — PROPOFOL 100 MG: 10 INJECTION, EMULSION INTRAVENOUS at 12:57

## 2021-09-13 RX ADMIN — SODIUM CHLORIDE, SODIUM GLUCONATE, SODIUM ACETATE, POTASSIUM CHLORIDE AND MAGNESIUM CHLORIDE: 526; 502; 368; 37; 30 INJECTION, SOLUTION INTRAVENOUS at 11:21

## 2021-09-13 RX ADMIN — FUROSEMIDE 100 MG: 10 INJECTION, SOLUTION INTRAVENOUS at 11:16

## 2021-09-13 RX ADMIN — LIDOCAINE HYDROCHLORIDE 80 MG: 20 INJECTION, SOLUTION INFILTRATION; PERINEURAL at 08:13

## 2021-09-13 RX ADMIN — OXYCODONE HYDROCHLORIDE 5 MG: 5 TABLET ORAL at 18:50

## 2021-09-13 RX ADMIN — SUGAMMADEX 200 MG: 100 INJECTION, SOLUTION INTRAVENOUS at 13:00

## 2021-09-13 RX ADMIN — ROCURONIUM BROMIDE 50 MG: 10 INJECTION INTRAVENOUS at 08:13

## 2021-09-13 RX ADMIN — SODIUM CHLORIDE, SODIUM LACTATE, POTASSIUM CHLORIDE, CALCIUM CHLORIDE, AND DEXTROSE MONOHYDRATE: 600; 310; 30; 20; 5 INJECTION, SOLUTION INTRAVENOUS at 13:48

## 2021-09-13 RX ADMIN — CALCIUM CHLORIDE 200 MG: 100 INJECTION INTRAVENOUS; INTRAVENTRICULAR at 11:24

## 2021-09-13 RX ADMIN — FENTANYL CITRATE 50 MCG: 50 INJECTION, SOLUTION INTRAMUSCULAR; INTRAVENOUS at 15:07

## 2021-09-13 RX ADMIN — ROCURONIUM BROMIDE 30 MG: 10 INJECTION INTRAVENOUS at 10:00

## 2021-09-13 RX ADMIN — ANTI-THYMOCYTE GLOBULIN (RABBIT) 159.5 MG: 5 INJECTION, POWDER, LYOPHILIZED, FOR SOLUTION INTRAVENOUS at 08:55

## 2021-09-13 RX ADMIN — ROCURONIUM BROMIDE 20 MG: 10 INJECTION INTRAVENOUS at 12:09

## 2021-09-13 RX ADMIN — OXYCODONE HYDROCHLORIDE 10 MG: 10 TABLET ORAL at 23:53

## 2021-09-13 RX ADMIN — SODIUM CHLORIDE 1000 ML: 9 INJECTION, SOLUTION INTRAVENOUS at 21:59

## 2021-09-13 RX ADMIN — FENTANYL CITRATE 200 MCG: 50 INJECTION, SOLUTION INTRAMUSCULAR; INTRAVENOUS at 08:59

## 2021-09-13 RX ADMIN — CALCIUM CHLORIDE 200 MG: 100 INJECTION INTRAVENOUS; INTRAVENTRICULAR at 11:21

## 2021-09-13 RX ADMIN — SODIUM CHLORIDE 1000 ML: 9 INJECTION, SOLUTION INTRAVENOUS at 14:03

## 2021-09-13 ASSESSMENT — MIFFLIN-ST. JEOR: SCORE: 1696.63

## 2021-09-13 ASSESSMENT — ACTIVITIES OF DAILY LIVING (ADL)
ADLS_ACUITY_SCORE: 11
ADLS_ACUITY_SCORE: 10

## 2021-09-13 NOTE — ANESTHESIA POSTPROCEDURE EVALUATION
Patient: Kailash Sorto    Procedure(s):  TRANSPLANT, KIDNEY, RECIPIENT,  DONOR with ureteral stent placement    Diagnosis:* No pre-op diagnosis entered *  Diagnosis Additional Information: No value filed.    Anesthesia Type:  General    Note:  Disposition: Admission   Postop Pain Control: Uneventful            Sign Out: Well controlled pain   PONV: No   Neuro/Psych: Uneventful            Sign Out: Acceptable/Baseline neuro status   Airway/Respiratory: Uneventful            Sign Out: Acceptable/Baseline resp. status   CV/Hemodynamics: Uneventful            Sign Out: Acceptable CV status; No obvious hypovolemia; No obvious fluid overload   Other NRE: NONE   DID A NON-ROUTINE EVENT OCCUR? No           Last vitals:  Vitals Value Taken Time   /76 21 1450   Temp 36.9  C (98.4  F) 21 1400   Pulse 92 21 1456   Resp 15 21 1450   SpO2 95 % 21 1456   Vitals shown include unvalidated device data.    Electronically Signed By: SIENA WOOD MD  2021  2:57 PM

## 2021-09-13 NOTE — PROGRESS NOTES
Patient removed from OS waitlist after  donor kidney transplant. OS ID [id number].    Donor Has Risk Criteria for Transmission of HIV/HCV/HBV: No  Recipient Notified of Risk Criteria: N/A

## 2021-09-13 NOTE — CONSULTS
[facility]  Transplant Nephrology Consult  Date of Admission:  2021  Today's Date: 2021  Requesting physician: Amado Vale MD    Recommendations:  - Continue monitoring intake and output  - Follow Electrolytes and BMP closely    Assessment & Plan   # DDKT: Stable   - Baseline Creatinine: ~ too early to determine   - Proteinuria: Minimal (0.2-0.5 grams)   - Date DSA Last Checked: Not Known      Latest DSA: No DSA at time of transplant   - BK Viremia: No   - Kidney Tx Biopsy: No    # Immunosuppression: Tacrolimus immediate release (goal 8-10)   - Changes: No    # Infection Prophylaxis:   - PJP: Sulfa/TMP (Bactrim)  - CMV: Valganciclovir (Valcyte)    # Hypertension: Controlled;  Goal BP: < 130/80   - Volume status: Euvolemic  EDW ~ 77 kg   - Changes: hold antihypertensives tonight      # Anemia in Chronic Renal Disease: Hgb: Trend down      KODY: No   - Iron studies: Not checked recently    # Mineral Bone Disorder:   - Vitamin D; level: Not checked recently        On supplement: No  - Calcium; level: Low normal        On supplement: No  - Phosphorus; level: Low normal        On supplement: No    # Electrolytes:   - Potassium; level: Normal        On supplement: No  - Magnesium; level: Normal        On supplement: No  - Bicarbonate; level: Normal        On supplement: No    # Transplant History:  Etiology of Kidney Failure: Unknown etiology (no kidney biopsy)  Tx: DDKT  Transplant: 2021 (Kidney)  Crossmatch at time of Tx: negative  DSA at time of Tx: No  Significant changes in immunosuppression: None  Significant transplant-related complications: None    Recommendations were communicated to the primary team via this note.    Seen and discussed with Dr. Mario Gonzalez MD  Pager: [phone]    REASON FOR CONSULT     S/p  donor kidney transplant     History of Present Illness   Kailash Sorto is a 35 year old male with history of  hypertension, end-stage renal disease secondary to unclear etiology (suspect potential underlying GN) vs Hypertension.  Patient has been on hemodialysis since May 2016.  He has right upper arm fistula.  He underwent  donor kidney transplant on 2021.  For immunosuppression he received 500 mg of IV Solu-Medrol, 150 mg Thymoglobulin and 1500 mg of mycophenolate.  Postprocedure his blood pressures were stable and he had good urine output.  He received 100 mg of IV furosemide and 125 mg mannitol.    Review of Systems    The 10 point Review of Systems is negative other than noted in the HPI or here.    Past Medical History    I have reviewed this patient's medical history and updated it with pertinent information if needed.   Past Medical History:   Diagnosis Date     Anemia of chronic kidney failure      Dialysis patient (H)      End stage kidney disease (H)      Hypertension        Past Surgical History   I have reviewed this patient's surgical history and updated it with pertinent information if needed.  Past Surgical History:   Procedure Laterality Date     CREATE FISTULA ARTERIOVENOUS UPPER EXTREMITY Right 5/15/2016    Procedure: CREATE FISTULA ARTERIOVENOUS UPPER EXTREMITY;  Surgeon: Ricardo Gallo MD;  Location:  OR     CREATE FISTULA ARTERIOVENOUS UPPER EXTREMITY Right 2016    Procedure: CREATE FISTULA ARTERIOVENOUS UPPER EXTREMITY;  Surgeon: Payam Denise MD;  Location:  OR     EXCISE FISTULA ARTERIOVENOUS UPPER EXTREMITY Right 2019    Procedure: FIRST STAGE EXCISION OF RIGHT ARTERIOVENOUS FISTULA ANEURYMS & Vein patch angioplasty;  Surgeon: Payam Denise MD;  Location:  OR     REVISION FISTULA ARTERIOVENOUS UPPER EXTREMITY Right 2020    Procedure: REPAIR RIGHT UPPER EXTREMITY ARTERIOVENOUS FISTULA  ANEURYSM WITH SKIN ULCER;  Surgeon: Payam Denise MD;  Location:  OR     VASCULAR SURGERY      placement of jugular tunnel catheter      "wisdom teeth         Family History   I have reviewed this patient's family history and updated it with pertinent information if needed.   Family History   Problem Relation Age of Onset     Diabetes Father      Lung Cancer Maternal Grandmother      Hypertension Maternal Grandmother      Hypertension Other         uncle       Social History   I have reviewed this patient's social history and updated it with pertinent information if needed. Kailash Sorto  reports that he has never smoked. He has never used smokeless tobacco. He reports current alcohol use. He reports that he does not use drugs.    Allergies   No Known Allergies  Prior to Admission Medications     acetaminophen  975 mg Oral Q8H     [START ON 2021] atorvastatin  10 mg Oral Daily     [START ON 9/15/2021] magnesium oxide  400 mg Oral Daily with lunch     mycophenolate  1,000 mg Oral BID IS     [START ON 2021] polyethylene glycol  17 g Oral Daily     senna-docusate  1 tablet Oral BID     sodium chloride (PF)  10 mL Intracatheter Q8H     [START ON 9/15/2021] sulfamethoxazole-trimethoprim  1 tablet Oral Q  AM     [START ON 2021] tacrolimus  3 mg Oral BID IS     [START ON 2021] valGANciclovir  450 mg Oral Once per day on        dextrose 5% lactated ringers 100 mL/hr at 21 1611     NaCl       NaCl 140 mL/hr at 21 1611       Physical Exam   Temp  Av.3  F (36.8  C)  Min: 98.1  F (36.7  C)  Max: 98.4  F (36.9  C)      Pulse  Av.5  Min: 79  Max: 96 Resp  Avg: 15.6  Min: 14  Max: 18  SpO2  Av.2 %  Min: 92 %  Max: 100 %    CVP (mmHg): 14 mmHgBP 121/77   Pulse 84   Temp 98.3  F (36.8  C) (Oral)   Resp 14   Ht 1.702 m (5' 7\")   Wt 79.8 kg (175 lb 14.8 oz)   SpO2 95%   BMI 27.55 kg/m     Date 21 0700 - 21 0659   Shift 8492-7137 4119-9522 1799-1725 24 Hour Total   INTAKE   I.V. 2043.33 429.58  2472.91   IV Piggyback 599.52   599.52   Shift Total(mL/kg) 2642.85(33.12) 429.58(5.38)  " 3072.43(38.5)   OUTPUT   Urine 615 465  1080   Blood 100   100   Shift Total(mL/kg) 715(8.96) 465(5.83)  1180(14.79)   Weight (kg) 79.8 79.8 79.8 79.8      Admit Weight: 79.8 kg (175 lb 14.8 oz)     GENERAL APPEARANCE: alert and no distress  HENT: mouth without ulcers or lesions  LYMPHATICS: no cervical or supraclavicular nodes  RESP: lungs clear to auscultation - no rales, rhonchi or wheezes  CV: regular rhythm, normal rate, no rub, no murmur  EDEMA: no LE edema bilaterally  ABDOMEN: soft, nondistended, nontender, bowel sounds normal  MS: extremities normal - no gross deformities noted, no evidence of inflammation in joints, no muscle tenderness  SKIN: no rash    Data   CMP  Recent Labs   Lab 09/13/21  1336 09/13/21  0600 09/13/21  0500     --  141   POTASSIUM 4.0  --  3.3*   CHLORIDE 102  --  105   CO2 25  --  28   ANIONGAP 12  --  8   * 98 94   BUN 54*  --  55*   CR 6.37*  --  7.02*   GFRESTIMATED 10*  --  9*   GRAHAM 8.2*  --  8.9   MAG 1.7  --   --    PHOS 2.3*  --   --    PROTTOTAL  --   --  7.7   ALBUMIN  --   --  3.8   BILITOTAL  --   --  0.4   ALKPHOS  --   --  122   AST  --   --  15   ALT  --   --  24     CBC  Recent Labs   Lab 09/13/21  1336 09/13/21  0500   HGB 8.6* 10.3*   WBC 5.9 4.9   RBC 2.85* 3.45*   HCT 25.7* 31.4*   MCV 90 91   MCH 30.2 29.9   MCHC 33.5 32.8   RDW 12.4 12.4    237     INR  Recent Labs   Lab 09/13/21  0500   INR 0.88   PTT 25     ABGNo lab results found in last 7 days.   Urine Studies  Recent Labs   Lab Test 09/13/21  0532 08/04/16  0711 05/11/16  0125 03/09/16  1850   COLOR Light Yellow Tina Yellow Tina   APPEARANCE Clear Cloudy Slightly Cloudy Slightly Cloudy   URINEGLC Negative Negative Negative Negative   URINEBILI Negative Small   This is an unconfirmed screening test result. A positive result may be false.  * Negative Negative   URINEKETONE Negative 5* 5* Negative   SG 1.015 1.020 1.008 >1.030   UBLD Negative Small* Large* Large*   URINEPH 6.5 5.0 5.5 5.0    PROTEIN 30 * 100* 100* 100*   UROBILINOGEN  --   --   --  0.2   NITRITE Negative Negative Negative Negative   LEUKEST Negative Negative Negative Negative   RBCU 1 7* 15* >100*   WBCU 1 4* 1 O - 2     Recent Labs   Lab Test 09/13/21  0532 05/11/16  0125   UTPG 0.22* 1.07*     PTH  Recent Labs   Lab Test 05/11/16  1314   PTHI 633*     Iron Studies  Recent Labs   Lab Test 05/11/16  0450 05/10/16  2325   IRON 38 32*   *  --    IRONSAT 17  --    RACH  --  577*       IMAGING:  All imaging studies reviewed by me.  Patient was seen and evaluated by me, Cristino Martin MD. I have reviewed the note and agree with the the plan of care as documented by the fellow.

## 2021-09-13 NOTE — ANESTHESIA PROCEDURE NOTES
Airway       Patient location during procedure: OR       Procedure Start/Stop Times: 9/13/2021 8:16 AM  Staff -        CRNA: Xenia Galeas APRN CRNA       Other Anesthesia Staff: Carlos Gomez       Performed By: MARY  Consent for Airway        Urgency: elective  Indications and Patient Condition       Indications for airway management: megha-procedural       Induction type:intravenous       Mask difficulty assessment: 1 - vent by mask    Final Airway Details       Final airway type: endotracheal airway       Successful airway: ETT - single  Endotracheal Airway Details        ETT size (mm): 8.0       Cuffed: yes       Successful intubation technique: direct laryngoscopy       DL Blade Type: Kapoor 2       Grade View of Cords: 1       Adjucts: stylet       Position: Right       Measured from: lips       Secured at (cm): 24       Bite block used: Soft    Post intubation assessment        Placement verified by: capnometry, equal breath sounds and chest rise        Number of attempts at approach: 1       Secured with: pink tape       Ease of procedure: easy       Dentition: Unchanged    Additional Comments       Intubated by MARY

## 2021-09-13 NOTE — H&P
[facility]    History and Physical  Solid Organ Transplant     Date of Admission: 2021    Assessment & Plan   Kailash Sorto is a 35 year old male with a past medical history significant for HTN, HLD, end stage kidney disease of unclear etiology on hemodialysis since 2016 who presents as a potential kidney transplant candidate. Patient was notified as an acceptable  donor organ became available and presented for further pre-operative work-up. Patient was informed of the risks and benefits regarding  donor organ transplantation, and has elected to proceed.    Plan:  -[place] to outpatient for pre-op work-up  -Pre-op labs, including BMP, CBC, coag panel, viral serologies  -STAT COVID-19 swab  -EKG, CXR  -Cardiac clearance: last ECHO in  showed normal left ventricular function  - Procedure and blood transfusion consent signed, placed in patient chart      Estrellita Aguilar MD/ Chayito Espitia MD  General Surgery Resident    Code Status   Full Code    Primary Care Physician   Homero Dumont    Chief Complaint   Kidney transplant candidate    History of Present Illness   Kailash Sorto is a 35 year old male with a past medical history significant for  HTN, HLD, end stage kidney disease of unclear etiology on hemodialysis since 2016 who presents as a potential kidney transplant candidate.    At the time of admission, the patient reports that he is feeling some anxiety related to his surgery planned for this morning, other feeling well, in his usual state of health. He is active, has been playing softball during the summer. No recent illness. Denies recent fever/chills, chest pain, SOB, upper respiratory symptoms. He has not been vaccinated against COVID-19, but he is open to learning more and potentially receiving the vaccine at a later time.     No history of blood transfusion. Past surgical history includes RUE AVF creation and revision,  placement of jugular tunnel catheter. No prior abdominal surgeries. No history of issues with anesthesia. No personal or family history of bleeding/clotting disorders. Does not take any blood thinners.      Hemodialysis MWF via reft arm AVF, last session was 9/10 and tolerated well.      Past Medical History    I have reviewed this patient's medical history and updated it with pertinent information if needed.   Past Medical History:   Diagnosis Date     Anemia of chronic kidney failure      Dialysis patient (H)      End stage kidney disease (H)      Hypertension        Past Surgical History   I have reviewed this patient's surgical history and updated it with pertinent information if needed.  Past Surgical History:   Procedure Laterality Date     CREATE FISTULA ARTERIOVENOUS UPPER EXTREMITY Right 5/15/2016    Procedure: CREATE FISTULA ARTERIOVENOUS UPPER EXTREMITY;  Surgeon: Ricardo Gallo MD;  Location:  OR     CREATE FISTULA ARTERIOVENOUS UPPER EXTREMITY Right 7/12/2016    Procedure: CREATE FISTULA ARTERIOVENOUS UPPER EXTREMITY;  Surgeon: Payam Denise MD;  Location:  OR     EXCISE FISTULA ARTERIOVENOUS UPPER EXTREMITY Right 5/28/2019    Procedure: FIRST STAGE EXCISION OF RIGHT ARTERIOVENOUS FISTULA ANEURYMS & Vein patch angioplasty;  Surgeon: Payam Denise MD;  Location:  OR     REVISION FISTULA ARTERIOVENOUS UPPER EXTREMITY Right 8/25/2020    Procedure: REPAIR RIGHT UPPER EXTREMITY ARTERIOVENOUS FISTULA  ANEURYSM WITH SKIN ULCER;  Surgeon: Payam Denise MD;  Location:  OR     VASCULAR SURGERY      placement of jugular tunnel catheter     wisdom teeth         Prior to Admission Medications   Cannot display prior to admission medications because the patient has not been admitted in this contact.     Allergies   No Known Allergies    Social History   I have reviewed this patient's social history and updated it with pertinent information if needed. Kailash Sorto   reports that he has never smoked. He has never used smokeless tobacco. He reports current alcohol use. He reports that he does not use drugs.    Family History   I have reviewed this patient's family history and updated it with pertinent information if needed.   Family History   Problem Relation Age of Onset     Diabetes Father      Lung Cancer Maternal Grandmother      Hypertension Maternal Grandmother      Hypertension Other         uncle       Review of Systems   The 10 point Review of Systems is negative other than noted in the HPI or here.     Physical Exam                      Vital Signs with Ranges  Temp:  [98.4  F (36.9  C)] 98.4  F (36.9  C)  Pulse:  [79] 79  Resp:  [18] 18  BP: (108)/(96) 108/96  SpO2:  [100 %] 100 %  0 lbs 0 oz       General: alert, awake, NAD  CV: regular rate and rhythm, radial pulses intact  Resp: CTAB, normal respiratory effort on room air  Abdomen: soft, nondistended, nontender  Extremities: AV fistula right upper arm with thrill, all extremities warm and well perfused  Skin: warm, dry      Data   Results for orders placed or performed during the hospital encounter of 09/13/21 (from the past 24 hour(s))   CBC with platelets differential    Narrative    The following orders were created for panel order CBC with platelets differential.  Procedure                               Abnormality         Status                     ---------                               -----------         ------                     CBC with platelets and d...[[id number]]  Abnormal            Final result                 Please view results for these tests on the individual orders.   INR   Result Value Ref Range    INR 0.88 0.85 - 1.15   Partial thromboplastin time   Result Value Ref Range    aPTT 25 22 - 38 Seconds   Comprehensive metabolic panel   Result Value Ref Range    Sodium 141 133 - 144 mmol/L    Potassium 3.3 (L) 3.4 - 5.3 mmol/L    Chloride 105 94 - 109 mmol/L    Carbon Dioxide (CO2) 28 20 - 32  mmol/L    Anion Gap 8 3 - 14 mmol/L    Urea Nitrogen 55 (H) 7 - 30 mg/dL    Creatinine 7.02 (H) 0.66 - 1.25 mg/dL    Calcium 8.9 8.5 - 10.1 mg/dL    Glucose 94 70 - 99 mg/dL    Alkaline Phosphatase 122 40 - 150 U/L    AST 15 0 - 45 U/L    ALT 24 0 - 70 U/L    Protein Total 7.7 6.8 - 8.8 g/dL    Albumin 3.8 3.4 - 5.0 g/dL    Bilirubin Total 0.4 0.2 - 1.3 mg/dL    GFR Estimate 9 (L) >60 mL/min/1.73m2   Hemoglobin A1c   Result Value Ref Range    Hemoglobin A1C 5.3 0.0 - 5.6 %   Lipid Profile   Result Value Ref Range    Cholesterol 211 (H) <200 mg/dL    Triglycerides 97 <150 mg/dL    Direct Measure HDL 56 >=40 mg/dL    LDL Cholesterol Calculated 136 (H) <=100 mg/dL    Non HDL Cholesterol 155 (H) <130 mg/dL    Patient Fasting > 8hrs? No    ABO/Rh type and screen    Narrative    The following orders were created for panel order ABO/Rh type and screen.  Procedure                               Abnormality         Status                     ---------                               -----------         ------                     Adult Type and Screen[[id number]]                            Final result                 Please view results for these tests on the individual orders.   CBC with platelets and differential   Result Value Ref Range    WBC Count 4.9 4.0 - 11.0 10e3/uL    RBC Count 3.45 (L) 4.40 - 5.90 10e6/uL    Hemoglobin 10.3 (L) 13.3 - 17.7 g/dL    Hematocrit 31.4 (L) 40.0 - 53.0 %    MCV 91 78 - 100 fL    MCH 29.9 26.5 - 33.0 pg    MCHC 32.8 31.5 - 36.5 g/dL    RDW 12.4 10.0 - 15.0 %    Platelet Count 237 150 - 450 10e3/uL    % Neutrophils 42 %    % Lymphocytes 37 %    % Monocytes 12 %    % Eosinophils 8 %    % Basophils 1 %    % Immature Granulocytes 0 %    NRBCs per 100 WBC 0 <1 /100    Absolute Neutrophils 2.1 1.6 - 8.3 10e3/uL    Absolute Lymphocytes 1.8 0.8 - 5.3 10e3/uL    Absolute Monocytes 0.6 0.0 - 1.3 10e3/uL    Absolute Eosinophils 0.4 0.0 - 0.7 10e3/uL    Absolute Basophils 0.0 0.0 - 0.2 10e3/uL     Absolute Immature Granulocytes 0.0 <=0.0 10e3/uL    Absolute NRBCs 0.0 10e3/uL   Adult Type and Screen   Result Value Ref Range    ABO/RH(D) B NEG     Antibody Screen Negative Negative    SPECIMEN EXPIRATION DATE [id number]    Asymptomatic COVID-19 Virus (Coronavirus) by PCR Nasopharyngeal    Specimen: Nasopharyngeal; Swab   Result Value Ref Range    SARS CoV2 PCR Negative Negative    Narrative    Testing was performed using the [employer] Xpress SARS-CoV-2 Assay on the  Cepheid Gene-Xpert Instrument Systems. Additional information about  this Emergency Use Authorization (EUA) assay can be found via the Lab  Guide. This test should be ordered for the detection of SARS-CoV-2 in  individuals who meet SARS-CoV-2 clinical and/or epidemiological  criteria. Test performance is unknown in asymptomatic patients. This  test is for in vitro diagnostic use under the FDA EUA for  laboratories certified under CLIA to perform high complexity testing.  This test has not been FDA cleared or approved. A negative result  does not rule out the presence of PCR inhibitors in the specimen or  target RNA in concentration below the limit of detection for the  assay. The possibility of a false negative should be considered if  the patient's recent exposure or clinical presentation suggests  COVID-19. This test was validated by the [facility]. This laboratory is certified under  the Clinical Laboratory Improvement Amendments of 1988 (CLIA-88) as  qualified to perform high complexity laboratory testing.     XR Chest 2 Views    Impression    RESIDENT PRELIMINARY INTERPRETATION  IMPRESSION: No acute cardiac or pulmonary abnormalities.   Routine UA with microscopic   Result Value Ref Range    Color Urine Light Yellow Colorless, Straw, Light Yellow, Yellow    Appearance Urine Clear Clear    Glucose Urine Negative Negative mg/dL    Bilirubin Urine Negative Negative    Ketones Urine Negative Negative  mg/dL    Specific Gravity Urine 1.015 1.003 - 1.035    Blood Urine Negative Negative    pH Urine 6.5 5.0 - 7.0    Protein Albumin Urine 30  (A) Negative mg/dL    Urobilinogen Urine Normal Normal, 2.0 mg/dL    Nitrite Urine Negative Negative    Leukocyte Esterase Urine Negative Negative    RBC Urine 1 <=2 /HPF    WBC Urine 1 <=5 /HPF    Squamous Epithelials Urine <1 <=1 /HPF   Protein  random urine with Creat Ratio   Result Value Ref Range    Total Protein Random Urine g/L 0.44 g/L    Total Protein Urine g/gr Creatinine 0.22 (H) 0.00 - 0.20 g/g Cr    Creatinine Urine mg/dL 201 mg/dL   EKG 12-lead, tracing only   Result Value Ref Range    Systolic Blood Pressure  mmHg    Diastolic Blood Pressure  mmHg    Ventricular Rate 73 BPM    Atrial Rate 73 BPM    AZ Interval 150 ms    QRS Duration 108 ms     ms    QTc 460 ms    P Axis 79 degrees    R AXIS 59 degrees    T Axis 28 degrees    Interpretation ECG       Sinus rhythm  Possible Left atrial enlargement  Left ventricular hypertrophy  Abnormal ECG  When compared with ECG of 04-AUG-2016 13:13,  No significant change was found     Glucose by meter   Result Value Ref Range    GLUCOSE BY METER POCT 98 70 - 99 mg/dL

## 2021-09-13 NOTE — PROGRESS NOTES
B/P: 121/77, T: 98.3, P: 84, R: 14  Sats 95% on 2L nc.     Pt arrived to 6B from PACU after  donor kidney transplant. Oriented but drowsy. C/o pain with movement at incision site. Incision c/d/i, MEENAKSHI. Quiles draining clear yellow urine slightly pink. CVP 14, provider aware per PACU nurse. Skin assessment completed by SHAYY Parada and SHAYY Teixeira. MIV and NS infusing into CVC. Plasma-lyte stopped upon arrival. Surgery team saw pt at bedside. Belongings in room with pt include cell phone and  did not go with pt to surgery. Will monitor closely and follow POC.

## 2021-09-13 NOTE — TELEPHONE ENCOUNTER
"TRANSPLANT OR REPORT    Organ: Kidney  Laterality (if known): Left  Organ Location: Import, already at the U    UNOS ID: [id number]  Donor OR Time: 1400 9/12  Expected/Actual Cross Clamp Time: 1800 9/12  Expected Organ Arrival Time: Already at the     Surgeon: Finger  Time in OR: 0800  Time in 3C (N/A for LI): 0700    Recipient Details  Admission ETA: 0430  Unit: 6B  Isolation: No  Latex Allergy: No  : No  Diagnosis: ESRD    Liver Transplants  Bypass: N/A  Hemodialysis: N/A  ~ \"RENAL STAFF TEACHING SERVICE MEDICINE\" : N/A  ~ CRRT Resource Nurse: N/A  (Telephone Number for CRRT [phone])    Kidney/Panc Transplants  XM Status (Need to wait for XM?): No    Liver or KP/PA Recipients - Vessel Banking:  Donor has positive serologies for HIV/HCV/HBV: No  Donor has risk criteria for HIV/HCV/HBV: No      Transplant Coordinator Contact Info: [place] [phone]      Vessel Bank Information  Transplant hospitals must not store a donor s extra vessels if the donor has tested positive for any of the following:   - HIV by antibody, antigen, or nucleic acid test (PANCHO)   - Hepatitis B surface antigen (HBsAg)   - Hepatitis B (HBV) by PANCHO   - Hepatitis C (HCV) by antibody or PANCHO     Extra vessels from donors that do not test positive for HIV, HBV, or HCV as above may be stored      "

## 2021-09-13 NOTE — PROGRESS NOTES
PATHOLOGY HLA CROSSMATCH CONSULTATION: DONOR/RECIPIENT  VIRTUAL CROSSMATCH - Kidney  Consultation Date: 2021  Consultation Requested by:     Regarding: Compatibility of  donor organ UNOS #[id number] from OPO: OKOP  with Kailash Sorto    Findings: Regarding a virtual crossmatch between Kailash Sorto and  donor listed above (match ID [id number]):  The most recent (7.5.21) and *6 additional patient serum/sera  were analyzed.  The patient has no antibodies listed with HLA specificity against the donor organ.      Record Review Indicates: I personally reviewed the most recent serum, the historic peak sera, and all other sera with solid-phase HLA Single Antigen test results:  The patient has no HLA antibodies against the donor organ.     The results of this virtual XM are:   -most recent serum: compatible   -peak #1: compatible  -peak #2: compatible    Disclaimer: Clinical judgement must take into account other factors, such as non-HLA antibodies not detected in the assay. The VXM gives probabilities only.  The probability does not account for the potential for auto-antibodies that may be present in the patient's serum.  These autoantibodies may render the physical crossmatch falsely positive, and would be detected by an autologous crossmatch.  When possible, confirm findings with prospective allogeneic and autologous flow crossmatches before going to transplant as clinically indicated.     Vicki Wilkes MD      Medical Director, Immunology/Histocompatibility Laboratory  Pager [phone]

## 2021-09-13 NOTE — LETTER
Transition Communication Hand-off for Care Transitions to Next Level of Care Provider    Name: Kailash Sorto  : 1985  MRN #: [id number]  Primary Care Provider: Homero Dumont     Primary Clinic: [address] [place] [id number]     Reason for Hospitalization:  Kidney transplant candidate [Z76.82]  Kidney transplant recipient [Z94.0]  Admit Date/Time: 2021  4:40 AM  Discharge Date: 2021  Payor Source: Payor: MEDICARE / Plan: MEDICARE / Product Type: Medicare /     Reason for Communication Hand-off Referral: Other Continuity of care    Discharge Plan:  Discharge Plan:      Most Recent Value   Disposition Comments  Anticipate Pt will discharge to home with intermittent Nursing visits following completion of daily ATC Clinic visits.           Concern for non-adherence with plan of care:  No  Discharge Needs Assessment:  Needs      Most Recent Value   Equipment Currently Used at Home  none   # of Referrals Placed by CTS  External Care Coordination, Internal Clinic Care Coordination, Homecare          Follow-up specialty is recommended: Yes    Follow-up plan:    Future Appointments   Date Time Provider Department Center   2021 11:15 AM Samantha Alexander, PT [facility]   2021  6:30 AM UC LAB [facility]   2021  7:00 AM [facility] INFUSION NURSE [facility]   2021  8:00 AM Idalmis Cote MD [facility]   2021  7:00 AM UC [facility] INFUSION NURSE [facility]       Any outstanding tests or procedures:        Referrals     Future Labs/Procedures    Home care nursing referral     Comments:    [facility] #[phone]  Home Care visits to start following completion of daily ATC Clinic visits  RN to monitor cardiac and resp status  Monitor hydration, nutrition, urinary and bowel status  Monitor healing of incision  Instruct in medications and eval effects  Morning Lab draws per orders, report results to Post Kidney Transplant Coordinator #[phone]  Fax  #[phone]    Your provider has ordered home care nursing services. If you have not been contacted within 2 days of your discharge please call the inpatient department phone number at [phone] .  Please call to schedule your appointment              Key Recommendations:  Please see attached AVS.  ATC appointments confirmed for Friday 9/17 and Saturday 9/18.  [facility] will initiate home care RN services on Monday 9/20.      Amena Olivares RN    AVS/Discharge Summary is the source of truth; this is a helpful guide for improved communication of patient story

## 2021-09-13 NOTE — OP NOTE
Transplant Surgery  Operative Note     Procedure date:  21    Preoperative diagnosis:  End Stage renal failure due to hypertension    Postoperative diagnosis:  Same,     Procedure:  1. Left kidney  donor transplant,  Donation after Brain Death, Left iliac fossa, without vascular reconstruction. A J-J ureteral stent was placed.  2. Kidney allograft preparation on Back Table      Surgeon:  Gideon Vale MD    Fellow/Assistant:  Rober Suarez, fellow, Lang Olea fellow assisted with all dictated portions of operative procedure.    Anesthesia:  General    Specimen:  None    Drains:  no drain    Urine output:  340 mls    Estimated blood loss:  100 mL    Fluids administered:    Fluid Amount   Crystalloid (mL) 3,000   Colloid (mL) 250        Indication: The patient has End Stage renal failure due to hypertension and received an organ offer for a Donation after Brain Death kidney allograft. After discussing the risks and benefits of proceeding, the patient agreed to proceed with surgery and provided informed consent.  Findings: Integrity of recipient artery: Normal   Intraoperative Events: None,     Final ABO/Crossmatch verification: After the donor organ arrived to the operating room and prior to anastomosis, I participated in the transplant pre-verification upon organ receipt timeout by visually verifying the donor ID, organ and laterality, donor blood type, recipient unique identifier, recipient blood type, and that the donor and recipient are blood type compatible. The crossmatch was done prospectively; the T cell flow crossmatch result was negative and B cell flow crossmatch result was negative prior to anastomosis.  The patient received Thymoglobulin on induction.    Donor Organ Information:   Donor UNOS ID:  [id number]    Donor arterial clamp on:  2021  6:02 PM    Total ischemic time:  1031 min    Cold ischemic time:  989 min    Warm ischemic time:  42 min    Preservation fluid:  HTK      Back Table  Details:   Procedure:  Bench preparation of the kidney allograft for transplantation without vascular reconstruction    Surgeon:  Gideon Vale MD    Faculty Co-Surgeon:      Fellow/Assistant:  Rober Suarez fellow,  Assisted with bench preparation    Donor arrival to recipient room:  9/13/2021  6:55 AM    Graft injury:  No    Graft biopsy:  none    Organ received on:  Ice    Pump resistance:      Pump flow:      Arterial anatomy:  Single    Donor arterial quality:  Normal    Venous anatomy:  Single    Ureteral anatomy:  Single    Any reconstruction:  No    Artery:  on cuff    Vein:       Complications: None.    Findings: Normal       None.    Back Table Preparation:  The donor kidney was received and inspected. It had been flushed with HTK. The graft was prepared on the back table by removing perinephric fat and ligating venous tributaries and lymphatics. The ureter was also cleaned of excess tissue. If required, reconstruction was performed as detailed above. The kidney was stored in iced cold preservation solution until ready for transplantation. Faculty was present for the critical portions of the procedure.    Operative Procedure:   Arterial anastomosis start:  9/13/2021 10:31 AM    Arterial unclamp:  9/13/2021 11:13 AM    Extra vessels used:         The patient was brought to the operating room, placed in a supine position, and a time out was performed. Sequential compression devices were placed on both lower extremities and general endotracheal anesthesia was induced.  The patient was given IV antibiotics and a Quiles catheter. A central line was placed by Anesthesia service. The abdomen was then shaved, prepped, and draped in the usual sterile fashion.  An incision was made in the right lower quadrant and carried down through the subcutaneous tissue and the abdominal wall fascia. If encountered, the epigastric vessels were ligated in continuity, divided and secured with surgical clips. The right iliac artery and  vein were exposed. The retractor system was placed and the lymphatics overlying the vessels were serially ligated and divided.     The patient was not heparinized. We applied atraumatic vascular clamps and the donor kidney was brought to the operative field. We made a venotomy and the renal vein was anastomosed to the recipient right External Iliac vein in an end-to-side fashion. An arteriotomy was made and the donor renal artery was anastomosed to the recipient right external iliac artery  in an end to side fashion. The patient was simultaneously loaded with IV mannitol, Lasix and volume. The renal artery was protected and the clamps were removed. After several cardiac cycles, we opened the renal artery and the kidney had Good reperfusion and was soft, without edema, with mild congestion and pink .    The transplant ureter was managed by creating a Liche (anterior multistitch) anastomosis with absorbable suture. A stent was placed across the anastomosis. The kidney made Yes urine prior to implantation.    Hemostasis was obtained, the anastomoses inspected, and the kidney placed in the iliac fossa. After placement, the vessel lay was inspected and found to be acceptable. The kidney position was Partial intra-peritoneal given adhesions to posterior fascia with inability to close large defects. The field was irrigated with antibiotic solution. No drain was placed. The retractor was removed and the abdominal wall fascia reapproximated. Subcutaneous tissues were irrigated and hemostasis obtained.  The skin was reapproximated with running subcuticular stitch and dermabond was applied.   All needle, sponge and instrument counts were correct x 2. The patient was awakened, extubated, and transferred to PACU for post-op monitoring. Faculty was present for key portions of the procedure.      ATTESTATION:    I was present during the key portions of the procedure, and I was immediately available for the entire procedure between  opening and closing.  Dr. Suarez assisted with the dissection of the vessels and vascular anastomoses.  Following reperfusion, Dr. Olea assisted with the ureter implantation and closure.      Amado Vale MD, PhD  Associate Professor of Surgery

## 2021-09-13 NOTE — PROGRESS NOTES
Admission          9/13/2021  5:00 AM  -----------------------------------------------------------  Reason for admission: kidney transplant  Primary team notified of pt arrival.  Admitted from: home  Via: walked to unit  Accompanied by: self  Belongings: Placed in closet, charted in Epic; valuables sent home with family  Admission Profile: complete  Teaching: orientation to unit and call light- call light within reach, call don't fall, use of console, meal times, when to call for the RN, and enforced importance of safety   Access: PIV  Telemetry: Not placed on patient  Ht./Wt.: complete  Code Status verified on armband: PENDING - pt did not have Code Status ordered  2 RN Skin Assessment Completed By: PENDING  Med Rec completed: PENDING  Bed surface reassessed with algorithm and charted: PENDING  New bed surface ordered: PENDING  Suction/Ambu bag/Flowmeter at bedside: yes    Pt status: stable, in pre-op.    Temp:  [98.4  F (36.9  C)] 98.4  F (36.9  C)  Pulse:  [79] 79  Resp:  [18] 18  BP: (108)/(96) 108/96  SpO2:  [100 %] 100 %

## 2021-09-13 NOTE — TELEPHONE ENCOUNTER
Organ Offer Encounter Information    Organ Offer Information  Organ offer date & time: 9/13/2021  3:07 AM  Coordinator/Fellow/Attending name: Keyla Huston RN   Organ(s):  Organ UNOS ID Match Run ID Comment Organ Laterality   Kidney [id number]       Recent infections?: No    New medications?: No Recent pregnancy?: No   Angicoagulation medications?: No Recent vaccinations?: No   Recent blood transfusions?: No Recent hospitalizations?: No   Has your insurance changed in the last 6-12 months?: Neg    Patient last dialyzed: 9/10/2021 12:00 PM  Dialysis type: Hemo  Discussed organ offer with: Patient  Patient/Caregiver name: Kailash  Discussed risk category with Patient/Other: N/A  Understood donor criteria, verbalized understanding  Patient/Other asked to speak to a surgeon?: No  Discussed program-specific outcomes: Did not have questions regarding SRTR  Right to decline organ offer without penalty, Patient/Other: Aware of option to decline without penalty  Organ offer decision status Patient/Other: Accepted Offer  Organ disposition: Case Cancelled - Other (specify)  Additional Comments: 9/13/2021 3:12 AM  Kidney: Primary kidney offer  MD: Akanksha  OPO Contact: Naveed [phone]  VXM Results: Per Dr. Wilkes, compatible w no DSA  XM Plan (FXM must be done with serum no older than 10 days from transplant): Will admit patient immediately for fresh sample for FXM, blood already at the .   Plan (Admission, NPO, Donor OR): Donor OR was yesterday evening, crossclamp was at 1802 on 9/12, plan to admit patient ASAP to the  for FXM & pre-op workup. Plan for OR this morning. Spoke with patient, he is interested in this offer, plan to admit him to the hospital ASAP for final crossmatch and pre-op workup.   - - -   COVID Screening  In the past month, have you had:  Any close contact with a suspect or laboratory-confirmed COVID-19 patient: No  Travel anywhere: No  COVID Symptoms (Fever, Cough, Short of Breath, Loss of  Taste/Smell, Rash): No    Admissions: 0308 - Tina, ETA 0400  Unit: 0305 - Darlene TIFFANIE on 7A, does NOT have a bed, pt will go to 6B, spoke to TIFFANIE Mann  Update Provider Entering Orders (XM Plan & COVID Testing):  [facility] SOCORRO ESTRELLITA [ Msg Id [id number] ] @ 0345  Immunology: 0335 - Desi, aware of need for STAT FXM  Inpatient Lab (COVID Testing [phone], Option 2): 0350 - Estrellita  Book OR: 0408 - Ashwin, booked for 0800  Vessel Storage Confirmation (PA/CYNDI/ROE): N/A  Blood Bank: 0420 - Tried calling multiple times, line busy?  Research: ON HOLD  TransNet/ABO Verification: printed @ 1933  Add Organ: Done @ 0335      Attestation I have discussed all of the above with the Patient/Legal Guardian/Caregiver regarding this organ offer.: Yes  Coordinator/Fellow/Attending name: Keyla Huston RN

## 2021-09-13 NOTE — ANESTHESIA PREPROCEDURE EVALUATION
Anesthesia Pre-Procedure Evaluation    Patient: Kailash Sorto   MRN: [id number] : 1985        Preoperative Diagnosis: * No pre-op diagnosis entered *   Procedure : Procedure(s):  TRANSPLANT, KIDNEY, RECIPIENT,  DONOR     Past Medical History:   Diagnosis Date     Anemia of chronic kidney failure      Dialysis patient (H)      End stage kidney disease (H)      Hypertension       Past Surgical History:   Procedure Laterality Date     CREATE FISTULA ARTERIOVENOUS UPPER EXTREMITY Right 5/15/2016    Procedure: CREATE FISTULA ARTERIOVENOUS UPPER EXTREMITY;  Surgeon: Ricardo Gallo MD;  Location:  OR     CREATE FISTULA ARTERIOVENOUS UPPER EXTREMITY Right 2016    Procedure: CREATE FISTULA ARTERIOVENOUS UPPER EXTREMITY;  Surgeon: Payam Denise MD;  Location:  OR     EXCISE FISTULA ARTERIOVENOUS UPPER EXTREMITY Right 2019    Procedure: FIRST STAGE EXCISION OF RIGHT ARTERIOVENOUS FISTULA ANEURYMS & Vein patch angioplasty;  Surgeon: Payam Denise MD;  Location:  OR     REVISION FISTULA ARTERIOVENOUS UPPER EXTREMITY Right 2020    Procedure: REPAIR RIGHT UPPER EXTREMITY ARTERIOVENOUS FISTULA  ANEURYSM WITH SKIN ULCER;  Surgeon: Payam Denise MD;  Location:  OR     VASCULAR SURGERY      placement of jugular tunnel catheter     wisdom teeth        No Known Allergies   Social History     Tobacco Use     Smoking status: Never Smoker     Smokeless tobacco: Never Used   Substance Use Topics     Alcohol use: Yes     Alcohol/week: 0.0 standard drinks     Comment: socially       Wt Readings from Last 1 Encounters:   21 79.8 kg (175 lb 14.8 oz)        Anesthesia Evaluation   Pt has had prior anesthetic. Type: MAC.    No history of anesthetic complications       ROS/MED HX  ENT/Pulmonary:  - neg pulmonary ROS     Neurologic:  - neg neurologic ROS     Cardiovascular:     (+) hypertension-----Previous cardiac testing   Echo: Date: 2016  Results:  Interpretation Summary  Global and regional left ventricular function is normal with an EF of 60-65%.  Mild concentric wall thickening consistent with left ventricular hypertrophy is present.  Right ventricular function, chamber size, wall motion, and thickness are normal.  The inferior vena cava is normal.  Trivial pericardial effusion is present.  Stress Test: Date: Results:    ECG Reviewed: Date: 9/13/2021 Results:  Normal sinus rhythm.  Cath: Date: Results:      METS/Exercise Tolerance:     Hematologic:     (+) anemia ( Hgb 10.3),     Musculoskeletal:       GI/Hepatic:    (-) liver disease   Renal/Genitourinary:     (+) renal disease, type: ESRD, Pt requires dialysis, type: Hemodialysis,     Endo:    (-) Type I DM, Type II DM and obesity   Psychiatric/Substance Use:  - neg psychiatric ROS     Infectious Disease:    (-) Recent Fever   Malignancy:    (-) malignancy   Other:            Physical Exam    Airway        Mallampati: III   TM distance: > 3 FB   Neck ROM: full   Mouth opening: > 3 cm    Respiratory Devices and Support         Dental  no notable dental history         Cardiovascular   cardiovascular exam normal          Pulmonary   pulmonary exam normal                OUTSIDE LABS:  CBC:   Lab Results   Component Value Date    WBC 4.9 09/13/2021    WBC 5.5 07/23/2018    HGB 10.3 (L) 09/13/2021    HGB 10.0 (L) 08/24/2020    HCT 31.4 (L) 09/13/2021    HCT 31.4 (L) 07/23/2018     09/13/2021     08/24/2020     BMP:   Lab Results   Component Value Date     09/13/2021     05/28/2019    POTASSIUM 3.3 (L) 09/13/2021    POTASSIUM 3.3 (L) 08/24/2020    CHLORIDE 105 09/13/2021    CHLORIDE 104 05/28/2019    CO2 28 09/13/2021    CO2 33 (H) 05/28/2019    BUN 55 (H) 09/13/2021    BUN 35 (H) 05/28/2019    CR 7.02 (H) 09/13/2021    CR 5.67 (H) 08/24/2020    GLC 94 09/13/2021     (H) 05/28/2019     COAGS:   Lab Results   Component Value Date    PTT 25 09/13/2021    INR 0.88  09/13/2021     POC: No results found for: BGM, HCG, HCGS  HEPATIC:   Lab Results   Component Value Date    ALBUMIN 3.8 09/13/2021    PROTTOTAL 7.7 09/13/2021    ALT 24 09/13/2021    AST 15 09/13/2021    ALKPHOS 122 09/13/2021    BILITOTAL 0.4 09/13/2021     OTHER:   Lab Results   Component Value Date    LACT 1.7 07/23/2018    GRAHAM 8.9 09/13/2021    PHOS 5.8 (H) 05/15/2016    MAG 2.5 (H) 05/13/2016    TSH 0.89 05/10/2016       Anesthesia Plan    ASA Status:  3      Anesthesia Type: General.     - Airway: ETT   Induction: Intravenous.   Maintenance: Balanced.   Techniques and Equipment:     - Lines/Monitors: Central Line     - Blood: T&C     Consents    Anesthesia Plan(s) and associated risks, benefits, and realistic alternatives discussed. Questions answered and patient/representative(s) expressed understanding.     - Discussed with:  Patient         Postoperative Care    Pain management: Multi-modal analgesia.   PONV prophylaxis: Ondansetron (or other 5HT-3)     Comments:                Vishal Quevedo MD

## 2021-09-13 NOTE — BRIEF OP NOTE
[facility]    Brief Operative Note    Pre-operative diagnosis: * No pre-op diagnosis entered *  Post-operative diagnosis Same as pre-operative diagnosis    Procedure: Procedure(s):  TRANSPLANT, KIDNEY, RECIPIENT,  DONOR with ureteral stent placement  Surgeon: Surgeon(s) and Role:     * Amado Vale MD - Primary     * Lang Olea MD - Fellow - Assisting     * Rober Suarez MD - Fellow - Assisting  Anesthesia: General   Estimated blood loss: Less than 100 ml  Drains: None  Specimens: * No specimens in log *  Findings:   None.  Complications: None.  Implants:   Implant Name Type Inv. Item Serial No.  Lot No. LRB No. Used Action   STENT URETERAL PERCUFLEX PLUS [id number] - [id number] Stent STENT URETERAL PERCUFLEX PLUS [id number]  [employer] [id number] Right 1 Implanted

## 2021-09-13 NOTE — PROVIDER NOTIFICATION
Dr. Suarez notified that ultrasound is at patient's bedside. Notified of CVP 15/16. MD coming to bedside.

## 2021-09-13 NOTE — ANESTHESIA PROCEDURE NOTES
Central Line/PA Catheter Placement  Pre-Procedure   Staff -        Anesthesiologist:  Ritika Hawk MD       Resident/Fellow: Osvaldo Martinez III, MD       Performed By: resident       Location: OR       Pre-Anesthestic Checklist: patient identified, IV checked, site marked, risks and benefits discussed, informed consent, monitors and equipment checked, pre-op evaluation and at physician/surgeon's request  Timeout:       Correct Patient: Yes        Correct Procedure: Yes        Correct Site: Yes        Correct Position: Yes        Correct Laterality: Yes     Procedure   Procedure: central line       Diagnosis: perioperative management and monitoring       Laterality: left       Insertion Site: internal jugular.       Patient Position: Trendelenburg  Sterile Prep        All elements of maximal sterile barrier technique followed       Patient Prep/Sterile Barriers: draped, hand hygiene, gloves , hat , mask , draped, gown, sterile gel and probe cover       Skin prep: Chloraprep  Insertion/Injection        Technique: ultrasound guided and Seldinger Technique        1. Ultrasound was used to evaluate the access site.       2. Vein evaluated via ultrasound for patency/adequacy.       3. Using real-time ultrasound the needle/catheter was observed entering the artery/vein.       Catheter Type/Size: 7 Fr, 20 cm, 3-lumen  Narrative         Secured by: suture and anchor securement device       Tegaderm dressing used.       Complications: None apparent,        blood aspirated from all lumens,        All lumens flushed: Yes       Verification method: Ultrasound and Placement to be verified post-op  Comments:  Right internal jugular small and overlapping anterior to carotid artery.  Decision was made to scan left side prior to attempting and the left internal jugular was large and lateral to carotid. Routine triple lumen CVL on left side performed without complication.

## 2021-09-13 NOTE — ANESTHESIA CARE TRANSFER NOTE
Patient: Kailash Sorto    Procedure(s):  TRANSPLANT, KIDNEY, RECIPIENT,  DONOR with ureteral stent placement    Diagnosis: * No pre-op diagnosis entered *  Diagnosis Additional Information: No value filed.    Anesthesia Type:   General     Note:    Oropharynx: oropharynx clear of all foreign objects and spontaneously breathing  Level of Consciousness: drowsy  Oxygen Supplementation: face mask            Patient transferred to: PACU    Handoff Report: Identifed the Patient, Identified the Reponsible Provider, Reviewed the pertinent medical history, Discussed the surgical course, Reviewed Intra-OP anesthesia mangement and issues during anesthesia, Set expectations for post-procedure period and Allowed opportunity for questions and acknowledgement of understanding      Vitals:  Vitals Value Taken Time   /68 21 1320   Temp     Pulse 95 21 1323   Resp     SpO2 100 % 21 1323   Vitals shown include unvalidated device data.    Electronically Signed By: Xenia Galeas CRNA, APRN CRNA  2021  1:24 PM

## 2021-09-14 ENCOUNTER — LAB REQUISITION (OUTPATIENT)
Dept: LAB | Facility: CLINIC | Age: 36
End: 2021-09-14
Payer: MEDICARE

## 2021-09-14 ENCOUNTER — DOCUMENTATION ONLY (OUTPATIENT)
Dept: TRANSPLANT | Facility: CLINIC | Age: 36
End: 2021-09-14

## 2021-09-14 ENCOUNTER — APPOINTMENT (OUTPATIENT)
Dept: PHYSICAL THERAPY | Facility: CLINIC | Age: 36
DRG: 652 | End: 2021-09-14
Attending: SURGERY
Payer: MEDICARE

## 2021-09-14 LAB
ANION GAP SERPL CALCULATED.3IONS-SCNC: 5 MMOL/L (ref 3–14)
ATRIAL RATE - MUSE: 73 BPM
BASOPHILS # BLD AUTO: 0 10E3/UL (ref 0–0.2)
BASOPHILS NFR BLD AUTO: 0 %
BUN SERPL-MCNC: 54 MG/DL (ref 7–30)
CALCIUM SERPL-MCNC: 8.2 MG/DL (ref 8.5–10.1)
CHLORIDE BLD-SCNC: 107 MMOL/L (ref 94–109)
CO2 SERPL-SCNC: 27 MMOL/L (ref 20–32)
CREAT SERPL-MCNC: 4.54 MG/DL (ref 0.66–1.25)
DEPRECATED CALCIDIOL+CALCIFEROL SERPL-MC: 16 UG/L (ref 20–75)
DIASTOLIC BLOOD PRESSURE - MUSE: NORMAL MMHG
DONOR IDENTIFICATION: NORMAL
DSA COMMENTS: NORMAL
DSA PRESENT: NO
DSA TEST METHOD: NORMAL
EOSINOPHIL # BLD AUTO: 0 10E3/UL (ref 0–0.7)
EOSINOPHIL NFR BLD AUTO: 0 %
ERYTHROCYTE [DISTWIDTH] IN BLOOD BY AUTOMATED COUNT: 12.8 % (ref 10–15)
GFR SERPL CREATININE-BSD FRML MDRD: 16 ML/MIN/1.73M2
GLUCOSE BLD-MCNC: 159 MG/DL (ref 70–99)
HCT VFR BLD AUTO: 27.6 % (ref 40–53)
HGB BLD-MCNC: 9 G/DL (ref 13.3–17.7)
HGB BLD-MCNC: 9.2 G/DL (ref 13.3–17.7)
HGB BLD-MCNC: 9.2 G/DL (ref 13.3–17.7)
HGB BLD-MCNC: 9.3 G/DL (ref 13.3–17.7)
HGB BLD-MCNC: 9.5 G/DL (ref 13.3–17.7)
IMM GRANULOCYTES # BLD: 0 10E3/UL
IMM GRANULOCYTES NFR BLD: 0 %
INTERPRETATION ECG - MUSE: NORMAL
IRON SATN MFR SERPL: 10 % (ref 15–46)
IRON SERPL-MCNC: 18 UG/DL (ref 35–180)
LYMPHOCYTES # BLD AUTO: 0.1 10E3/UL (ref 0.8–5.3)
LYMPHOCYTES NFR BLD AUTO: 1 %
MAGNESIUM SERPL-MCNC: 2 MG/DL (ref 1.6–2.3)
MCH RBC QN AUTO: 29.9 PG (ref 26.5–33)
MCHC RBC AUTO-ENTMCNC: 32.6 G/DL (ref 31.5–36.5)
MCV RBC AUTO: 92 FL (ref 78–100)
MONOCYTES # BLD AUTO: 0.7 10E3/UL (ref 0–1.3)
MONOCYTES NFR BLD AUTO: 6 %
NEUTROPHILS # BLD AUTO: 10.9 10E3/UL (ref 1.6–8.3)
NEUTROPHILS NFR BLD AUTO: 93 %
NRBC # BLD AUTO: 0 10E3/UL
NRBC BLD AUTO-RTO: 0 /100
ORGAN: NORMAL
P AXIS - MUSE: 79 DEGREES
PHOSPHATE SERPL-MCNC: 3.6 MG/DL (ref 2.5–4.5)
PLATELET # BLD AUTO: 177 10E3/UL (ref 150–450)
POTASSIUM BLD-SCNC: 3.9 MMOL/L (ref 3.4–5.3)
POTASSIUM BLD-SCNC: 3.9 MMOL/L (ref 3.4–5.3)
POTASSIUM BLD-SCNC: 4 MMOL/L (ref 3.4–5.3)
POTASSIUM BLD-SCNC: 4.2 MMOL/L (ref 3.4–5.3)
PR INTERVAL - MUSE: 150 MS
QRS DURATION - MUSE: 108 MS
QT - MUSE: 418 MS
QTC - MUSE: 460 MS
R AXIS - MUSE: 59 DEGREES
RBC # BLD AUTO: 3.01 10E6/UL (ref 4.4–5.9)
SA 1 CELL: NORMAL
SA 1 CELL: NORMAL
SA 1 TEST METHOD: NORMAL
SA 1 TEST METHOD: NORMAL
SA 2 CELL: NORMAL
SA 2 CELL: NORMAL
SA 2 TEST METHOD: NORMAL
SA 2 TEST METHOD: NORMAL
SA1 HI RISK ABY: NORMAL
SA1 HI RISK ABY: NORMAL
SA1 MOD RISK ABY: NORMAL
SA1 MOD RISK ABY: NORMAL
SA2 HI RISK ABY: NORMAL
SA2 HI RISK ABY: NORMAL
SA2 MOD RISK ABY: NORMAL
SA2 MOD RISK ABY: NORMAL
SODIUM SERPL-SCNC: 139 MMOL/L (ref 133–144)
SYSTOLIC BLOOD PRESSURE - MUSE: NORMAL MMHG
T AXIS - MUSE: 28 DEGREES
TIBC SERPL-MCNC: 189 UG/DL (ref 240–430)
UNACCEPTABLE ANTIGENS: NORMAL
UNOS CPRA: 2
VENTRICULAR RATE- MUSE: 73 BPM
WBC # BLD AUTO: 11.7 10E3/UL (ref 4–11)
ZZZSA 1  COMMENTS: NORMAL
ZZZSA 1  COMMENTS: NORMAL
ZZZSA 2 COMMENTS: NORMAL
ZZZSA 2 COMMENTS: NORMAL

## 2021-09-14 PROCEDURE — 85025 COMPLETE CBC W/AUTO DIFF WBC: CPT | Performed by: SURGERY

## 2021-09-14 PROCEDURE — 83735 ASSAY OF MAGNESIUM: CPT | Performed by: SURGERY

## 2021-09-14 PROCEDURE — 84132 ASSAY OF SERUM POTASSIUM: CPT | Performed by: NURSE PRACTITIONER

## 2021-09-14 PROCEDURE — 97116 GAIT TRAINING THERAPY: CPT | Mod: GP

## 2021-09-14 PROCEDURE — 82306 VITAMIN D 25 HYDROXY: CPT | Performed by: INTERNAL MEDICINE

## 2021-09-14 PROCEDURE — 97161 PT EVAL LOW COMPLEX 20 MIN: CPT | Mod: GP

## 2021-09-14 PROCEDURE — 120N000011 HC R&B TRANSPLANT UMMC

## 2021-09-14 PROCEDURE — 99233 SBSQ HOSP IP/OBS HIGH 50: CPT | Mod: 24 | Performed by: INTERNAL MEDICINE

## 2021-09-14 PROCEDURE — 80048 BASIC METABOLIC PNL TOTAL CA: CPT | Performed by: SURGERY

## 2021-09-14 PROCEDURE — 250N000013 HC RX MED GY IP 250 OP 250 PS 637: Performed by: NURSE PRACTITIONER

## 2021-09-14 PROCEDURE — 250N000012 HC RX MED GY IP 250 OP 636 PS 637: Performed by: NURSE PRACTITIONER

## 2021-09-14 PROCEDURE — 83550 IRON BINDING TEST: CPT | Performed by: INTERNAL MEDICINE

## 2021-09-14 PROCEDURE — 97530 THERAPEUTIC ACTIVITIES: CPT | Mod: GP

## 2021-09-14 PROCEDURE — 84132 ASSAY OF SERUM POTASSIUM: CPT | Performed by: SURGERY

## 2021-09-14 PROCEDURE — 36592 COLLECT BLOOD FROM PICC: CPT | Performed by: SURGERY

## 2021-09-14 PROCEDURE — 85018 HEMOGLOBIN: CPT | Performed by: NURSE PRACTITIONER

## 2021-09-14 PROCEDURE — 85018 HEMOGLOBIN: CPT | Performed by: SURGERY

## 2021-09-14 PROCEDURE — 84100 ASSAY OF PHOSPHORUS: CPT | Performed by: SURGERY

## 2021-09-14 PROCEDURE — 250N000011 HC RX IP 250 OP 636: Performed by: NURSE PRACTITIONER

## 2021-09-14 PROCEDURE — 250N000013 HC RX MED GY IP 250 OP 250 PS 637: Performed by: SURGERY

## 2021-09-14 PROCEDURE — 258N000003 HC RX IP 258 OP 636: Performed by: NURSE PRACTITIONER

## 2021-09-14 PROCEDURE — 250N000012 HC RX MED GY IP 250 OP 636 PS 637: Performed by: SURGERY

## 2021-09-14 PROCEDURE — 36592 COLLECT BLOOD FROM PICC: CPT | Performed by: NURSE PRACTITIONER

## 2021-09-14 PROCEDURE — 250N000011 HC RX IP 250 OP 636: Performed by: SURGERY

## 2021-09-14 RX ORDER — DIPHENHYDRAMINE HYDROCHLORIDE 50 MG/ML
50 INJECTION INTRAMUSCULAR; INTRAVENOUS
Status: CANCELLED
Start: 2021-09-18

## 2021-09-14 RX ORDER — ALBUTEROL SULFATE 0.83 MG/ML
2.5 SOLUTION RESPIRATORY (INHALATION)
Status: CANCELLED | OUTPATIENT
Start: 2021-09-18

## 2021-09-14 RX ORDER — PREDNISONE 20 MG/1
40 TABLET ORAL DAILY
Status: DISCONTINUED | OUTPATIENT
Start: 2021-09-18 | End: 2021-09-16 | Stop reason: HOSPADM

## 2021-09-14 RX ORDER — METHYLPREDNISOLONE SODIUM SUCCINATE 125 MG/2ML
125 INJECTION, POWDER, LYOPHILIZED, FOR SOLUTION INTRAMUSCULAR; INTRAVENOUS
Status: CANCELLED
Start: 2021-09-18

## 2021-09-14 RX ORDER — EPINEPHRINE 1 MG/ML
0.3 INJECTION, SOLUTION, CONCENTRATE INTRAVENOUS EVERY 5 MIN PRN
Status: CANCELLED | OUTPATIENT
Start: 2021-09-18

## 2021-09-14 RX ORDER — METHYLPREDNISOLONE SODIUM SUCCINATE 125 MG/2ML
100 INJECTION, POWDER, LYOPHILIZED, FOR SOLUTION INTRAMUSCULAR; INTRAVENOUS ONCE
Status: COMPLETED | OUTPATIENT
Start: 2021-09-15 | End: 2021-09-15

## 2021-09-14 RX ORDER — HEPARIN SODIUM (PORCINE) LOCK FLUSH IV SOLN 100 UNIT/ML 100 UNIT/ML
5 SOLUTION INTRAVENOUS
Status: CANCELLED | OUTPATIENT
Start: 2021-09-18

## 2021-09-14 RX ORDER — PREDNISONE 20 MG/1
20 TABLET ORAL DAILY
Status: DISCONTINUED | OUTPATIENT
Start: 2021-09-20 | End: 2021-09-16 | Stop reason: HOSPADM

## 2021-09-14 RX ORDER — MEPERIDINE HYDROCHLORIDE 25 MG/ML
25 INJECTION INTRAMUSCULAR; INTRAVENOUS; SUBCUTANEOUS EVERY 30 MIN PRN
Status: CANCELLED | OUTPATIENT
Start: 2021-09-18

## 2021-09-14 RX ORDER — HEPARIN SODIUM,PORCINE 10 UNIT/ML
5 VIAL (ML) INTRAVENOUS
Status: CANCELLED | OUTPATIENT
Start: 2021-09-18

## 2021-09-14 RX ORDER — ALBUTEROL SULFATE 90 UG/1
1-2 AEROSOL, METERED RESPIRATORY (INHALATION)
Status: CANCELLED
Start: 2021-09-18

## 2021-09-14 RX ORDER — NALOXONE HYDROCHLORIDE 0.4 MG/ML
0.2 INJECTION, SOLUTION INTRAMUSCULAR; INTRAVENOUS; SUBCUTANEOUS
Status: CANCELLED | OUTPATIENT
Start: 2021-09-18

## 2021-09-14 RX ORDER — PREDNISONE 5 MG/1
5 TABLET ORAL DAILY
Status: DISCONTINUED | OUTPATIENT
Start: 2021-10-11 | End: 2021-09-16 | Stop reason: HOSPADM

## 2021-09-14 RX ORDER — PREDNISONE 10 MG/1
10 TABLET ORAL DAILY
Status: DISCONTINUED | OUTPATIENT
Start: 2021-10-04 | End: 2021-09-16 | Stop reason: HOSPADM

## 2021-09-14 RX ADMIN — OXYCODONE HYDROCHLORIDE 5 MG: 5 TABLET ORAL at 16:26

## 2021-09-14 RX ADMIN — ATORVASTATIN CALCIUM 10 MG: 10 TABLET, FILM COATED ORAL at 08:07

## 2021-09-14 RX ADMIN — MYCOPHENOLATE MOFETIL 1000 MG: 250 CAPSULE ORAL at 08:07

## 2021-09-14 RX ADMIN — SENNOSIDES AND DOCUSATE SODIUM 1 TABLET: 8.6; 5 TABLET ORAL at 08:09

## 2021-09-14 RX ADMIN — OXYCODONE HYDROCHLORIDE 5 MG: 5 TABLET ORAL at 20:27

## 2021-09-14 RX ADMIN — MYCOPHENOLATE MOFETIL 1000 MG: 250 CAPSULE ORAL at 18:10

## 2021-09-14 RX ADMIN — SODIUM CHLORIDE, SODIUM LACTATE, POTASSIUM CHLORIDE, CALCIUM CHLORIDE, AND DEXTROSE MONOHYDRATE: 600; 310; 30; 20; 5 INJECTION, SOLUTION INTRAVENOUS at 09:25

## 2021-09-14 RX ADMIN — TACROLIMUS 3 MG: 1 CAPSULE ORAL at 08:07

## 2021-09-14 RX ADMIN — SODIUM CHLORIDE 20 MG: 9 INJECTION, SOLUTION INTRAVENOUS at 10:54

## 2021-09-14 RX ADMIN — OXYCODONE HYDROCHLORIDE 10 MG: 10 TABLET ORAL at 08:07

## 2021-09-14 RX ADMIN — ACETAMINOPHEN 975 MG: 325 TABLET, FILM COATED ORAL at 16:26

## 2021-09-14 RX ADMIN — OXYCODONE HYDROCHLORIDE 10 MG: 10 TABLET ORAL at 04:02

## 2021-09-14 RX ADMIN — SODIUM CHLORIDE, SODIUM LACTATE, POTASSIUM CHLORIDE, CALCIUM CHLORIDE, AND DEXTROSE MONOHYDRATE: 600; 310; 30; 20; 5 INJECTION, SOLUTION INTRAVENOUS at 19:14

## 2021-09-14 RX ADMIN — SENNOSIDES AND DOCUSATE SODIUM 1 TABLET: 8.6; 5 TABLET ORAL at 20:27

## 2021-09-14 RX ADMIN — ACETAMINOPHEN 975 MG: 325 TABLET, FILM COATED ORAL at 23:33

## 2021-09-14 RX ADMIN — TACROLIMUS 3 MG: 1 CAPSULE ORAL at 18:10

## 2021-09-14 RX ADMIN — ACETAMINOPHEN 975 MG: 325 TABLET, FILM COATED ORAL at 08:07

## 2021-09-14 RX ADMIN — METHYLPREDNISOLONE SODIUM SUCCINATE 250 MG: 125 INJECTION, POWDER, LYOPHILIZED, FOR SOLUTION INTRAMUSCULAR; INTRAVENOUS at 09:29

## 2021-09-14 ASSESSMENT — ACTIVITIES OF DAILY LIVING (ADL)
ADLS_ACUITY_SCORE: 14

## 2021-09-14 ASSESSMENT — MIFFLIN-ST. JEOR: SCORE: 1733.63

## 2021-09-14 NOTE — PROGRESS NOTES
Handoff information     Type of transplant: DDKT  Date of transplant: 9/13/21   Direct/non-direct/PEP- NA  Transplant history: Naive   Outstanding items for patient: None  Pertinent history: ESRD unclear etiology   Barriers to post transplant care: None

## 2021-09-14 NOTE — PHARMACY-ADMISSION MEDICATION HISTORY
Admission Medication History Completed by Pharmacy    See [facility] Admission Navigator for allergy information, preferred outpatient pharmacy, prior to admission medications and immunization status.     Medication History Sources:     Patient- patient was a good historian. He was able to report medications he was taking, but needed prompting for doses and strengths. Was able to recall last doses taken.     Changes made to PTA medication list (reason):    Added: None    Deleted: None    Changed:updated last doses taken of medications     Additional Information:    None    Prior to Admission medications    Medication Sig Last Dose Taking? Auth Provider   amLODIPine (NORVASC) 10 MG tablet Take 10 mg by mouth daily  9/12/2021 at 1200 Yes Reported, Patient   hydrALAZINE (APRESOLINE) 50 MG tablet Take 50 mg by mouth 3 times daily  9/12/2021 at 2000 Yes Reported, Patient   labetalol (NORMODYNE) 200 MG tablet Take 200 mg by mouth 2 times daily  9/12/2021 at 2000 Yes Reported, Patient       Date completed: 09/14/21    Medication history completed by: Missy Lozano RPH

## 2021-09-14 NOTE — PLAN OF CARE
Admitted/transferred from:   Time of arrival on unit 0130  2 RN full  skin assessment completed by Lee Ann Maier   Skin assessment finding: skin intact, no problems   Interventions/actions: Incision abdomen dermabonded CDI MEENAKSHI. Mepilex remove off from coccyx CDI. No issues found.      Will continue to monitor.

## 2021-09-14 NOTE — PLAN OF CARE
"/78 (BP Location: Left arm)   Pulse 79   Temp 98.7  F (37.1  C) (Oral)   Resp 16   Ht 1.702 m (5' 7\")   Wt 79.8 kg (175 lb 14.8 oz)   SpO2 93%   BMI 27.55 kg/m      Hours of Care: 1365-4360.    Reason for admission: POD 1 DDKT and ureteral stent placement.  Hx of HTN, HLD, ESRD on hemodialysis since 5/2016.  Vitals: VSS.   Activity: Pt not OOB overnight.  Pain: Controlled with PRN oxycodone.  Neuro: AO x 4.  Cardiac: SR.    Respiratory: WDL on 2L NC.  GI/: Good UOP.  Quiles in place.  Diet: Advanced to regular overnight.    Lines: L TL CVC, L PIV, R upper arm fistula.  Skin/Wounds: R abdominal incision dermabonded, WDL.      Continue to monitor and follow POC    Parish Lopez RN on 9/14/2021 at 6:13 AM           "

## 2021-09-14 NOTE — PROGRESS NOTES
"CLINICAL NUTRITION SERVICES - ASSESSMENT NOTE     Nutrition Prescription    RECOMMENDATIONS FOR MDs/PROVIDERS TO ORDER:  Liberalize diet as medically able to promote calorie/protien intake to promote healing.     Malnutrition Status:    Patient does not meet two of the established criteria necessary for diagnosing malnutrition    Recommendations already ordered by Registered Dietitian (RD):  Discharge diet instruction written     Future/Additional Recommendations:  If suspect eating poorly, recommend starting oral nutrition supplements. If intakes are variable, recommend start calorie count.     Education review on acutely increased energy and protein needs, 6-8 weeks post surgery and long term recommendation for heart healthy (low saturated fat, low sodium) diet and food safety precautions.        REASON FOR ASSESSMENT  Pt is a 35 year old male seen by the dietitian for MD Order- Assess & Educate post-op SOT    CLINICAL HISTORY   HTN, HLD, ESRD (unclear etiology) on HD since 5/2016 here for a LDKT and ureteral stent     NUTRITION HISTORY  Patient reported he usually eats 2-3 meals per day. His labs were good with dialysis so he did not need to follow renal diet. No food allergies. Denied issues with N/V/D/C. He was consuming banana during visit.   Denied any recent weight loss.     CURRENT NUTRITION ORDERS  Diet: Regular  Intake/Tolerance: 100%     LABS  Na 139, K+ 4, Mg 2, Po4 3.6 (WNL)  Urea Nitrogen 54 (H), Creatinine 4.54 (H)  Glucose 159 (H)  HgbA1c 5.3 (H)    MEDICATIONS  Methylprednisolone, Mycophenolate, Tacrolimus  Miralax, Senokot    ANTHROPOMETRICS  Height: 170.2 cm (5' 7\")  Most Recent Weight: 79.8 kg (175 lb 14.8 oz)    IBW: 67.3 kg  BMI: Overweight BMI 25-29.9  Weight History:   Wt Readings from Last 15 Encounters:   09/13/21 79.8 kg (175 lb 14.8 oz)   08/25/20 78.9 kg (173 lb 14.4 oz)   08/24/20 80.3 kg (177 lb)   05/28/19 78 kg (171 lb 14.4 oz)   01/24/19 78.3 kg (172 lb 11.2 oz)   07/26/18 78.2 kg " (172 lb 4.8 oz)   07/23/18 77.1 kg (170 lb)   07/21/18 74.4 kg (164 lb 0.4 oz)   05/31/18 78.5 kg (173 lb)   08/04/16 75.1 kg (165 lb 8 oz)   08/04/16 75.1 kg (165 lb 8 oz)   07/21/16 75.5 kg (166 lb 6.4 oz)   07/12/16 75.3 kg (165 lb 14.4 oz)   05/23/16 80.9 kg (178 lb 4.8 oz)   05/16/16 78.3 kg (172 lb 9.9 oz)     Dosing Weight: 79.8 kg    ASSESSED NUTRITION NEEDS:  Estimated Energy Needs: 1735-4287 kcals (30-35 Kcal/Kg)  Justification: increased needs post-op SOT  Estimated Protein Needs: 103-160 grams protein (1.3-2 gm/Kg)  Justification: increased needs post op SOT  Estimated Fluid Needs: 6666-1460+ mL (25-30 mL/Kg)  Justification: maintenance, or per MD pending fluid status and adequate UOP    PHYSICAL FINDINGS  See malnutrition section below.    MALNUTRITION  % Intake: No decreased intake noted  % Weight Loss: None noted  Subcutaneous Fat Loss: None observed  Muscle Loss: None observed  Fluid Accumulation/Edema: None noted  Malnutrition Diagnosis: Patient does not meet two of the established criteria necessary for diagnosing malnutrition    NUTRITION DIAGNOSIS:  Food and nutrition-related knowledge deficit r/t length of time since previous post-transplant education AEB patient verbal report, review of chart record, and MD consult for nutrition education.     INTERVENTIONS  Implementation  Nutrition Education:  -- Provided instruction on post-transplant diet with discussion regarding protein sources and high protein needs in acute post-tx phase.   --Discussed monitoring of K+/Phos lab values with possible need for adjustment of these in the diet as necessary.   -- Reviewed recommendations to follow low fat/low sodium diet long term and discussed heart healthy diet tips.    --Reviewed need for food safety precautions to prevent food borne illness.  Provided & reviewed handout: Post-transplant diet guidelines. Patient receptive to information provided. Expected diet compliance is good.     Goals  1. Patient will  verbalize understanding of 3 important aspects of post-transplant diet guidelines.   2. PO intake >50% meals TID.    Monitoring/Evaluation  Progress toward goals will be monitored and evaluated per protocol.    Thea Ritter RD, LD  6B pager: [phone]

## 2021-09-14 NOTE — PROGRESS NOTES
[facility]   Transplant Infectious Disease Research Note   Patient:  Kailash Sorto, Date of birth 1985, Medical record number [id number]  Date of Visit:  09/14/2021  Transplants:  9/13/2021 (Kidney), Postoperative day:  1    A Phase 2, Randomized, Double-Blind, Placebo-Controlled Study of NPC-21 for Kidney Transplant Recipients at High-Risk of Cytomegalovirus Infection ([id number])      Sponsor:    [employer]    Protocol Number: NPC-21-2   : Cyndi Griffith MD, Phone: [phone] and Ruddy Lance MD, Phone [phone]. Department of Medicine, Division of Infectious Diseases    IRB number [id number], IDS [id number]  Consent Form_[email or web address]  Kailash Sorto was approached on  09/14/2021 for this prevention study of CMV infections.  For this study, the chance that the participant will receive NPC-21 instead of placebo infusion is 5:4. If assigned to the NPC-21 group, treatment will include 4 weekly infusions of this investigational CMV monoclonal antibody agent. If assigned to the placebo arm, the 1-hour infusions will be with a placebo infusion. During the consent process, the risks, benefits, and alternatives of this study were discussed.  The alternative is not to participate in the study. Kailash Sorto is eligible to enter the screening period. He was given time to ask questions and did volunteer to participate in this clinical study. Kailash Sorto has signed consent, and has a copy of the signed consent form. No study procedures were performed prior to the consent document having been signed.   Attestation:   I have reviewed today's vital signs, medications, labs and imaging.   Ruddy Lance MD    Pager [phone]    175 lbs 14.83 oz    CMV Antibody IgG   Date Value Ref Range Status   09/13/2021 No detectable antibody. No detectable antibody.  Final   08/04/2016  0.0 - 0.8 AI Final    <0.2  Negative   Antibody index (AI) values  reflect qualitative changes in antibody   concentration that cannot be directly associated with clinical condition or   disease state.         CMV viral loads  No lab results found.    CMV viral loads  No results found for: CMVLOG, 37501, 58434, 02166, 56072    DSA    No results found for: ORGAN, DSAPRES, DCNSFSD49, GFBJXHX92, HLADSADQB7    DSA    @LASTCNTR(organ:1,dsapres:2,bbypspm54:1,NNDGKQD91:1,HLADSADQB7:1)@    Hematology Studies   Recent Labs   Lab Test 09/14/21  0846 09/14/21 0449 09/13/21 1336 09/13/21  0500 08/24/20  1416 07/23/18  2316 07/19/18  1458   WBC  --  11.7* 5.9 4.9  --  5.5 8.8   ANEU  --   --   --   --   --  3.2 7.4   ALYM  --   --   --   --   --  1.2 0.4*   SHIVANI  --   --   --   --   --  0.8 1.0   AEOS  --   --   --   --   --  0.3 0.1   HGB 9.2* 9.0* 8.6* 10.3*   < > 10.7* 11.0*   HCT  --  27.6* 25.7* 31.4*   < > 31.4* 32.3*   PLT  --  177 154 237   < > 298 247    < > = values in this interval not displayed.       Metabolic Studies    Recent Labs   Lab Test 09/14/21 0846 09/14/21 0449 09/13/21  1336 09/13/21  0600 09/13/21  0500 05/28/19  1045 07/23/18  2316   NA  --  139 139  --  141   < > 139   POTASSIUM 4.0 4.0  4.0 4.0  --  3.3*   < > 3.4   CHLORIDE  --  107 102  --  105   < > 100   CO2  --  27 25  --  28   < > 33*   ANIONGAP  --  5 12  --  8   < > 6   BUN  --  54* 54*  --  55*   < > 22   CR  --  4.54* 6.37*  --  7.02*   < > 3.45*   GFRESTIMATED  --  16* 10*  --  9*   < > 21*   GLC  --  159* 145*  --  94   < > 93   A1C  --   --   --   --  5.3  --   --    GRAHAM  --  8.2* 8.2*  --  8.9   < > 8.8   MAG  --  2.0 1.7   < >  --   --   --    LACT  --   --   --   --   --   --  1.7    < > = values in this interval not displayed.       Hepatic Studies    Recent Labs   Lab Test 09/13/21  0500 06/22/20  0000 08/04/16  0711   BILITOTAL 0.4  --  0.4   ALKPHOS 122  --  120   PROTTOTAL 7.7  --  8.2   ALBUMIN 3.8  --  4.4   AST 15  --  20   ALT 24   < > 46    < > = values in this interval not displayed.

## 2021-09-14 NOTE — PROGRESS NOTES
[facility]   Transplant Nephrology Progress Note  Date of Admission:  9/13/2021  Today's Date: 09/14/2021    35 year old with hx of ESKD on HD (5 years) underwent DDKT on 9/14.     Recommendations:  - If SBP > 140/90 consider starting Amlodipine 5 mg daily  - Continue IS with MMF 1 g BID and Tac with goal 8-10    Assessment & Plan   # DDKT: Stable   - Baseline Creatinine: ~ too early to determine   - Proteinuria: Minimal (0.2-0.5 grams)   - Date DSA Last Checked: Not Known      Latest DSA: No DSA at time of transplant   - BK Viremia: No   - Kidney Tx Biopsy: No    # Immunosuppression: Tacrolimus immediate release (goal 8-10)   - Changes: On Solumedrol 250 mg, MMF 1 g BID   - Received 150 mg of Thymo during day of surgery, Received Basilixmab 9/14    # Infection Prophylaxis:   - PJP: Sulfa/TMP (Bactrim)  - CMV: Valganciclovir (Valcyte)    # Hypertension: Controlled;  Goal BP: < 140/90   - Volume status: Euvolemic  EDW ~ 77 Kg   - Changes: If SBP > 140/90 start amlodipine 5 mg    # Anemia in Chronic Renal Disease: Hgb: Stable      KODY: No   - Iron studies: Not checked recently    # Mineral Bone Disorder:   - Vitamin D; level: Not checked recently        On supplement: No  - Calcium; level: Normal        On supplement: No  - Phosphorus; level: Normal        On supplement: No    # Electrolytes:   - Potassium; level: Normal        On supplement: No  - Magnesium; level: Normal        On supplement: No  - Bicarbonate; level: Normal        On supplement: No      # Transplant History:  Etiology of Kidney Failure: Unknown etiology (no kidney biopsy)  Tx: DDKT  Transplant: 9/13/2021 (Kidney)     Significant changes in immunosuppression: None  Significant transplant-related complications: None  Crossmatch at time of Tx: negative  DSA at time of Tx: No  Recommendations were communicated to the primary team via this note.    Seen and discussed with Dr. Mario Gonzalez MD  "  Pager: [phone]    Interval History   - BP control adequate  - Hb  Stable  - Urine o/p 2.4 L    Review of Systems   4 point ROS was obtained and negative except as noted in the Interval History.    MEDICATIONS:    acetaminophen  975 mg Oral Q8H     atorvastatin  10 mg Oral Daily     basiliximab (SIMULECT) infusion  20 mg Intravenous Once     [START ON 9/15/2021] magnesium oxide  400 mg Oral Daily with lunch     methylPREDNISolone  250 mg Intravenous Once     methylPREDNISolone  100 mg Intravenous Once    Followed by     [START ON 2021] predniSONE  60 mg Oral Daily    Followed by     [START ON 2021] predniSONE  40 mg Oral Daily    Followed by     [START ON 2021] predniSONE  20 mg Oral Daily    Followed by     [START ON 2021] predniSONE  15 mg Oral Daily    Followed by     [START ON 10/4/2021] predniSONE  10 mg Oral Daily    Followed by     [START ON 10/11/2021] predniSONE  5 mg Oral Daily     mycophenolate  1,000 mg Oral BID IS     polyethylene glycol  17 g Oral Daily     senna-docusate  1 tablet Oral BID     sodium chloride (PF)  10 mL Intracatheter Q8H     [START ON 9/15/2021] sulfamethoxazole-trimethoprim  1 tablet Oral Q  AM     tacrolimus  3 mg Oral BID IS     [Held by provider] valGANciclovir  450 mg Oral Once per day on        dextrose 5% lactated ringers 100 mL/hr at 21 0800       Physical Exam   Temp  Av.3  F (36.8  C)  Min: 97.8  F (36.6  C)  Max: 98.7  F (37.1  C)      Pulse  Av.1  Min: 67  Max: 96 Resp  Av.2  Min: 14  Max: 18  SpO2  Av.4 %  Min: 92 %  Max: 100 %    CVP (mmHg): 11 mmHgBP 130/84 (BP Location: Left arm)   Pulse 76   Temp 98.2  F (36.8  C) (Oral)   Resp 18   Ht 1.702 m (5' 7\")   Wt 79.8 kg (175 lb 14.8 oz)   SpO2 92%   BMI 27.55 kg/m     Date 21 0700 - 09/15/21 0659   Shift 2395-3747 9955-5604 8498-1805 24 Hour Total   INTAKE   I.V. 10   10   Shift Total(mL/kg) 10(0.13)   10(0.13)   OUTPUT   Urine 125   125   Shift " Total(mL/kg) 125(1.57)   125(1.57)   Weight (kg) 79.8 79.8 79.8 79.8      Admit Weight: 79.8 kg (175 lb 14.8 oz)     GENERAL APPEARANCE: alert and no distress  HENT: mouth without ulcers or lesions  LYMPHATICS: no cervical or supraclavicular nodes  RESP: lungs clear to auscultation - no rales, rhonchi or wheezes  CV: regular rhythm, normal rate, no rub, no murmur  EDEMA: no LE edema bilaterally  ABDOMEN: soft, nondistended, nontender, bowel sounds normal  MS: extremities normal - no gross deformities noted, no evidence of inflammation in joints, no muscle tenderness  SKIN: no rash    Data   All labs reviewed by me.  CMP  Recent Labs   Lab 09/14/21 0449 09/13/21 2353 09/13/21 2014 09/13/21 1336 09/13/21  0600 09/13/21  0500     --   --  139  --  141   POTASSIUM 4.0  4.0 4.2 4.2 4.0  --  3.3*   CHLORIDE 107  --   --  102  --  105   CO2 27  --   --  25  --  28   ANIONGAP 5  --   --  12  --  8   *  --   --  145* 98 94   BUN 54*  --   --  54*  --  55*   CR 4.54*  --   --  6.37*  --  7.02*   GFRESTIMATED 16*  --   --  10*  --  9*   GRAHAM 8.2*  --   --  8.2*  --  8.9   MAG 2.0  --   --  1.7  --   --    PHOS 3.6  --   --  2.3*  --   --    PROTTOTAL  --   --   --   --   --  7.7   ALBUMIN  --   --   --   --   --  3.8   BILITOTAL  --   --   --   --   --  0.4   ALKPHOS  --   --   --   --   --  122   AST  --   --   --   --   --  15   ALT  --   --   --   --   --  24     CBC  Recent Labs   Lab 09/14/21 0449 09/13/21 2353 09/13/21 2014 09/13/21 1336 09/13/21  0500   HGB 9.0* 9.3* 8.9* 8.6* 10.3*   WBC 11.7*  --   --  5.9 4.9   RBC 3.01*  --   --  2.85* 3.45*   HCT 27.6*  --   --  25.7* 31.4*   MCV 92  --   --  90 91   MCH 29.9  --   --  30.2 29.9   MCHC 32.6  --   --  33.5 32.8   RDW 12.8  --   --  12.4 12.4     --   --  154 237     INR  Recent Labs   Lab 09/13/21  0500   INR 0.88   PTT 25     ABGNo lab results found in last 7 days.   Urine Studies  Recent Labs   Lab Test 09/13/21  0532 08/04/16  0711  05/11/16  0125 03/09/16  1850   COLOR Light Yellow Tina Yellow Tina   APPEARANCE Clear Cloudy Slightly Cloudy Slightly Cloudy   URINEGLC Negative Negative Negative Negative   URINEBILI Negative Small   This is an unconfirmed screening test result. A positive result may be false.  * Negative Negative   URINEKETONE Negative 5* 5* Negative   SG 1.015 1.020 1.008 >1.030   UBLD Negative Small* Large* Large*   URINEPH 6.5 5.0 5.5 5.0   PROTEIN 30 * 100* 100* 100*   UROBILINOGEN  --   --   --  0.2   NITRITE Negative Negative Negative Negative   LEUKEST Negative Negative Negative Negative   RBCU 1 7* 15* >100*   WBCU 1 4* 1 O - 2     Recent Labs   Lab Test 09/13/21  0532 05/11/16  0125   UTPG 0.22* 1.07*     PTH  Recent Labs   Lab Test 05/11/16  1314   PTHI 633*     Iron Studies  Recent Labs   Lab Test 05/11/16  0450 05/10/16  2325   IRON 38 32*   *  --    IRONSAT 17  --    RACH  --  577*       IMAGING:  All imaging studies reviewed by me.    Patient was seen and evaluated by me, Cristino Martin MD. I have reviewed the note and agree with the the plan of care as documented by the fellow.

## 2021-09-14 NOTE — PHARMACY-TRANSPLANT NOTE
Adult Kidney Transplant Post Operative Note    35 year old male s/p  donor kidney transplant on 21 for hypertensive nephrosclerosis.     Donor information:   -DBD, unrelated   -Cold ischemia: 16.5 hours   -Warm ischemia: 42 minutes   -Donor CMV+    -recipient has no HLA antibodies against donor organ        Planned immunosuppression regimen per kidney transplant protocol:    INDUCTION:   POD 0:   -thymoglobulin 150 mg (~ 2mg/kg);  mg x 1     POD 1:  -basiliximab 20 mg,  mg x 1     POD2:   - mg x 1     POD 3:  -Prednisone taper     POD 5:  -basiliximab 20 mg    MAINTENANCE:   - mycophenolate 1000 mg twice daily (AA)  - tacrolimus with goal trough levels of 8-10 mcg/L for first 6 months post-transplant.     Opportunistic pathogen prophylaxis includes: -trimethoprim/sulfamethoxazole single strength every M, W, F morning   -valganciclovir. (waiting for donor CMV status to solidify plan)      Patient is enrolled in medication study.     Pharmacy will monitor for medication interactions and immunosuppression levels in conjunction with the team. Medication therapy needs for discharge planning will continue to be addressed throughout the current admission via multidisciplinary rounds and order review.  Pharmacy will make recommendations as appropriate.    Missy Lozano, PharmD PGY1 Resident

## 2021-09-14 NOTE — PLAN OF CARE
[id number]:  Neuro: A&Ox4.   Cardiac: SR. VSS. Afebrile.          Respiratory: Sating >93% on RA.  GI/: Adequate urine output via cummins. Passing flatus.  Diet/appetite: Tolerating regular diet. Eating well. Denies nausea.  Activity:  Assist of 1. Spent this couple hours in chair. Needs encouragement to get up/reposition  Pain: R incisional pain, PRN oxy 5mg given along with schedule tylenol w/ adequate relief.   Skin: No new deficits noted.  LDA's: CVC 3 lumen, PIV, R arm AV Fistula.     Plan: Continue with POC. Notify primary team with changes/concerns.  Report given to 7A by previous nurse. Transferred to unit 7A at 1730.

## 2021-09-14 NOTE — PROGRESS NOTES
09/14/21 1500   Quick Adds   Type of Visit Initial PT Evaluation   Living Environment   People in home spouse;child(seema), dependent   Current Living Arrangements apartment   Home Accessibility stairs to enter home   Number of Stairs, Main Entrance 6   Transportation Anticipated family or friend will provide   Living Environment Comments Pt lives in apartment with wife and young son, 4-6 steps to enter apartment.    Self-Care   Usual Activity Tolerance good   Current Activity Tolerance moderate   Regular Exercise No   Equipment Currently Used at Home none   Activity/Exercise/Self-Care Comment Per pt he was IND with ADL's prior to admission.  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    Disability/Function   Hearing Difficulty or Deaf no   Wear Glasses or Blind no   Concentrating, Remembering or Making Decisions Difficulty no   Difficulty Communicating no   Difficulty Eating/Swallowing no   Walking or Climbing Stairs Difficulty no   Dressing/Bathing Difficulty no   Toileting issues no   Doing Errands Independently Difficulty (such as shopping) no   Fall history within last six months no   Change in Functional Status Since Onset of Current Illness/Injury yes   General Information   Onset of Illness/Injury or Date of Surgery 09/13/21   Patient/Family Therapy Goals Statement (PT) Pt wants to go home.    Pertinent History of Current Problem (include personal factors and/or comorbidities that impact the POC) 35 year old male with PMH significant for HTN, ACD on HD x5 years. He is now s/p DDKTx 9/13/21 with stent placement   Existing Precautions/Restrictions abdominal   Weight-Bearing Status - LUE full weight-bearing   Weight-Bearing Status - RUE full weight-bearing   Weight-Bearing Status - LLE full weight-bearing   Weight-Bearing Status - RLE full weight-bearing   General Observations Activity: up with assist   Cognition   Orientation Status (Cognition) oriented x 4   Affect/Mental Status (Cognition) WNL   Follows Commands  (Cognition) WNL   Pain Assessment   Patient Currently in Pain Yes, see Vital Sign flowsheet  (Abdominal incisional pain)   Integumentary/Edema   Integumentary/Edema Comments Incision open to air    Posture    Posture Not impaired   Range of Motion (ROM)   ROM Quick Adds ROM WNL   Strength   Manual Muscle Testing Quick Adds Strength WFL   Strength Comments B LE's grossly 5/5    Bed Mobility   Comment (Bed Mobility) Min-modA at trunk with log roll   Transfers   Transfer Safety Comments CGA with sit<>stand    Gait/Stairs (Locomotion)   Comment (Gait/Stairs) CGA-SBA without AD    Balance   Balance no deficits were identified   Sensory Examination   Sensory Perception WNL   Coordination   Coordination no deficits were identified   Clinical Impression   Criteria for Skilled Therapeutic Intervention yes, treatment indicated;meets criteria   PT Diagnosis (PT) Impaired functional mobility   Influenced by the following impairments Decreased strength and activity tolerance, pain   Functional limitations due to impairments Inability to complete functional mobility at baseline level of functioning    Clinical Presentation Stable/Uncomplicated   Clinical Presentation Rationale Medically stable, on RA, pain controlled    Clinical Decision Making (Complexity) low complexity   Therapy Frequency (PT) Daily   Predicted Duration of Therapy Intervention (days/wks) 1 week    Planned Therapy Interventions (PT) bed mobility training;balance training;gait training;home exercise program;stair training;strengthening;transfer training;risk factor education;home program guidelines;progressive activity/exercise   Risk & Benefits of therapy have been explained evaluation/treatment results reviewed;care plan/treatment goals reviewed;risks/benefits reviewed;current/potential barriers reviewed;participants voiced agreement with care plan;participants included;patient   Clinical Impression Comments Pt would benefit from IP PT to progress strength and  IND with functional mobility to ensure safety with d/c home.    PT Discharge Planning    PT Discharge Recommendation (DC Rec) home with assist   PT Rationale for DC Rec Pt moving well, has wife to assist him at home as needed.    PT Brief overview of current status  min-modA for bed mobility, CGA for out of bed mobility    Total Evaluation Time   Total Evaluation Time (Minutes) 10

## 2021-09-14 NOTE — PROGRESS NOTES
Transplant Surgery  Inpatient Daily Progress Note  09/14/2021    Assessment & Plan: Kailash Sorto is a 35 year old male with PMH significant for HTN, ACD on HD x5 years. He is now s/p DDKTx 9/13/21 with stent placement.    Graft function:DDKTx: POD #1. Good graft function  SCr 4.5 (6.4). Good UOP ~130cc/hr. K 4   Renal Tx US: POD#0-patent vasculature  Immunosuppression management:   Induction: Intermediate risk, PRA 2  Thymo 159mg x1 (2mg/kg)   Simulect 20mg today and repeat on POD #5  Solumedrol 500mg intraop, 250mg POD #1, 100mg POD #2 then po taper x4 weeks  MMF 1 G BID  Tac 3mgBID, titrate to a goal of 8-10  Complexity of management:Medium. Contributing factors: anemia and induction  Hematology: Anemia of chronic disease/BRISA : Hgb stable 9.  Continue to monitor Q4 x24hrs. PSAT 10  Cardiorespiratory: Hx HTN:PTA on antihypertensives, do not restart at this time -120s.Stable  GI/Nutrition: CLD ADAT regular  Endocrine: No acute issues, Hgb A1c=5.3,  this am. Continue to monitor with steroids.  Fluid/Electrolytes: MIVF:D5LR @ 100 -continue, stop replacements.  : Cummins to remain due to new surgical anastomosis, plan for 3 day cummins  Infectious disease: Afebrile, Leukocytosis:  WBC 11.7, most likely related to steroids.   Prophylaxis: DVT,  GI,CMV study drug, bactrim (renally adjusted)  Disposition: 6B-transfer to     Medical Decision Making: Medium  Subsequent visit 10142 (moderate level decision making)    RUY/Fellow/Resident Provider: María Yancey NP    Faculty: Sahra Toscano M.D.  _________________________________________________________________  Transplant History: Admitted 9/13/2021 for Kidney transplant candidate [Z76.82]  Kidney transplant recipient [Z94.0]  9/13/2021 (Kidney), Postoperative day: 1     Interval History: History is obtained from the patient  Overnight events: Arrived ~1700 from PACU. Pain adequately controlled. Not yet OOB.  No nausea/vomiting. Tolerating CLD.  "    ROS:   A 10-point review of systems was negative except as noted above.    Meds:    acetaminophen  975 mg Oral Q8H     atorvastatin  10 mg Oral Daily     basiliximab (SIMULECT) infusion  20 mg Intravenous Once     [START ON 9/15/2021] magnesium oxide  400 mg Oral Daily with lunch     methylPREDNISolone  250 mg Intravenous Once     mycophenolate  1,000 mg Oral BID IS     polyethylene glycol  17 g Oral Daily     senna-docusate  1 tablet Oral BID     sodium chloride (PF)  10 mL Intracatheter Q8H     [START ON 9/15/2021] sulfamethoxazole-trimethoprim  1 tablet Oral Q Mon Wed Fri AM     tacrolimus  3 mg Oral BID IS     [Held by provider] valGANciclovir  450 mg Oral Once per day on Tue Fri       Physical Exam:     Admit Weight: 79.8 kg (175 lb 14.8 oz)    Current vitals:   /84 (BP Location: Left arm)   Pulse 76   Temp 98.2  F (36.8  C) (Oral)   Resp 18   Ht 1.702 m (5' 7\")   Wt 79.8 kg (175 lb 14.8 oz)   SpO2 92%   BMI 27.55 kg/m      CVP (mmHg): 11 mmHg    Vital sign ranges:    Temp:  [97.8  F (36.6  C)-98.7  F (37.1  C)] 98.2  F (36.8  C)  Pulse:  [67-96] 76  Resp:  [14-18] 18  BP: (108-139)/(68-91) 130/84  SpO2:  [92 %-100 %] 92 %  Patient Vitals for the past 24 hrs:   BP Temp Temp src Pulse Resp SpO2   09/14/21 0800 130/84 98.2  F (36.8  C) Oral 76 18 92 %   09/14/21 0744 124/79 98.2  F (36.8  C) Oral 76 18 93 %   09/14/21 0658 118/79 -- -- 73 16 94 %   09/14/21 0559 119/78 -- -- 79 16 93 %   09/14/21 0500 128/79 98.7  F (37.1  C) Oral 70 16 94 %   09/14/21 0400 121/84 -- -- 67 16 95 %   09/14/21 0302 124/80 98.1  F (36.7  C) Oral 86 16 95 %   09/14/21 0159 118/73 -- -- -- -- 94 %   09/14/21 0102 124/76 -- -- -- -- 95 %   09/14/21 0003 132/83 -- -- -- -- --   09/14/21 0000 -- -- -- -- 16 --   09/13/21 2357 -- 97.8  F (36.6  C) Oral 87 16 96 %   09/13/21 2300 132/81 -- -- 79 -- --   09/13/21 2215 139/84 -- -- 88 16 --   09/13/21 2200 -- -- -- 84 -- --   09/13/21 2103 -- 98.4  F (36.9  C) Oral 82 -- -- "   09/13/21 2100 135/81 -- -- 90 -- --   09/13/21 2000 129/81 -- -- 80 -- 95 %   09/13/21 1947 125/78 -- -- 82 -- --   09/13/21 1940 125/78 -- -- 82 -- --   09/13/21 1902 130/83 98.1  F (36.7  C) Oral 89 18 95 %   09/13/21 1805 -- -- -- -- -- 95 %   09/13/21 1750 128/87 -- -- 89 -- --   09/13/21 1745 -- -- -- 82 -- --   09/13/21 1740 -- -- -- 83 -- --   09/13/21 1735 128/82 -- -- 82 -- --   09/13/21 1705 133/86 -- -- 90 -- --   09/13/21 1650 129/83 -- -- 92 16 94 %   09/13/21 1645 -- -- -- 87 -- --   09/13/21 1640 -- -- -- 89 -- --   09/13/21 1635 (!) 138/91 -- -- 92 -- --   09/13/21 1620 121/77 -- -- 84 -- 95 %   09/13/21 1615 -- -- -- 84 -- --   09/13/21 1610 -- -- -- 85 -- --   09/13/21 1605 111/79 -- -- 83 -- --   09/13/21 1600 -- 98.3  F (36.8  C) Oral 84 14 --   09/13/21 1555 -- -- -- 86 -- --   09/13/21 1550 121/75 -- -- 88 -- --   09/13/21 1545 121/80 -- -- -- -- --   09/13/21 1515 108/74 -- -- 84 -- 92 %   09/13/21 1500 117/75 98.1  F (36.7  C) Skin 87 -- 94 %   09/13/21 1450 115/76 -- -- 92 15 92 %   09/13/21 1445 -- -- -- -- -- 95 %   09/13/21 1440 116/73 -- -- 86 14 94 %   09/13/21 1430 119/73 -- -- 91 -- 95 %   09/13/21 1420 114/73 -- -- 86 -- 96 %   09/13/21 1415 113/73 -- -- 89 14 93 %   09/13/21 1410 113/73 -- -- 85 16 95 %   09/13/21 1400 127/83 98.4  F (36.9  C) Skin 90 18 96 %   09/13/21 1357 -- -- -- -- -- 98 %   09/13/21 1350 126/81 -- -- 89 -- 99 %   09/13/21 1345 -- -- -- 90 -- 99 %   09/13/21 1340 124/77 -- -- 92 -- 99 %   09/13/21 1330 127/79 -- -- 92 -- 100 %   09/13/21 1320 123/68 -- -- 90 -- 95 %   09/13/21 1315 125/81 98.4  F (36.9  C) Skin 96 -- 100 %     General Appearance: in no apparent distress.   Skin: normal, warm, dry  Heart: regular rate and rhythm  Lungs: NLB on 2LNC, CTA  Abdomen: The abdomen is soft, slightly distended, appropriately tender, incision with dermabond-CDI  : cummins is present.  Urine has no gross hematuria.   Extremities: edema: absent.   Neurologic: awake,  alert and oriented. Tremor absent..     Data:   CMP  Recent Labs   Lab 09/14/21 0449 09/13/21  2353 09/13/21  1336 09/13/21  0500     --  139 141   POTASSIUM 4.0  4.0 4.2 4.0 3.3*   CHLORIDE 107  --  102 105   CO2 27  --  25 28   *  --  145* 94   BUN 54*  --  54* 55*   CR 4.54*  --  6.37* 7.02*   GFRESTIMATED 16*  --  10* 9*   GRAHAM 8.2*  --  8.2* 8.9   MAG 2.0  --  1.7  --    PHOS 3.6  --  2.3*  --    ALBUMIN  --   --   --  3.8   BILITOTAL  --   --   --  0.4   ALKPHOS  --   --   --  122   AST  --   --   --  15   ALT  --   --   --  24     CBC  Recent Labs   Lab 09/14/21 0449 09/13/21  2353 09/13/21  1336 09/13/21  0500   HGB 9.0* 9.3* 8.6* 10.3*   WBC 11.7*  --  5.9 4.9     --  154 237   A1C  --   --   --  5.3     COAGS  Recent Labs   Lab 09/13/21  0500   INR 0.88   PTT 25      Urinalysis  Recent Labs   Lab Test 09/13/21  0532 08/04/16  0711 05/11/16  0125   COLOR Light Yellow Tina Yellow   APPEARANCE Clear Cloudy Slightly Cloudy   URINEGLC Negative Negative Negative   URINEBILI Negative Small   This is an unconfirmed screening test result. A positive result may be false.  * Negative   URINEKETONE Negative 5* 5*   SG 1.015 1.020 1.008   UBLD Negative Small* Large*   URINEPH 6.5 5.0 5.5   PROTEIN 30 * 100* 100*   NITRITE Negative Negative Negative   LEUKEST Negative Negative Negative   RBCU 1 7* 15*   WBCU 1 4* 1   UTPG 0.22*  --  1.07*     Virology:  Hepatitis C Antibody   Date Value Ref Range Status   09/13/2021 Nonreactive Nonreactive Final   08/04/2016  NR Final    Nonreactive   Assay performance characteristics have not been established for newborns,   infants, and children       BK Virus DNA copies/mL   Date Value Ref Range Status   09/13/2021 Not Detected Not Detected copies/mL Final

## 2021-09-14 NOTE — PLAN OF CARE
Neuro: A&Ox4.   Cardiac: SR. VSS. CVPs between 9-11   Respiratory: Sating >93% on RA. Encouraging IS.  GI/: Adequate urine output  per cummins. Passing flatus.  Diet/appetite: Tolerating regular diet. Denies nausea. Eating well.  Activity:  Assist of 1, up to chair and in halls. Needs encouragement to get up/reposition  Pain: right incisional pain, PRN oxy given w/ adequate relief.   Skin: No new deficits noted.  LDA's: CVC 3 lumen, PIV, R arm AV Fistula. Nasal cannula with Capno    Plan: Continue with POC. Notify primary team with changes.

## 2021-09-14 NOTE — PLAN OF CARE
B/P: 132/81, T: 98.4, P: 79, R: 16  Neuro: A&Ox4.Drowsy.    Cardiac: SR. VSS.    Respiratory: Sating 95% on 2L nc. Encouraging IS.  GI/: Adequate urine output via cummins. 110-260mL/hr. No flatus.   Diet/appetite: Tolerating clear liquid diet. Eating well. OK to advance to regular for breakfast.  Activity:  Needs encouragement to reposition or get up. Hesitant with activity.   Pain: At acceptable level on current regimen. Oxy 10mg and 0.2mg IV Dialudid given.   Skin: Liquid bandage incision c/d/I open to air.   LDA's: CVC 3 lumen, PIV, R arm AV Fistula. Nasal cannula with Capno.     Plan: Monitor UOP and replace per MAR. Encourage activity. Continue with POC. Notify primary team with changes.

## 2021-09-14 NOTE — CONSULTS
Care Management Initial Consult    General Information  Assessment completed with: Patient,    Type of CM/SW Visit: Offer D/C Planning    Primary Care Provider verified and updated as needed: Yes   Readmission within the last 30 days: no previous admission in last 30 days         Advance Care Planning:  None on file          Communication Assessment  Patient's communication style: spoken language (English or Bilingual)    Hearing Difficulty or Deaf: no   Wear Glasses or Blind: no    Cognitive  Cognitive/Neuro/Behavioral: WDL  Level of Consciousness: alert  Arousal Level: opens eyes spontaneously  Orientation: oriented x 4  Mood/Behavior: calm, cooperative  Best Language: 0 - No aphasia  Speech: slow    Living Environment:   People in home: child(seema), dependent, spouse   (Wife, Sonja)  Current living Arrangements: apartment      Able to return to prior arrangements: yes       Family/Social Support:  Care provided by: self  Provides care for:  2yr old child     Wife          Description of Support System: Supportive, Involved         Current Resources:   Patient receiving home care services: No     Community Resources: Dialysis Services  Equipment currently used at home: none  Supplies currently used at home:      Employment/Financial:  Employment Status:     Works full time     Financial Concerns:   None identified          Lifestyle & Psychosocial Needs:  Social Determinants of Health     Tobacco Use: Low Risk      Smoking Tobacco Use: Never Smoker     Smokeless Tobacco Use: Never Used   Alcohol Use:      Frequency of Alcohol Consumption:      Average Number of Drinks:      Frequency of Binge Drinking:    Financial Resource Strain:      Difficulty of Paying Living Expenses:    Food Insecurity:      Worried About Running Out of Food in the Last Year:      Ran Out of Food in the Last Year:    Transportation Needs:      Lack of Transportation (Medical):      Lack of Transportation (Non-Medical):    Physical  Activity:      Days of Exercise per Week:      Minutes of Exercise per Session:    Stress:      Feeling of Stress :    Social Connections:      Frequency of Communication with Friends and Family:      Frequency of Social Gatherings with Friends and Family:      Attends Yarsani Services:      Active Member of Clubs or Organizations:      Attends Club or Organization Meetings:      Marital Status:    Intimate Partner Violence:      Fear of Current or Ex-Partner:      Emotionally Abused:      Physically Abused:      Sexually Abused:    Depression: Not at risk     PHQ-2 Score: 0   Housing Stability:      Unable to Pay for Housing in the Last Year:      Number of Places Lived in the Last Year:      Unstable Housing in the Last Year:        Functional Status:  Prior to admission patient was independent.                        Values/Beliefs:  Spiritual, Cultural Beliefs, Yarsani Practices, Values that affect care:                 Additional Information:  Pt s/p Kidney Transplant 9/13/21. I have met with Pt to assist with discharge planning. Prior to admission Pt was independent living with his Wife and 2yr old in [facility].  I have informed Pt of daily ATC Clinic visits starting the morning after discharge. Home Care follow up discussed which Pt is agreeable to.  Choice of Home Care options offered, [facility] is preferred. I have made a referral to [facility] and added RN visits to the discharge orders.    Dionna Ramirez RN   6B care coordinator #[phone]

## 2021-09-15 ENCOUNTER — APPOINTMENT (OUTPATIENT)
Dept: PHYSICAL THERAPY | Facility: CLINIC | Age: 36
DRG: 652 | End: 2021-09-15
Attending: TRANSPLANT SURGERY
Payer: MEDICARE

## 2021-09-15 ENCOUNTER — TELEPHONE (OUTPATIENT)
Dept: TRANSPLANT | Facility: CLINIC | Age: 36
End: 2021-09-15

## 2021-09-15 PROBLEM — I15.0 RENOVASCULAR HYPERTENSION: Status: ACTIVE | Noted: 2018-07-26

## 2021-09-15 LAB
ANION GAP SERPL CALCULATED.3IONS-SCNC: 3 MMOL/L (ref 3–14)
BASOPHILS # BLD AUTO: 0 10E3/UL (ref 0–0.2)
BASOPHILS NFR BLD AUTO: 0 %
BUN SERPL-MCNC: 36 MG/DL (ref 7–30)
CALCIUM SERPL-MCNC: 8.4 MG/DL (ref 8.5–10.1)
CELL TYPE ALLO: NORMAL
CELL TYPE AUTO: NORMAL
CHANNELSHIFTALLOB1: -28
CHANNELSHIFTALLOB2: -25
CHANNELSHIFTALLOT1: -19
CHANNELSHIFTALLOT2: -19
CHANNELSHIFTAUTOB1: -32
CHANNELSHIFTAUTOB2: -26
CHANNELSHIFTAUTOT1: -31
CHANNELSHIFTAUTOT2: -26
CHLORIDE BLD-SCNC: 111 MMOL/L (ref 94–109)
CO2 SERPL-SCNC: 28 MMOL/L (ref 20–32)
CREAT SERPL-MCNC: 1.88 MG/DL (ref 0.66–1.25)
CROSSMATCHDATEALLO: NORMAL
CROSSMATCHDATEAUTO: NORMAL
DONOR ALLO: NORMAL
DONOR AUTO: NORMAL
DONORCELLDATE ALLO: NORMAL
DONORCELLDATE AUTO: NORMAL
EOSINOPHIL # BLD AUTO: 0 10E3/UL (ref 0–0.7)
EOSINOPHIL NFR BLD AUTO: 0 %
ERYTHROCYTE [DISTWIDTH] IN BLOOD BY AUTOMATED COUNT: 13 % (ref 10–15)
GFR SERPL CREATININE-BSD FRML MDRD: 46 ML/MIN/1.73M2
GLUCOSE BLD-MCNC: 130 MG/DL (ref 70–99)
GLUCOSE BLDC GLUCOMTR-MCNC: 137 MG/DL (ref 70–99)
HCT VFR BLD AUTO: 25.5 % (ref 40–53)
HGB BLD-MCNC: 8.4 G/DL (ref 13.3–17.7)
IMM GRANULOCYTES # BLD: 0 10E3/UL
IMM GRANULOCYTES NFR BLD: 0 %
LYMPHOCYTES # BLD AUTO: 0.2 10E3/UL (ref 0.8–5.3)
LYMPHOCYTES NFR BLD AUTO: 2 %
MAGNESIUM SERPL-MCNC: 2.1 MG/DL (ref 1.6–2.3)
MCH RBC QN AUTO: 30.1 PG (ref 26.5–33)
MCHC RBC AUTO-ENTMCNC: 32.9 G/DL (ref 31.5–36.5)
MCV RBC AUTO: 91 FL (ref 78–100)
MONOCYTES # BLD AUTO: 0.9 10E3/UL (ref 0–1.3)
MONOCYTES NFR BLD AUTO: 9 %
NEUTROPHILS # BLD AUTO: 8.4 10E3/UL (ref 1.6–8.3)
NEUTROPHILS NFR BLD AUTO: 89 %
NRBC # BLD AUTO: 0 10E3/UL
NRBC BLD AUTO-RTO: 0 /100
PHOSPHATE SERPL-MCNC: 3 MG/DL (ref 2.5–4.5)
PLATELET # BLD AUTO: 159 10E3/UL (ref 150–450)
POS CUT OFF ALLO B: >93
POS CUT OFF ALLO T: >79
POS CUT OFF AUTO B: >93
POS CUT OFF AUTO T: >79
POTASSIUM BLD-SCNC: 3.9 MMOL/L (ref 3.4–5.3)
RBC # BLD AUTO: 2.79 10E6/UL (ref 4.4–5.9)
RESULT ALLO B1: NORMAL
RESULT ALLO B2: NORMAL
RESULT ALLO T1: NORMAL
RESULT ALLO T2: NORMAL
RESULT AUTO B1: NORMAL
RESULT AUTO B2: NORMAL
RESULT AUTO T1: NORMAL
RESULT AUTO T2: NORMAL
SERUM DATE ALLO B1: NORMAL
SERUM DATE ALLO B2: NORMAL
SERUM DATE ALLO T1: NORMAL
SERUM DATE ALLO T2: NORMAL
SERUM DATE AUTO B1: NORMAL
SERUM DATE AUTO B2: NORMAL
SERUM DATE AUTO T1: NORMAL
SERUM DATE AUTO T2: NORMAL
SODIUM SERPL-SCNC: 142 MMOL/L (ref 133–144)
TESTMETHODALLO: NORMAL
TESTMETHODAUTO: NORMAL
TREATMENT ALLO B1: NORMAL
TREATMENT ALLO B2: NORMAL
TREATMENT ALLO T1: NORMAL
TREATMENT ALLO T2: NORMAL
TREATMENT AUTO B1: NORMAL
TREATMENT AUTO B2: NORMAL
TREATMENT AUTO T1: NORMAL
TREATMENT AUTO T2: NORMAL
WBC # BLD AUTO: 9.5 10E3/UL (ref 4–11)
ZZZCOMMENT ALLOB2: NORMAL

## 2021-09-15 PROCEDURE — 250N000012 HC RX MED GY IP 250 OP 636 PS 637: Performed by: NURSE PRACTITIONER

## 2021-09-15 PROCEDURE — 250N000013 HC RX MED GY IP 250 OP 250 PS 637: Performed by: NURSE PRACTITIONER

## 2021-09-15 PROCEDURE — 97530 THERAPEUTIC ACTIVITIES: CPT | Mod: GP | Performed by: PHYSICAL THERAPIST

## 2021-09-15 PROCEDURE — 36592 COLLECT BLOOD FROM PICC: CPT | Performed by: NURSE PRACTITIONER

## 2021-09-15 PROCEDURE — 250N000011 HC RX IP 250 OP 636: Performed by: NURSE PRACTITIONER

## 2021-09-15 PROCEDURE — 85025 COMPLETE CBC W/AUTO DIFF WBC: CPT | Performed by: NURSE PRACTITIONER

## 2021-09-15 PROCEDURE — 99233 SBSQ HOSP IP/OBS HIGH 50: CPT | Mod: 24 | Performed by: INTERNAL MEDICINE

## 2021-09-15 PROCEDURE — 84100 ASSAY OF PHOSPHORUS: CPT | Performed by: NURSE PRACTITIONER

## 2021-09-15 PROCEDURE — 80048 BASIC METABOLIC PNL TOTAL CA: CPT | Performed by: NURSE PRACTITIONER

## 2021-09-15 PROCEDURE — 120N000011 HC R&B TRANSPLANT UMMC

## 2021-09-15 PROCEDURE — 83735 ASSAY OF MAGNESIUM: CPT | Performed by: NURSE PRACTITIONER

## 2021-09-15 PROCEDURE — 97116 GAIT TRAINING THERAPY: CPT | Mod: GP | Performed by: PHYSICAL THERAPIST

## 2021-09-15 RX ORDER — SULFAMETHOXAZOLE AND TRIMETHOPRIM 400; 80 MG/1; MG/1
1 TABLET ORAL DAILY
Status: DISCONTINUED | OUTPATIENT
Start: 2021-09-16 | End: 2021-09-16 | Stop reason: HOSPADM

## 2021-09-15 RX ORDER — SIMETHICONE 80 MG
80 TABLET,CHEWABLE ORAL EVERY 6 HOURS PRN
Status: DISCONTINUED | OUTPATIENT
Start: 2021-09-15 | End: 2021-09-16 | Stop reason: HOSPADM

## 2021-09-15 RX ADMIN — OXYCODONE HYDROCHLORIDE 5 MG: 5 TABLET ORAL at 05:02

## 2021-09-15 RX ADMIN — MYCOPHENOLATE MOFETIL 1000 MG: 250 CAPSULE ORAL at 18:10

## 2021-09-15 RX ADMIN — SULFAMETHOXAZOLE AND TRIMETHOPRIM 1 TABLET: 400; 80 TABLET ORAL at 08:14

## 2021-09-15 RX ADMIN — OXYCODONE HYDROCHLORIDE 5 MG: 5 TABLET ORAL at 08:53

## 2021-09-15 RX ADMIN — METHYLPREDNISOLONE SODIUM SUCCINATE 100 MG: 125 INJECTION, POWDER, FOR SOLUTION INTRAMUSCULAR; INTRAVENOUS at 08:18

## 2021-09-15 RX ADMIN — ACETAMINOPHEN 975 MG: 325 TABLET, FILM COATED ORAL at 15:48

## 2021-09-15 RX ADMIN — Medication 400 MG: at 11:59

## 2021-09-15 RX ADMIN — MYCOPHENOLATE MOFETIL 1000 MG: 250 CAPSULE ORAL at 08:13

## 2021-09-15 RX ADMIN — TACROLIMUS 3 MG: 1 CAPSULE ORAL at 18:11

## 2021-09-15 RX ADMIN — TACROLIMUS 3 MG: 1 CAPSULE ORAL at 08:13

## 2021-09-15 RX ADMIN — SIMETHICONE 80 MG: 80 TABLET, CHEWABLE ORAL at 08:27

## 2021-09-15 RX ADMIN — SENNOSIDES AND DOCUSATE SODIUM 1 TABLET: 8.6; 5 TABLET ORAL at 08:19

## 2021-09-15 RX ADMIN — SODIUM CHLORIDE, SODIUM LACTATE, POTASSIUM CHLORIDE, CALCIUM CHLORIDE, AND DEXTROSE MONOHYDRATE: 600; 310; 30; 20; 5 INJECTION, SOLUTION INTRAVENOUS at 15:49

## 2021-09-15 RX ADMIN — SODIUM CHLORIDE, SODIUM LACTATE, POTASSIUM CHLORIDE, CALCIUM CHLORIDE, AND DEXTROSE MONOHYDRATE: 600; 310; 30; 20; 5 INJECTION, SOLUTION INTRAVENOUS at 05:01

## 2021-09-15 RX ADMIN — OXYCODONE HYDROCHLORIDE 5 MG: 5 TABLET ORAL at 12:53

## 2021-09-15 RX ADMIN — ACETAMINOPHEN 975 MG: 325 TABLET, FILM COATED ORAL at 08:14

## 2021-09-15 RX ADMIN — POLYETHYLENE GLYCOL 3350 17 G: 17 POWDER, FOR SOLUTION ORAL at 08:19

## 2021-09-15 RX ADMIN — ATORVASTATIN CALCIUM 10 MG: 10 TABLET, FILM COATED ORAL at 08:19

## 2021-09-15 ASSESSMENT — ACTIVITIES OF DAILY LIVING (ADL)
ADLS_ACUITY_SCORE: 14

## 2021-09-15 NOTE — PROGRESS NOTES
[facility]   Transplant Nephrology Progress Note  Date of Admission:  9/13/2021  Today's Date: 09/15/2021    35 year old with hx of ESKD on HD (5 years) underwent DDKT on 9/14.     Recommendations:  - If SBP > 140/90 consider starting Amlodipine 5 mg daily  - Continue IS with MMF 1 g BID and Tac with goal 8-10    Assessment & Plan   # DDKT: Stable   - Baseline Creatinine: ~ too early to determine   - Proteinuria: Minimal (0.2-0.5 grams)   - Date DSA Last Checked: Not Known      Latest DSA: No DSA at time of transplant   - BK Viremia: No   - Kidney Tx Biopsy: No    # Immunosuppression: Tacrolimus immediate release (goal 8-10)   - Induction with Thymo 150 mg,  mg BID (9/13); Simulect 20 mg,  mg (9/14)   - Changes: On MMF 1 g BID, Tac 3 BID    # Infection Prophylaxis:   - PJP: Sulfa/TMP (Bactrim)  - CMV: Valganciclovir (Valcyte)    # Hypertension: Controlled;  Goal BP: < 140/90   - Volume status: Euvolemic  EDW ~ 77 Kg   - Changes: If SBP > 140/90 start amlodipine 5 mg    # Anemia in Chronic Renal Disease: Hgb: Stable      KODY: No   - Iron studies: Not checked recently    # Mineral Bone Disorder:   - Vitamin D; level: Not checked recently        On supplement: No  - Calcium; level: Normal        On supplement: No  - Phosphorus; level: Normal        On supplement: No    # Electrolytes:   - Potassium; level: Normal        On supplement: No  - Magnesium; level: Normal        On supplement: No  - Bicarbonate; level: Normal        On supplement: No      # Transplant History:  Etiology of Kidney Failure: Unknown etiology (no kidney biopsy)  Tx: DDKT  Transplant: 9/13/2021 (Kidney)     Significant changes in immunosuppression: None  Significant transplant-related complications: None  Crossmatch at time of Tx: negative  DSA at time of Tx: No  Recommendations were communicated to the primary team via this note.    Seen and discussed with Dr. Mario Gonzalez MD  "  Pager: [phone]    Interval History   - BP control adequate  - Hb  - 8.4, creatinine - down trending  - Urine o/p 2.6 L    Review of Systems   4 point ROS was obtained and negative except as noted in the Interval History.    MEDICATIONS:    acetaminophen  975 mg Oral Q8H     atorvastatin  10 mg Oral Daily     magnesium oxide  400 mg Oral Daily with lunch     mycophenolate  1,000 mg Oral BID IS     polyethylene glycol  17 g Oral Daily     [START ON 2021] predniSONE  60 mg Oral Daily    Followed by     [START ON 2021] predniSONE  40 mg Oral Daily    Followed by     [START ON 2021] predniSONE  20 mg Oral Daily    Followed by     [START ON 2021] predniSONE  15 mg Oral Daily    Followed by     [START ON 10/4/2021] predniSONE  10 mg Oral Daily    Followed by     [START ON 10/11/2021] predniSONE  5 mg Oral Daily     senna-docusate  1 tablet Oral BID     sodium chloride (PF)  10 mL Intracatheter Q8H     sulfamethoxazole-trimethoprim  1 tablet Oral Q Mon Wed Fri AM     tacrolimus  3 mg Oral BID IS       dextrose 5% lactated ringers 100 mL/hr at 09/15/21 0755       Physical Exam   Temp  Av.3  F (36.8  C)  Min: 97.8  F (36.6  C)  Max: 98.7  F (37.1  C)      Pulse  Av.1  Min: 67  Max: 96 Resp  Av.2  Min: 14  Max: 18  SpO2  Av.4 %  Min: 92 %  Max: 100 %    CVP (mmHg): 11 mmHgBP (!) 135/91 (BP Location: Left arm)   Pulse 73   Temp 98.5  F (36.9  C) (Oral)   Resp 16   Ht 1.702 m (5' 7\")   Wt 83.5 kg (184 lb 1.4 oz)   SpO2 94%   BMI 28.83 kg/m     Date 21 0700 - 09/15/21 0659   Shift 8624-3730 4514-1151 7711-5485 24 Hour Total   INTAKE   I.V. 10   10   Shift Total(mL/kg) 10(0.13)   10(0.13)   OUTPUT   Urine 125   125   Shift Total(mL/kg) 125(1.57)   125(1.57)   Weight (kg) 79.8 79.8 79.8 79.8      Admit Weight: 79.8 kg (175 lb 14.8 oz)     GENERAL APPEARANCE: alert and no distress  HENT: mouth without ulcers or lesions  LYMPHATICS: no cervical or supraclavicular nodes  RESP: " lungs clear to auscultation - no rales, rhonchi or wheezes  CV: regular rhythm, normal rate, no rub, no murmur  EDEMA: no LE edema bilaterally  ABDOMEN: soft, nondistended, nontender, bowel sounds normal  MS: extremities normal - no gross deformities noted, no evidence of inflammation in joints, no muscle tenderness  SKIN: no rash    Data   All labs reviewed by me.  CMP  Recent Labs   Lab 09/15/21  0634 09/15/21  0453 09/14/21  1630 09/14/21  1141 09/14/21  0846 09/14/21  0449 09/14/21  0449 09/13/21 2014 09/13/21  1336 09/13/21  0600 09/13/21  0500     --   --   --   --   --  139  --  139  --  141   POTASSIUM 3.9  --  3.9 3.9 4.0   < > 4.0  4.0   < > 4.0   < > 3.3*   CHLORIDE 111*  --   --   --   --   --  107  --  102  --  105   CO2 28  --   --   --   --   --  27  --  25  --  28   ANIONGAP 3  --   --   --   --   --  5  --  12  --  8   * 137*  --   --   --   --  159*  --  145*   < > 94   BUN 36*  --   --   --   --   --  54*  --  54*  --  55*   CR 1.88*  --   --   --   --   --  4.54*  --  6.37*  --  7.02*   GFRESTIMATED 46*  --   --   --   --   --  16*  --  10*  --  9*   GRAHAM 8.4*  --   --   --   --   --  8.2*  --  8.2*  --  8.9   MAG 2.1  --   --   --   --   --  2.0  --  1.7  --   --    PHOS 3.0  --   --   --   --   --  3.6  --  2.3*  --   --    PROTTOTAL  --   --   --   --   --   --   --   --   --   --  7.7   ALBUMIN  --   --   --   --   --   --   --   --   --   --  3.8   BILITOTAL  --   --   --   --   --   --   --   --   --   --  0.4   ALKPHOS  --   --   --   --   --   --   --   --   --   --  122   AST  --   --   --   --   --   --   --   --   --   --  15   ALT  --   --   --   --   --   --   --   --   --   --  24    < > = values in this interval not displayed.     CBC  Recent Labs   Lab 09/15/21  0634 09/14/21  1630 09/14/21  1141 09/14/21  0846 09/14/21  0449 09/14/21  0449 09/13/21 2014 09/13/21  1336 09/13/21  0500 09/13/21  0500   HGB 8.4* 9.5* 9.2* 9.2*   < > 9.0*   < > 8.6*   < > 10.3*   WBC  9.5  --   --   --   --  11.7*  --  5.9  --  4.9   RBC 2.79*  --   --   --   --  3.01*  --  2.85*  --  3.45*   HCT 25.5*  --   --   --   --  27.6*  --  25.7*  --  31.4*   MCV 91  --   --   --   --  92  --  90  --  91   MCH 30.1  --   --   --   --  29.9  --  30.2  --  29.9   MCHC 32.9  --   --   --   --  32.6  --  33.5  --  32.8   RDW 13.0  --   --   --   --  12.8  --  12.4  --  12.4     --   --   --   --  177  --  154  --  237    < > = values in this interval not displayed.     INR  Recent Labs   Lab 09/13/21  0500   INR 0.88   PTT 25     ABGNo lab results found in last 7 days.   Urine Studies  Recent Labs   Lab Test 09/13/21  0532 08/04/16  0711 05/11/16  0125 03/09/16  1850   COLOR Light Yellow Tina Yellow Tina   APPEARANCE Clear Cloudy Slightly Cloudy Slightly Cloudy   URINEGLC Negative Negative Negative Negative   URINEBILI Negative Small   This is an unconfirmed screening test result. A positive result may be false.  * Negative Negative   URINEKETONE Negative 5* 5* Negative   SG 1.015 1.020 1.008 >1.030   UBLD Negative Small* Large* Large*   URINEPH 6.5 5.0 5.5 5.0   PROTEIN 30 * 100* 100* 100*   UROBILINOGEN  --   --   --  0.2   NITRITE Negative Negative Negative Negative   LEUKEST Negative Negative Negative Negative   RBCU 1 7* 15* >100*   WBCU 1 4* 1 O - 2     Recent Labs   Lab Test 09/13/21  0532 05/11/16  0125   UTPG 0.22* 1.07*     PTH  Recent Labs   Lab Test 05/11/16  1314   PTHI 633*     Iron Studies  Recent Labs   Lab Test 09/14/21  0449 05/11/16  0450 05/10/16  2325   IRON 18* 38 32*   * 226*  --    IRONSAT 10* 17  --    RACH  --   --  577*       IMAGING:  All imaging studies reviewed by me.    Patient was seen and evaluated by me, Cristino Martin MD. I have reviewed the note and agree with the the plan of care as documented by the fellow.

## 2021-09-15 NOTE — PLAN OF CARE
Vitals: stable RA /95.  Blood glucose: NA  Pain/nausea: denies  Diet: regular good appetite  Lines: TLIJ infusing [id number].   : cummins good .   GI: no BM since post op, passing gas. Given senna. Refused suppository.   Drains: NA  Skin: incision abdomen RLQ dermabonded MEENAKSHI. No other skin issues found.   Mobility: up with 1 with IV pole.   Education : med/lab card started and updated. MTP attempted but pt wanted to start with wife tomorrow. Needs to see [facility] pharmacy.

## 2021-09-15 NOTE — DISCHARGE SUMMARY
[facility]    Discharge Summary  Solid Organ Transplant    Date of Admission:  2021  Date of Discharge:  2021  Discharging Provider: María Yanecy NP    Discharge Diagnoses   Active Problems:    Anemia of chronic kidney failure    Renovascular hypertension    Kidney transplant recipient      Follow-ups Needed After Discharge       Unresulted Labs Ordered in the Past 30 Days of this Admission     Date and Time Order Name Status Description    2021  1:00 AM Research Kit Collection In process     9/15/2021 11:30 PM CMV DNA quantification In process     2021  4:43 AM HBV HCV HIV by PANCHO In process     2021  3:02 AM REFERRAL  DONOR In process       These results will be followed up by transplant Infectious disease    Discharge Disposition   Discharged to home  Condition at discharge: Good      Hospital Course   Kailash Sorto with PMH significant for HTN, ACD on HD x5 years who was admitted on 2021 s/p DDKTx 21.  The following problems were addressed during his hospitalization:     donor Kidney transplant: 21. Graft function good.  SCr trended down appropriately postoperatively and was 1.6 at discharge. Quiles removed on POD #3 and he was voiding ~2.3L/day without retention.    Immunosuppression:  Induction immunosuppression with thymoglobulin 159.5mg (2mg/kg) and Simulect 20mg on POD #1() and POD #5 (due ) induction and steroid taper. Maintenance immunosuppression with Tacrolimus and mycophenolate. Tacrolimus goal level 8-10 (12 hour trough).  Infectious prophylaxis with CMV study drug (per Transplant ID) and bactrim (indefinitely).     Transplant coordinator: Yas Card RN  [phone]  Donor type:  DBD  DSA at time of transplant:  No  Ureteral stent: Yes  CMV:  Donor Positive/ Recipient negative  EBV:  Donor Positive/ Recipient positive  Thymoglobulin:  2mg/kg    Acute postoperative pain control:  Controlled, continue Tylenol 650mg Q6 PRN and Oxycodone 5mg Q6 PRN mod-severe pain    Anemia of chronic disease/BRISA : Hgb stable 8.4. PSAT 10    Hypertension: SBP 10-140s. He will continue Norvasc 5mg Daily at discharge    Consultations This Hospital Stay   NEPHROLOGY KIDNEY/PANCREAS TRANSPLANT ADULT IP CONSULT  VASCULAR ACCESS CARE ADULT IP CONSULT  SOT MEDICATION HISTORY IP PHARMACY CONSULT  SOCIAL WORK IP CONSULT  PHARMACY IP CONSULT  NUTRITION SERVICES ADULT IP CONSULT  NEPHROLOGY KIDNEY/PANCREAS TRANSPLANT ADULT IP CONSULT  PHYSICAL THERAPY ADULT IP CONSULT  CARE MANAGEMENT / SOCIAL WORK IP CONSULT    Code Status   Full Code    Time Spent on this Encounter   I, María Yancey NP, personally saw the patient today and spent greater than 30 minutes discharging this patient.       María Yancey NP  [facility]  ______________________________________________________________________    Physical Exam   Temp: 98.2  F (36.8  C) Temp src: Oral BP: (!) 148/99 Pulse: 80   Resp: 16 SpO2: 98 % O2 Device: None (Room air)    Vitals:    09/13/21 0524 09/14/21 1106 09/16/21 0622   Weight: 79.8 kg (175 lb 14.8 oz) 83.5 kg (184 lb 1.4 oz) 83.9 kg (185 lb)     Vital Signs with Ranges  Temp:  [98.1  F (36.7  C)-98.5  F (36.9  C)] 98.2  F (36.8  C)  Pulse:  [72-82] 80  Resp:  [16-18] 16  BP: (133-148)/(85-99) 148/99  SpO2:  [92 %-98 %] 98 %  I/O last 3 completed shifts:  In: 1554.17 [I.V.:1554.17]  Out: 2200 [Urine:2200]    General Appearance: in no apparent distress.   Skin: normal, warm, dry  Heart: regular rate and rhythm  Lungs: NLB on RA, CTA  Abdomen: The abdomen is soft, slightly distended,nontender, incision with dermabond-CDI  : Voiding without retention. Urine has no gross hematuria.   Extremities: edema: absent.   Neurologic: awake, alert and oriented. Tremor absent.    Primary Care Physician   Homero Dumont    Discharge Orders      Home care nursing referral       Care Coordination Referral      MD face to face encounter    Documentation of Face to Face and Certification for Home Health Services    I certify that patient: Kailash Sorto is under my care and that I, or a nurse practitioner or physician's assistant working with me, had a face-to-face encounter that meets the physician face-to-face encounter requirements with this patient on: 9/16/21    This encounter with the patient was in whole, or in part, for the following medical condition, which is the primary reason for home health care: Kidney Transplant    I certify that, based on my findings, the following services are medically necessary home health services:  RN    Further, I certify that my clinical findings support that this patient is homebound, absences from home require considerable and taxing effort and are for medical reasons or Muslim services or infrequently or of short duration when for other reasons.    Based on the above findings. I certify that this patient is confined to the home and needs intermittent skilled nursing care, physical therapy and/or speech therapy.  The patient is under my care, and I have initiated the establishment of the plan of care.  This patient will be followed by a physician who will periodically review the plan of care.  Physician/Provider to provide follow up care: Homero Dumont    Attending hospital physician (the Medicare certified PECOS provider):   Physician Signature: See electronic signature associated with these discharge orders.  Date: 9/16/2021     Activity    Walk at least four times a day, lift no greater than 10 pounds for 6-8 weeks from the time of surgery.  No driving while taking narcotics or 3 weeks after surgery.     Wound care and dressings    Instructions to care for your wound at home:keep incision clean and dry. Ok to shower. Do not scrub incision.  Pat dry.  Do not soak or submerge in baths, hot tubs, or pools. Do not apply lotions or powders to  incision.     When to contact your care team    WHEN TO CONTACT YOUR  COORDINATOR:  Transplant Coordinator [phone]  Notify your coordinator if you have pain over your kidney, increased redness or drainage from your incision, fever greater than 100.5F, or decreased urine output.  Notify your coordinator immediately if you are ever unable to take your immunosuppressive medications for any reason.  If it is outside of office hours, please call the hospital switchboard at [phone] and ask to have the  kidney transplant surgery fellow paged for urgent medical questions, or present to the emergency department.     Monitor and record    blood pressure daily  weight every day. Call coordinator if you experience a 2 lb weight gain or weight loss in a 24 hour period.     Adult [facility] Follow-up and recommended labs and tests    Over the next 2 days you will be seen in [facility] at 7 am (ph. [phone], option 7) .  Your labs will be drawn at the beginning of your appointment at 7:00 am.  DO NOT take your medications prior to having labs drawn. Please bring all your medications with you from home to take after labs are drawn.    LABS:  CBC, BMP, Mg, Phos to be drawn daily while in [facility], then every Monday, Thursday by home health care nurse if arranged, or at an outpatient lab.   Tacrolimus levels (12 hours after administration) daily while in AT[facility] then 2 times weekly.     FOLLOW UP APPOINTMENTS:  Remember to always bring an updated medication list to all appointments.     An appointment with your transplant surgeon will be scheduled for approximately 2 weeks following discharge from the hospital.  Your transplant surgeon is: Dr. Amado Vale  You will follow up with transplant nephrology in clinic as scheduled.   Follow up with primary care provider in 4-8 weeks. (Pt to schedule)  You have a ureteral stent in place which needs to be removed in 4-6 weeks.  You will be scheduled for stent removal.   If a  does not contact you for this, please contact your transplant coordinator.    Call scheduling at [phone] if you have not heard about your appointments within 48 hours after discharge.  If you have staples in place, they will be removed in 3 weeks after operation.    Discharged to  home        Appointments on [place] and/or [facility] (with [facility] or [facility] provider or service). Call [phone] if you haven't heard regarding these appointments within 7 days of discharge.     Reason for your hospital stay    Kidney transplant recipient [Z94.0]  Hypertension     Diet    Diet recommendations post-transplant: Regular Diet Adult      Heart healthy dietary habits long term (low saturated/trans fat, low sodium). High protein diet x 8 weeks. Practice food safety precautions.       Significant Results and Procedures   Most Recent 3 CBC's:  Recent Labs   Lab Test 09/16/21  0724 09/15/21  0634 09/14/21  1630 09/14/21  0846 09/14/21  0449   WBC 7.5 9.5  --   --  11.7*   HGB 8.3* 8.4* 9.5*   < > 9.0*   MCV 91 91  --   --  92   * 159  --   --  177    < > = values in this interval not displayed.     Most Recent 3 BMP's:  Recent Labs   Lab Test 09/16/21  0724 09/15/21  0634 09/15/21  0453 09/14/21  1630 09/14/21  0846 09/14/21  0449 09/13/21  0600   * 142  --   --   --  139  --    POTASSIUM 3.9 3.9  --  3.9   < > 4.0  4.0  --    CHLORIDE 116* 111*  --   --   --  107  --    CO2 26 28  --   --   --  27  --    BUN 31* 36*  --   --   --  54*  --    CR 1.64* 1.88*  --   --   --  4.54*  --    ANIONGAP 3 3  --   --   --  5  --    GRAHAM 8.6 8.4*  --   --   --  8.2*  --    GLC 94 130* 137*  --   --  159*   < >    < > = values in this interval not displayed.     Most Recent 3 INR's:  Recent Labs   Lab Test 09/13/21  0500 08/04/16  0711 05/15/16  0547   INR 0.88 0.95 1.01     Most Recent Hemoglobin A1c:  Recent Labs   Lab Test 09/13/21  0500   A1C 5.3     Most Recent 6 glucoses:  Recent Labs   Lab Test  09/16/21  0724 09/15/21  0634 09/15/21  0453 09/14/21  0449 09/13/21  1336 09/13/21  0600   GLC 94 130* 137* 159* 145* 98     Most Recent Urinalysis:  Recent Labs   Lab Test 09/13/21  0532 05/11/16  0125 03/09/16  1850   COLOR Light Yellow   < > Tina   APPEARANCE Clear   < > Slightly Cloudy   URINEGLC Negative   < > Negative   URINEBILI Negative   < > Negative   URINEKETONE Negative   < > Negative   SG 1.015   < > >1.030   UBLD Negative   < > Large*   URINEPH 6.5   < > 5.0   PROTEIN 30 *   < > 100*   UROBILINOGEN  --   --  0.2   NITRITE Negative   < > Negative   LEUKEST Negative   < > Negative   RBCU 1   < > >100*   WBCU 1   < > O - 2    < > = values in this interval not displayed.   ,   Results for orders placed or performed during the hospital encounter of 09/13/21   XR Chest 2 Views    Narrative    EXAMINATION: XR CHEST 2 VW, 9/13/2021 5:18 AM    INDICATION: Pre op kidney transplant    COMPARISON: Chest radiograph 8/4/2016    FINDINGS: PA and lateral upright radiograph of the chest    Trachea is midline. Cardiac and mediastinal borders are clear. Cardiac  silhouette is not enlarged. No focal consolidative opacity. No pleural  effusion. No pneumothorax. Partially visualized upper abdomen is  unremarkable.      Impression    IMPRESSION: No acute cardiac or pulmonary abnormalities.    I have personally reviewed the examination and initial interpretation  and I agree with the findings.    MARGO STOVER MD         SYSTEM ID:  [id number]   XR Chest Port 1 View    Narrative    EXAM: XR CHEST PORT 1 VIEW  9/13/2021 3:20 PM     HISTORY:  central line       COMPARISON:  9/13/2021    FINDINGS: Single view of the chest. Central venous catheter tip from  left IJ approach projects at the low SVC. Cardiomegaly. Bilateral  mixed interstitial and airspace opacities. No pleural effusion. No  pneumothorax.       Impression    IMPRESSION:   1. Central venous catheter tip at the low SVC..   2. Mixed interstitial and airspace opacities  which can be seen with  edema, atelectasis or infection.    I have personally reviewed the examination and initial interpretation  and I agree with the findings.    ABEL SIDHU MD         SYSTEM ID:  [id number]   US Renal Transplant    Narrative    EXAMINATION: US RENAL TRANSPLANT,  9/13/2021 2:43 PM     COMPARISON: None.    HISTORY: Post same day renal transplant    TECHNIQUE:  Grey-scale, color Doppler and spectral flow analysis.    FINDINGS:  The transplant kidney is located in the right lower quadrant, and  measures 10.0 cm in length. Parenchyma is of normal thickness and  echogenicity. No focal lesions. No hydronephrosis. No perinephric  fluid collection.    Renal artery flow:   173  cm/sec peak systolic at hilum.  181 peak systolic at anastomosis.  Arcuate artery resistive indices (upper to lower): 0.69, 0.72, 0.58    Renal Vein Flow:  93 cm/s at hilum.   98 cm/s at anastomosis.    Iliac artery flow:  193 cm/s peak systolic above anastomosis.  146 cm/s peak systolic below anastomosis.    Iliac vein flow:  Patent above and below the anastomosis.      Impression    IMPRESSION:   Normal grayscale appearance of the right lower quadrant transplant  kidney. Patent renal vasculature.    I have personally reviewed the examination and initial interpretation  and I agree with the findings.    DEBI STRONG MD         SYSTEM ID:  [id number]       Discharge Medications   Current Discharge Medication List      START taking these medications    Details   acetaminophen (TYLENOL) 325 MG tablet Take 2 tablets (650 mg) by mouth every 6 hours as needed for other (For optimal non-opioid multimodal pain management to improve pain control.)  Qty: 30 tablet, Refills: 1    Associated Diagnoses: Kidney transplant recipient      atorvastatin (LIPITOR) 10 MG tablet Take 1 tablet (10 mg) by mouth daily  Qty: 30 tablet, Refills: 1    Associated Diagnoses: Kidney transplant recipient      magnesium oxide (MAG-OX) 400 MG tablet Take 1  tablet (400 mg) by mouth daily (with lunch)  Qty: 30 tablet, Refills: 1    Associated Diagnoses: Kidney transplant recipient      mycophenolate (GENERIC EQUIVALENT) 250 MG capsule Take 4 capsules (1,000 mg) by mouth 2 times daily  Qty: 120 capsule, Refills: 11    Associated Diagnoses: Kidney transplant recipient      oxyCODONE (ROXICODONE) 5 MG tablet Take 1 tablet (5 mg) by mouth every 6 hours as needed for moderate to severe pain  Qty: 10 tablet, Refills: 0    Associated Diagnoses: Kidney transplant recipient      pantoprazole (PROTONIX) 40 MG EC tablet Take 1 tablet (40 mg) by mouth daily  Qty: 30 tablet, Refills: 0    Associated Diagnoses: Kidney transplant recipient      predniSONE (DELTASONE) 10 MG tablet Take 6 tablets (60 mg) by mouth daily for 1 day, THEN 4 tablets (40 mg) daily for 2 days, THEN 2 tablets (20 mg) daily for 7 days, THEN 1.5 tablets (15 mg) daily for 7 days, THEN 1 tablet (10 mg) daily for 7 days, THEN 0.5 tablets (5 mg) daily for 3 days.  Qty: 47 tablet, Refills: 0    Associated Diagnoses: Kidney transplant recipient      senna-docusate (SENOKOT-S/PERICOLACE) 8.6-50 MG tablet Take 1-2 tablets by mouth 2 times daily as needed for constipation  Qty: 30 tablet, Refills: 0    Associated Diagnoses: Kidney transplant recipient      sulfamethoxazole-trimethoprim (BACTRIM) 400-80 MG tablet Take 1 tablet by mouth daily  Qty: 30 tablet, Refills: 11    Associated Diagnoses: Kidney transplant recipient      tacrolimus (GENERIC EQUIVALENT) 0.5 MG capsule Take 1 capsule (0.5 mg) by mouth 2 times daily as needed As instructed by transplant team. HOLD  Qty: 30 capsule, Refills: 1    Associated Diagnoses: Kidney transplant recipient      tacrolimus (GENERIC EQUIVALENT) 1 MG capsule Take 6 capsules (6 mg) by mouth 2 times daily Total =6 mg  Qty: 360 capsule, Refills: 4    Associated Diagnoses: Kidney transplant recipient         CONTINUE these medications which have CHANGED    Details   amLODIPine (NORVASC) 5  MG tablet Take 1 tablet (5 mg) by mouth daily  Qty: 30 tablet, Refills: 1    Associated Diagnoses: Renovascular hypertension         STOP taking these medications       hydrALAZINE (APRESOLINE) 50 MG tablet Comments:   Reason for Stopping:         labetalol (NORMODYNE) 200 MG tablet Comments:   Reason for Stopping:             Allergies   No Known Allergies     I have reviewed history, examined patient and discussed plan with the fellow/resident/RUY.    I concur with the findings in this note.    Time spent on discharge activities: 45 minutes.

## 2021-09-15 NOTE — TELEPHONE ENCOUNTER
A pharmacist spent 25 minutes providing medication teaching via telephone with Kailash Sorto for discharge with a focus on new medications/dose changes.  The discharge medication list was reviewed with the patient and the following points were discussed, as applicable: Name, description, purpose, dose/strength, duration of medications, common side effects, food/medications to avoid, action to be taken if dose is missed and how to obtain refills.  The patient will be responsible for managing medications. Additionally, the following transplant related education was covered: Purpose of medication card, Timing of medications and day of lab draw considerations , Emesis or missed dose, Prescription Insurance  and Discharge process for receiving meds   Patient will  transplant supplies including 7 day pill organizer and BP monitor, at discharge pharmacy along with medications. Patient will use local [facility]  pharmacy for outpatient medications.     Clinical Pharmacy Consult:                                                      Transplant Specific:   Date of Transplant: 09/13/2021  Type of Transplant: kidney  First Transplant: yes  History of rejection: no    Immunosuppression Regimen   TAC 3 mg qAM & 3 mg qPM and MMF 1,000 mg qAM & 1,000 mg qPM; prednisone taper  Patient specific goal: Tac 8 to 12  Most recent level: POD2, not drawn yet, date: N/A  Immunosuppressant Levels:  Subtherapeutic  Pt adherent to lab draws: yes  Scr:   Lab Results   Component Value Date    CR 1.88 09/15/2021    CR 5.67 08/24/2020     Side effects: no side effects    Prophylactic Medications  Antibacterial:  Bactrim 400/80 mg daily  Scheduled Discontinue Date: Lifelong    Antifungal: Not needed thus far  Scheduled Discontinue Date: N/A    Antiviral: Enrolled in CMV study   Scheduled Discontinue Date: N/A    Acid Reducer: none currently prescribed  Scheduled Reviewed Date: N/A    Thrombosis Prevention: none currently prescribed  Scheduled  Discontinue Date: N/A    Blood Pressure Management  Frequency of home Blood Pressure checks: twice daily  Most recent home BP: 139/94  Patient Blood pressure goal: <140/90  Patient blood pressure at goal:  yes  Hospitalizations/ER visits since last assessment: 0      Med rec/DUR performed: yes  Med Rec Discrepancies: no    Reminders:    1. Bring to first clinic appt: med box, med card, bp monitor, all medications being taken, and lab book.  2.   MTM pharmacist visit on first clinic appt and if ok, again in 3 to 4 months during follow up appt.  3.   Avoid Grapefruit and Grapefruit juice.   4.   Avoid herbal supplements. If wish to take other medications or supplements, call your coordinator.   5.   Keep lab appts.   6.   Can use apps on phone like [employer] to help manage medication lists and reminders.   7.   Make sure you are protecting your skin by wearing long sleeves and applying sunscreen to exposed skin, for any significant time in the sun.     Transplant Coordinator is Yas Newsome, Pharm.D.  [facility]  [phone]

## 2021-09-15 NOTE — PROGRESS NOTES
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 He is now s/p DDKTx 9/13/21 with stent placement.    Graft function:DDKTx: POD #2. Good graft function  SCr 4.5 (6.4). Good UOP 2.6L    Renal Tx US: POD#0-patent vasculature  Immunosuppression management:   Induction: Intermediate risk, PRA 2  Thymo 159mg x1 (2mg/kg)   Simulect 20mg today and repeat on POD #5  Solumedrol 500mg intraop, 250mg POD #1, 100mg POD #2 then po taper x4 weeks  MMF 1 G BID  Tac 3mgBID, titrate to a goal of 8-10. Level tomorrow.   Complexity of management:Medium. Contributing factors: anemia and induction  Hematology: Anemia of chronic disease/BRISA : Hgb stable 8.4. PSAT 10  Neurology: Acute postoperative pain: Controlled, Tylenol 650mg Q6 PRN and Oxycodone 5-10 Q4 PRN mod-severe pain  Cardiorespiratory: Hx HTN:PTA on antihypertensives, do not restart at this time -140s.Stable.  If SBP > 140/90 consider starting Amlodipine 5 mg daily  GI/Nutrition: regular, encourage po fluids  Continue bowel regimen  Endocrine: No acute issues, Hgb A1c=5.3, FBS 130s. Continue to monitor with steroids.  Fluid/Electrolytes: MIVF:D5LR @ 100, decrease to 50cc/hr -CIV with good po  : Cummins to remain due to new surgical anastomosis, plan for 3 day cummins  Infectious disease: Afebrile, Leukocytosis:  WBC 9.5, improving, most likely related to steroids.   Prophylaxis: DVT,  GI, CMV study drug, bactrim (renally adjusted)  Disposition:7A, encourage COVID vaccine in 6 weeks postop    Medical Decision Making: Medium  Subsequent visit 00485 (moderate level decision making)    RUY/Fellow/Resident Provider: María Yancey NP    Faculty: Sahra Toscano M.D.  _________________________________________________________________  Transplant History: Admitted 9/13/2021 for Kidney transplant candidate [Z76.82]  Kidney transplant recipient  "[Z94.0]  9/13/2021 (Kidney), Postoperative day: 2     Interval History: History is obtained from the patient  Overnight events: Transferred from . [facility]. Pain controlled. +flatus/no bm. Quiles in place. Tolerating reg. Diet. Up within room.     ROS:   A 10-point review of systems was negative except as noted above.    Meds:    acetaminophen  975 mg Oral Q8H     atorvastatin  10 mg Oral Daily     magnesium oxide  400 mg Oral Daily with lunch     mycophenolate  1,000 mg Oral BID IS     polyethylene glycol  17 g Oral Daily     [START ON 9/16/2021] predniSONE  60 mg Oral Daily    Followed by     [START ON 9/18/2021] predniSONE  40 mg Oral Daily    Followed by     [START ON 9/20/2021] predniSONE  20 mg Oral Daily    Followed by     [START ON 9/27/2021] predniSONE  15 mg Oral Daily    Followed by     [START ON 10/4/2021] predniSONE  10 mg Oral Daily    Followed by     [START ON 10/11/2021] predniSONE  5 mg Oral Daily     senna-docusate  1 tablet Oral BID     sodium chloride (PF)  10 mL Intracatheter Q8H     sulfamethoxazole-trimethoprim  1 tablet Oral Q Mon Wed Fri AM     tacrolimus  3 mg Oral BID IS       Physical Exam:     Admit Weight: 79.8 kg (175 lb 14.8 oz)    Current vitals:   BP (!) 135/91 (BP Location: Left arm)   Pulse 73   Temp 98.5  F (36.9  C) (Oral)   Resp 16   Ht 1.702 m (5' 7\")   Wt 83.5 kg (184 lb 1.4 oz)   SpO2 94%   BMI 28.83 kg/m      CVP (mmHg): 11 mmHg    Vital sign ranges:    Temp:  [97.7  F (36.5  C)-98.8  F (37.1  C)] 98.5  F (36.9  C)  Pulse:  [70-86] 73  Resp:  [16-18] 16  BP: (121-148)/(78-97) 135/91  SpO2:  [93 %-95 %] 94 %  Patient Vitals for the past 24 hrs:   BP Temp Temp src Pulse Resp SpO2   09/15/21 1100 (!) 135/91 98.5  F (36.9  C) Oral 73 16 --   09/15/21 0736 (!) 139/94 98.6  F (37  C) Oral 77 18 94 %   09/15/21 0504 (!) 138/93 98.3  F (36.8  C) Oral 76 16 93 %   09/15/21 0400 (!) 137/97 98  F (36.7  C) Oral 70 16 93 %   09/14/21 2341 (!) 142/92 -- -- 86 -- 93 %   09/14/21 1947 " (!) 148/95 97.8  F (36.6  C) Oral 81 16 95 %   09/14/21 1729 (!) 142/95 98.8  F (37.1  C) Oral 86 16 95 %   09/14/21 1523 (!) 141/89 97.7  F (36.5  C) Oral -- 16 --   09/14/21 1405 -- -- -- -- -- 95 %   09/14/21 1330 121/83 -- -- 73 -- 95 %   09/14/21 1230 124/78 -- -- 77 -- 95 %     General Appearance: in no apparent distress.   Skin: normal, warm, dry  Heart: regular rate and rhythm  Lungs: NLB on RA, CTA  Abdomen: The abdomen is soft, slightly distended, appropriately tender, incision with dermabond-CDI  : cummins is present.  Urine has no gross hematuria.   Extremities: edema: absent.   Neurologic: awake, alert and oriented. Tremor absent..     Data:   CMP  Recent Labs   Lab 09/15/21  0634 09/15/21  0453 09/14/21  1630 09/14/21  0846 09/14/21  0449 09/13/21  0600 09/13/21  0500     --   --   --  139   < > 141   POTASSIUM 3.9  --  3.9   < > 4.0  4.0   < > 3.3*   CHLORIDE 111*  --   --   --  107   < > 105   CO2 28  --   --   --  27   < > 28   * 137*  --   --  159*   < > 94   BUN 36*  --   --   --  54*   < > 55*   CR 1.88*  --   --   --  4.54*   < > 7.02*   GFRESTIMATED 46*  --   --   --  16*   < > 9*   GRAHAM 8.4*  --   --   --  8.2*   < > 8.9   MAG 2.1  --   --   --  2.0   < >  --    PHOS 3.0  --   --   --  3.6   < >  --    ALBUMIN  --   --   --   --   --   --  3.8   BILITOTAL  --   --   --   --   --   --  0.4   ALKPHOS  --   --   --   --   --   --  122   AST  --   --   --   --   --   --  15   ALT  --   --   --   --   --   --  24    < > = values in this interval not displayed.     CBC  Recent Labs   Lab 09/15/21  0634 09/14/21  1630 09/14/21  0846 09/14/21  0449 09/13/21  1336 09/13/21  0500   HGB 8.4* 9.5*   < > 9.0*   < > 10.3*   WBC 9.5  --   --  11.7*   < > 4.9     --   --  177   < > 237   A1C  --   --   --   --   --  5.3    < > = values in this interval not displayed.     COAGS  Recent Labs   Lab 09/13/21  0500   INR 0.88   PTT 25      Urinalysis  Recent Labs   Lab Test 09/13/21  0521  08/04/16  0711 05/11/16  0125 05/11/16  0125   COLOR Light Yellow Tina   < > Yellow   APPEARANCE Clear Cloudy   < > Slightly Cloudy   URINEGLC Negative Negative   < > Negative   URINEBILI Negative Small   This is an unconfirmed screening test result. A positive result may be false.  *   < > Negative   URINEKETONE Negative 5*   < > 5*   SG 1.015 1.020   < > 1.008   UBLD Negative Small*   < > Large*   URINEPH 6.5 5.0   < > 5.5   PROTEIN 30 * 100*   < > 100*   NITRITE Negative Negative   < > Negative   LEUKEST Negative Negative   < > Negative   RBCU 1 7*   < > 15*   WBCU 1 4*   < > 1   UTPG 0.22*  --   --  1.07*    < > = values in this interval not displayed.     Virology:  Hepatitis C Antibody   Date Value Ref Range Status   09/13/2021 Nonreactive Nonreactive Final   08/04/2016  NR Final    Nonreactive   Assay performance characteristics have not been established for newborns,   infants, and children       BK Virus DNA copies/mL   Date Value Ref Range Status   09/13/2021 Not Detected Not Detected copies/mL Final

## 2021-09-15 NOTE — PLAN OF CARE
"Vitals: BP (!) 140/96 (BP Location: Left arm)   Pulse 83   Temp 98.5  F (36.9  C) (Oral)   Resp 16   Ht 1.702 m (5' 7\")   Wt 83.5 kg (184 lb 1.4 oz)   SpO2 90%   BMI 28.83 kg/m    Endocrine: No BG checks.  Labs: Creatinin 1.88 (down from 4.54 yesterday)  Pain: Well controlled. Abdominal/incisional  PRN's: Oxycodone given x2  Diet: Regular. Fair appetite  LDA: R internal jugular, PIV  GI: Pt had bowel movement on this shift  : Quiles in place. Good urine output.   Skin: Abd incision dermanbonded and MEENAKSHI  Neuro: Intact  Mobility: Up with SBA. Ambulated in room and pablo.   Education: Med card reviewed.Lab book updated by pt. Specialty pharmacy met with pt.   Plan: Possible discharge tomorrow. Will continue to monitor and follow plan of care. Will update provider with any changes in condition.     "

## 2021-09-15 NOTE — CONSULTS
Transplant Admission Psychosocial Assessment    Patient Name: Dianelys Sroto  : 1985  Age: 35 year old  MRN: [id number]  Completed assessment with: Patient    Patient underwent a  donor kidney transplant.  Met with patient to update psychosocial assessment and provide brief education about SW role while inpatient, as well as expectations/requirements and follow up needs post-transplant. SW also provided education about need for compliance with transplant medications, and explained ESRD Medicare benefits and medication coverage under Medicare part B.  Medicare 2728 forms completed and signed by patient.    Presenting Information   Living Situation: Patient lives with his wife and son in [place], [place]  If not local, plans for short term stay:  N/A  Previous Functional Status: Independent, working full time  Cultural/Language/Spiritual Considerations: None indicated    Support System  Primary Support Person Wife Sonja  Other support:  In-Law's  Plan for support in immediate post-transplant period: Wife Sonja    Health Care Directive  Decision Maker: Patient  Alternate Decision Maker: Legal NOK would be his wife   Health Care Directive: Provided education    Mental Health/Coping:   History of Mental Health: No mental health history or current concerns  History of Chemical Health: Occasional drinking but hasn't been recently  Current status: Appropriate and oriented, tired  Coping: Patient reports thinking a lot about his donor and family but is otherwise coping okay  Services Needed/Recommended: SW provided information on how to write his donor's family    Financial   Income: 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 Has access to FMLA and disability benefits  Insurance and medication coverage: SOT*LIAISON DIANELYS IS A KIDNEY TRANSPLANT PATIENT  (DATE OF TRANSPLANT: )      HEALTH BENEFIT: MEDICARE   ID#[id number] (PART A EFFECTIVE 19, PART B EFFECTIVE 20)   PROCESSING INFO: ID#[id number] GRP#[id number] BIN#[id number]  SUPPLEMENTAL HEALTH BENEFIT: [facility] (EFFECTIVE 21)  [facility] ID#[id number] GRP#[id number] BIN#[id number]   PT WILL PAY $0 AT TIME OF SERVICE FOR ELIGIBLE PART B MEDS      PHARMACY BENEFIT: [facility]  PROCESSING INFO: ID#[id number] GRP#[id number] PCN#[id number] BIN#[id number] (EFFECTIVE 21)   NO DEDUCTIBLE & FAMILY OUT-OF-POCKET $5900  COPAY STRUCTURE:  $8 FOR GENERIC  $42 FOR BRAND  $90 FOR NON-FORMULARY MEDICATIONS    TESTCLAIM SPECIALTY #28:   MYCOPHENOLATE 250MG=$0 AT TIME OF SERVICE  PROGRAF 1MG=$0 AT TIME OF SERVICE  TACROLIMUS 1MG=$0 AT TIME OF SERVICE   CYCLOSPORINE 100MG=$0 AT TIME OF SERVICE   VALGANCICLOVIR 450MG=$8  Financial concerns: None indicated  Resources needed: None indicated    Assessment, recommendations and discharge plan: Patient is a 35-year-old, male, who underwent a  donor kidney transplant on 2021. Patient was on dialysis prior to transplant. Patient was lying in bed and willing to talk with . SW provided information on Medicare.. SW also provided information on how to write to his donor's family. SW provided contact information for inpatient and outpatient SW. SW then provided the cost of patient's transplant medication. Patient has adequate insurance coverage and social support. No coping issues or psychosocial concerns. Patient was well informed of post-transplant expectations/follow through.    RAFAEL Mcghee, EDWIN  7A   Ph: [phone]  Pager: [phone]

## 2021-09-15 NOTE — PLAN OF CARE
"BP (!) 138/93 (BP Location: Left arm)   Pulse 76   Temp 98.3  F (36.8  C) (Oral)   Resp 16   Ht 1.702 m (5' 7\")   Wt 83.5 kg (184 lb 1.4 oz)   SpO2 93%   BMI 28.83 kg/m      Shift: 2285-9000  VS: hypertensive, no interventions necessary overnight, on RA, afebrile  Neuro: AOx4  BG: spot checked at 0400, .  Labs: AM labs pending.   Respiratory: LS clear-diminished.   Pain/Nausea/PRN: Scheduled tylenol, PRN oxy x1. Pt denied nausea.   Diet: Regular.   LDA: internal jugular infusing D5LR @100mL/hr. PIV SL. Quiles positional with good UO (725mL total)  GI/: No BM since OR, passing flatus. Quiles positional.   Skin: RLQ incision dermabonded MEENAKSHI. No other skin issues found.   Mobility: Up with assist x1 and IV pole.   Plan: Continue to monitor and notify the team of any changes. Still needs to see specialty pharmacy before discharging.     Handoff given to following RN.    "

## 2021-09-16 ENCOUNTER — LAB REQUISITION (OUTPATIENT)
Dept: LAB | Facility: CLINIC | Age: 36
End: 2021-09-16

## 2021-09-16 ENCOUNTER — APPOINTMENT (OUTPATIENT)
Dept: PHYSICAL THERAPY | Facility: CLINIC | Age: 36
DRG: 652 | End: 2021-09-16
Attending: TRANSPLANT SURGERY
Payer: MEDICARE

## 2021-09-16 VITALS
SYSTOLIC BLOOD PRESSURE: 153 MMHG | BODY MASS INDEX: 29.03 KG/M2 | RESPIRATION RATE: 18 BRPM | HEART RATE: 84 BPM | TEMPERATURE: 98.4 F | WEIGHT: 185 LBS | DIASTOLIC BLOOD PRESSURE: 89 MMHG | HEIGHT: 67 IN | OXYGEN SATURATION: 98 %

## 2021-09-16 DIAGNOSIS — Z20.828 CONTACT WITH AND (SUSPECTED) EXPOSURE TO OTHER VIRAL COMMUNICABLE DISEASES: ICD-10-CM

## 2021-09-16 DIAGNOSIS — Z94.0 KIDNEY REPLACED BY TRANSPLANT: Primary | ICD-10-CM

## 2021-09-16 DIAGNOSIS — Z79.899 ENCOUNTER FOR LONG-TERM CURRENT USE OF MEDICATION: ICD-10-CM

## 2021-09-16 DIAGNOSIS — Z48.298 AFTERCARE FOLLOWING ORGAN TRANSPLANT: ICD-10-CM

## 2021-09-16 LAB
ANION GAP SERPL CALCULATED.3IONS-SCNC: 3 MMOL/L (ref 3–14)
BASOPHILS # BLD AUTO: 0 10E3/UL (ref 0–0.2)
BASOPHILS NFR BLD AUTO: 0 %
BUN SERPL-MCNC: 31 MG/DL (ref 7–30)
CALCIUM SERPL-MCNC: 8.6 MG/DL (ref 8.5–10.1)
CHLORIDE BLD-SCNC: 116 MMOL/L (ref 94–109)
CMV DNA SPEC NAA+PROBE-ACNC: NOT DETECTED IU/ML
CO2 SERPL-SCNC: 26 MMOL/L (ref 20–32)
CREAT SERPL-MCNC: 1.64 MG/DL (ref 0.66–1.25)
EOSINOPHIL # BLD AUTO: 0 10E3/UL (ref 0–0.7)
EOSINOPHIL NFR BLD AUTO: 0 %
ERYTHROCYTE [DISTWIDTH] IN BLOOD BY AUTOMATED COUNT: 13.1 % (ref 10–15)
GFR SERPL CREATININE-BSD FRML MDRD: 54 ML/MIN/1.73M2
GLUCOSE BLD-MCNC: 94 MG/DL (ref 70–99)
HCT VFR BLD AUTO: 25 % (ref 40–53)
HGB BLD-MCNC: 8.3 G/DL (ref 13.3–17.7)
IMM GRANULOCYTES # BLD: 0 10E3/UL
IMM GRANULOCYTES NFR BLD: 0 %
LYMPHOCYTES # BLD AUTO: 0.5 10E3/UL (ref 0.8–5.3)
LYMPHOCYTES NFR BLD AUTO: 7 %
MAGNESIUM SERPL-MCNC: 1.9 MG/DL (ref 1.6–2.3)
MCH RBC QN AUTO: 30.3 PG (ref 26.5–33)
MCHC RBC AUTO-ENTMCNC: 33.2 G/DL (ref 31.5–36.5)
MCV RBC AUTO: 91 FL (ref 78–100)
MONOCYTES # BLD AUTO: 0.9 10E3/UL (ref 0–1.3)
MONOCYTES NFR BLD AUTO: 12 %
NEUTROPHILS # BLD AUTO: 6 10E3/UL (ref 1.6–8.3)
NEUTROPHILS NFR BLD AUTO: 81 %
NRBC # BLD AUTO: 0 10E3/UL
NRBC BLD AUTO-RTO: 0 /100
PHOSPHATE SERPL-MCNC: 1.4 MG/DL (ref 2.5–4.5)
PLATELET # BLD AUTO: 144 10E3/UL (ref 150–450)
POTASSIUM BLD-SCNC: 3.9 MMOL/L (ref 3.4–5.3)
RBC # BLD AUTO: 2.74 10E6/UL (ref 4.4–5.9)
SODIUM SERPL-SCNC: 145 MMOL/L (ref 133–144)
TACROLIMUS BLD-MCNC: <3 UG/L (ref 5–15)
TME LAST DOSE: ABNORMAL H
TME LAST DOSE: ABNORMAL H
WBC # BLD AUTO: 7.5 10E3/UL (ref 4–11)

## 2021-09-16 PROCEDURE — 250N000013 HC RX MED GY IP 250 OP 250 PS 637: Performed by: TRANSPLANT SURGERY

## 2021-09-16 PROCEDURE — 99001 SPECIMEN HANDLING PT-LAB: CPT | Performed by: INTERNAL MEDICINE

## 2021-09-16 PROCEDURE — 80197 ASSAY OF TACROLIMUS: CPT | Performed by: NURSE PRACTITIONER

## 2021-09-16 PROCEDURE — 85025 COMPLETE CBC W/AUTO DIFF WBC: CPT | Performed by: NURSE PRACTITIONER

## 2021-09-16 PROCEDURE — 84100 ASSAY OF PHOSPHORUS: CPT | Performed by: NURSE PRACTITIONER

## 2021-09-16 PROCEDURE — 250N000012 HC RX MED GY IP 250 OP 636 PS 637: Performed by: NURSE PRACTITIONER

## 2021-09-16 PROCEDURE — 99233 SBSQ HOSP IP/OBS HIGH 50: CPT | Mod: 24 | Performed by: INTERNAL MEDICINE

## 2021-09-16 PROCEDURE — 80048 BASIC METABOLIC PNL TOTAL CA: CPT | Performed by: NURSE PRACTITIONER

## 2021-09-16 PROCEDURE — 97530 THERAPEUTIC ACTIVITIES: CPT | Mod: GP

## 2021-09-16 PROCEDURE — 250N000013 HC RX MED GY IP 250 OP 250 PS 637: Performed by: NURSE PRACTITIONER

## 2021-09-16 PROCEDURE — 83735 ASSAY OF MAGNESIUM: CPT | Performed by: NURSE PRACTITIONER

## 2021-09-16 PROCEDURE — 36592 COLLECT BLOOD FROM PICC: CPT | Performed by: NURSE PRACTITIONER

## 2021-09-16 PROCEDURE — 250N000013 HC RX MED GY IP 250 OP 250 PS 637: Performed by: INTERNAL MEDICINE

## 2021-09-16 PROCEDURE — 97116 GAIT TRAINING THERAPY: CPT | Mod: GP

## 2021-09-16 RX ORDER — AMLODIPINE BESYLATE 5 MG/1
5 TABLET ORAL DAILY
Qty: 30 TABLET | Refills: 1 | Status: SHIPPED | OUTPATIENT
Start: 2021-09-17 | End: 2021-10-05

## 2021-09-16 RX ORDER — TACROLIMUS 0.5 MG/1
0.5 CAPSULE ORAL 2 TIMES DAILY PRN
Qty: 30 CAPSULE | Refills: 1 | Status: SHIPPED | OUTPATIENT
Start: 2021-09-16 | End: 2021-10-26

## 2021-09-16 RX ORDER — AMOXICILLIN 250 MG
1-2 CAPSULE ORAL 2 TIMES DAILY PRN
Qty: 30 TABLET | Refills: 0 | Status: SHIPPED | OUTPATIENT
Start: 2021-09-16 | End: 2021-09-20

## 2021-09-16 RX ORDER — ACETAMINOPHEN 325 MG/1
650 TABLET ORAL EVERY 6 HOURS PRN
Qty: 30 TABLET | Refills: 1 | Status: SHIPPED | OUTPATIENT
Start: 2021-09-16 | End: 2022-07-01

## 2021-09-16 RX ORDER — MYCOPHENOLATE MOFETIL 250 MG/1
1000 CAPSULE ORAL 2 TIMES DAILY
Qty: 120 CAPSULE | Refills: 11 | Status: SHIPPED | OUTPATIENT
Start: 2021-09-16 | End: 2021-10-05

## 2021-09-16 RX ORDER — PANTOPRAZOLE SODIUM 40 MG/1
40 TABLET, DELAYED RELEASE ORAL DAILY
Qty: 30 TABLET | Refills: 0 | Status: SHIPPED | OUTPATIENT
Start: 2021-09-16 | End: 2021-09-29

## 2021-09-16 RX ORDER — TACROLIMUS 0.5 MG/1
0.5 CAPSULE ORAL 2 TIMES DAILY PRN
Qty: 30 CAPSULE | Refills: 1 | Status: SHIPPED | OUTPATIENT
Start: 2021-09-16 | End: 2021-09-16

## 2021-09-16 RX ORDER — SULFAMETHOXAZOLE AND TRIMETHOPRIM 400; 80 MG/1; MG/1
1 TABLET ORAL DAILY
Qty: 30 TABLET | Refills: 11 | Status: SHIPPED | OUTPATIENT
Start: 2021-09-17 | End: 2023-03-23

## 2021-09-16 RX ORDER — ATORVASTATIN CALCIUM 10 MG/1
10 TABLET, FILM COATED ORAL DAILY
Qty: 30 TABLET | Refills: 1 | Status: SHIPPED | OUTPATIENT
Start: 2021-09-17 | End: 2021-11-17

## 2021-09-16 RX ORDER — PREDNISONE 10 MG/1
TABLET ORAL
Qty: 47 TABLET | Refills: 0 | Status: SHIPPED | OUTPATIENT
Start: 2021-09-17 | End: 2021-10-14

## 2021-09-16 RX ORDER — TACROLIMUS 1 MG/1
6 CAPSULE ORAL 2 TIMES DAILY
Qty: 360 CAPSULE | Refills: 4 | Status: SHIPPED | OUTPATIENT
Start: 2021-09-16 | End: 2021-09-17

## 2021-09-16 RX ORDER — AMLODIPINE BESYLATE 5 MG/1
5 TABLET ORAL DAILY
Status: DISCONTINUED | OUTPATIENT
Start: 2021-09-16 | End: 2021-09-16 | Stop reason: HOSPADM

## 2021-09-16 RX ORDER — MAGNESIUM OXIDE 400 MG/1
400 TABLET ORAL
Qty: 30 TABLET | Refills: 1 | Status: SHIPPED | OUTPATIENT
Start: 2021-09-16 | End: 2021-09-29

## 2021-09-16 RX ORDER — OXYCODONE HYDROCHLORIDE 5 MG/1
5 TABLET ORAL EVERY 6 HOURS PRN
Qty: 10 TABLET | Refills: 0 | Status: SHIPPED | OUTPATIENT
Start: 2021-09-16 | End: 2021-10-05

## 2021-09-16 RX ADMIN — AMLODIPINE BESYLATE 5 MG: 5 TABLET ORAL at 12:22

## 2021-09-16 RX ADMIN — ACETAMINOPHEN 975 MG: 325 TABLET, FILM COATED ORAL at 00:56

## 2021-09-16 RX ADMIN — ACETAMINOPHEN 650 MG: 325 TABLET, FILM COATED ORAL at 05:29

## 2021-09-16 RX ADMIN — SULFAMETHOXAZOLE AND TRIMETHOPRIM 1 TABLET: 400; 80 TABLET ORAL at 07:36

## 2021-09-16 RX ADMIN — ATORVASTATIN CALCIUM 10 MG: 10 TABLET, FILM COATED ORAL at 07:37

## 2021-09-16 RX ADMIN — ACETAMINOPHEN 975 MG: 325 TABLET, FILM COATED ORAL at 09:58

## 2021-09-16 RX ADMIN — Medication 400 MG: at 12:22

## 2021-09-16 RX ADMIN — TACROLIMUS 6 MG: 5 CAPSULE ORAL at 17:28

## 2021-09-16 RX ADMIN — OXYCODONE HYDROCHLORIDE 5 MG: 5 TABLET ORAL at 05:29

## 2021-09-16 RX ADMIN — TACROLIMUS 3 MG: 1 CAPSULE ORAL at 07:36

## 2021-09-16 RX ADMIN — MYCOPHENOLATE MOFETIL 1000 MG: 250 CAPSULE ORAL at 07:36

## 2021-09-16 RX ADMIN — MYCOPHENOLATE MOFETIL 1000 MG: 250 CAPSULE ORAL at 17:27

## 2021-09-16 RX ADMIN — PREDNISONE 60 MG: 50 TABLET ORAL at 07:36

## 2021-09-16 ASSESSMENT — ACTIVITIES OF DAILY LIVING (ADL)
ADLS_ACUITY_SCORE: 7
ADLS_ACUITY_SCORE: 7
ADLS_ACUITY_SCORE: 14

## 2021-09-16 ASSESSMENT — MIFFLIN-ST. JEOR: SCORE: 1737.78

## 2021-09-16 NOTE — PROGRESS NOTES
Care Management Discharge Note    Discharge Date: 09/16/2021       Discharge Disposition: Home Care, Home    Discharge Services: None    Discharge DME: None    Discharge Transportation: family or friend will provide     Patient/family educated on Medicare website which has current facility and service quality ratings: yes    Education Provided on the Discharge Plan:  Yes  Persons Notified of Discharge Plans: Patient  Patient/Family in Agreement with the Plan: yes    Handoff Referral Completed: Yes    Additional Information:  Notified by [facility] that team anticipates patient will discharge home today.    ATC appointments requested and confirmed for Friday 9/17 and Saturday 9/18.    [facility] confirmed and will be able to initiate home care RN on Monday 9/20.      Met with pt.   Reviewed ATC appointments.  Reviewed/discussed home care RN f/u.   Pt confirmed that he has had pharmacy education and continues to work with the nursing staff and watch transplant education videos.  Pt notes no concerns regarding anticipated plan for discharge.     Discharge home care orders reviewed.       Meenu Olivares RN BSN, PHN, ACM-RN  7A RN Care Coordinator  Phone: [phone]  Pager [phone]    9/16/2021 9:57 AM

## 2021-09-16 NOTE — PROGRESS NOTES
[facility]   Transplant Nephrology Progress Note  Date of Admission:  9/13/2021  Today's Date: 09/16/2021    35 year old with hx of ESKD on HD (5 years) underwent DDKT on 9/14.     Recommendations:  - If SBP > 140/90 consider starting Amlodipine 5 mg daily  - Continue IS with MMF 1 g BID and Tac with goal 8-10  - Increase free water intake.   - Replace Phos    Assessment & Plan   # DDKT: Stable   - Baseline Creatinine: ~TBD; level - downtrending   - Proteinuria: Minimal (0.2-0.5 grams)   - Date DSA Last Checked: Not Known      Latest DSA: No DSA at time of transplant   - BK Viremia: No   - Kidney Tx Biopsy: No    # Immunosuppression: Tacrolimus immediate release (goal 8-10)   - Induction with Thymo 150 mg,  mg BID (9/13); Simulect 20 mg,  mg (9/14)   - Changes: On MMF 1 g BID, Tac 3 BID    # Infection Prophylaxis:   - PJP: Sulfa/TMP (Bactrim)  - CMV: Valganciclovir (Valcyte)    # Hypertension: Controlled;  Goal BP: < 140/90   - Volume status: Euvolemic  EDW ~ 77 Kg   - Changes: If SBP > 140/90 start amlodipine 5 mg    # Anemia in Chronic Renal Disease: Hgb: Stable      KODY: No   - Iron studies: Not checked recently    # Mineral Bone Disorder:   - Vitamin D; level: Not checked recently        On supplement: No  - Calcium; level: Normal        On supplement: No  - Phosphorus; level: Normal        On supplement: No    # Electrolytes:   - Potassium; level: Normal        On supplement: No  - Magnesium; level: Normal        On supplement: No  - Bicarbonate; level: Normal        On supplement: No      # Transplant History:  Etiology of Kidney Failure: Unknown etiology (no kidney biopsy)  Tx: DDKT  Transplant: 9/13/2021 (Kidney)     Significant changes in immunosuppression: None  Significant transplant-related complications: None  Crossmatch at time of Tx: negative  DSA at time of Tx: No  Recommendations were communicated to the primary team via this note.    Seen  "and discussed with Dr. Mario Gonzalez MD   Pager: [phone]    Interval History   - BP control adequate  - Hb  - 8.4, creatinine - down trending and at 1.64  - Urine o/p 2.3 L    Review of Systems   4 point ROS was obtained and negative except as noted in the Interval History.    MEDICATIONS:    acetaminophen  975 mg Oral Q8H     atorvastatin  10 mg Oral Daily     magnesium oxide  400 mg Oral Daily with lunch     mycophenolate  1,000 mg Oral BID IS     polyethylene glycol  17 g Oral Daily     predniSONE  60 mg Oral Daily    Followed by     [START ON 2021] predniSONE  40 mg Oral Daily    Followed by     [START ON 2021] predniSONE  20 mg Oral Daily    Followed by     [START ON 2021] predniSONE  15 mg Oral Daily    Followed by     [START ON 10/4/2021] predniSONE  10 mg Oral Daily    Followed by     [START ON 10/11/2021] predniSONE  5 mg Oral Daily     senna-docusate  1 tablet Oral BID     sodium chloride (PF)  10 mL Intracatheter Q8H     study - NPC-21 vs. placebo (IDS# [id number]) 6 mg/kg; 12 mg/kg; or placebo in 0.9% sodium chloride  300 mL Intravenous Once     sulfamethoxazole-trimethoprim  1 tablet Oral Daily     tacrolimus  3 mg Oral BID IS         Physical Exam   Temp  Av.3  F (36.8  C)  Min: 97.8  F (36.6  C)  Max: 98.7  F (37.1  C)      Pulse  Av.1  Min: 67  Max: 96 Resp  Av.2  Min: 14  Max: 18  SpO2  Av.4 %  Min: 92 %  Max: 100 %    CVP (mmHg): 11 mmHgBP (!) 133/98 (BP Location: Left arm)   Pulse 80   Temp 98.1  F (36.7  C) (Oral)   Resp 16   Ht 1.702 m (5' 7\")   Wt 83.9 kg (185 lb)   SpO2 97%   BMI 28.98 kg/m     Date 21 0700 - 09/15/21 0659   Shift 4061-2126 4885-8820 9512-5085 24 Hour Total   INTAKE   I.V. 10   10   Shift Total(mL/kg) 10(0.13)   10(0.13)   OUTPUT   Urine 125   125   Shift Total(mL/kg) 125(1.57)   125(1.57)   Weight (kg) 79.8 79.8 79.8 79.8      Admit Weight: 79.8 kg (175 lb 14.8 oz)     GENERAL APPEARANCE: alert and no distress  HENT: mouth " without ulcers or lesions  LYMPHATICS: no cervical or supraclavicular nodes  RESP: lungs clear to auscultation - no rales, rhonchi or wheezes  CV: regular rhythm, normal rate, no rub, no murmur  EDEMA: no LE edema bilaterally  ABDOMEN: soft, nondistended, nontender, bowel sounds normal  MS: extremities normal - no gross deformities noted, no evidence of inflammation in joints, no muscle tenderness  SKIN: no rash    Data   All labs reviewed by me.  CMP  Recent Labs   Lab 09/16/21  0724 09/15/21  0634 09/15/21  0453 09/14/21  1630 09/14/21  1141 09/14/21  0846 09/14/21  0449 09/13/21 2014 09/13/21  1336 09/13/21  0600 09/13/21  0500   * 142  --   --   --   --  139  --  139   < > 141   POTASSIUM 3.9 3.9  --  3.9 3.9   < > 4.0  4.0   < > 4.0   < > 3.3*   CHLORIDE 116* 111*  --   --   --   --  107  --  102   < > 105   CO2 26 28  --   --   --   --  27  --  25   < > 28   ANIONGAP 3 3  --   --   --   --  5  --  12   < > 8   GLC 94 130* 137*  --   --   --  159*  --  145*   < > 94   BUN 31* 36*  --   --   --   --  54*  --  54*   < > 55*   CR 1.64* 1.88*  --   --   --   --  4.54*  --  6.37*   < > 7.02*   GFRESTIMATED 54* 46*  --   --   --   --  16*  --  10*   < > 9*   GRAHAM 8.6 8.4*  --   --   --   --  8.2*  --  8.2*   < > 8.9   MAG 1.9 2.1  --   --   --   --  2.0  --  1.7  --   --    PHOS 1.4* 3.0  --   --   --   --  3.6  --  2.3*  --   --    PROTTOTAL  --   --   --   --   --   --   --   --   --   --  7.7   ALBUMIN  --   --   --   --   --   --   --   --   --   --  3.8   BILITOTAL  --   --   --   --   --   --   --   --   --   --  0.4   ALKPHOS  --   --   --   --   --   --   --   --   --   --  122   AST  --   --   --   --   --   --   --   --   --   --  15   ALT  --   --   --   --   --   --   --   --   --   --  24    < > = values in this interval not displayed.     CBC  Recent Labs   Lab 09/16/21  0724 09/15/21  0634 09/14/21  1630 09/14/21  1141 09/14/21  0846 09/14/21  0449 09/13/21 2014 09/13/21  1336   HGB 8.3* 8.4*  9.5* 9.2*   < > 9.0*   < > 8.6*   WBC 7.5 9.5  --   --   --  11.7*  --  5.9   RBC 2.74* 2.79*  --   --   --  3.01*  --  2.85*   HCT 25.0* 25.5*  --   --   --  27.6*  --  25.7*   MCV 91 91  --   --   --  92  --  90   MCH 30.3 30.1  --   --   --  29.9  --  30.2   MCHC 33.2 32.9  --   --   --  32.6  --  33.5   RDW 13.1 13.0  --   --   --  12.8  --  12.4   * 159  --   --   --  177  --  154    < > = values in this interval not displayed.     INR  Recent Labs   Lab 09/13/21  0500   INR 0.88   PTT 25     ABGNo lab results found in last 7 days.   Urine Studies  Recent Labs   Lab Test 09/13/21  0532 08/04/16  0711 05/11/16  0125 03/09/16  1850   COLOR Light Yellow Tina Yellow Tina   APPEARANCE Clear Cloudy Slightly Cloudy Slightly Cloudy   URINEGLC Negative Negative Negative Negative   URINEBILI Negative Small   This is an unconfirmed screening test result. A positive result may be false.  * Negative Negative   URINEKETONE Negative 5* 5* Negative   SG 1.015 1.020 1.008 >1.030   UBLD Negative Small* Large* Large*   URINEPH 6.5 5.0 5.5 5.0   PROTEIN 30 * 100* 100* 100*   UROBILINOGEN  --   --   --  0.2   NITRITE Negative Negative Negative Negative   LEUKEST Negative Negative Negative Negative   RBCU 1 7* 15* >100*   WBCU 1 4* 1 O - 2     Recent Labs   Lab Test 09/13/21  0532 05/11/16  0125   UTPG 0.22* 1.07*     PTH  Recent Labs   Lab Test 05/11/16  1314   PTHI 633*     Iron Studies  Recent Labs   Lab Test 09/14/21  0449 05/11/16  0450 05/10/16  2325   IRON 18* 38 32*   * 226*  --    IRONSAT 10* 17  --    RACH  --   --  577*       IMAGING:  All imaging studies reviewed by me.    Patient was seen and evaluated by me, Qamar Gonzalez MD.     Patient was seen and evaluated by me, Cristino Martin MD. I have reviewed the note and agree with the the plan of care as documented by the fellow.

## 2021-09-16 NOTE — PROGRESS NOTES
DISCHARGE:  Patient with orders to discharge to home with home care.     Education Provided:   Med Card Up to date  Lab Book Up to date  Specialty Pharmacy Met with pt yesterday  LDAs internal jugular and PIV removed    Discharge instructions, medications & follow ups reviewed with pt. Copy of discharge summary given to pt. IVs removed. Belongings returned from security N/A. [facility] notified, report given Yes.    Patient in stable condition. AVSS. Pt had no further questions regarding discharge instructions and medications. Patient transferred out by Friend & left with friend.  Plan for follow-up in clinic tomorrow.

## 2021-09-16 NOTE — PLAN OF CARE
Physical Therapy Discharge Summary    Reason for therapy discharge:    Discharged to home.    Progress towards therapy goal(s). See goals on Care Plan in [facility] electronic health record for goal details.  Goals met    Therapy recommendation(s):    No further therapy is recommended.

## 2021-09-16 NOTE — PROGRESS NOTES
Transplant Surgery  Inpatient Daily Progress Note  09/16/2021    Assessment & Plan: Kailash Sorto is a 35 year old male with PMH significant for HTN, ACD on HD x5 years. He is now s/p DDKTx 9/13/21 with stent placement.    Graft function:DDKTx: POD #3. Good graft function  SCr 1.6 (1.8). Good UOP 2.3L    Renal Tx US: POD#0-patent vasculature  Immunosuppression management:   Induction: Intermediate risk, PRA 2  Thymo 159mg x1 (2mg/kg)   Simulect 20mg today and repeat on POD #5  Solumedrol 500mg intraop, 250mg POD #1, 100mg POD #2 then po taper x4 weeks  MMF 1 G BID  Tac 3mgBID, titrate to a goal of 8-10. Level today, pending.   Complexity of management:Medium. Contributing factors: anemia and induction  Hematology: Anemia of chronic disease/BRISA : Hgb stable 8.3. PSAT 10  Neurology: Acute postoperative pain: Controlled, Tylenol 650mg Q6 PRN and Oxycodone 5-10 Q6 PRN mod-severe pain  Cardiorespiratory: Hx HTN:PTA on antihypertensives, do not restart at this time -140s.Stable.  If SBP > 140/90 consider starting Amlodipine 5 mg daily  GI/Nutrition: regular, encourage po fluids  Continue bowel regimen  Endocrine: No acute issues, Hgb A1c=5.3, FBS <100.   Fluid/Electrolytes: MIVF:D5LR @ 50,  -CIV   : Quiles  due to new surgical anastomosis, Removed this am, PVR neg x2  Infectious disease: Afebrile  Leukocytosis:  Resolved, WBC 7.5, most likely related to steroids.   Prophylaxis: DVT, GI, CMV study drug (infused this am), bactrim (renally adjusted)  Disposition:7A, encourage COVID vaccine in 6 weeks postop    Medical Decision Making: Medium  Subsequent visit 38940 (moderate level decision making)    RUY/Fellow/Resident Provider: María Yancey NP    Faculty: Sahra Toscano M.D.  _________________________________________________________________  Transplant History: Admitted 9/13/2021 for Kidney transplant candidate [Z76.82]  Kidney transplant recipient [Z94.0]  9/13/2021 (Kidney), Postoperative day: 3  "    Interval History: History is obtained from the patient  Overnight events:  NAEs. Pain controlled. Denies CP, SOB. Denies Nausea/vomiting. +BMs. Voiding without retention/dysuria. Tolerating reg. Diet. Up within room.     ROS:   A 10-point review of systems was negative except as noted above.    Meds:    atorvastatin  10 mg Oral Daily     magnesium oxide  400 mg Oral Daily with lunch     mycophenolate  1,000 mg Oral BID IS     polyethylene glycol  17 g Oral Daily     predniSONE  60 mg Oral Daily    Followed by     [START ON 9/18/2021] predniSONE  40 mg Oral Daily    Followed by     [START ON 9/20/2021] predniSONE  20 mg Oral Daily    Followed by     [START ON 9/27/2021] predniSONE  15 mg Oral Daily    Followed by     [START ON 10/4/2021] predniSONE  10 mg Oral Daily    Followed by     [START ON 10/11/2021] predniSONE  5 mg Oral Daily     senna-docusate  1 tablet Oral BID     sodium chloride (PF)  10 mL Intracatheter Q8H     sulfamethoxazole-trimethoprim  1 tablet Oral Daily     tacrolimus  3 mg Oral BID IS       Physical Exam:     Admit Weight: 79.8 kg (175 lb 14.8 oz)    Current vitals:   BP (!) 148/99 (BP Location: Left arm)   Pulse 80   Temp 98.2  F (36.8  C) (Oral)   Resp 16   Ht 1.702 m (5' 7\")   Wt 83.9 kg (185 lb)   SpO2 98%   BMI 28.98 kg/m      CVP (mmHg): 11 mmHg    Vital sign ranges:    Temp:  [98.1  F (36.7  C)-98.5  F (36.9  C)] 98.2  F (36.8  C)  Pulse:  [72-83] 80  Resp:  [16-18] 16  BP: (133-148)/(85-99) 148/99  SpO2:  [90 %-98 %] 98 %  Patient Vitals for the past 24 hrs:   BP Temp Temp src Pulse Resp SpO2 Weight   09/16/21 1054 (!) 148/99 98.2  F (36.8  C) Oral 80 16 98 % --   09/16/21 0712 (!) 133/98 98.1  F (36.7  C) Oral 80 16 -- --   09/16/21 0622 -- -- -- -- -- -- 83.9 kg (185 lb)   09/15/21 2338 (!) 147/91 98.5  F (36.9  C) Oral 72 18 97 % --   09/15/21 1930 137/85 98.5  F (36.9  C) Oral 82 18 97 % --   09/15/21 1604 (!) 141/88 98.5  F (36.9  C) Oral 80 18 92 % --   09/15/21 1316 (!) " 140/96 -- -- 83 -- 90 % --     General Appearance: in no apparent distress.   Skin: normal, warm, dry  Heart: regular rate and rhythm  Lungs: NLB on RA, CTA  Abdomen: The abdomen is soft, slightly distended, appropriately tender, incision with dermabond-CDI  :  Urine per urinal has no gross hematuria.   Extremities: edema: absent.   Neurologic: awake, alert and oriented. Tremor absent..     Data:   CMP  Recent Labs   Lab 09/16/21  0724 09/15/21  0634 09/13/21  0600 09/13/21  0500   * 142   < > 141   POTASSIUM 3.9 3.9   < > 3.3*   CHLORIDE 116* 111*   < > 105   CO2 26 28   < > 28   GLC 94 130*   < > 94   BUN 31* 36*   < > 55*   CR 1.64* 1.88*   < > 7.02*   GFRESTIMATED 54* 46*   < > 9*   GRAHAM 8.6 8.4*   < > 8.9   MAG 1.9 2.1   < >  --    PHOS 1.4* 3.0   < >  --    ALBUMIN  --   --   --  3.8   BILITOTAL  --   --   --  0.4   ALKPHOS  --   --   --  122   AST  --   --   --  15   ALT  --   --   --  24    < > = values in this interval not displayed.     CBC  Recent Labs   Lab 09/16/21  0724 09/15/21  0634 09/13/21  1336 09/13/21  0500   HGB 8.3* 8.4*   < > 10.3*   WBC 7.5 9.5   < > 4.9   * 159   < > 237   A1C  --   --   --  5.3    < > = values in this interval not displayed.     COAGS  Recent Labs   Lab 09/13/21  0500   INR 0.88   PTT 25      Urinalysis  Recent Labs   Lab Test 09/13/21  0532 08/04/16  0711 05/11/16  0125 05/11/16  0125   COLOR Light Yellow Tina   < > Yellow   APPEARANCE Clear Cloudy   < > Slightly Cloudy   URINEGLC Negative Negative   < > Negative   URINEBILI Negative Small   This is an unconfirmed screening test result. A positive result may be false.  *   < > Negative   URINEKETONE Negative 5*   < > 5*   SG 1.015 1.020   < > 1.008   UBLD Negative Small*   < > Large*   URINEPH 6.5 5.0   < > 5.5   PROTEIN 30 * 100*   < > 100*   NITRITE Negative Negative   < > Negative   LEUKEST Negative Negative   < > Negative   RBCU 1 7*   < > 15*   WBCU 1 4*   < > 1   UTPG 0.22*  --   --  1.07*    < > =  values in this interval not displayed.     Virology:  Hepatitis C Antibody   Date Value Ref Range Status   09/13/2021 Nonreactive Nonreactive Final   08/04/2016  NR Final    Nonreactive   Assay performance characteristics have not been established for newborns,   infants, and children       BK Virus DNA copies/mL   Date Value Ref Range Status   09/13/2021 Not Detected Not Detected copies/mL Final

## 2021-09-16 NOTE — PROGRESS NOTES
CLINICAL NUTRITION SERVICES - BRIEF/DISCHARGE NOTE     Nutrition Prescription    Future/Additional Recommendations:  Minimize diet restrictions as able d/t high calorie/protein needs post-transplant.  Oral supplements as needed to help meet nutritional needs.     High protein food choices with meals to help meet high needs post-transplant over the next 6-8 weeks.     Heart-healthy diet (low saturated fat, low sodium, high fiber) and food safety precautions long term due to immunosuppression regimen post-transplant         EVALUATION OF THE PROGRESS TOWARD GOALS   Diet: Regular    Intake: Patient reports good appetite/tolerance, however has not had a meal yet today.        NEW FINDINGS   Reviewed post-op transplant guidelines.  Patient had several questions related to the education.  Writer answered these questions.     Patient s discharge needs assessed and discharge planning has been conducted with the multidisciplinary transplant care team including physicians, pharmacy, social work and transplant coordinator.     Monitoring/Evaluation  Progress toward goals will be monitored and evaluated per protocol.    Once discharged, place outpatient nutrition consult via the transplant team if nutrition concerns arise.    Yas Dalal MS, RD, LD  Pager [phone]

## 2021-09-16 NOTE — PROGRESS NOTES
Transplant Social Work Service Discharge Note      Patient Name:  Kailash Sorto     Anticipated Discharge Date: 9/16/2021    Discharge Disposition: Home    Plan for 24 hour care for immediate post transplant period: Patient will receive care from his wife    If not local, plans for short term stay:  N/A from [place], [place]    Additional Services/Equipment Arranged: RNCC set up appropriate home care and ATC visits. No further SW needs.    Persons notified of above discharge plan:  Patient, patient's wife, treatment team, nursing staff, ATC, home care    Patient / Family response to discharge plan: Agreeable    Education and resources provided by SW at discharge: Social Work role inpatient setting, 2728 Medicare Form, [facility] Donor information, and outpatient social work contact information.    Discussed anticipated pharmacy out of pocket costs: YES    Provided [facility] Donor Letter Writing packet : YES    Plan: Patient will discharge home into the care of his wife in [place], [place]. Patient will attend ATC and then have home care through [facility]. No SW needs identified at discharge.    RAFAEL Mcghee, EDWIN  7A   Ph: [phone]  Pager: [phone]

## 2021-09-16 NOTE — PHARMACY-TRANSPLANT NOTE
Solid Organ Transplant Recipient Prior to Discharge Note    35 year old male s/p  donor kidney transplant on 21.    INDUCTION:  Thymoglobulin 2 mg/kg intraop then Basiliximab 20 mg on POD 1 and pending for POD5  Methylprednisolone 3 day taper, then Prednisone taper to 5 mg/day and off by week 4    MAINTENANCE:  Tacrolimus dose increased to 6 mg PO twice daily  based on level of < 3 mcg/L -- to be titrated to goal of 8-10 mcg/L for first 6 months post-transplant  Mycophenolate mofetil 1000 mg PO twice daily    OPPORTUNISTIC Pathogen Prophy:  Bactrim and now enrolled in CMV study IDS #[id number] with infusion of NPC 21 given 21 AM.    Pharmacy has monitored for medication interactions and immunosuppression levels in conjunction with the multidisciplinary team. In anticipation for discharge, medication therapy needs have been addressed daily throughout the current admission via multidisciplinary rounds and/or discussions, order verification, daily clinical pharmacy review, and communication with prescribers.    Michel Darden, PharmD -- pager [phone]

## 2021-09-16 NOTE — PLAN OF CARE
"BP (!) 147/91 (BP Location: Left arm)   Pulse 72   Temp 98.5  F (36.9  C) (Oral)   Resp 18   Ht 1.702 m (5' 7\")   Wt 83.5 kg (184 lb 1.4 oz)   SpO2 97%   BMI 28.83 kg/m      Shift: 2504-1532  VS: VSS on RA, afebrile  Neuro: AOx4  Labs: AM labs pending.  Pain/Nausea/PRN: Scheduled tylenol, PRN tylenol x1 and PRN oxy x1 ; denied nausea.   Diet: Regular.   LDA: internal jugular with D5LR @50mL/hr, PIV SL, Right AVF ; cummins removed this AM.   GI/: Output overnight 750, cummins removed at 0615 continue to monitor to monitor output on day shift. No BM overnight.   Skin: incision dermabonded and MEENAKSHI, WDL.  Mobility: Assist x1 with IV pole. Ambulated to bathroom.   Plan: Potential discharge today. Continue to monitor and notify the team of any changes.     Handoff given to following RN.    "

## 2021-09-16 NOTE — PLAN OF CARE
Vitals: stable room air.   Blood glucose: NA  Pain/nausea: denies  Diet: regular good appetite  Lines: TLIJ infusing [id number].  : cummins good .   GI: Had 3 BM today. Skipped senna.   Drains: NA  Skin: incision abdomen RLQ dermabonded MEENAKSHI.   Education : med/lab card started and updated. Special [facility] pharmacy seen. Pt educated to start MTP. Will start tonight. Wife educated to start as well.

## 2021-09-17 ENCOUNTER — OFFICE VISIT (OUTPATIENT)
Dept: INFUSION THERAPY | Facility: CLINIC | Age: 36
DRG: 652 | End: 2021-09-17
Attending: INTERNAL MEDICINE
Payer: MEDICARE

## 2021-09-17 ENCOUNTER — LAB (OUTPATIENT)
Dept: LAB | Facility: CLINIC | Age: 36
End: 2021-09-17
Attending: INTERNAL MEDICINE
Payer: MEDICARE

## 2021-09-17 ENCOUNTER — TELEPHONE (OUTPATIENT)
Dept: TRANSPLANT | Facility: CLINIC | Age: 36
End: 2021-09-17

## 2021-09-17 ENCOUNTER — PATIENT OUTREACH (OUTPATIENT)
Dept: CARE COORDINATION | Facility: CLINIC | Age: 36
End: 2021-09-17

## 2021-09-17 VITALS
BODY MASS INDEX: 29.73 KG/M2 | DIASTOLIC BLOOD PRESSURE: 84 MMHG | RESPIRATION RATE: 18 BRPM | OXYGEN SATURATION: 97 % | HEART RATE: 73 BPM | WEIGHT: 189.8 LBS | SYSTOLIC BLOOD PRESSURE: 154 MMHG | TEMPERATURE: 98.5 F

## 2021-09-17 DIAGNOSIS — N18.6 END STAGE RENAL FAILURE ON DIALYSIS (H): ICD-10-CM

## 2021-09-17 DIAGNOSIS — Z20.828 CONTACT WITH AND (SUSPECTED) EXPOSURE TO OTHER VIRAL COMMUNICABLE DISEASES: ICD-10-CM

## 2021-09-17 DIAGNOSIS — Z48.298 AFTERCARE FOLLOWING ORGAN TRANSPLANT: ICD-10-CM

## 2021-09-17 DIAGNOSIS — Z94.0 KIDNEY TRANSPLANT RECIPIENT: Primary | ICD-10-CM

## 2021-09-17 DIAGNOSIS — N18.32 ANEMIA OF CHRONIC RENAL FAILURE, STAGE 3B (H): ICD-10-CM

## 2021-09-17 DIAGNOSIS — D63.1 ANEMIA OF CHRONIC RENAL FAILURE, STAGE 3B (H): ICD-10-CM

## 2021-09-17 DIAGNOSIS — E86.0 DEHYDRATION: Primary | ICD-10-CM

## 2021-09-17 DIAGNOSIS — Z94.0 KIDNEY TRANSPLANT RECIPIENT: ICD-10-CM

## 2021-09-17 DIAGNOSIS — E83.39 HYPOPHOSPHATEMIA: ICD-10-CM

## 2021-09-17 DIAGNOSIS — Z79.899 ENCOUNTER FOR LONG-TERM CURRENT USE OF MEDICATION: ICD-10-CM

## 2021-09-17 DIAGNOSIS — D84.9 IMMUNOSUPPRESSION (H): ICD-10-CM

## 2021-09-17 DIAGNOSIS — Z99.2 END STAGE RENAL FAILURE ON DIALYSIS (H): ICD-10-CM

## 2021-09-17 DIAGNOSIS — Z94.0 KIDNEY REPLACED BY TRANSPLANT: ICD-10-CM

## 2021-09-17 LAB
ANION GAP SERPL CALCULATED.3IONS-SCNC: 1 MMOL/L (ref 3–14)
BUN SERPL-MCNC: 31 MG/DL (ref 7–30)
CALCIUM SERPL-MCNC: 8.9 MG/DL (ref 8.5–10.1)
CHLORIDE BLD-SCNC: 118 MMOL/L (ref 94–109)
CO2 SERPL-SCNC: 28 MMOL/L (ref 20–32)
CREAT SERPL-MCNC: 1.55 MG/DL (ref 0.66–1.25)
ERYTHROCYTE [DISTWIDTH] IN BLOOD BY AUTOMATED COUNT: 12.5 % (ref 10–15)
FERRITIN SERPL-MCNC: 770 NG/ML (ref 26–388)
GFR SERPL CREATININE-BSD FRML MDRD: 58 ML/MIN/1.73M2
GLUCOSE BLD-MCNC: 88 MG/DL (ref 70–99)
HBV DNA SERPL QL NAA+PROBE: NORMAL
HCT VFR BLD AUTO: 25.6 % (ref 40–53)
HCV RNA SERPL QL NAA+PROBE: NORMAL
HGB BLD-MCNC: 8.6 G/DL (ref 13.3–17.7)
HISTOTRAC: YES
HIV1+2 RNA SERPL QL NAA+PROBE: NORMAL
IRON SATN MFR SERPL: 69 % (ref 15–46)
IRON SERPL-MCNC: 123 UG/DL (ref 35–180)
MAGNESIUM SERPL-MCNC: 1.6 MG/DL (ref 1.6–2.3)
MCH RBC QN AUTO: 30.3 PG (ref 26.5–33)
MCHC RBC AUTO-ENTMCNC: 33.6 G/DL (ref 31.5–36.5)
MCV RBC AUTO: 90 FL (ref 78–100)
PHOSPHATE SERPL-MCNC: 1.2 MG/DL (ref 2.5–4.5)
PLATELET # BLD AUTO: 157 10E3/UL (ref 150–450)
POTASSIUM BLD-SCNC: 4.1 MMOL/L (ref 3.4–5.3)
PTH-INTACT SERPL-MCNC: 131 PG/ML (ref 18–80)
RBC # BLD AUTO: 2.84 10E6/UL (ref 4.4–5.9)
SODIUM SERPL-SCNC: 147 MMOL/L (ref 133–144)
TACROLIMUS BLD-MCNC: 4.5 UG/L (ref 5–15)
TIBC SERPL-MCNC: 179 UG/DL (ref 240–430)
TME LAST DOSE: ABNORMAL H
TME LAST DOSE: ABNORMAL H
WBC # BLD AUTO: 7.2 10E3/UL (ref 4–11)

## 2021-09-17 PROCEDURE — 85027 COMPLETE CBC AUTOMATED: CPT | Performed by: PATHOLOGY

## 2021-09-17 PROCEDURE — 36415 COLL VENOUS BLD VENIPUNCTURE: CPT | Performed by: PATHOLOGY

## 2021-09-17 PROCEDURE — 84100 ASSAY OF PHOSPHORUS: CPT | Performed by: PATHOLOGY

## 2021-09-17 PROCEDURE — 83970 ASSAY OF PARATHORMONE: CPT | Performed by: INTERNAL MEDICINE

## 2021-09-17 PROCEDURE — 99215 OFFICE O/P EST HI 40 MIN: CPT | Mod: 24 | Performed by: INTERNAL MEDICINE

## 2021-09-17 PROCEDURE — 80197 ASSAY OF TACROLIMUS: CPT | Performed by: INTERNAL MEDICINE

## 2021-09-17 PROCEDURE — 83735 ASSAY OF MAGNESIUM: CPT | Performed by: PATHOLOGY

## 2021-09-17 PROCEDURE — 82306 VITAMIN D 25 HYDROXY: CPT | Performed by: INTERNAL MEDICINE

## 2021-09-17 PROCEDURE — 80048 BASIC METABOLIC PNL TOTAL CA: CPT | Performed by: PATHOLOGY

## 2021-09-17 PROCEDURE — 82728 ASSAY OF FERRITIN: CPT | Performed by: PATHOLOGY

## 2021-09-17 PROCEDURE — 83550 IRON BINDING TEST: CPT | Performed by: PATHOLOGY

## 2021-09-17 RX ORDER — SODIUM CHLORIDE 450 MG/100ML
1000 INJECTION, SOLUTION INTRAVENOUS CONTINUOUS
Status: DISCONTINUED | OUTPATIENT
Start: 2021-09-17 | End: 2021-09-17

## 2021-09-17 RX ORDER — TACROLIMUS 1 MG/1
8 CAPSULE ORAL 2 TIMES DAILY
Qty: 480 CAPSULE | Refills: 11 | Status: SHIPPED | OUTPATIENT
Start: 2021-09-17 | End: 2021-09-18

## 2021-09-17 NOTE — PATIENT INSTRUCTIONS
Next Simulect 9/18/21  Needs to drink more water due to dehydration  start  Phosphorus 250 mg BID

## 2021-09-17 NOTE — PROGRESS NOTES
ACUTE TRANSPLANT NEPHROLOGY VISIT    Recommendation   Next Simulect 9/18/21  Needs to drink more water due to dehydration  start  Phosphorus 250 mg BID     Assessment & Plan   # DDKT: Stable   - Baseline Creatinine: ~ TBD   - Proteinuria: Not checked post transplant   - Date DSA Last Checked: Sep/2021      Latest DSA: No DSA at time of transplant   - BK Viremia: Not checked post transplant   - Kidney Tx Biopsy: No    {# Immunosuppression: Tacrolimus immediate release (goal 8-10), Mycophenolate mofetil (dose 1000 mg every 12 hours) and Prednisone (dose taper)   - Induction with Recent Transplant:  Intermediate Intensity: Thymoglobulin 2 mg/kg intraop then Basiliximab 20 mg on POD 1 and pending for POD5 Methylprednisolone 3 day taper, then Prednisone taper to 5 mg/day and off by week 4   - Continue with intensive monitoring of immunosuppression for efficacy and toxicity.              -  PRA 2   - Changes: No  Tac trough < 3 ( 9/16) on Tacrolimus 6 mg BID ( dose was increased yesterday)     # Infection Prophylaxis:   - PJP: Sulfa/TMP (Bactrim)  - CMV:  enrolled in CMV study IDS #[id number] with infusion of NPC 21 given 9/16/21 AM. Patient is R -. Donor ? Need clarification if he is discordant?                # Hypertension: Controlled;  Goal BP: < 150/90   BP Readings from Last 6 Encounters:   09/17/21 (!) 154/84   09/16/21 (!) 153/89   07/20/21 110/67   05/13/21 128/75   08/25/20 115/68   08/24/20 124/68   weight is up   Wt Readings from Last 3 Encounters:   09/17/21 86.1 kg (189 lb 12.8 oz)   09/16/21 83.9 kg (185 lb)   08/25/20 78.9 kg (173 lb 14.4 oz)      - Volume status: Euvolemic  EDW ~    - Changes: No continue amlodipine 5 mg daily    # Anemia in Chronic Renal Disease: Hgb: Stable      KODY: No   - Iron studies: Unknown at this time, but checked with dialysis    # Mineral Bone Disorder:   - Secondary renal hyperparathyroidism; PTH level: Unknown at this time, but checked with dialysis        On treatment: None  -  Vitamin D; level: Low        On supplement: No  - Calcium; level: Normal        On supplement: No  - Phosphorus; level: Low        On supplement: No start  Phosphorus 250 mg BID       # Electrolytes:   - Potassium; level: Normal        On supplement: No  - Magnesium; level: Normal        On supplement: Yes Mag ox 400 mg daily   - Bicarbonate; level: Normal        On supplement: No  - Sodium; level: High ~ 147. He is intravascularly dehydrated - needs to drink more fluids    # Medical Compliance: Yes    # Transplant History:  Etiology of Kidney Failure: Unknown etiology :Suspect potential underlying GN) vs Hypertension.  Tx: DDKT  Transplant: 9/13/2021 (Kidney). Was on HD for 5 yrs prior to transplant   Donor Type: Donation after Brain Death Donor Class:   Crossmatch at time of Tx: negative  DSA at time of Tx: No  Significant changes in immunosuppression: None  CMV IgG Ab High Risk Discordance (D+/R-): Yes  EBV IgG Ab High Risk Discordance (D+/R-): No  Significant transplant-related complications: None    Transplant Office Phone Number: [phone]    Assessment and plan was discussed with the patient and he voiced his understanding and agreement.    Return visit: Return in 1 day (on 9/18/2021).  Staffed with Dr Billy Iverson MD    Chief Complaint   Mr. Sorto is a 35 year old here for kidney transplant and immunosuppression management.     History of Present Illness   35 year old with hx of ESKD on HD (5 years) underwent DDKT on 9/14.   Had diarrhea  4-5 times which is improved now  Not been drinking enough water   No SOB/CP  No dizziness/lightheadedness  Has  Leg swelling   No cough/fever/chills  Appetite is good. No unintentional weight loss   No Abd pain/N/V/D/Constipation.   No voiding difficulty/hematuria /flank pain  No focal weakness/altered sensation.  No rash       Problem List   Patient Active Problem List   Diagnosis     Hyperlipidemia LDL goal <100     End stage renal failure on dialysis (H)      Anemia of chronic kidney failure     Renovascular hypertension     AVF (arteriovenous fistula) (H)     Kidney transplant candidate     Kidney transplant recipient       Allergies   No Known Allergies    Medications   Current Outpatient Medications   Medication Sig     acetaminophen (TYLENOL) 325 MG tablet Take 2 tablets (650 mg) by mouth every 6 hours as needed for other (For optimal non-opioid multimodal pain management to improve pain control.)     amLODIPine (NORVASC) 5 MG tablet Take 1 tablet (5 mg) by mouth daily     atorvastatin (LIPITOR) 10 MG tablet Take 1 tablet (10 mg) by mouth daily     magnesium oxide (MAG-OX) 400 MG tablet Take 1 tablet (400 mg) by mouth daily (with lunch)     mycophenolate (GENERIC EQUIVALENT) 250 MG capsule Take 4 capsules (1,000 mg) by mouth 2 times daily     oxyCODONE (ROXICODONE) 5 MG tablet Take 1 tablet (5 mg) by mouth every 6 hours as needed for moderate to severe pain     pantoprazole (PROTONIX) 40 MG EC tablet Take 1 tablet (40 mg) by mouth daily     predniSONE (DELTASONE) 10 MG tablet Take 6 tablets (60 mg) by mouth daily for 1 day, THEN 4 tablets (40 mg) daily for 2 days, THEN 2 tablets (20 mg) daily for 7 days, THEN 1.5 tablets (15 mg) daily for 7 days, THEN 1 tablet (10 mg) daily for 7 days, THEN 0.5 tablets (5 mg) daily for 3 days.     senna-docusate (SENOKOT-S/PERICOLACE) 8.6-50 MG tablet Take 1-2 tablets by mouth 2 times daily as needed for constipation     sulfamethoxazole-trimethoprim (BACTRIM) 400-80 MG tablet Take 1 tablet by mouth daily     tacrolimus (GENERIC EQUIVALENT) 0.5 MG capsule Take 1 capsule (0.5 mg) by mouth 2 times daily as needed As instructed by transplant team.     tacrolimus (GENERIC EQUIVALENT) 1 MG capsule Take 6 capsules (6 mg) by mouth 2 times daily Total =6 mg     No current facility-administered medications for this visit.     There are no discontinued medications.    Physical Exam   Vital Signs: There were no vitals taken for this  visit.    GENERAL APPEARANCE: alert and no distress  HENT: mouth without ulcers or lesions  LYMPHATICS: no cervical or supraclavicular nodes  RESP: lungs clear to auscultation - no rales, rhonchi or wheezes  CV: regular rhythm, normal rate, no rub, no murmur  EDEMA:1+ bilateral leg swelling .  ABDOMEN: soft, nondistended, nontender, bowel sounds normal. Surgical incision healing well  MS: extremities normal - no gross deformities noted, no evidence of inflammation in joints, no muscle tenderness  SKIN: no rash  ACCESS: RUE AVF - good bruit    Data     Renal Latest Ref Rng & Units 9/16/2021 9/15/2021 9/15/2021   Na 133 - 144 mmol/L 145(H) 142 -   K 3.4 - 5.3 mmol/L 3.9 3.9 -   Cl 94 - 109 mmol/L 116(H) 111(H) -   CO2 20 - 32 mmol/L 26 28 -   BUN 7 - 30 mg/dL 31(H) 36(H) -   Cr 0.66 - 1.25 mg/dL 1.64(H) 1.88(H) -   Glucose 70 - 99 mg/dL 94 130(H) 137(H)   Ca  8.5 - 10.1 mg/dL 8.6 8.4(L) -   Mg 1.6 - 2.3 mg/dL 1.9 2.1 -     Bone Health Latest Ref Rng & Units 9/16/2021 9/15/2021 9/14/2021   Phos 2.5 - 4.5 mg/dL 1.4(L) 3.0 3.6   PTHi 12 - 72 pg/mL - - -   Vit D Def 20 - 75 ug/L - - 16(L)     Heme Latest Ref Rng & Units 9/17/2021 9/16/2021 9/15/2021   WBC 4.0 - 11.0 10e3/uL 7.2 7.5 9.5   Hgb 13.3 - 17.7 g/dL 8.6(L) 8.3(L) 8.4(L)   Plt 150 - 450 10e3/uL 157 144(L) 159   ABSOLUTE NEUTROPHIL 1.6 - 8.3 10e9/L - - -   ABSOLUTE LYMPHOCYTES 0.8 - 5.3 10e9/L - - -   ABSOLUTE MONOCYTES 0.0 - 1.3 10e9/L - - -   ABSOLUTE EOSINOPHILS 0.0 - 0.7 10e9/L - - -   ABSOLUTE BASOPHILS 0.0 - 0.2 10e9/L - - -   ABS IMMATURE GRANULOCYTES 0 - 0.4 10e9/L - - -   ABSOLUTE NUCLEATED RBC - - - -     Liver Latest Ref Rng & Units 9/13/2021 7/13/2020 6/22/2020   AP 40 - 150 U/L 122 - -   TBili 0.2 - 1.3 mg/dL 0.4 - -   ALT 0 - 70 U/L 24 20 -   AST 0 - 45 U/L 15 20 23   Tot Protein 6.8 - 8.8 g/dL 7.7 - -   Albumin 3.4 - 5.0 g/dL 3.8 - -     Pancreas Latest Ref Rng & Units 9/13/2021   A1C 0.0 - 5.6 % 5.3     Iron studies Latest Ref Rng & Units 9/14/2021  5/11/2016 5/10/2016   Iron 35 - 180 ug/dL 18(L) 38 32(L)   Iron sat 15 - 46 % 10(L) 17 -   Ferritin 26 - 388 ng/mL - - 577(H)     [facility] Txp Virology Latest Ref Rng & Units 9/16/2021 9/13/2021 8/4/2016   CMV QUANT IU/ML Not Detected IU/mL Not Detected Not Detected -   EBV CAPSID ANTIBODY IGG No detectable antibody. - Positive(A) >8.0  Positive, suggests recent or past exposure   Antibody index (AI) values reflect qualitative changes in antibody   concentration that cannot be directly associated with clinical condition or   disease state.  (H)   Hep B Core NR - - Nonreactive   HIV 1&2 NEG - - -        Recent Labs   Lab Test 09/16/21  0724   DOSTAC 9/16/2021   TACROL <3.0*        This patient has been seen and evaluated by me, Idalmis Cote MD.  I have reviewed the note and agree with plan of care as documented by the fellow.  Idalmis Cote MD on 9/17/2021 at 3:56 PM

## 2021-09-17 NOTE — TELEPHONE ENCOUNTER
ISSUE:   Tacrolimus IR level 4.5 on 9/17, goal 8-10, dose 6 mg BID.    PLAN:   Please call patient and confirm this was an accurate 12-hour trough. Verify Tacrolimus IR dose 6 mg BID. Confirm no new medications or illness. Confirm no missed doses. If accurate trough and accurate dose, increase Tacrolimus IR dose to 8 mg BID and repeat labs in 1 days    OUTCOME:   Spoke with patient, they confirm accurate trough level and current dose 6 mg BID. Patient confirmed dose change to 8 mg BID and to repeat labs in 1 days. Orders sent to preferred pharmacy for dose change and lab for repeat labs. Patient voiced understanding of plan.

## 2021-09-17 NOTE — LETTER
9/17/2021         RE: Kailash Sorto  [address]  [facility] [address]        Dear Colleague,    Thank you for referring your patient, Kailash Sorto, to the [facility]. Please see a copy of my visit note below.    ACUTE TRANSPLANT NEPHROLOGY VISIT    Recommendation   Next Simulect 9/18/21  Needs to drink more water due to dehydration  start  Phosphorus 250 mg BID     Assessment & Plan   # DDKT: Stable   - Baseline Creatinine: ~ TBD   - Proteinuria: Not checked post transplant   - Date DSA Last Checked: Sep/2021      Latest DSA: No DSA at time of transplant   - BK Viremia: Not checked post transplant   - Kidney Tx Biopsy: No    {# Immunosuppression: Tacrolimus immediate release (goal 8-10), Mycophenolate mofetil (dose 1000 mg every 12 hours) and Prednisone (dose taper)   - Induction with Recent Transplant:  Intermediate Intensity: Thymoglobulin 2 mg/kg intraop then Basiliximab 20 mg on POD 1 and pending for POD5 Methylprednisolone 3 day taper, then Prednisone taper to 5 mg/day and off by week 4   - Continue with intensive monitoring of immunosuppression for efficacy and toxicity.              -  PRA 2   - Changes: No  Tac trough < 3 ( 9/16) on Tacrolimus 6 mg BID ( dose was increased yesterday)     # Infection Prophylaxis:   - PJP: Sulfa/TMP (Bactrim)  - CMV:  enrolled in CMV study IDS #[id number] with infusion of NPC 21 given 9/16/21 AM. Patient is R -. Donor ? Need clarification if he is discordant?                # Hypertension: Controlled;  Goal BP: < 150/90   BP Readings from Last 6 Encounters:   09/17/21 (!) 154/84   09/16/21 (!) 153/89   07/20/21 110/67   05/13/21 128/75   08/25/20 115/68   08/24/20 124/68   weight is up   Wt Readings from Last 3 Encounters:   09/17/21 86.1 kg (189 lb 12.8 oz)   09/16/21 83.9 kg (185 lb)   08/25/20 78.9 kg (173 lb 14.4 oz)      - Volume status: Euvolemic  EDW ~    - Changes: No continue amlodipine 5 mg  daily    # Anemia in Chronic Renal Disease: Hgb: Stable      KODY: No   - Iron studies: Unknown at this time, but checked with dialysis    # Mineral Bone Disorder:   - Secondary renal hyperparathyroidism; PTH level: Unknown at this time, but checked with dialysis        On treatment: None  - Vitamin D; level: Low        On supplement: No  - Calcium; level: Normal        On supplement: No  - Phosphorus; level: Low        On supplement: No start  Phosphorus 250 mg BID       # Electrolytes:   - Potassium; level: Normal        On supplement: No  - Magnesium; level: Normal        On supplement: Yes Mag ox 400 mg daily   - Bicarbonate; level: Normal        On supplement: No  - Sodium; level: High ~ 147. He is intravascularly dehydrated - needs to drink more fluids    # Medical Compliance: Yes    # Transplant History:  Etiology of Kidney Failure: Unknown etiology :Suspect potential underlying GN) vs Hypertension.  Tx: DDKT  Transplant: 9/13/2021 (Kidney). Was on HD for 5 yrs prior to transplant   Donor Type: Donation after Brain Death Donor Class:   Crossmatch at time of Tx: negative  DSA at time of Tx: No  Significant changes in immunosuppression: None  CMV IgG Ab High Risk Discordance (D+/R-): Yes  EBV IgG Ab High Risk Discordance (D+/R-): No  Significant transplant-related complications: None    Transplant Office Phone Number: [phone]    Assessment and plan was discussed with the patient and he voiced his understanding and agreement.    Return visit: Return in 1 day (on 9/18/2021).  Staffed with Dr Billy Iverson MD    Chief Complaint   Mr. Sorto is a 35 year old here for kidney transplant and immunosuppression management.     History of Present Illness   35 year old with hx of ESKD on HD (5 years) underwent DDKT on 9/14.   Had diarrhea  4-5 times which is improved now  Not been drinking enough water   No SOB/CP  No dizziness/lightheadedness  Has  Leg swelling   No cough/fever/chills  Appetite is good.  No unintentional weight loss   No Abd pain/N/V/D/Constipation.   No voiding difficulty/hematuria /flank pain  No focal weakness/altered sensation.  No rash       Problem List   Patient Active Problem List   Diagnosis     Hyperlipidemia LDL goal <100     End stage renal failure on dialysis (H)     Anemia of chronic kidney failure     Renovascular hypertension     AVF (arteriovenous fistula) (H)     Kidney transplant candidate     Kidney transplant recipient       Allergies   No Known Allergies    Medications   Current Outpatient Medications   Medication Sig     acetaminophen (TYLENOL) 325 MG tablet Take 2 tablets (650 mg) by mouth every 6 hours as needed for other (For optimal non-opioid multimodal pain management to improve pain control.)     amLODIPine (NORVASC) 5 MG tablet Take 1 tablet (5 mg) by mouth daily     atorvastatin (LIPITOR) 10 MG tablet Take 1 tablet (10 mg) by mouth daily     magnesium oxide (MAG-OX) 400 MG tablet Take 1 tablet (400 mg) by mouth daily (with lunch)     mycophenolate (GENERIC EQUIVALENT) 250 MG capsule Take 4 capsules (1,000 mg) by mouth 2 times daily     oxyCODONE (ROXICODONE) 5 MG tablet Take 1 tablet (5 mg) by mouth every 6 hours as needed for moderate to severe pain     pantoprazole (PROTONIX) 40 MG EC tablet Take 1 tablet (40 mg) by mouth daily     predniSONE (DELTASONE) 10 MG tablet Take 6 tablets (60 mg) by mouth daily for 1 day, THEN 4 tablets (40 mg) daily for 2 days, THEN 2 tablets (20 mg) daily for 7 days, THEN 1.5 tablets (15 mg) daily for 7 days, THEN 1 tablet (10 mg) daily for 7 days, THEN 0.5 tablets (5 mg) daily for 3 days.     senna-docusate (SENOKOT-S/PERICOLACE) 8.6-50 MG tablet Take 1-2 tablets by mouth 2 times daily as needed for constipation     sulfamethoxazole-trimethoprim (BACTRIM) 400-80 MG tablet Take 1 tablet by mouth daily     tacrolimus (GENERIC EQUIVALENT) 0.5 MG capsule Take 1 capsule (0.5 mg) by mouth 2 times daily as needed As instructed by transplant  team.     tacrolimus (GENERIC EQUIVALENT) 1 MG capsule Take 6 capsules (6 mg) by mouth 2 times daily Total =6 mg     No current facility-administered medications for this visit.     There are no discontinued medications.    Physical Exam   Vital Signs: There were no vitals taken for this visit.    GENERAL APPEARANCE: alert and no distress  HENT: mouth without ulcers or lesions  LYMPHATICS: no cervical or supraclavicular nodes  RESP: lungs clear to auscultation - no rales, rhonchi or wheezes  CV: regular rhythm, normal rate, no rub, no murmur  EDEMA:1+ bilateral leg swelling .  ABDOMEN: soft, nondistended, nontender, bowel sounds normal. Surgical incision healing well  MS: extremities normal - no gross deformities noted, no evidence of inflammation in joints, no muscle tenderness  SKIN: no rash  ACCESS: RUE AVF - good bruit    Data     Renal Latest Ref Rng & Units 9/16/2021 9/15/2021 9/15/2021   Na 133 - 144 mmol/L 145(H) 142 -   K 3.4 - 5.3 mmol/L 3.9 3.9 -   Cl 94 - 109 mmol/L 116(H) 111(H) -   CO2 20 - 32 mmol/L 26 28 -   BUN 7 - 30 mg/dL 31(H) 36(H) -   Cr 0.66 - 1.25 mg/dL 1.64(H) 1.88(H) -   Glucose 70 - 99 mg/dL 94 130(H) 137(H)   Ca  8.5 - 10.1 mg/dL 8.6 8.4(L) -   Mg 1.6 - 2.3 mg/dL 1.9 2.1 -     Bone Health Latest Ref Rng & Units 9/16/2021 9/15/2021 9/14/2021   Phos 2.5 - 4.5 mg/dL 1.4(L) 3.0 3.6   PTHi 12 - 72 pg/mL - - -   Vit D Def 20 - 75 ug/L - - 16(L)     Heme Latest Ref Rng & Units 9/17/2021 9/16/2021 9/15/2021   WBC 4.0 - 11.0 10e3/uL 7.2 7.5 9.5   Hgb 13.3 - 17.7 g/dL 8.6(L) 8.3(L) 8.4(L)   Plt 150 - 450 10e3/uL 157 144(L) 159   ABSOLUTE NEUTROPHIL 1.6 - 8.3 10e9/L - - -   ABSOLUTE LYMPHOCYTES 0.8 - 5.3 10e9/L - - -   ABSOLUTE MONOCYTES 0.0 - 1.3 10e9/L - - -   ABSOLUTE EOSINOPHILS 0.0 - 0.7 10e9/L - - -   ABSOLUTE BASOPHILS 0.0 - 0.2 10e9/L - - -   ABS IMMATURE GRANULOCYTES 0 - 0.4 10e9/L - - -   ABSOLUTE NUCLEATED RBC - - - -     Liver Latest Ref Rng & Units 9/13/2021 7/13/2020 6/22/2020   AP 40  - 150 U/L 122 - -   TBili 0.2 - 1.3 mg/dL 0.4 - -   ALT 0 - 70 U/L 24 20 -   AST 0 - 45 U/L 15 20 23   Tot Protein 6.8 - 8.8 g/dL 7.7 - -   Albumin 3.4 - 5.0 g/dL 3.8 - -     Pancreas Latest Ref Rng & Units 9/13/2021   A1C 0.0 - 5.6 % 5.3     Iron studies Latest Ref Rng & Units 9/14/2021 5/11/2016 5/10/2016   Iron 35 - 180 ug/dL 18(L) 38 32(L)   Iron sat 15 - 46 % 10(L) 17 -   Ferritin 26 - 388 ng/mL - - 577(H)     UMP Txp Virology Latest Ref Rng & Units 9/16/2021 9/13/2021 8/4/2016   CMV QUANT IU/ML Not Detected IU/mL Not Detected Not Detected -   EBV CAPSID ANTIBODY IGG No detectable antibody. - Positive(A) >8.0  Positive, suggests recent or past exposure   Antibody index (AI) values reflect qualitative changes in antibody   concentration that cannot be directly associated with clinical condition or   disease state.  (H)   Hep B Core NR - - Nonreactive   HIV 1&2 NEG - - -        Recent Labs   Lab Test 09/16/21  0724   DOSTAC 9/16/2021   TACROL <3.0*        This patient has been seen and evaluated by me, Idalmis Cote MD.  I have reviewed the note and agree with plan of care as documented by the fellow.  Idalmis Cote MD on 9/17/2021 at 3:56 PM          Again, thank you for allowing me to participate in the care of your patient.        Sincerely,        Idalmis Cote MD

## 2021-09-17 NOTE — PROGRESS NOTES
Clinic Care Coordination Contact  [facility]/Voicemail    Referral Source: IP Handoff  Clinical Data: Care Coordinator Outreach  Outreach attempted x 1.  Left message on patient's voicemail with call back information and requested return call.  Plan:  Care Coordinator will try to reach patient again in 1-2 business days.      Kristin MCKOYN, RN, PHN, CCM  Primary Clinic Care Coordination    [facility]  Primary Care Clinics  [email or web address]   Office: [phone]  Employed by [facility]

## 2021-09-17 NOTE — LETTER
"    9/17/2021         RE: Kailash Sorto  [address]  [facility] [address]        Dear Colleague,    Thank you for referring your patient, Kailsah Sorto, to the [facility]. Please see a copy of my visit note below.    Kailash Sorto came to [facility] today for a lab and assess following a DDKT transplant on 09/13/2021.      Discharge date: 09/16/2021  Transplant coordinator: Yas  Phone number patient can be reached at: [phone]      Physical Assessment:  See physical assessment located under \"Document Flowsheets\".  Incision site: C/D/I, Staples  Lines: N/A  Quiles: N/A  Urine clarity: clear yellow per patient  Hydration: educated regarding the importance of 2-3L water daily  Nutrition: OK appetite.   Last BM: diarrhea x 1 day  Pain: 4/10, using tylenol with good pain relief    Labs drawn by [facility] staff No    Plan of care for today: labs and assessment review by nurse and Dr. Davenport. Patient received copy of labs. Orders also received by provider.  1. Medication box set up, patient will bring his box to give us back. Needs reinforcement on Medication ID.  2. Phosphorous ordered, patient will  downstairs.  3, Patient will return in the Morning.  4. MD gave patient no salt diet due to NA+        Medications administered:      Patient education:    The following teaching topics were addressed: Importance of drinking 2L of non-caffeinated fluids daily, Incisional care, Signs/symptoms of infection, Good handwashing, Medications (purposes, doses and times of administration) and Plan of care   Patient verbalized understanding and all questions answered.    Drug level:  Patient took 6mg of Tarco last evening at 0800 PM.  Care coordinator to follow up with the result.    Face to face time: 60  Discharge Plan    Pt will follow up with [facility] in the AM  Discharge instructions reviewed with patient: YES  Patient/Representative verbalized understanding, " all questions answered: YES    Discharged from unit at 1000 with whom: self to home.    Yumiko Nguyen, RN, RN       Again, thank you for allowing me to participate in the care of your patient.        Sincerely,        [facility]

## 2021-09-17 NOTE — PATIENT INSTRUCTIONS
Dear Kailash Sorto    Thank you for choosing [facility] ([facility]) for your transplant cares.  The following information is a summary of our appointment as well as important reminders.      1.  Phosphorous downstairs in Pharmacy.  2. Return tomorrow at 7 AM.     We look forward in seeing you on your next appointment here at [facility] ([facility]).  Please don t hesitate to call us at [phone] to reschedule any of your appointments or to speak with one of the [facility] registered nurses.  It was a pleasure taking care of you today.    Sincerely,    [facility]  [facility]  [address] [place]  [address]  Phone:  [phone]

## 2021-09-17 NOTE — TELEPHONE ENCOUNTER
Kailash is a post kidney transplant patient who discharged on 09/16/2021.    The patient will be responsible for managing medications. Patient will use local [facility] pharmacy for outpatient medications.     Advised patient that [facility] may not have experience billing Part B and if he runs into any trouble, please reach out to  and we will happily fill and get immunos to patient. Part B meds are not transferable so would require new RXs be sent if this situation should occur.     HEALTH BENEFIT: MEDICARE   ID#[id number] (PART A EFFECTIVE 2/1/19, PART B EFFECTIVE 7/1/20)   PROCESSING INFO: ID#[id number] GRP#[id number] BIN#[id number]  SUPPLEMENTAL HEALTH BENEFIT: [facility] (EFFECTIVE 1/1/21)  PROCESSING INFO: [facility] SECNDARY ID#[id number] GRP#[id number] BIN#[id number]   PT WILL PAY $0 AT TIME OF SERVICE FOR ELIGIBLE PART B MEDS      PHARMACY BENEFIT: [employer]  PROCESSING INFO: ID#[id number] GRP#[id number] PCN#[id number] BIN#[id number] (EFFECTIVE 1/1/21)   NO DEDUCTIBLE & FAMILY OUT-OF-POCKET $5900  COPAY STRUCTURE:  $8 FOR GENERIC  $42 FOR BRAND  $90 FOR NON-FORMULARY MEDICATIONS    TESTCLAIM SPECIALTY #28:   MYCOPHENOLATE 250MG=$0 AT TIME OF SERVICE  PROGRAF 1MG=$0 AT TIME OF SERVICE  TACROLIMUS 1MG=$0 AT TIME OF SERVICE   CYCLOSPORINE 100MG=$0 AT TIME OF SERVICE   VALGANCICLOVIR 450MG=$8          Elías Newsome, Pharm.D.  [facility]  [phone]

## 2021-09-17 NOTE — PROGRESS NOTES
"Kailash Sorto came to [facility] today for a lab and assess following a DDKT transplant on 09/13/2021.      Discharge date: 09/16/2021  Transplant coordinator: Yas  Phone number patient can be reached at: [phone]      Physical Assessment:  See physical assessment located under \"Document Flowsheets\".  Incision site: C/D/I, Staples  Lines: N/A  Quiles: N/A  Urine clarity: clear yellow per patient  Hydration: educated regarding the importance of 2-3L water daily  Nutrition: OK appetite.   Last BM: diarrhea x 1 day  Pain: 4/10, using tylenol with good pain relief    Labs drawn by [facility] staff No    Plan of care for today: labs and assessment review by nurse and Dr. Davenport. Patient received copy of labs. Orders also received by provider.  1. Medication box set up, patient will bring his box to give us back. Needs reinforcement on Medication ID.  2. Phosphorous ordered, patient will  downstairs.  3, Patient will return in the Morning.  4. MD gave patient no salt diet due to NA+        Medications administered:      Patient education:    The following teaching topics were addressed: Importance of drinking 2L of non-caffeinated fluids daily, Incisional care, Signs/symptoms of infection, Good handwashing, Medications (purposes, doses and times of administration) and Plan of care   Patient verbalized understanding and all questions answered.    Drug level:  Patient took 6mg of Tarco last evening at 0800 PM.  Care coordinator to follow up with the result.    Face to face time: 60  Discharge Plan    Pt will follow up with [facility] in the AM  Discharge instructions reviewed with patient: YES  Patient/Representative verbalized understanding, all questions answered: YES    Discharged from unit at 1000 with whom: self to home.    Yumiko Nguyen, RN, RN   "

## 2021-09-18 ENCOUNTER — TELEPHONE (OUTPATIENT)
Dept: TRANSPLANT | Facility: CLINIC | Age: 36
End: 2021-09-18

## 2021-09-18 ENCOUNTER — INFUSION THERAPY VISIT (OUTPATIENT)
Dept: INFUSION THERAPY | Facility: CLINIC | Age: 36
End: 2021-09-18
Attending: INTERNAL MEDICINE
Payer: MEDICARE

## 2021-09-18 VITALS
DIASTOLIC BLOOD PRESSURE: 89 MMHG | HEART RATE: 70 BPM | RESPIRATION RATE: 18 BRPM | SYSTOLIC BLOOD PRESSURE: 149 MMHG | BODY MASS INDEX: 29.9 KG/M2 | WEIGHT: 190.9 LBS | TEMPERATURE: 98.4 F | OXYGEN SATURATION: 100 %

## 2021-09-18 DIAGNOSIS — N18.6 END STAGE RENAL FAILURE ON DIALYSIS (H): ICD-10-CM

## 2021-09-18 DIAGNOSIS — Z94.0 KIDNEY TRANSPLANT RECIPIENT: ICD-10-CM

## 2021-09-18 DIAGNOSIS — Z99.2 END STAGE RENAL FAILURE ON DIALYSIS (H): ICD-10-CM

## 2021-09-18 DIAGNOSIS — D63.1 ANEMIA OF CHRONIC RENAL FAILURE, STAGE 3B (H): ICD-10-CM

## 2021-09-18 DIAGNOSIS — N18.32 ANEMIA OF CHRONIC RENAL FAILURE, STAGE 3B (H): ICD-10-CM

## 2021-09-18 DIAGNOSIS — Z94.0 KIDNEY TRANSPLANT RECIPIENT: Primary | ICD-10-CM

## 2021-09-18 LAB
ANION GAP SERPL CALCULATED.3IONS-SCNC: 4 MMOL/L (ref 3–14)
BASOPHILS # BLD AUTO: 0 10E3/UL (ref 0–0.2)
BASOPHILS NFR BLD AUTO: 0 %
BUN SERPL-MCNC: 28 MG/DL (ref 7–30)
CALCIUM SERPL-MCNC: 8.8 MG/DL (ref 8.5–10.1)
CHLORIDE BLD-SCNC: 116 MMOL/L (ref 94–109)
CO2 SERPL-SCNC: 26 MMOL/L (ref 20–32)
CREAT SERPL-MCNC: 1.42 MG/DL (ref 0.66–1.25)
DEPRECATED CALCIDIOL+CALCIFEROL SERPL-MC: 16 UG/L (ref 20–75)
EOSINOPHIL # BLD AUTO: 0.1 10E3/UL (ref 0–0.7)
EOSINOPHIL NFR BLD AUTO: 2 %
ERYTHROCYTE [DISTWIDTH] IN BLOOD BY AUTOMATED COUNT: 12.4 % (ref 10–15)
GFR SERPL CREATININE-BSD FRML MDRD: 64 ML/MIN/1.73M2
GLUCOSE BLD-MCNC: 86 MG/DL (ref 70–99)
HCT VFR BLD AUTO: 27.4 % (ref 40–53)
HGB BLD-MCNC: 8.9 G/DL (ref 13.3–17.7)
IMM GRANULOCYTES # BLD: 0 10E3/UL
IMM GRANULOCYTES NFR BLD: 0 %
LYMPHOCYTES # BLD AUTO: 1 10E3/UL (ref 0.8–5.3)
LYMPHOCYTES NFR BLD AUTO: 14 %
MAGNESIUM SERPL-MCNC: 1.5 MG/DL (ref 1.6–2.3)
MCH RBC QN AUTO: 29.5 PG (ref 26.5–33)
MCHC RBC AUTO-ENTMCNC: 32.5 G/DL (ref 31.5–36.5)
MCV RBC AUTO: 91 FL (ref 78–100)
MONOCYTES # BLD AUTO: 0.8 10E3/UL (ref 0–1.3)
MONOCYTES NFR BLD AUTO: 11 %
NEUTROPHILS # BLD AUTO: 5.3 10E3/UL (ref 1.6–8.3)
NEUTROPHILS NFR BLD AUTO: 73 %
NRBC # BLD AUTO: 0 10E3/UL
NRBC BLD AUTO-RTO: 0 /100
PHOSPHATE SERPL-MCNC: 1.9 MG/DL (ref 2.5–4.5)
PLATELET # BLD AUTO: 198 10E3/UL (ref 150–450)
POTASSIUM BLD-SCNC: 4 MMOL/L (ref 3.4–5.3)
RBC # BLD AUTO: 3.02 10E6/UL (ref 4.4–5.9)
SODIUM SERPL-SCNC: 146 MMOL/L (ref 133–144)
TACROLIMUS BLD-MCNC: 6.5 UG/L (ref 5–15)
TME LAST DOSE: NORMAL H
TME LAST DOSE: NORMAL H
WBC # BLD AUTO: 7.2 10E3/UL (ref 4–11)

## 2021-09-18 PROCEDURE — 84100 ASSAY OF PHOSPHORUS: CPT

## 2021-09-18 PROCEDURE — 250N000011 HC RX IP 250 OP 636: Performed by: NURSE PRACTITIONER

## 2021-09-18 PROCEDURE — 85025 COMPLETE CBC W/AUTO DIFF WBC: CPT

## 2021-09-18 PROCEDURE — 36415 COLL VENOUS BLD VENIPUNCTURE: CPT

## 2021-09-18 PROCEDURE — 258N000003 HC RX IP 258 OP 636: Performed by: NURSE PRACTITIONER

## 2021-09-18 PROCEDURE — 96365 THER/PROPH/DIAG IV INF INIT: CPT

## 2021-09-18 PROCEDURE — 87799 DETECT AGENT NOS DNA QUANT: CPT | Performed by: INTERNAL MEDICINE

## 2021-09-18 PROCEDURE — 83735 ASSAY OF MAGNESIUM: CPT

## 2021-09-18 PROCEDURE — 80197 ASSAY OF TACROLIMUS: CPT

## 2021-09-18 RX ORDER — DIPHENHYDRAMINE HYDROCHLORIDE 50 MG/ML
50 INJECTION INTRAMUSCULAR; INTRAVENOUS
Status: CANCELLED
Start: 2021-09-18

## 2021-09-18 RX ORDER — ALBUTEROL SULFATE 90 UG/1
1-2 AEROSOL, METERED RESPIRATORY (INHALATION)
Status: CANCELLED
Start: 2021-09-18

## 2021-09-18 RX ORDER — TACROLIMUS 1 MG/1
9 CAPSULE ORAL 2 TIMES DAILY
Qty: 540 CAPSULE | Refills: 11 | Status: SHIPPED | OUTPATIENT
Start: 2021-09-18 | End: 2021-09-24

## 2021-09-18 RX ORDER — MEPERIDINE HYDROCHLORIDE 25 MG/ML
25 INJECTION INTRAMUSCULAR; INTRAVENOUS; SUBCUTANEOUS EVERY 30 MIN PRN
Status: CANCELLED | OUTPATIENT
Start: 2021-09-18

## 2021-09-18 RX ORDER — ALBUTEROL SULFATE 0.83 MG/ML
2.5 SOLUTION RESPIRATORY (INHALATION)
Status: CANCELLED | OUTPATIENT
Start: 2021-09-18

## 2021-09-18 RX ORDER — EPINEPHRINE 1 MG/ML
0.3 INJECTION, SOLUTION INTRAMUSCULAR; SUBCUTANEOUS EVERY 5 MIN PRN
Status: CANCELLED | OUTPATIENT
Start: 2021-09-18

## 2021-09-18 RX ORDER — HEPARIN SODIUM,PORCINE 10 UNIT/ML
5 VIAL (ML) INTRAVENOUS
Status: CANCELLED | OUTPATIENT
Start: 2021-09-18

## 2021-09-18 RX ORDER — NALOXONE HYDROCHLORIDE 0.4 MG/ML
0.2 INJECTION, SOLUTION INTRAMUSCULAR; INTRAVENOUS; SUBCUTANEOUS
Status: CANCELLED | OUTPATIENT
Start: 2021-09-18

## 2021-09-18 RX ORDER — HEPARIN SODIUM (PORCINE) LOCK FLUSH IV SOLN 100 UNIT/ML 100 UNIT/ML
5 SOLUTION INTRAVENOUS
Status: CANCELLED | OUTPATIENT
Start: 2021-09-18

## 2021-09-18 RX ORDER — METHYLPREDNISOLONE SODIUM SUCCINATE 125 MG/2ML
125 INJECTION, POWDER, LYOPHILIZED, FOR SOLUTION INTRAMUSCULAR; INTRAVENOUS
Status: CANCELLED
Start: 2021-09-18

## 2021-09-18 RX ADMIN — SODIUM CHLORIDE 20 MG: 9 INJECTION, SOLUTION INTRAVENOUS at 08:04

## 2021-09-18 NOTE — PATIENT INSTRUCTIONS
Dear Kailash Sorto    Thank you for choosing [facility] ([facility]) for your transplant cares.  The following information is a summary of our appointment as well as important reminders.      Start Home care on Monday.  Contact Information:    Transplant Coordinator: Yas Card RN  Transplant Office:  [phone]  [facility]:  [phone]  Ask for physician on call for kidney transplant.  [facility]:  [phone]        We look forward in seeing you on your next appointment here at [facility] ([facility]).  Please don t hesitate to call us at [phone] to reschedule any of your appointments or to speak with one of the [facility] registered nurses.  It was a pleasure taking care of you today.    Sincerely,    [facility]  [address] [place] [address]  [address]  Phone:  [phone]

## 2021-09-18 NOTE — PROGRESS NOTES
"Nursing Note  Kailash Sorto presents [facility] for:   Chief Complaint   Patient presents with     Follow Up     Kidney tx      Kailash Sorto came to [facility] today for a lab and assess following a DDKT transplant on 09/13/2021.      Discharge date: 09/16/2021  Transplant coordinator: Yas  Phone number patient can be reached at: [phone]      Physical Assessment:  See physical assessment located under \"Document Flowsheets\".  Incision site: C/D/I, Staples  Lines: N/A  Quiles: N/A  Urine clarity: clear yellow per patient.  Hydration: educated regarding the importance of 2-3L water daily.  Nutrition: OK appetite.   Last BM: Today  Pain: 4/10, using tylenol with good pain relief    Labs drawn by [facility] staff No    Plan of care for today: labs and assessment review by nurse and Dr. Davenport. Patient received copy of labs. Orders also received by provider.      Medications administered:  Administrations This Visit     basiliximab (SIMULECT) 20 mg in sodium chloride 0.9 % 60 mL infusion     Admin Date  09/18/2021 Action  New Bag Dose  20 mg Rate  120 mL/hr Route  Intravenous Administered By  Christine Marin RN                    Patient education:    The following teaching topics were addressed: Importance of drinking 2L of non-caffeinated fluids daily, Incisional care, Signs/symptoms of infection, Good handwashing, Medications (purposes, doses and times of administration) and Plan of care   Patient verbalized understanding and all questions answered.    Drug level:  Patient took 6mg of Tarco last evening at 0800 PM.  Care coordinator to follow up with the result.      Discharge Plan    Pt will follow up with Home care on Monday. (RN called to update [facility] care)  Discharge instructions reviewed with patient: YES  Patient/Representative verbalized understanding, all questions answered: YES    Christine Marin RN        BP (!) 149/89   Pulse 70   Temp 98.4  F " (36.9  C) (Oral)   Resp 18   Wt 86.6 kg (190 lb 14.4 oz)   SpO2 100%   BMI 29.90 kg/m

## 2021-09-18 NOTE — TELEPHONE ENCOUNTER
ISSUE:  Last day at [facility] 9/18/2021.  tacro level came back at 6.5, dose increased 9/17 PM dose to 8mg BID from 6mg BID.    PLAN:  INCREASE tacro to 9mg BID. Recheck labs Monday, as planned.    OUTCOME:  Kailash voiced understanding and will await the home care lab draw on Monday.

## 2021-09-18 NOTE — LETTER
"    9/18/2021         RE: Kailash Sorto  [address]  [facility] [address]        Dear Colleague,    Thank you for referring your patient, Kailash Sorto, to the [facility]. Please see a copy of my visit note below.    Nursing Note  Kailash Sort[facility]:   Chief Complaint   Patient presents with     Follow Up     Kidney tx      Kailash Sorto came to [facility] today for a lab and assess following a DDKT transplant on 09/13/2021.      Discharge date: 09/16/2021  Transplant coordinator: Yas  Phone number patient can be reached at: [phone]      Physical Assessment:  See physical assessment located under \"Document Flowsheets\".  Incision site: C/D/I, Staples  Lines: N/A  Quiles: N/A  Urine clarity: clear yellow per patient.  Hydration: educated regarding the importance of 2-3L water daily.  Nutrition: OK appetite.   Last BM: Today  Pain: 4/10, using tylenol with good pain relief    Labs drawn by [facility] staff No    Plan of care for today: labs and assessment review by nurse and Dr. Davenport. Patient received copy of labs. Orders also received by provider.      Medications administered:  Administrations This Visit     basiliximab (SIMULECT) 20 mg in sodium chloride 0.9 % 60 mL infusion     Admin Date  09/18/2021 Action  New Bag Dose  20 mg Rate  120 mL/hr Route  Intravenous Administered By  Christine Marin, RN                    Patient education:    The following teaching topics were addressed: Importance of drinking 2L of non-caffeinated fluids daily, Incisional care, Signs/symptoms of infection, Good handwashing, Medications (purposes, doses and times of administration) and Plan of care   Patient verbalized understanding and all questions answered.    Drug level:  Patient took 6mg of Tarco last evening at 0800 PM.  Care coordinator to follow up with the result.      Discharge Plan    Pt " will follow up with Home care on Monday. (RN called to update [facility] care)  Discharge instructions reviewed with patient: YES  Patient/Representative verbalized understanding, all questions answered: YES    Christine Marin RN        BP (!) 149/89   Pulse 70   Temp 98.4  F (36.9  C) (Oral)   Resp 18   Wt 86.6 kg (190 lb 14.4 oz)   SpO2 100%   BMI 29.90 kg/m            Again, thank you for allowing me to participate in the care of your patient.        Sincerely,        [facility]

## 2021-09-19 ENCOUNTER — HEALTH MAINTENANCE LETTER (OUTPATIENT)
Age: 36
End: 2021-09-19

## 2021-09-19 LAB — CMV DNA SPEC NAA+PROBE-ACNC: NOT DETECTED IU/ML

## 2021-09-20 ENCOUNTER — PATIENT OUTREACH (OUTPATIENT)
Dept: CARE COORDINATION | Facility: CLINIC | Age: 36
End: 2021-09-20

## 2021-09-20 ENCOUNTER — MEDICAL CORRESPONDENCE (OUTPATIENT)
Dept: HEALTH INFORMATION MANAGEMENT | Facility: CLINIC | Age: 36
End: 2021-09-20

## 2021-09-20 ENCOUNTER — VIRTUAL VISIT (OUTPATIENT)
Dept: PHARMACY | Facility: CLINIC | Age: 36
End: 2021-09-20
Payer: COMMERCIAL

## 2021-09-20 ENCOUNTER — LAB REQUISITION (OUTPATIENT)
Dept: LAB | Facility: CLINIC | Age: 36
End: 2021-09-20
Payer: MEDICARE

## 2021-09-20 ENCOUNTER — TELEPHONE (OUTPATIENT)
Dept: TRANSPLANT | Facility: CLINIC | Age: 36
End: 2021-09-20

## 2021-09-20 DIAGNOSIS — R60.9 EDEMA, UNSPECIFIED TYPE: ICD-10-CM

## 2021-09-20 DIAGNOSIS — Z94.0 KIDNEY TRANSPLANTED: Primary | ICD-10-CM

## 2021-09-20 DIAGNOSIS — Z48.288 ENCOUNTER FOR AFTERCARE FOLLOWING MULTIPLE ORGAN TRANSPLANT: ICD-10-CM

## 2021-09-20 DIAGNOSIS — Z94.0 KIDNEY TRANSPLANT STATUS: ICD-10-CM

## 2021-09-20 DIAGNOSIS — Z94.0 KIDNEY TRANSPLANT RECIPIENT: Primary | ICD-10-CM

## 2021-09-20 DIAGNOSIS — R19.7 DIARRHEA, UNSPECIFIED TYPE: ICD-10-CM

## 2021-09-20 DIAGNOSIS — G89.18 ACUTE POST-OPERATIVE PAIN: ICD-10-CM

## 2021-09-20 DIAGNOSIS — I15.0 RENOVASCULAR HYPERTENSION: ICD-10-CM

## 2021-09-20 DIAGNOSIS — Z79.899 OTHER LONG TERM (CURRENT) DRUG THERAPY: ICD-10-CM

## 2021-09-20 LAB
ANION GAP SERPL CALCULATED.3IONS-SCNC: 6 MMOL/L (ref 3–14)
BKV DNA # SPEC NAA+PROBE: NOT DETECTED COPIES/ML
BUN SERPL-MCNC: 20 MG/DL (ref 7–30)
CALCIUM SERPL-MCNC: 8.5 MG/DL (ref 8.5–10.1)
CHLORIDE BLD-SCNC: 116 MMOL/L (ref 94–109)
CO2 SERPL-SCNC: 22 MMOL/L (ref 20–32)
CREAT SERPL-MCNC: 1.27 MG/DL (ref 0.66–1.25)
ERYTHROCYTE [DISTWIDTH] IN BLOOD BY AUTOMATED COUNT: 12.7 % (ref 10–15)
GFR SERPL CREATININE-BSD FRML MDRD: 73 ML/MIN/1.73M2
GLUCOSE BLD-MCNC: 66 MG/DL (ref 70–99)
HCT VFR BLD AUTO: 26.8 % (ref 40–53)
HGB BLD-MCNC: 8.8 G/DL (ref 13.3–17.7)
MAGNESIUM SERPL-MCNC: 1.5 MG/DL (ref 1.6–2.3)
MCH RBC QN AUTO: 29.9 PG (ref 26.5–33)
MCHC RBC AUTO-ENTMCNC: 32.8 G/DL (ref 31.5–36.5)
MCV RBC AUTO: 91 FL (ref 78–100)
PHOSPHATE SERPL-MCNC: 2.5 MG/DL (ref 2.5–4.5)
PLATELET # BLD AUTO: 268 10E3/UL (ref 150–450)
POTASSIUM BLD-SCNC: 3.8 MMOL/L (ref 3.4–5.3)
RBC # BLD AUTO: 2.94 10E6/UL (ref 4.4–5.9)
SODIUM SERPL-SCNC: 144 MMOL/L (ref 133–144)
TACROLIMUS BLD-MCNC: 8.2 UG/L (ref 5–15)
TME LAST DOSE: NORMAL H
TME LAST DOSE: NORMAL H
WBC # BLD AUTO: 9.3 10E3/UL (ref 4–11)

## 2021-09-20 PROCEDURE — 80048 BASIC METABOLIC PNL TOTAL CA: CPT | Performed by: TRANSPLANT SURGERY

## 2021-09-20 PROCEDURE — 85027 COMPLETE CBC AUTOMATED: CPT | Performed by: TRANSPLANT SURGERY

## 2021-09-20 PROCEDURE — 80197 ASSAY OF TACROLIMUS: CPT | Performed by: TRANSPLANT SURGERY

## 2021-09-20 PROCEDURE — 83735 ASSAY OF MAGNESIUM: CPT | Performed by: TRANSPLANT SURGERY

## 2021-09-20 PROCEDURE — 84100 ASSAY OF PHOSPHORUS: CPT | Performed by: TRANSPLANT SURGERY

## 2021-09-20 PROCEDURE — 99607 MTMS BY PHARM ADDL 15 MIN: CPT | Performed by: PHARMACIST

## 2021-09-20 PROCEDURE — 99605 MTMS BY PHARM NP 15 MIN: CPT | Performed by: PHARMACIST

## 2021-09-20 RX ORDER — ERGOCALCIFEROL 1.25 MG/1
50000 CAPSULE, LIQUID FILLED ORAL WEEKLY
Qty: 8 CAPSULE | Refills: 0 | Status: SHIPPED | OUTPATIENT
Start: 2021-09-20 | End: 2021-10-11

## 2021-09-20 NOTE — PATIENT INSTRUCTIONS
Recommendations from today's MTM visit:                                                       1. Get a scale and start weighing yourself first thing in the morning. Let us know if these weights don't start trending down this week, or if the edema does not improve.   2. Metamucil; once daily. This will make your stools more solid.   3. Prednisone taper: Reduce by 5mg every Monday until off.  9/20-9/26 20mg daily  9/27-10/3 15mg daily  10/4-10/10 10mg daily  10/11-10/17 5mg daily then stop.   4. Acetaminophen increase to 650mg to 3 to 4 times daily. This works best when taken more frequently.   5. Try Melatonin over the counter starting with 1mg at bedtime.     Follow-up: 2 months post transplant    It was great to speak with you today.  I value your experience and would be very thankful for your time with providing feedback on our clinic survey. You may receive a survey via email or text message in the next few days.     To schedule another MTM appointment, please call the clinic directly or you may call the MTM scheduling line at [phone] or toll-free at [phone].     My Clinical Pharmacist's contact information:                                                      Please feel free to contact me with any questions or concerns you have.      Brady Peter, PharmD  MTM Pharmacist    Phone: [phone]

## 2021-09-20 NOTE — TELEPHONE ENCOUNTER
Left message for home care with verbal OK for the following:  Skilled Nursing orders  2 x a wk x 9 wks   3 PRN

## 2021-09-20 NOTE — TELEPHONE ENCOUNTER
Provider Call: General  Route to LPN    Reason for call: Skilled Nursing orders  2 x a wk x 9 wks   3 PRN     Call back needed? Yes    Return Call Needed  Same as documented in contacts section  When to return call?: Greater than one day: Route standard priority

## 2021-09-20 NOTE — PROGRESS NOTES
Medication Therapy Management (MTM) Encounter    ASSESSMENT:                            Medication Adherence/Access: Discussed prednisone taper in length the day and outlined the dates that he will be decreasing the dose.    Renal Transplant: Stable.    Edema: Patient's weight is stable as his discharge weight was 85 kg and now is eighty-six.  Does not have a scale at home recommend he get one of these and start weighing himself every morning.  If weights do not start declining in the next week or edema does not improve patient to contact transplant coordinator or myself.    Hypertension: BP at goal <150/90 immediately post transplant. Patient has had edema from amlodipine in the past.  See edema section for plan.    Pain: Pt to increase Acetaminophen to 650mg TID to 4x daily.  Patient may try melatonin 1 mg daily for sleep as well if the increase Tylenol dose help him sleep.    Diarrhea: Patient to start Metamucil daily to firm stools.     PLAN:                            1. Get a scale and start weighing yourself first thing in the morning. Let us know if these weights don't start trending down this week, or if the edema does not improve.   2. Metamucil; once daily. This will make your stools more solid.   3. Prednisone taper: Reduce by 5mg every Monday until off.  9/20-9/26 20mg daily  9/27-10/3 15mg daily  10/4-10/10 10mg daily  10/11-10/17 5mg daily then stop.   4. Acetaminophen increase to 650mg to 3 to 4 times daily. This works best when taken more frequently.   5. Try Melatonin over the counter starting with 1mg at bedtime.     Follow-up: 2 months post txp      SUBJECTIVE/OBJECTIVE:                          Kailash Sotro is a 35 year old male called for a transitions of care visit. He was discharged from [facility] on 9/16 for kidney txp.      Reason for visit: Initial post txp. Struggling mostly with incisional discomfort and pain, poor sleep. Up 20 pounds.     Allergies/ADRs: None  Past Medical History: Reviewed  in chart  Tobacco: He reports that he has never smoked. He has never used smokeless tobacco.  Alcohol: not currently using  Hydration: Getting more fluids, about 2L, although drank a little less Yesterday.     Medication Adherence/Access: Patient uses pill box(es), Alarm at dose times.   Patient takes medications 3 time(s) per day. 8am, 8pm, 12pm.  Per patient, misses medication 0 times per week.   Medication barriers: none.   The patient fills medications at [place]: NO, fills medications at [facility].    Renal Transplant:  Current immunosuppressants include Tacrolimus 9mg twice daily, Mycophenolate Mofetil 1000mg twice daily, Prednisone .  Pt reports Diarrhea  Transplant date: 9/13/21  Estimated Creatinine Clearance: 77 mL/min (A) (based on SCr of 1.42 mg/dL (H)).  CMV prophylaxis: not taking due to CMV study. Donor (+), Recipient (-), treat 6 months post tx   PCP prophylaxis: Bactrim S S daily  PPI: Pantoprazole 40mg daily while on prednisone.   Supplements: Mag Oxide 400mg daily ( 2 hours from MMF), Phosphorus 250mg twice daily.   Tx Coordinator: Sweta Card, Using Med Card: Yes  Immunizations: annual flu shot 2020; [id number]:  unknown; Prevnar 13: unknown; TDaP:  2008; Shingrix: unknown, HBV: immunity per last titers, COVID: unknown  Magnesium   Date Value Ref Range Status   09/18/2021 1.5 (L) 1.6 - 2.3 mg/dL Final   05/13/2016 2.5 (H) 1.6 - 2.3 mg/dL Final       Edema: Pt is taking no medications. Pt is up 20 pounds (9kg) from his dry weight, Dry weight is 77 kg, 86kg at last appointment. Has edema in ankles/ legs/ lower body, but can get into his shoes. States he can see it in his penis as well. No breathing issues.     Hypertension: Current medications include Amlodipine 5mg daily.  Patient does self-monitor blood pressure. .  Patient reports edema. Has taken Labetalol, Hydralazine in the past. Amlodipine caused edema in the past.   BP Readings from Last 3 Encounters:   09/18/21 (!) 149/89   09/17/21  (!) 154/84   09/16/21 (!) 153/89     Pain: Pt is taking Acetaminophen 650mg midday and at night, Oxycodone 5mg at bedtime prn. 4-5/10 per patient. Trouble sleeping since he mostly sleeps on his stomach.     Diarrhea: Pt is having loose stools about 5x daily. No laxatives.     Today's Vitals: There were no vitals taken for this visit.  ----------------  Post Discharge Medication Reconciliation Status: discharge medications reconciled and changed, per note/orders.    I spent 37 minutes with this patient today. All changes were made via collaborative practice agreement with Dr. Cote. A copy of the visit note was provided to the patient's referring provider.    The patient was sent via [employer] a summary of these recommendations.     Brady Peter, PharmD  [facility] Pharmacist    Phone: [phone]     Telemedicine Visit Details  Type of service:  Telephone visit  Start Time: 7:33 AM  End Time: 8:10 AM  Originating Location (patient location): [place]  Distant Location (provider location):  [facility]     Medication Therapy Recommendations  Acute post-operative pain    Current Medication: acetaminophen (TYLENOL) 325 MG tablet   Rationale: Dose too low - Dosage too low - Effectiveness   Recommendation: Increase Frequency   Status: Accepted - no CPA Needed          Rationale: Synergistic therapy - Needs additional medication therapy - Indication   Recommendation: Start Medication - melatonin 1 MG Caps   Status: Accepted - no CPA Needed         Diarrhea, unspecified type    Rationale: Untreated condition - Needs additional medication therapy - Indication   Recommendation: Start Medication - Metamucil 48.57 % Powd   Status: Accepted - no CPA Needed         Kidney transplant recipient    Current Medication: predniSONE (DELTASONE) 10 MG tablet   Rationale: Does not understand instructions - Adherence - Adherence   Recommendation: Provide Adherence Intervention   Status: Patient Agreed - Adherence/Education          Renovascular hypertension    Current Medication: amLODIPine (NORVASC) 5 MG tablet   Rationale: Undesirable effect - Adverse medication event - Safety   Recommendation: Self-Monitoring   Status: Accepted - no CPA Needed

## 2021-09-20 NOTE — TELEPHONE ENCOUNTER
ISSUE:  Low vitamin D level.    PLAN:  Idalmis Cote MD Schindelholz, Alanna, RN  Ergocalciferol 50,000 units weekly x8 weeks then maintenance cholecalciferol 2000 international unit(s) qd        LPN TASK:  Send Rxs as above to patient preferred pharmacy.  Notify patient to start ergocalciferol 50,000 units weekly for 8 weeks.  Then STOP ergocalciferol.  Start cholecalciferol 2,000 international unit(s) daily after completion of ergocalciferol.

## 2021-09-20 NOTE — PROGRESS NOTES
Clinic Care Coordination Contact  [facility]/Voicemail       Clinical Data: Care Coordinator Outreach  Outreach attempted x 1.  Left message on patient's voicemail with call back information and requested return call.  Plan: Care Coordinator will try to reach patient again in 1-2 business days.      [facility]   Debby Verdin RN, Care Coordinator   [facility] [place], Women's Clinic, [facility], [place]  E-mail [email or web address]   [phone]

## 2021-09-22 ENCOUNTER — TELEPHONE (OUTPATIENT)
Dept: TRANSPLANT | Facility: CLINIC | Age: 36
End: 2021-09-22

## 2021-09-22 ENCOUNTER — PATIENT OUTREACH (OUTPATIENT)
Dept: NURSING | Facility: CLINIC | Age: 36
End: 2021-09-22
Payer: MEDICARE

## 2021-09-22 ASSESSMENT — ACTIVITIES OF DAILY LIVING (ADL): DEPENDENT_IADLS:: TRANSPORTATION

## 2021-09-22 NOTE — TELEPHONE ENCOUNTER
General health check in with Kailash. Doing well, /90. Has edema in lower extremities and groin but is improving. Has not taking weight yet today, discussed taking weights daily and BPs twice daily. EDW 77kgs.     Drinking around 2 L of water per day. No N/V/D. No dizziness. Appetite slowly improving.     Needs all post-transplant apps scheduled. 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 Reviewed s/s of CMV and when to contact RNCC.

## 2021-09-22 NOTE — LETTER
[facility]  [address] [facility] [address]  Phone: [phone]      September 22, 2021      Kailash Sorto  [address]   [facility] [address]    Dear Kailash,    It was nice talking to you today. Enclosed are the services [facility] offers that I reviewed in general on our call. This will give you more detailed information on our various services.    Schedule a Same-Day Appointment: Most [facility] offer same-day appointments and extended hours Monday-Friday. Call [facility] (toll-free), 24/7, to schedule an appointment.    We have Urgent Care: Choose [facility] Urgent Care when you need non-emergency care for a sore throat, ear infection, sinus infection, minor burn, minor cut, bladder/urinary tract infection (UTI), or other general illnesses and discomforts. Locations are [place] (Weekends only), [place], [facility], [place], [place] and [facility] and you can see the wait times by going online to [email or web address] under urgent care   [email or web address]    [facility] offers Express Care within the [place] [facility]. Choose Express Care when you have a certain non-emergency illness or injury. Express Cares offer flu, strep and UTI tests (but don't have on-site laboratories or X-rays). Express Cares have set prices but also accept several major insurance plans. [email or web address]    Phone -can set up a phone visit with their primary care provider. Contact your clinic or schedule a visit through [facility] .    Email - Current [facility] patients can use [employer]  messaging (similar to email) for diagnosis and treatment. [email or web address]     E-visits: through [facility]  email functionality, you share your medical concerns or symptoms. Your health-care provider will respond  to you within 24 hours (except weekends) of receiving your message if it is a question that they feel can be managed without seeing you in person.We will bill your insurance company for this service. You ll be responsible for the full cost if insurance coverage is denied.   [email or web address]   I hope you find this information useful - We value our patients at [facility] and want to help you be able to access care that is both convenient and affordable for you and your family.    Sincerely,    [facility]   Debby Verdin RN, Care Coordinator   [facility] [place], Women's Clinic, [facility], [place]  E-mail [email or web address]   [phone]

## 2021-09-22 NOTE — PROGRESS NOTES
"Clinic Care Coordination Contact  [facility]: Post-Discharge Note  SITUATION                                                      Admission:    Admission Date: 09/13/21   Reason for Admission: Anemia of chronic kidney failure- kidney transplant  Discharge:   Discharge Date: 09/16/21  Discharge Diagnosis: Kidney Transplant    BACKGROUND                                                      Patient admitted to [facility] 9/13/2021-9/16/2021 with a diagnosis of kidney transplant.    ASSESSMENT      Enrollment  Primary Care Care Coordination Status: Not a Candidate  Clinical Pathway Name: None    Discharge Assessment  How are you doing now that you are home?: \"Ok, but good\"  How are your symptoms? (Red Flag symptoms escalate to triage hotline per guidelines): Unchanged  Do you feel your condition is stable enough to be safe at home until your provider visit?: Yes  Does the patient have their discharge instructions? : Yes  Does the patient have questions regarding their discharge instructions? : No  Were you started on any new medications or were there changes to any of your previous medications? : Yes  Does the patient have all of their medications?: Yes  Do you have questions regarding any of your medications? : No  Do you have all of your needed medical supplies or equipment (DME)?  (i.e. oxygen tank, CPAP, cane, etc.): Yes  Discharge follow-up appointment scheduled within 14 calendar days? : No (Patient states he will MyChart the transplant team to request an appt)  Is patient agreeable to assistance with scheduling? : Yes         Post-op (Clinicians Only)  Did the patient have surgery or a procedure: Yes  Incision: closed  Drainage: No  Fever: No  Chills: No  Redness: No  Warmth: No  Swelling: No  Incision site pain: No (\"All over discomfort\")  PO Intake: regular diet (High protein, low sodium, low fat)  Additional Symptoms:  (NA)  Bowel Function: loose stools  Date of last BM: 09/22/21  Urinary Status: voiding " without complaint/concerns    Care Management       Care Mgmt Encounter Assessment  Preventative Care  Routine Health maintenance Reviewed: Up to date  Clinic Utilization  Difficulty keeping appointments:: No  Compliance Concerns: No  No-Show Concerns: No  No PCP office visit in Past Year: No  Transportation  Transportation means:: Family;Friend     Primary Diagnosis  Primary Diagnosis: Other (include Comment box) (Kidney Transplant)  Barriers in Communication  Other concerns:: None  How confident are you filling out medical forms by yourself:: Not Assessed  Pain  Pain (GOAL):: Yes  Type: Other (New transplant patient)  Location of chronic pain:: Abdomen  Radiating: No  Progression: Improving  Description of pain: Nagging  Chronic pain severity:: 2  Limitation of routine activities due to chronic pain:: No  Alleviating Factors: Rest  Aggravating Factors: Activity  Medication Review  Medication adherence problem (GOAL):: No  Knowledgeable about how to use meds:: Yes  Medication side effects suspected:: No  Diet/Exercise/Sleep  Diet:: Other (High protein, low fat, low sodium)  Inadequate nutrition (GOAL):: No  Tube Feeding: No  Inadequate activity/exercise (GOAL):: Yes (No lifting >10#; walk 4x/day; no driving x 3 weeks s/p surgery)  Significant changes in sleep pattern (GOAL): No    PLAN                                                      Outpatient Plan:    No further care coordination needed at this time.  Patient has [facility] and a transplant care coordinator.  RNCC contact  Information sent via [employer] for future services if needed.    No future appointments.      For any urgent concerns, please contact our 24 hour nurse triage line: [phone] ([phone])         [facility]   Debby Verdin RN, Care Coordinator   [facility] [place], Women's Clinic, [facility], [place]  E-mail [email or web address]   [phone]

## 2021-09-22 NOTE — TELEPHONE ENCOUNTER
Post Kidney and Pancreas Transplant Team Conference  Date: 9/22/2021  Transplant Coordinator: Sweta Card RN     Attendees:  [x]  Dr. Abdi  [x] Sandy Shay LPN     [x]  Dr. Martin [x] Marisa Jeffers RN [] Sofia Del Valle LPN   []  Dr. Cote [x] Majo Kevin, SHAYY    [x]  Dr. Goodwin [] Nikki Palmer RN [x] Brady Peter, PharmD   [] Dr. Vale [] Carline Myers, SHAYY    [] Dr. Toscano [] Dago Damon RN    [x] Dr. Chandler [] Maribell Orr RN    [] Dr. Loyoal [] Roxie Logan RN    []  Dr. Wilder [] Windy Napier, SHAYY    [] Surgery Fellow [x] Yas Card RN    [] Lluvia Hernandez, NP [] Lesa Raza RN        Verbal Plan Read Back:   No changes at this time    Routed to RN Coordinator   Sandy Shay LPN

## 2021-09-23 ENCOUNTER — LAB REQUISITION (OUTPATIENT)
Dept: LAB | Facility: CLINIC | Age: 36
End: 2021-09-23
Payer: MEDICARE

## 2021-09-23 ENCOUNTER — MEDICAL CORRESPONDENCE (OUTPATIENT)
Dept: HEALTH INFORMATION MANAGEMENT | Facility: CLINIC | Age: 36
End: 2021-09-23

## 2021-09-23 DIAGNOSIS — Z94.0 KIDNEY TRANSPLANT STATUS: ICD-10-CM

## 2021-09-23 LAB
ANION GAP SERPL CALCULATED.3IONS-SCNC: 6 MMOL/L (ref 3–14)
BUN SERPL-MCNC: 15 MG/DL (ref 7–30)
CALCIUM SERPL-MCNC: 8.3 MG/DL (ref 8.5–10.1)
CHLORIDE BLD-SCNC: 110 MMOL/L (ref 94–109)
CO2 SERPL-SCNC: 23 MMOL/L (ref 20–32)
CREAT SERPL-MCNC: 1.27 MG/DL (ref 0.66–1.25)
ERYTHROCYTE [DISTWIDTH] IN BLOOD BY AUTOMATED COUNT: 13.2 % (ref 10–15)
GFR SERPL CREATININE-BSD FRML MDRD: 73 ML/MIN/1.73M2
GLUCOSE BLD-MCNC: 84 MG/DL (ref 70–99)
HCT VFR BLD AUTO: 27.2 % (ref 40–53)
HGB BLD-MCNC: 8.9 G/DL (ref 13.3–17.7)
MCH RBC QN AUTO: 29.7 PG (ref 26.5–33)
MCHC RBC AUTO-ENTMCNC: 32.7 G/DL (ref 31.5–36.5)
MCV RBC AUTO: 91 FL (ref 78–100)
PLATELET # BLD AUTO: 322 10E3/UL (ref 150–450)
POTASSIUM BLD-SCNC: 4.2 MMOL/L (ref 3.4–5.3)
RBC # BLD AUTO: 3 10E6/UL (ref 4.4–5.9)
SODIUM SERPL-SCNC: 139 MMOL/L (ref 133–144)
TACROLIMUS BLD-MCNC: 13.9 UG/L (ref 5–15)
TME LAST DOSE: NORMAL H
TME LAST DOSE: NORMAL H
WBC # BLD AUTO: 8.5 10E3/UL (ref 4–11)

## 2021-09-23 PROCEDURE — 85027 COMPLETE CBC AUTOMATED: CPT | Performed by: TRANSPLANT SURGERY

## 2021-09-23 PROCEDURE — 36415 COLL VENOUS BLD VENIPUNCTURE: CPT | Performed by: TRANSPLANT SURGERY

## 2021-09-23 PROCEDURE — 80197 ASSAY OF TACROLIMUS: CPT | Performed by: TRANSPLANT SURGERY

## 2021-09-23 PROCEDURE — 80048 BASIC METABOLIC PNL TOTAL CA: CPT | Performed by: TRANSPLANT SURGERY

## 2021-09-24 ENCOUNTER — TELEPHONE (OUTPATIENT)
Dept: TRANSPLANT | Facility: CLINIC | Age: 36
End: 2021-09-24

## 2021-09-24 DIAGNOSIS — Z94.0 KIDNEY TRANSPLANT RECIPIENT: ICD-10-CM

## 2021-09-24 RX ORDER — TACROLIMUS 1 MG/1
7 CAPSULE ORAL 2 TIMES DAILY
Qty: 540 CAPSULE | Refills: 11
Start: 2021-09-24 | End: 2021-10-05

## 2021-09-24 NOTE — TELEPHONE ENCOUNTER
ISSUE:   Tacrolimus IR level 13.9 on 9/23, goal 8-10, dose 9 mg BID.    PLAN:   Please call patient and confirm this was an accurate 12-hour trough. Verify Tacrolimus IR dose 9 mg BID. Confirm no new medications or illness. Confirm no missed doses. If accurate trough and accurate dose, decrease Tacrolimus IR dose to 7 mg BID and repeat labs in 3 days    OUTCOME:   Left detailed VM and sent [employer] message.

## 2021-09-27 ENCOUNTER — LAB REQUISITION (OUTPATIENT)
Dept: LAB | Facility: CLINIC | Age: 36
End: 2021-09-27
Payer: MEDICARE

## 2021-09-27 DIAGNOSIS — Z48.288 ENCOUNTER FOR AFTERCARE FOLLOWING MULTIPLE ORGAN TRANSPLANT: ICD-10-CM

## 2021-09-27 LAB
BASOPHILS # BLD AUTO: 0 10E3/UL (ref 0–0.2)
BASOPHILS NFR BLD AUTO: 0 %
EOSINOPHIL # BLD AUTO: 0.1 10E3/UL (ref 0–0.7)
EOSINOPHIL NFR BLD AUTO: 2 %
ERYTHROCYTE [DISTWIDTH] IN BLOOD BY AUTOMATED COUNT: 13.2 % (ref 10–15)
HCT VFR BLD AUTO: 27 % (ref 40–53)
HGB BLD-MCNC: 8.8 G/DL (ref 13.3–17.7)
LYMPHOCYTES # BLD AUTO: 1.4 10E3/UL (ref 0.8–5.3)
LYMPHOCYTES NFR BLD AUTO: 17 %
MAGNESIUM SERPL-MCNC: 1.1 MG/DL (ref 1.6–2.3)
MCH RBC QN AUTO: 30 PG (ref 26.5–33)
MCHC RBC AUTO-ENTMCNC: 32.6 G/DL (ref 31.5–36.5)
MCV RBC AUTO: 92 FL (ref 78–100)
MONOCYTES # BLD AUTO: 1 10E3/UL (ref 0–1.3)
MONOCYTES NFR BLD AUTO: 12 %
NEUTROPHILS # BLD AUTO: 5.6 10E3/UL (ref 1.6–8.3)
NEUTROPHILS NFR BLD AUTO: 69 %
PHOSPHATE SERPL-MCNC: 2.4 MG/DL (ref 2.5–4.5)
PLATELET # BLD AUTO: 300 10E3/UL (ref 150–450)
RBC # BLD AUTO: 2.93 10E6/UL (ref 4.4–5.9)
TACROLIMUS BLD-MCNC: 10.5 UG/L (ref 5–15)
TME LAST DOSE: NORMAL H
TME LAST DOSE: NORMAL H
WBC # BLD AUTO: 8.1 10E3/UL (ref 4–11)

## 2021-09-27 PROCEDURE — 80197 ASSAY OF TACROLIMUS: CPT | Performed by: TRANSPLANT SURGERY

## 2021-09-28 DIAGNOSIS — Z53.9 DIAGNOSIS NOT YET DEFINED: Primary | ICD-10-CM

## 2021-09-28 PROCEDURE — G0180 MD CERTIFICATION HHA PATIENT: HCPCS | Performed by: INTERNAL MEDICINE

## 2021-09-29 ENCOUNTER — TELEPHONE (OUTPATIENT)
Dept: TRANSPLANT | Facility: CLINIC | Age: 36
End: 2021-09-29

## 2021-09-29 DIAGNOSIS — Z00.6 RESEARCH STUDY PATIENT: Primary | ICD-10-CM

## 2021-09-29 DIAGNOSIS — Z94.0 KIDNEY TRANSPLANT RECIPIENT: ICD-10-CM

## 2021-09-29 RX ORDER — MAGNESIUM OXIDE 400 MG/1
400 TABLET ORAL 2 TIMES DAILY
Qty: 60 TABLET | Refills: 1 | Status: SHIPPED | OUTPATIENT
Start: 2021-09-29 | End: 2021-11-01

## 2021-09-29 NOTE — TELEPHONE ENCOUNTER
Left detailed VM and sent [employer] message with the following recommendations:  -stop PPI  -increase mag-ox to 800 mg bid for 2-3 days, then reduce to 800 mg daily.

## 2021-09-29 NOTE — TELEPHONE ENCOUNTER
Post Kidney and Pancreas Transplant Team Conference  Date: 9/29/2021  Transplant Coordinator: Yas Card     Attendees:  [x]  Dr. Abdi  [x] Sandy Shay LPN     [x]  Dr. Martin [x] Marisa Jeffers, SHAYY [] Sofia Del Valle LPN   [x]  Dr. Cote [x] Majo Kevin, SHAYY    [x]  Dr. Goodwin [] Nikki Palmer RN [] Brady Peter, PharmD   [] Dr. Vale [] Carline Myers RN    [] Dr. Toscano [] Dago Damon RN    [] Dr. Chandler [] Maribell Orr RN    [] Dr. Loyola [] Roxie Logan RN    []  Dr. Wilder [] Windy Napier, SHAYY    [] Surgery Fellow [x] Yas Card RN    [x] Lluvia Hernandez, LILLIANA [] Lesa Raza RN        Verbal Plan Read Back:   Increase Magnesium to 800 mg daily  Stop PPI  Reach out to  regarding CMV study      Routed to RN Coordinator   Sandy Shay LPN

## 2021-09-30 ENCOUNTER — LAB REQUISITION (OUTPATIENT)
Dept: LAB | Facility: CLINIC | Age: 36
End: 2021-09-30

## 2021-09-30 DIAGNOSIS — Z48.288 ENCOUNTER FOR AFTERCARE FOLLOWING MULTIPLE ORGAN TRANSPLANT: ICD-10-CM

## 2021-09-30 DIAGNOSIS — Z94.0 KIDNEY TRANSPLANT STATUS: ICD-10-CM

## 2021-09-30 LAB
ANION GAP SERPL CALCULATED.3IONS-SCNC: <1 MMOL/L (ref 3–14)
BUN SERPL-MCNC: 16 MG/DL (ref 7–30)
CALCIUM SERPL-MCNC: 9.4 MG/DL (ref 8.5–10.1)
CHLORIDE BLD-SCNC: 116 MMOL/L (ref 94–109)
CO2 SERPL-SCNC: 28 MMOL/L (ref 20–32)
CREAT SERPL-MCNC: 1.19 MG/DL (ref 0.66–1.25)
ERYTHROCYTE [DISTWIDTH] IN BLOOD BY AUTOMATED COUNT: 13.6 % (ref 10–15)
GFR SERPL CREATININE-BSD FRML MDRD: 79 ML/MIN/1.73M2
GLUCOSE BLD-MCNC: 83 MG/DL (ref 70–99)
HCT VFR BLD AUTO: 30.2 % (ref 40–53)
HGB BLD-MCNC: 9.7 G/DL (ref 13.3–17.7)
MCH RBC QN AUTO: 29.8 PG (ref 26.5–33)
MCHC RBC AUTO-ENTMCNC: 32.1 G/DL (ref 31.5–36.5)
MCV RBC AUTO: 93 FL (ref 78–100)
PLATELET # BLD AUTO: 288 10E3/UL (ref 150–450)
POTASSIUM BLD-SCNC: 4.4 MMOL/L (ref 3.4–5.3)
RBC # BLD AUTO: 3.26 10E6/UL (ref 4.4–5.9)
SODIUM SERPL-SCNC: 141 MMOL/L (ref 133–144)
WBC # BLD AUTO: 7.3 10E3/UL (ref 4–11)

## 2021-09-30 PROCEDURE — 80197 ASSAY OF TACROLIMUS: CPT | Performed by: TRANSPLANT SURGERY

## 2021-10-01 LAB
TACROLIMUS BLD-MCNC: 9.5 UG/L (ref 5–15)
TME LAST DOSE: NORMAL H
TME LAST DOSE: NORMAL H

## 2021-10-04 ENCOUNTER — OFFICE VISIT (OUTPATIENT)
Dept: TRANSPLANT | Facility: CLINIC | Age: 36
End: 2021-10-04
Attending: TRANSPLANT SURGERY
Payer: MEDICARE

## 2021-10-04 ENCOUNTER — LAB REQUISITION (OUTPATIENT)
Dept: LAB | Facility: CLINIC | Age: 36
End: 2021-10-04
Payer: MEDICARE

## 2021-10-04 VITALS
DIASTOLIC BLOOD PRESSURE: 84 MMHG | HEART RATE: 92 BPM | WEIGHT: 174.2 LBS | SYSTOLIC BLOOD PRESSURE: 143 MMHG | BODY MASS INDEX: 27.28 KG/M2 | OXYGEN SATURATION: 98 %

## 2021-10-04 DIAGNOSIS — D84.9 IMMUNOSUPPRESSED STATUS (H): ICD-10-CM

## 2021-10-04 DIAGNOSIS — Z79.899 OTHER LONG TERM (CURRENT) DRUG THERAPY: ICD-10-CM

## 2021-10-04 DIAGNOSIS — Z48.288 ENCOUNTER FOR AFTERCARE FOLLOWING MULTIPLE ORGAN TRANSPLANT: ICD-10-CM

## 2021-10-04 DIAGNOSIS — R25.1 TREMOR OF BOTH HANDS: ICD-10-CM

## 2021-10-04 DIAGNOSIS — R19.7 DIARRHEA, UNSPECIFIED TYPE: ICD-10-CM

## 2021-10-04 DIAGNOSIS — Z94.0 KIDNEY TRANSPLANT RECIPIENT: Primary | ICD-10-CM

## 2021-10-04 DIAGNOSIS — Z94.0 KIDNEY TRANSPLANT STATUS: ICD-10-CM

## 2021-10-04 LAB
ANION GAP SERPL CALCULATED.3IONS-SCNC: 10 MMOL/L (ref 3–14)
BASOPHILS # BLD AUTO: 0 10E3/UL (ref 0–0.2)
BASOPHILS NFR BLD AUTO: 1 %
BUN SERPL-MCNC: 14 MG/DL (ref 7–30)
CALCIUM SERPL-MCNC: 8.6 MG/DL (ref 8.5–10.1)
CHLORIDE BLD-SCNC: 112 MMOL/L (ref 94–109)
CO2 SERPL-SCNC: 20 MMOL/L (ref 20–32)
CREAT SERPL-MCNC: 1.14 MG/DL (ref 0.66–1.25)
EOSINOPHIL # BLD AUTO: 0.2 10E3/UL (ref 0–0.7)
EOSINOPHIL NFR BLD AUTO: 3 %
ERYTHROCYTE [DISTWIDTH] IN BLOOD BY AUTOMATED COUNT: 13.6 % (ref 10–15)
GFR SERPL CREATININE-BSD FRML MDRD: 83 ML/MIN/1.73M2
GLUCOSE BLD-MCNC: 91 MG/DL (ref 70–99)
HCT VFR BLD AUTO: 31.3 % (ref 40–53)
HGB BLD-MCNC: 10.1 G/DL (ref 13.3–17.7)
IMM GRANULOCYTES # BLD: 0 10E3/UL
IMM GRANULOCYTES NFR BLD: 0 %
LYMPHOCYTES # BLD AUTO: 1.5 10E3/UL (ref 0.8–5.3)
LYMPHOCYTES NFR BLD AUTO: 26 %
MAGNESIUM SERPL-MCNC: 1.4 MG/DL (ref 1.6–2.3)
MCH RBC QN AUTO: 29.4 PG (ref 26.5–33)
MCHC RBC AUTO-ENTMCNC: 32.3 G/DL (ref 31.5–36.5)
MCV RBC AUTO: 91 FL (ref 78–100)
MONOCYTES # BLD AUTO: 0.8 10E3/UL (ref 0–1.3)
MONOCYTES NFR BLD AUTO: 15 %
NEUTROPHILS # BLD AUTO: 3.2 10E3/UL (ref 1.6–8.3)
NEUTROPHILS NFR BLD AUTO: 55 %
NRBC # BLD AUTO: 0 10E3/UL
NRBC BLD AUTO-RTO: 0 /100
PHOSPHATE SERPL-MCNC: 2.9 MG/DL (ref 2.5–4.5)
PLATELET # BLD AUTO: 254 10E3/UL (ref 150–450)
POTASSIUM BLD-SCNC: 4.1 MMOL/L (ref 3.4–5.3)
RBC # BLD AUTO: 3.44 10E6/UL (ref 4.4–5.9)
SODIUM SERPL-SCNC: 142 MMOL/L (ref 133–144)
TACROLIMUS BLD-MCNC: 12.1 UG/L (ref 5–15)
TME LAST DOSE: NORMAL H
TME LAST DOSE: NORMAL H
WBC # BLD AUTO: 5.8 10E3/UL (ref 4–11)

## 2021-10-04 PROCEDURE — 80197 ASSAY OF TACROLIMUS: CPT | Performed by: TRANSPLANT SURGERY

## 2021-10-04 PROCEDURE — 80048 BASIC METABOLIC PNL TOTAL CA: CPT | Performed by: TRANSPLANT SURGERY

## 2021-10-04 PROCEDURE — 99024 POSTOP FOLLOW-UP VISIT: CPT | Performed by: TRANSPLANT SURGERY

## 2021-10-04 PROCEDURE — 85025 COMPLETE CBC W/AUTO DIFF WBC: CPT | Performed by: TRANSPLANT SURGERY

## 2021-10-04 PROCEDURE — 83735 ASSAY OF MAGNESIUM: CPT | Performed by: TRANSPLANT SURGERY

## 2021-10-04 PROCEDURE — 84100 ASSAY OF PHOSPHORUS: CPT | Performed by: TRANSPLANT SURGERY

## 2021-10-04 NOTE — PROGRESS NOTES
Transplant Surgery Progress Note    Date of Visit: 10/04/2021    Assessment/plan: Patient is recovering well from kidney transplant.  He has a number of minor but persistent issues.  I will just these each in sequence.    1.  Tremor: Patient denies that this predated transplant.  It does not bother him in his normal activities of daily life.  We discussed that this could be due to tacrolimus.  There is some evidence that once per day tacrolimus dosing may improve this.  If the tremor persists we could consider a change in immunosuppression once we get further out from transplant.    2.  Diarrhea: This appears to be improving.  We discussed that the next up would be to change him from CellCept to Myfortic.  Since this is improving I did not make the change today but we could do this as an outpatient.    3.  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  It is improving and is a common finding in post transplant patients.  There is no specific treatment at this point.    4.  Hypomagnesemia: Patient is on magnesium supplementation as well as phosphorus supplementation.    5.  Scrotal swelling: We discussed this is likely due to manipulation of the spermatic cord.  Since it is not worsening we will treat him conservatively for now.  If his discomfort does not improve we will get a scrotal ultrasound.  I do not feel this is acute process such as a torsion.    6.  Prophylaxis: Patient remains on valganciclovir and Bactrim prophylaxis.    7.  Stent: Stent will be removed and a few weeks.    8.  Concerns about feeling aggressive: Patient denies intent or plans for hurting anybody.  To me this sounds most likely due to steroids.  He is weaning off of these presently.    9.  Kidney function: Remains excellent.    9.  AV fistula: We discussed the potential treatment plans for his aneurysmal AV fistula.  Since he is fresh from transplant I am reluctant to ligate or excise it at  this point.  We could converted to an AV graft, but this may not be durable.  Since it is unknown whether the patient will have a functioning transplant for an average of 10 years or so it may be unwarranted to convert to a graft as this may not be durable.  We discussed the plan to revisit this periodically and not do any intervention at present.    Amado Vale MD, PhD  Associate Professor of Surgery    Total time: 30 minutes      Transplants:  2021 (Kidney); Postoperative day:  21   History of Present Illness: Patient is now 3-week status post  donor kidney transplant.  He had uneventful perioperative course.  He comes in for scheduled follow-up.    Patient feels well overall.  He does have a number of minor complaints.  These include the followin.  Tremor.  This is minor nature but noted by the transplant nursing staff.  It does not noticeably bother the patient.    2.  Diarrhea.  This appears to be improving.  He had one loose stool into normal stools today.    3.  Right thigh numbness.  This is improving.    4.  Right-sided scrotal discomfort.  Patient reports this is been present since the early postoperative period and has not increased in severity.  He appreciates no masses.    5.  Questions about AV fistula.  Patient has an aneurysmal right upper extremity AV fistula.  He wonders about the timing of ligation, excision, or bypass.    6.  Mental status.  Patient reports feeling more aggressive.  He wonders whether this is some trait that came along with a kidney.    7. IS: present immunosuppression consists of tacrolimus, mycophenolate, and prednisone taper.    8.  Kidney function: His creatinine is 1.1 and the remainder of his labs are unrevealing.        Transplant coordinator: Yas Card RN  [phone]  Donor type:  DBD  DSA at time of transplant:  No  Ureteral stent: Yes  CMV:  Donor Positive/ Recipient negative  EBV:  Donor Positive/ Recipient positive  Thymoglobulin:   2mg/kg    Review of Systems:   CONSTITUTIONAL:  No fevers or chills  EYES: negative for icterus  ENT:  negative for hearing loss, tinnitus and sore throat  RESPIRATORY:  negative for cough, sputum, dyspnea  CARDIOVASCULAR:  negative for chest pain   GASTROINTESTINAL:  negative for nausea, vomiting, diarrhea or constipation  GENITOURINARY:  negative for incontinence, dysuria, bladder emptying problems  HEME:  No easy bruising  INTEGUMENT:  negative for rash and pruritus  NEURO:  Negative for headache, seizure disorder    Immunosuppression:    Immunosuppressant Medications     Immunosuppressive Agents Disp Start End     mycophenolate (GENERIC EQUIVALENT) 250 MG capsule    120 capsule 9/16/2021     Sig - Route: Take 4 capsules (1,000 mg) by mouth 2 times daily - Oral    Class: E-Prescribe     tacrolimus (GENERIC EQUIVALENT) 0.5 MG capsule    30 capsule 9/16/2021     Sig - Route: Take 1 capsule (0.5 mg) by mouth 2 times daily as needed As instructed by transplant team. - Oral    Class: E-Prescribe     tacrolimus (GENERIC EQUIVALENT) 1 MG capsule    540 capsule 9/24/2021     Sig - Route: Take 7 capsules (7 mg) by mouth 2 times daily - Oral    Class: No Print Out    Notes to Pharmacy: TXP DT 9/13/2021 (Kidney) TXP Dischg DT 9/16/2021 DX Kidney replaced by transplant Z94.0 TX [facility] ([place], [place])          Possible Immunosuppression-related side effects:   []             headache  []             vivid dreams  []             irritability  [x]             fine tremor  []             nausea  [x]             diarrhea  []             neuropathy      []             edema  []             renal calcineurin toxicity  []             hyperkalemia  []             post-transplant diabetes  []             decreased appetite  []             increased appetite  []             other:  []             None    Prophylaxis:    [x]             Valcyte  []             Mycelex  [x]              Bactrim  []             Dapsone  []             Protonix  []             Other:      Prescription Medications as of 10/4/2021       Rx Number Disp Refills Start End Last Dispensed Date Next Fill Date Owning Pharmacy    acetaminophen (TYLENOL) 325 MG tablet  30 tablet 1 9/16/2021    [address]    Sig: Take 2 tablets (650 mg) by mouth every 6 hours as needed for other (For optimal non-opioid multimodal pain management to improve pain control.)    Class: E-Prescribe    Route: Oral    amLODIPine (NORVASC) 5 MG tablet  30 tablet 1 9/17/2021    [address]    Sig: Take 1 tablet (5 mg) by mouth daily    Class: E-Prescribe    Route: Oral    Renewals     Renewal requests to authorizing provider (María Yancey NP) <b>prohibited</b>          atorvastatin (LIPITOR) 10 MG tablet  30 tablet 1 9/17/2021    [address]    Sig: Take 1 tablet (10 mg) by mouth daily    Class: E-Prescribe    Route: Oral    Renewals     Renewal requests to authorizing provider (María Yancey NP) <b>prohibited</b>          cholecalciferol (VITAMIN D3) 25 mcg (1000 units) capsule  60 capsule 11 11/8/2021    [facility]/pharmacy [address] [place] [address]    Sig: Take 2 capsules (50 mcg) by mouth daily    Class: E-Prescribe    Route: Oral    magnesium oxide (MAG-OX) 400 MG tablet  60 tablet 1 9/29/2021    [facility]/pharmacy [address] [place] [address]    Sig: Take 1 tablet (400 mg) by mouth 2 times daily    Class: E-Prescribe    Route: Oral    mycophenolate (GENERIC EQUIVALENT) 250 MG capsule  120 capsule 11 9/16/2021    [address]    Sig: Take 4 capsules (1,000 mg) by mouth 2 times daily    Class: E-Prescribe    Route: Oral    oxyCODONE (ROXICODONE) 5 MG tablet  10 tablet 0 9/16/2021     [address]    Sig: Take 1 tablet (5 mg) by mouth every 6 hours as needed for moderate to severe pain    Class: Local Print    Earliest Fill Date: 9/16/2021    Route: Oral    phosphorus tablet 250 mg (PHOSPHA 250 NEUTRAL) 250 MG per tablet  60 tablet 1 9/17/2021    [address]    Sig: Take 1 tablet (250 mg) by mouth 2 times daily    Class: E-Prescribe    Route: Oral    predniSONE (DELTASONE) 10 MG tablet  47 tablet 0 9/17/2021 10/14/2021   [address]    Sig: Take 6 tablets (60 mg) by mouth daily for 1 day, THEN 4 tablets (40 mg) daily for 2 days, THEN 2 tablets (20 mg) daily for 7 days, THEN 1.5 tablets (15 mg) daily for 7 days, THEN 1 tablet (10 mg) daily for 7 days, THEN 0.5 tablets (5 mg) daily for 3 days.    Class: E-Prescribe    Route: Oral    study - NPC-21 vs. placebo, IDS# [id number], 6 mg/kg; 12 mg/kg; or placebo in 0.9% sodium chloride intermittent infusion  300 mL 0 9/16/2021 12/9/2021       Sig: Inject 300 mLs into the vein once for 1 dose    Class: Historical    Route: Intravenous    sulfamethoxazole-trimethoprim (BACTRIM) 400-80 MG tablet  30 tablet 11 9/17/2021    [address]    Sig: Take 1 tablet by mouth daily    Class: E-Prescribe    Route: Oral    Renewals     Renewal requests to authorizing provider (María Yancey NP) <b>prohibited</b>          tacrolimus (GENERIC EQUIVALENT) 0.5 MG capsule  30 capsule 1 9/16/2021    [address]    Sig: Take 1 capsule (0.5 mg) by mouth 2 times daily as needed As instructed by transplant team.    Class: E-Prescribe    Route: Oral    tacrolimus (GENERIC EQUIVALENT) 1 MG capsule  540 capsule 11 9/24/2021        Sig: Take 7 capsules (7 mg) by mouth 2 times daily     Class: No Print Out    Notes to Pharmacy: TXP DT 9/13/2021 (Kidney) TXP Dischg DT 9/16/2021 DX Kidney replaced by transplant Z94.0 TX Center [facility] ([place], [place])    Route: Oral    vitamin D2 (ERGOCALCIFEROL) 14234 units (1250 mcg) capsule  8 capsule 0 9/20/2021    [facility]/pharmacy #[id number] - [address]    Sig: Take 1 capsule (50,000 Units) by mouth once a week    Class: E-Prescribe    Route: Oral          Exam:    Pulse:  [92] 92  BP: (143)/(84) 143/84  SpO2:  [98 %] 98 %    Pulse:  [92] 92  BP: (143)/(84) 143/84  SpO2:  [98 %] 98 %  General Appearance: in no apparent distress.   Skin: Normal, no rashes or jaundice  Lungs: easy respirations, no audible wheezing.  Abdomen: flat, The wound is dry and intact,  Extremities: Tremor present bilateral.   Edema: absent.     Transplant Immunosuppression Labs Latest Ref Rng & Units 10/4/2021 9/30/2021 9/27/2021 9/23/2021 9/20/2021   Tacro Level 5.0 - 15.0 ug/L - 9.5 10.5 13.9 8.2   Creat 0.66 - 1.25 mg/dL 1.14 1.19 1.07 1.27(H) 1.27(H)   BUN 7 - 30 mg/dL 14 16 17 15 20   WBC 4.0 - 11.0 10e3/uL 5.8 7.3 8.1 8.5 9.3   Neutrophil % 55 - 69 - -   ANEU 1.6 - 8.3 10e9/L - - - - -       Chemistries:   Recent Labs   Lab Test 10/04/21  0835   BUN 14   CR 1.14   GFRESTIMATED 83   GLC 91     Lab Results   Component Value Date    A1C 5.3 09/13/2021     Recent Labs   Lab Test 09/13/21  0500   ALBUMIN 3.8   BILITOTAL 0.4   ALKPHOS 122   AST 15   ALT 24     Urine Studies:  Recent Labs   Lab Test 09/13/21  0532   COLOR Light Yellow   APPEARANCE Clear   URINEGLC Negative   URINEBILI Negative   URINEKETONE Negative   SG 1.015   UBLD Negative   URINEPH 6.5   PROTEIN 30 *   NITRITE Negative   LEUKEST Negative   RBCU 1   WBCU 1     Recent Labs   Lab Test 09/13/21  0532 05/11/16  0125   UTPG 0.22* 1.07*     Hematology:   Recent Labs   Lab Test 10/04/21  0835 09/30/21  0830 09/27/21  0830   HGB 10.1* 9.7* 8.8*    288 300    WBC 5.8 7.3 8.1     Coags:   Recent Labs   Lab Test 09/13/21  0500 08/04/16  0711 05/15/16  0547   INR 0.88 0.95 1.01     HLA antibodies:   SA1 Hi Risk Ritu   Date Value Ref Range Status   07/05/2021 No Specificty Defined  Final     SA1 HI RISK RITU   Date Value Ref Range Status   09/13/2021 No Specificity Defined  Final     SA1 Mod Risk Ritu   Date Value Ref Range Status   07/05/2021 None  Final     SA1 MOD RISK RITU   Date Value Ref Range Status   09/13/2021 None  Final     SA2 Hi Risk Ritu   Date Value Ref Range Status   07/05/2021 None  Final     SA2 HI RISK RITU   Date Value Ref Range Status   09/13/2021 None  Final     SA2 Mod Risk Ritu   Date Value Ref Range Status   07/05/2021 None  Final     SA2 MOD RISK RITU   Date Value Ref Range Status   09/13/2021 None  Final

## 2021-10-04 NOTE — LETTER
10/4/2021         RE: Kailash Sorto  [address]  [place] [id number]        Dear Colleague,    Thank you for referring your patient, Kailash Sorto, to the [facility]. Please see a copy of my visit note below.    Transplant Surgery Progress Note    Date of Visit: 10/04/2021    Assessment/plan: Patient is recovering well from kidney transplant.  He has a number of minor but persistent issues.  I will just these each in sequence.    1.  Tremor: Patient denies that this predated transplant.  It does not bother him in his normal activities of daily life.  We discussed that this could be due to tacrolimus.  There is some evidence that once per day tacrolimus dosing may improve this.  If the tremor persists we could consider a change in immunosuppression once we get further out from transplant.    2.  Diarrhea: This appears to be improving.  We discussed that the next up would be to change him from CellCept to Myfortic.  Since this is improving I did not make the change today but we could do this as an outpatient.    3.  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  It is improving and is a common finding in post transplant patients.  There is no specific treatment at this point.    4.  Hypomagnesemia: Patient is on magnesium supplementation as well as phosphorus supplementation.    5.  Scrotal swelling: We discussed this is likely due to manipulation of the spermatic cord.  Since it is not worsening we will treat him conservatively for now.  If his discomfort does not improve we will get a scrotal ultrasound.  I do not feel this is acute process such as a torsion.    6.  Prophylaxis: Patient remains on valganciclovir and Bactrim prophylaxis.    7.  Stent: Stent will be removed and a few weeks.    8.  Concerns about feeling aggressive: Patient denies intent or plans for hurting anybody.  To me this sounds most likely due  to steroids.  He is weaning off of these presently.    9.  Kidney function: Remains excellent.    9.  AV fistula: We discussed the potential treatment plans for his aneurysmal AV fistula.  Since he is fresh from transplant I am reluctant to ligate or excise it at this point.  We could converted to an AV graft, but this may not be durable.  Since it is unknown whether the patient will have a functioning transplant for an average of 10 years or so it may be unwarranted to convert to a graft as this may not be durable.  We discussed the plan to revisit this periodically and not do any intervention at present.    Amado Vale MD, PhD  Associate Professor of Surgery    Total time: 30 minutes      Transplants:  2021 (Kidney); Postoperative day:  21   History of Present Illness: Patient is now 3-week status post  donor kidney transplant.  He had uneventful perioperative course.  He comes in for scheduled follow-up.    Patient feels well overall.  He does have a number of minor complaints.  These include the followin.  Tremor.  This is minor nature but noted by the transplant nursing staff.  It does not noticeably bother the patient.    2.  Diarrhea.  This appears to be improving.  He had one loose stool into normal stools today.    3.  Right thigh numbness.  This is improving.    4.  Right-sided scrotal discomfort.  Patient reports this is been present since the early postoperative period and has not increased in severity.  He appreciates no masses.    5.  Questions about AV fistula.  Patient has an aneurysmal right upper extremity AV fistula.  He wonders about the timing of ligation, excision, or bypass.    6.  Mental status.  Patient reports feeling more aggressive.  He wonders whether this is some trait that came along with a kidney.    7. IS: present immunosuppression consists of tacrolimus, mycophenolate, and prednisone taper.    8.  Kidney function: His creatinine is 1.1 and the remainder of  his labs are unrevealing.        Transplant coordinator: Yas Card RN  [phone]  Donor type:  DBD  DSA at time of transplant:  No  Ureteral stent: Yes  CMV:  Donor Positive/ Recipient negative  EBV:  Donor Positive/ Recipient positive  Thymoglobulin:  2mg/kg    Review of Systems:   CONSTITUTIONAL:  No fevers or chills  EYES: negative for icterus  ENT:  negative for hearing loss, tinnitus and sore throat  RESPIRATORY:  negative for cough, sputum, dyspnea  CARDIOVASCULAR:  negative for chest pain   GASTROINTESTINAL:  negative for nausea, vomiting, diarrhea or constipation  GENITOURINARY:  negative for incontinence, dysuria, bladder emptying problems  HEME:  No easy bruising  INTEGUMENT:  negative for rash and pruritus  NEURO:  Negative for headache, seizure disorder    Immunosuppression:    Immunosuppressant Medications     Immunosuppressive Agents Disp Start End     mycophenolate (GENERIC EQUIVALENT) 250 MG capsule    120 capsule 9/16/2021     Sig - Route: Take 4 capsules (1,000 mg) by mouth 2 times daily - Oral    Class: E-Prescribe     tacrolimus (GENERIC EQUIVALENT) 0.5 MG capsule    30 capsule 9/16/2021     Sig - Route: Take 1 capsule (0.5 mg) by mouth 2 times daily as needed As instructed by transplant team. - Oral    Class: E-Prescribe     tacrolimus (GENERIC EQUIVALENT) 1 MG capsule    540 capsule 9/24/2021     Sig - Route: Take 7 capsules (7 mg) by mouth 2 times daily - Oral    Class: No Print Out    Notes to Pharmacy: TXP DT 9/13/2021 (Kidney) TXP Dischg DT 9/16/2021 DX Kidney replaced by transplant Z94.0 TX [facility] ([place], [place])          Possible Immunosuppression-related side effects:   []             headache  []             vivid dreams  []             irritability  [x]             fine tremor  []             nausea  [x]             diarrhea  []             neuropathy      []             edema  []             renal calcineurin toxicity  []              hyperkalemia  []             post-transplant diabetes  []             decreased appetite  []             increased appetite  []             other:  []             None    Prophylaxis:    [x]             Valcyte  []             Mycelex  [x]             Bactrim  []             Dapsone  []             Protonix  []             Other:      Prescription Medications as of 10/4/2021       Rx Number Disp Refills Start End Last Dispensed Date Next Fill Date Owning Pharmacy    acetaminophen (TYLENOL) 325 MG tablet  30 tablet 1 9/16/2021    [address]    Sig: Take 2 tablets (650 mg) by mouth every 6 hours as needed for other (For optimal non-opioid multimodal pain management to improve pain control.)    Class: E-Prescribe    Route: Oral    amLODIPine (NORVASC) 5 MG tablet  30 tablet 1 9/17/2021    [address]    Sig: Take 1 tablet (5 mg) by mouth daily    Class: E-Prescribe    Route: Oral    Renewals     Renewal requests to authorizing provider (María Yancey NP) <b>prohibited</b>          atorvastatin (LIPITOR) 10 MG tablet  30 tablet 1 9/17/2021    [address]    Sig: Take 1 tablet (10 mg) by mouth daily    Class: E-Prescribe    Route: Oral    Renewals     Renewal requests to authorizing provider (María Yancey NP) <b>prohibited</b>          cholecalciferol (VITAMIN D3) 25 mcg (1000 units) capsule  60 capsule 11 11/8/2021    [facility]/pharmacy [address] [place] [address]    Sig: Take 2 capsules (50 mcg) by mouth daily    Class: E-Prescribe    Route: Oral    magnesium oxide (MAG-OX) 400 MG tablet  60 tablet 1 9/29/2021    [facility]/pharmacy [address] [place] [address]    Sig: Take 1 tablet (400 mg) by mouth 2 times daily    Class: E-Prescribe    Route: Oral    mycophenolate (GENERIC EQUIVALENT) 250 MG  capsule  120 capsule 11 9/16/2021    [address]    Sig: Take 4 capsules (1,000 mg) by mouth 2 times daily    Class: E-Prescribe    Route: Oral    oxyCODONE (ROXICODONE) 5 MG tablet  10 tablet 0 9/16/2021    [address]    Sig: Take 1 tablet (5 mg) by mouth every 6 hours as needed for moderate to severe pain    Class: Local Print    Earliest Fill Date: 9/16/2021    Route: Oral    phosphorus tablet 250 mg (PHOSPHA 250 NEUTRAL) 250 MG per tablet  60 tablet 1 9/17/2021    [place] [id number] [facility] [address]    Sig: Take 1 tablet (250 mg) by mouth 2 times daily    Class: E-Prescribe    Route: Oral    predniSONE (DELTASONE) 10 MG tablet  47 tablet 0 9/17/2021 10/14/2021   [address]    Sig: Take 6 tablets (60 mg) by mouth daily for 1 day, THEN 4 tablets (40 mg) daily for 2 days, THEN 2 tablets (20 mg) daily for 7 days, THEN 1.5 tablets (15 mg) daily for 7 days, THEN 1 tablet (10 mg) daily for 7 days, THEN 0.5 tablets (5 mg) daily for 3 days.    Class: E-Prescribe    Route: Oral    study - NPC-21 vs. placebo, IDS# [id number], 6 mg/kg; 12 mg/kg; or placebo in 0.9% sodium chloride intermittent infusion  300 mL 0 9/16/2021 12/9/2021       Sig: Inject 300 mLs into the vein once for 1 dose    Class: Historical    Route: Intravenous    sulfamethoxazole-trimethoprim (BACTRIM) 400-80 MG tablet  30 tablet 11 9/17/2021    [address]    Sig: Take 1 tablet by mouth daily    Class: E-Prescribe    Route: Oral    Renewals     Renewal requests to authorizing provider (María Yancey NP) <b>prohibited</b>          tacrolimus (GENERIC EQUIVALENT) 0.5 MG capsule  30 capsule 1 9/16/2021    [address]     Sig: Take 1 capsule (0.5 mg) by mouth 2 times daily as needed As instructed by transplant team.    Class: E-Prescribe    Route: Oral    tacrolimus (GENERIC EQUIVALENT) 1 MG capsule  540 capsule 11 9/24/2021        Sig: Take 7 capsules (7 mg) by mouth 2 times daily    Class: No Print Out    Notes to Pharmacy: TXP DT 9/13/2021 (Kidney) TXP Dischg DT 9/16/2021 DX Kidney replaced by transplant Z94.0 TX Center [facility] ([place], [place])    Route: Oral    vitamin D2 (ERGOCALCIFEROL) 09242 units (1250 mcg) capsule  8 capsule 0 9/20/2021    [facility]/pharmacy #[id number] - [address]    Sig: Take 1 capsule (50,000 Units) by mouth once a week    Class: E-Prescribe    Route: Oral          Exam:    Pulse:  [92] 92  BP: (143)/(84) 143/84  SpO2:  [98 %] 98 %    Pulse:  [92] 92  BP: (143)/(84) 143/84  SpO2:  [98 %] 98 %  General Appearance: in no apparent distress.   Skin: Normal, no rashes or jaundice  Lungs: easy respirations, no audible wheezing.  Abdomen: flat, The wound is dry and intact,  Extremities: Tremor present bilateral.   Edema: absent.     Transplant Immunosuppression Labs Latest Ref Rng & Units 10/4/2021 9/30/2021 9/27/2021 9/23/2021 9/20/2021   Tacro Level 5.0 - 15.0 ug/L - 9.5 10.5 13.9 8.2   Creat 0.66 - 1.25 mg/dL 1.14 1.19 1.07 1.27(H) 1.27(H)   BUN 7 - 30 mg/dL 14 16 17 15 20   WBC 4.0 - 11.0 10e3/uL 5.8 7.3 8.1 8.5 9.3   Neutrophil % 55 - 69 - -   ANEU 1.6 - 8.3 10e9/L - - - - -       Chemistries:   Recent Labs   Lab Test 10/04/21  0835   BUN 14   CR 1.14   GFRESTIMATED 83   GLC 91     Lab Results   Component Value Date    A1C 5.3 09/13/2021     Recent Labs   Lab Test 09/13/21  0500   ALBUMIN 3.8   BILITOTAL 0.4   ALKPHOS 122   AST 15   ALT 24     Urine Studies:  Recent Labs   Lab Test 09/13/21  0532   COLOR Light Yellow   APPEARANCE Clear   URINEGLC Negative   URINEBILI Negative   URINEKETONE Negative   SG 1.015   UBLD Negative   URINEPH 6.5   PROTEIN  30 *   NITRITE Negative   LEUKEST Negative   RBCU 1   WBCU 1     Recent Labs   Lab Test 09/13/21  0532 05/11/16  0125   UTPG 0.22* 1.07*     Hematology:   Recent Labs   Lab Test 10/04/21  0835 09/30/21  0830 09/27/21  0830   HGB 10.1* 9.7* 8.8*    288 300   WBC 5.8 7.3 8.1     Coags:   Recent Labs   Lab Test 09/13/21  0500 08/04/16  0711 05/15/16  0547   INR 0.88 0.95 1.01     HLA antibodies:   SA1 Hi Risk Ritu   Date Value Ref Range Status   07/05/2021 No Specificty Defined  Final     SA1 HI RISK RITU   Date Value Ref Range Status   09/13/2021 No Specificity Defined  Final     SA1 Mod Risk Ritu   Date Value Ref Range Status   07/05/2021 None  Final     SA1 MOD RISK RITU   Date Value Ref Range Status   09/13/2021 None  Final     SA2 Hi Risk Ritu   Date Value Ref Range Status   07/05/2021 None  Final     SA2 HI RISK RITU   Date Value Ref Range Status   09/13/2021 None  Final     SA2 Mod Risk Ritu   Date Value Ref Range Status   07/05/2021 None  Final     SA2 MOD RISK RITU   Date Value Ref Range Status   09/13/2021 None  Final               Again, thank you for allowing me to participate in the care of your patient.        Sincerely,        Amado Vale MD

## 2021-10-04 NOTE — NURSING NOTE
Chief Complaint   Patient presents with     RECHECK     3 week follow up     Blood pressure (!) 143/84, pulse 92, weight 79 kg (174 lb 3.2 oz), SpO2 98 %.    Poppy Morrissey on 10/4/2021 at 1:26 PM

## 2021-10-05 ENCOUNTER — TELEPHONE (OUTPATIENT)
Dept: TRANSPLANT | Facility: CLINIC | Age: 36
End: 2021-10-05

## 2021-10-05 ENCOUNTER — MEDICAL CORRESPONDENCE (OUTPATIENT)
Dept: HEALTH INFORMATION MANAGEMENT | Facility: CLINIC | Age: 36
End: 2021-10-05

## 2021-10-05 DIAGNOSIS — I15.0 RENOVASCULAR HYPERTENSION: ICD-10-CM

## 2021-10-05 DIAGNOSIS — Z94.0 KIDNEY TRANSPLANT RECIPIENT: Primary | ICD-10-CM

## 2021-10-05 RX ORDER — TACROLIMUS 1 MG/1
6 CAPSULE ORAL 2 TIMES DAILY
Qty: 1080 CAPSULE | Refills: 3 | Status: SHIPPED | OUTPATIENT
Start: 2021-10-05 | End: 2021-10-18

## 2021-10-05 RX ORDER — MYCOPHENOLATE MOFETIL 250 MG/1
1000 CAPSULE ORAL 2 TIMES DAILY
Qty: 720 CAPSULE | Refills: 3 | Status: SHIPPED | OUTPATIENT
Start: 2021-10-05 | End: 2021-12-20

## 2021-10-05 RX ORDER — AMLODIPINE BESYLATE 10 MG/1
10 TABLET ORAL DAILY
Qty: 30 TABLET | Refills: 3 | Status: SHIPPED | OUTPATIENT
Start: 2021-10-05 | End: 2022-02-01

## 2021-10-05 RX ORDER — PREDNISONE 5 MG/1
5 TABLET ORAL DAILY
Qty: 3 TABLET | Refills: 0 | Status: SHIPPED | OUTPATIENT
Start: 2021-10-05 | End: 2021-11-12

## 2021-10-05 NOTE — TELEPHONE ENCOUNTER
Patient also has question regarding prednisone taper, patient will not have enough 1/2 tabs (5 mg) for the last three days of the taper.  Please advise.

## 2021-10-05 NOTE — TELEPHONE ENCOUNTER
Kailash will be short 3 days of prednisone for his prednisone taper. Rx sent to pharmacy.     BPs high 140s-150s/90s. HR 70s. No swelling, no dizziness. Currently taking 5 mg amlodipine nightly and will increase to 10 mg.

## 2021-10-05 NOTE — TELEPHONE ENCOUNTER
ISSUE:   Tacrolimus IR level 12.1 on 10/4/21, goal 8-10, dose 7 mg BID.    PLAN:   Please call patient and confirm this was an accurate 12-hour trough. Verify Tacrolimus IR dose 7 mg BID. Confirm no new medications or illness. Confirm no missed doses. If accurate trough and accurate dose, decrease Tacrolimus IR dose to 6 mg BID and repeat labs in 1 weeks    Ritika Patel RN, BSN  Solid Organ Transplant, Post Kidney and Pancreas   Transplant Care Coordinator   [phone]      OUTCOME:   Spoke with patient, they confirm accurate trough level and current dose 7 mg BID. Patient confirmed dose change to 6 mg BID and to repeat labs in 1 weeks. Orders sent to preferred pharmacy for dose change and lab for repeat labs. Patient voiced understanding of plan.

## 2021-10-06 LAB
ANION GAP SERPL CALCULATED.3IONS-SCNC: 3 MMOL/L (ref 3–14)
BUN SERPL-MCNC: 17 MG/DL (ref 7–30)
CALCIUM SERPL-MCNC: 8.9 MG/DL (ref 8.5–10.1)
CHLORIDE BLD-SCNC: 115 MMOL/L (ref 94–109)
CO2 SERPL-SCNC: 21 MMOL/L (ref 20–32)
CREAT SERPL-MCNC: 1.07 MG/DL (ref 0.66–1.25)
GFR SERPL CREATININE-BSD FRML MDRD: 90 ML/MIN/1.73M2
GLUCOSE BLD-MCNC: 85 MG/DL (ref 70–99)
POTASSIUM BLD-SCNC: 4.3 MMOL/L (ref 3.4–5.3)
SODIUM SERPL-SCNC: 139 MMOL/L (ref 133–144)

## 2021-10-07 ENCOUNTER — LAB (OUTPATIENT)
Dept: LAB | Facility: CLINIC | Age: 36
End: 2021-10-07
Payer: MEDICARE

## 2021-10-07 DIAGNOSIS — Z94.0 KIDNEY REPLACED BY TRANSPLANT: ICD-10-CM

## 2021-10-07 DIAGNOSIS — Z79.899 ENCOUNTER FOR LONG-TERM CURRENT USE OF MEDICATION: ICD-10-CM

## 2021-10-07 DIAGNOSIS — Z20.828 CONTACT WITH AND (SUSPECTED) EXPOSURE TO OTHER VIRAL COMMUNICABLE DISEASES: ICD-10-CM

## 2021-10-07 DIAGNOSIS — Z48.298 AFTERCARE FOLLOWING ORGAN TRANSPLANT: ICD-10-CM

## 2021-10-07 LAB
ANION GAP SERPL CALCULATED.3IONS-SCNC: 9 MMOL/L (ref 3–14)
BUN SERPL-MCNC: 14 MG/DL (ref 7–30)
CALCIUM SERPL-MCNC: 9.1 MG/DL (ref 8.5–10.1)
CHLORIDE BLD-SCNC: 107 MMOL/L (ref 94–109)
CO2 SERPL-SCNC: 21 MMOL/L (ref 20–32)
CREAT SERPL-MCNC: 1.08 MG/DL (ref 0.66–1.25)
ERYTHROCYTE [DISTWIDTH] IN BLOOD BY AUTOMATED COUNT: 13.1 % (ref 10–15)
GFR SERPL CREATININE-BSD FRML MDRD: 89 ML/MIN/1.73M2
GLUCOSE BLD-MCNC: 84 MG/DL (ref 70–99)
HCT VFR BLD AUTO: 34 % (ref 40–53)
HGB BLD-MCNC: 11.2 G/DL (ref 13.3–17.7)
MCH RBC QN AUTO: 29.8 PG (ref 26.5–33)
MCHC RBC AUTO-ENTMCNC: 32.9 G/DL (ref 31.5–36.5)
MCV RBC AUTO: 90 FL (ref 78–100)
PLATELET # BLD AUTO: 232 10E3/UL (ref 150–450)
POTASSIUM BLD-SCNC: 3.7 MMOL/L (ref 3.4–5.3)
RBC # BLD AUTO: 3.76 10E6/UL (ref 4.4–5.9)
SODIUM SERPL-SCNC: 137 MMOL/L (ref 133–144)
TACROLIMUS BLD-MCNC: 10.7 UG/L (ref 5–15)
TME LAST DOSE: NORMAL H
TME LAST DOSE: NORMAL H
WBC # BLD AUTO: 5.6 10E3/UL (ref 4–11)

## 2021-10-07 PROCEDURE — 85027 COMPLETE CBC AUTOMATED: CPT

## 2021-10-07 PROCEDURE — 36415 COLL VENOUS BLD VENIPUNCTURE: CPT

## 2021-10-07 PROCEDURE — 80197 ASSAY OF TACROLIMUS: CPT

## 2021-10-07 PROCEDURE — 80048 BASIC METABOLIC PNL TOTAL CA: CPT

## 2021-10-08 ENCOUNTER — LAB (OUTPATIENT)
Dept: LAB | Facility: CLINIC | Age: 36
End: 2021-10-08
Payer: MEDICARE

## 2021-10-08 DIAGNOSIS — Z94.0 KIDNEY TRANSPLANT RECIPIENT: ICD-10-CM

## 2021-10-08 DIAGNOSIS — Z94.0 KIDNEY TRANSPLANT RECIPIENT: Primary | ICD-10-CM

## 2021-10-08 PROCEDURE — 36415 COLL VENOUS BLD VENIPUNCTURE: CPT

## 2021-10-08 NOTE — PROGRESS NOTES
Kailash's study bloodwork came back with a CMV level in blood of 275. That is low but it is detectable, so he will go in for labs today to have a CMV drawn via our lab. Cyndi Griffith MD 8:53 AM

## 2021-10-09 ENCOUNTER — TELEPHONE (OUTPATIENT)
Dept: TRANSPLANT | Facility: CLINIC | Age: 36
End: 2021-10-09

## 2021-10-09 DIAGNOSIS — B25.9 CMV (CYTOMEGALOVIRUS INFECTION) (H): Primary | ICD-10-CM

## 2021-10-09 DIAGNOSIS — Z48.298 AFTERCARE FOLLOWING ORGAN TRANSPLANT: ICD-10-CM

## 2021-10-09 LAB
CMV DNA IU/ML, INSTRUMENT: 576 IU/ML
CMV DNA SPEC NAA+PROBE-LOG#: 2.8 {LOG_COPIES}/ML

## 2021-10-09 RX ORDER — VALGANCICLOVIR 450 MG/1
900 TABLET, FILM COATED ORAL 2 TIMES DAILY
Qty: 120 TABLET | Refills: 1 | Status: SHIPPED | OUTPATIENT
Start: 2021-10-09 | End: 2021-10-17

## 2021-10-09 NOTE — TELEPHONE ENCOUNTER
Pt paged that he is is in CMV study and he tested positive, but had to have a second lab draw today per ID. [facility] second lab drawn and shows CMV log 2.8 quant 576.   Study nurse told him to page tx coordinator. He is unsure of what the study details are. But he has to have study labs weekly.     He does have intermittent loose stools once a day since transplant. appettite stable, no decreased appetite.  Tremors from tacrolimus. No nausea. No headaches or night sweats.    Per dr Goodwin patient to start valcyte 900 mg BID. But attempted to order there was a warning patient to not be on valcyte due to study. Paged Dr Sosa tx ID to discuss.    Per Dr Goodwin discussed with dr carrillo. Patient to start valcyte 900 mg BID. Pt updated and prefers to  at local pharm. Patient to call back with any changes or concerns.

## 2021-10-09 NOTE — LETTER
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    .

## 2021-10-11 ENCOUNTER — MEDICAL CORRESPONDENCE (OUTPATIENT)
Dept: HEALTH INFORMATION MANAGEMENT | Facility: CLINIC | Age: 36
End: 2021-10-11

## 2021-10-11 ENCOUNTER — LAB REQUISITION (OUTPATIENT)
Dept: LAB | Facility: CLINIC | Age: 36
End: 2021-10-11
Payer: MEDICARE

## 2021-10-11 ENCOUNTER — TELEPHONE (OUTPATIENT)
Dept: TRANSPLANT | Facility: CLINIC | Age: 36
End: 2021-10-11

## 2021-10-11 DIAGNOSIS — Z48.22 ENCOUNTER FOR AFTERCARE FOLLOWING KIDNEY TRANSPLANT: ICD-10-CM

## 2021-10-11 DIAGNOSIS — Z94.0 KIDNEY TRANSPLANTED: Primary | ICD-10-CM

## 2021-10-11 LAB
ANION GAP SERPL CALCULATED.3IONS-SCNC: 5 MMOL/L (ref 3–14)
BASOPHILS # BLD AUTO: 0 10E3/UL (ref 0–0.2)
BASOPHILS NFR BLD AUTO: 0 %
BUN SERPL-MCNC: 17 MG/DL (ref 7–30)
CALCIUM SERPL-MCNC: 8.2 MG/DL (ref 8.5–10.1)
CHLORIDE BLD-SCNC: 111 MMOL/L (ref 94–109)
CO2 SERPL-SCNC: 23 MMOL/L (ref 20–32)
CREAT SERPL-MCNC: 1.1 MG/DL (ref 0.66–1.25)
EOSINOPHIL # BLD AUTO: 0.1 10E3/UL (ref 0–0.7)
EOSINOPHIL NFR BLD AUTO: 3 %
ERYTHROCYTE [DISTWIDTH] IN BLOOD BY AUTOMATED COUNT: 13.2 % (ref 10–15)
GFR SERPL CREATININE-BSD FRML MDRD: 87 ML/MIN/1.73M2
GLUCOSE BLD-MCNC: 86 MG/DL (ref 70–99)
HCT VFR BLD AUTO: 33 % (ref 40–53)
HGB BLD-MCNC: 10.7 G/DL (ref 13.3–17.7)
IMM GRANULOCYTES # BLD: 0 10E3/UL
IMM GRANULOCYTES NFR BLD: 1 %
LYMPHOCYTES # BLD AUTO: 0.9 10E3/UL (ref 0.8–5.3)
LYMPHOCYTES NFR BLD AUTO: 27 %
MAGNESIUM SERPL-MCNC: 1.2 MG/DL (ref 1.6–2.3)
MCH RBC QN AUTO: 28.9 PG (ref 26.5–33)
MCHC RBC AUTO-ENTMCNC: 32.4 G/DL (ref 31.5–36.5)
MCV RBC AUTO: 89 FL (ref 78–100)
MONOCYTES # BLD AUTO: 0.7 10E3/UL (ref 0–1.3)
MONOCYTES NFR BLD AUTO: 20 %
NEUTROPHILS # BLD AUTO: 1.6 10E3/UL (ref 1.6–8.3)
NEUTROPHILS NFR BLD AUTO: 49 %
NRBC # BLD AUTO: 0 10E3/UL
NRBC BLD AUTO-RTO: 0 /100
PHOSPHATE SERPL-MCNC: 2.3 MG/DL (ref 2.5–4.5)
PLATELET # BLD AUTO: 242 10E3/UL (ref 150–450)
POTASSIUM BLD-SCNC: 3.6 MMOL/L (ref 3.4–5.3)
RBC # BLD AUTO: 3.7 10E6/UL (ref 4.4–5.9)
SODIUM SERPL-SCNC: 139 MMOL/L (ref 133–144)
TACROLIMUS BLD-MCNC: 10.1 UG/L (ref 5–15)
TME LAST DOSE: NORMAL H
TME LAST DOSE: NORMAL H
WBC # BLD AUTO: 3.2 10E3/UL (ref 4–11)

## 2021-10-11 PROCEDURE — 80197 ASSAY OF TACROLIMUS: CPT | Performed by: TRANSPLANT SURGERY

## 2021-10-11 PROCEDURE — 85004 AUTOMATED DIFF WBC COUNT: CPT | Performed by: TRANSPLANT SURGERY

## 2021-10-11 RX ORDER — ERGOCALCIFEROL 1.25 MG/1
50000 CAPSULE, LIQUID FILLED ORAL WEEKLY
Qty: 8 CAPSULE | Refills: 0 | Status: SHIPPED | OUTPATIENT
Start: 2021-10-11 | End: 2022-01-14

## 2021-10-11 NOTE — TELEPHONE ENCOUNTER
Provider Call: General  Route to LPN    Reason for call: HHN has questions re lab draw here on his 0845 appt she would not be able to get them done before his appt     Call back needed? Yes    Return Call Needed  Same as documented in contacts section  When to return call?: Greater than one day: Route standard priority

## 2021-10-11 NOTE — TELEPHONE ENCOUNTER
Spoke to home care nurse regarding lab draw for next Monday 10/18/2021.  Made appointment [place] [facility]

## 2021-10-12 ENCOUNTER — TELEPHONE (OUTPATIENT)
Dept: NEPHROLOGY | Facility: CLINIC | Age: 36
End: 2021-10-12

## 2021-10-13 NOTE — TELEPHONE ENCOUNTER
PPI d/c'd after 30 days post-transplant. If he was on PPI before transplant, ok to resume taking under the direction of original prescribing MD. Otherwise, ok to stop PPI.     If GERD symptoms develop after stopping PPI, ok to use OTC famotidine (pepcid) 20 mg bid prn for symptoms.

## 2021-10-13 NOTE — TELEPHONE ENCOUNTER
Left message and sent [employer] message to patient regarding:  PPI d/c'd after 30 days post-transplant. If he was on PPI before transplant, ok to resume taking under the direction of original prescribing MD. Otherwise, ok to stop PPI.      If GERD symptoms develop after stopping PPI, ok to use OTC famotidine (pepcid) 20 mg bid prn for symptoms.

## 2021-10-14 ENCOUNTER — OFFICE VISIT (OUTPATIENT)
Dept: TRANSPLANT | Facility: CLINIC | Age: 36
End: 2021-10-14
Attending: TRANSPLANT SURGERY
Payer: MEDICARE

## 2021-10-14 ENCOUNTER — OFFICE VISIT (OUTPATIENT)
Dept: NEPHROLOGY | Facility: CLINIC | Age: 36
End: 2021-10-14
Attending: TRANSPLANT SURGERY
Payer: MEDICARE

## 2021-10-14 ENCOUNTER — ALLIED HEALTH/NURSE VISIT (OUTPATIENT)
Dept: ONCOLOGY | Facility: CLINIC | Age: 36
End: 2021-10-14
Attending: INTERNAL MEDICINE
Payer: MEDICARE

## 2021-10-14 ENCOUNTER — LAB REQUISITION (OUTPATIENT)
Dept: LAB | Facility: CLINIC | Age: 36
End: 2021-10-14

## 2021-10-14 ENCOUNTER — TELEPHONE (OUTPATIENT)
Dept: TRANSPLANT | Facility: CLINIC | Age: 36
End: 2021-10-14

## 2021-10-14 ENCOUNTER — LAB (OUTPATIENT)
Dept: LAB | Facility: CLINIC | Age: 36
End: 2021-10-14
Attending: INTERNAL MEDICINE
Payer: MEDICARE

## 2021-10-14 VITALS
WEIGHT: 172.4 LBS | RESPIRATION RATE: 18 BRPM | HEIGHT: 67 IN | BODY MASS INDEX: 27.06 KG/M2 | OXYGEN SATURATION: 99 % | HEART RATE: 85 BPM | SYSTOLIC BLOOD PRESSURE: 135 MMHG | DIASTOLIC BLOOD PRESSURE: 89 MMHG | TEMPERATURE: 97.6 F

## 2021-10-14 DIAGNOSIS — Z94.0 KIDNEY TRANSPLANT RECIPIENT: Primary | ICD-10-CM

## 2021-10-14 DIAGNOSIS — Z00.6 RESEARCH STUDY PATIENT: ICD-10-CM

## 2021-10-14 DIAGNOSIS — Z48.298 AFTERCARE FOLLOWING ORGAN TRANSPLANT: ICD-10-CM

## 2021-10-14 DIAGNOSIS — Z46.6 ENCOUNTER FOR REMOVAL OF URETERAL STENT: Primary | ICD-10-CM

## 2021-10-14 DIAGNOSIS — Z00.6 RESEARCH STUDY PATIENT: Primary | ICD-10-CM

## 2021-10-14 DIAGNOSIS — Z20.828 CONTACT WITH AND (SUSPECTED) EXPOSURE TO OTHER VIRAL COMMUNICABLE DISEASES: ICD-10-CM

## 2021-10-14 DIAGNOSIS — D84.9 IMMUNOSUPPRESSION (H): ICD-10-CM

## 2021-10-14 DIAGNOSIS — Z94.0 KIDNEY REPLACED BY TRANSPLANT: ICD-10-CM

## 2021-10-14 DIAGNOSIS — Z79.899 ENCOUNTER FOR LONG-TERM CURRENT USE OF MEDICATION: ICD-10-CM

## 2021-10-14 LAB
ALBUMIN UR-MCNC: 100 MG/DL
ALBUMIN UR-MCNC: NEGATIVE MG/DL
ANION GAP SERPL CALCULATED.3IONS-SCNC: 6 MMOL/L (ref 3–14)
APPEARANCE UR: ABNORMAL
APPEARANCE UR: CLEAR
BILIRUB UR QL STRIP: NEGATIVE
BILIRUB UR QL STRIP: NEGATIVE
BUN SERPL-MCNC: 16 MG/DL (ref 7–30)
CALCIUM SERPL-MCNC: 9.2 MG/DL (ref 8.5–10.1)
CHLORIDE BLD-SCNC: 109 MMOL/L (ref 94–109)
CO2 SERPL-SCNC: 24 MMOL/L (ref 20–32)
COLOR UR AUTO: ABNORMAL
COLOR UR AUTO: YELLOW
CREAT SERPL-MCNC: 1.09 MG/DL (ref 0.66–1.25)
CREAT UR-MCNC: 31 MG/DL
DEPRECATED CALCIDIOL+CALCIFEROL SERPL-MC: 27 UG/L (ref 20–75)
ERYTHROCYTE [DISTWIDTH] IN BLOOD BY AUTOMATED COUNT: 13 % (ref 10–15)
GFR SERPL CREATININE-BSD FRML MDRD: 88 ML/MIN/1.73M2
GLUCOSE BLD-MCNC: 107 MG/DL (ref 70–99)
GLUCOSE UR STRIP-MCNC: NEGATIVE MG/DL
GLUCOSE UR STRIP-MCNC: NEGATIVE MG/DL
HCT VFR BLD AUTO: 33.9 % (ref 40–53)
HGB BLD-MCNC: 11.2 G/DL (ref 13.3–17.7)
HGB UR QL STRIP: ABNORMAL
HGB UR QL STRIP: ABNORMAL
HOLD SPECIMEN: NORMAL
HOLD SPECIMEN: NORMAL
KETONES UR STRIP-MCNC: NEGATIVE MG/DL
KETONES UR STRIP-MCNC: NEGATIVE MG/DL
LEUKOCYTE ESTERASE UR QL STRIP: NEGATIVE
LEUKOCYTE ESTERASE UR QL STRIP: NEGATIVE
MAGNESIUM SERPL-MCNC: 1.2 MG/DL (ref 1.6–2.3)
MCH RBC QN AUTO: 29.4 PG (ref 26.5–33)
MCHC RBC AUTO-ENTMCNC: 33 G/DL (ref 31.5–36.5)
MCV RBC AUTO: 89 FL (ref 78–100)
NITRATE UR QL: NEGATIVE
NITRATE UR QL: NEGATIVE
PH UR STRIP: 6 [PH] (ref 5–7)
PH UR STRIP: 6 [PH] (ref 5–7)
PHOSPHATE SERPL-MCNC: 1.8 MG/DL (ref 2.5–4.5)
PLATELET # BLD AUTO: 224 10E3/UL (ref 150–450)
POTASSIUM BLD-SCNC: 3.6 MMOL/L (ref 3.4–5.3)
PROT UR-MCNC: 0.18 G/L
PROT/CREAT 24H UR: 0.58 G/G CR (ref 0–0.2)
PTH-INTACT SERPL-MCNC: 157 PG/ML (ref 18–80)
RBC # BLD AUTO: 3.81 10E6/UL (ref 4.4–5.9)
RBC URINE: 35 /HPF
RBC URINE: >182 /HPF
SODIUM SERPL-SCNC: 139 MMOL/L (ref 133–144)
SP GR UR STRIP: 1 (ref 1–1.03)
SP GR UR STRIP: 1.02 (ref 1–1.03)
SQUAMOUS EPITHELIAL: <1 /HPF
UROBILINOGEN UR STRIP-MCNC: NORMAL MG/DL
UROBILINOGEN UR STRIP-MCNC: NORMAL MG/DL
WBC # BLD AUTO: 4.7 10E3/UL (ref 4–11)
WBC URINE: 2 /HPF
WBC URINE: 22 /HPF

## 2021-10-14 PROCEDURE — 83970 ASSAY OF PARATHORMONE: CPT

## 2021-10-14 PROCEDURE — 93010 ELECTROCARDIOGRAM REPORT: CPT | Performed by: INTERNAL MEDICINE

## 2021-10-14 PROCEDURE — 250N000013 HC RX MED GY IP 250 OP 250 PS 637: Performed by: NURSE PRACTITIONER

## 2021-10-14 PROCEDURE — 81001 URINALYSIS AUTO W/SCOPE: CPT | Mod: 91

## 2021-10-14 PROCEDURE — 80048 BASIC METABOLIC PNL TOTAL CA: CPT

## 2021-10-14 PROCEDURE — 82306 VITAMIN D 25 HYDROXY: CPT

## 2021-10-14 PROCEDURE — G0463 HOSPITAL OUTPT CLINIC VISIT: HCPCS

## 2021-10-14 PROCEDURE — 86832 HLA CLASS I HIGH DEFIN QUAL: CPT

## 2021-10-14 PROCEDURE — 36415 COLL VENOUS BLD VENIPUNCTURE: CPT

## 2021-10-14 PROCEDURE — 86828 HLA CLASS I&II ANTIBODY QUAL: CPT | Mod: XU

## 2021-10-14 PROCEDURE — 81001 URINALYSIS AUTO W/SCOPE: CPT | Performed by: INTERNAL MEDICINE

## 2021-10-14 PROCEDURE — 84100 ASSAY OF PHOSPHORUS: CPT

## 2021-10-14 PROCEDURE — 84156 ASSAY OF PROTEIN URINE: CPT

## 2021-10-14 PROCEDURE — 85027 COMPLETE CBC AUTOMATED: CPT

## 2021-10-14 PROCEDURE — 83735 ASSAY OF MAGNESIUM: CPT

## 2021-10-14 PROCEDURE — 86833 HLA CLASS II HIGH DEFIN QUAL: CPT

## 2021-10-14 PROCEDURE — 93005 ELECTROCARDIOGRAM TRACING: CPT | Mod: XU

## 2021-10-14 PROCEDURE — 52310 CYSTOSCOPY AND TREATMENT: CPT | Performed by: NURSE PRACTITIONER

## 2021-10-14 PROCEDURE — 250N000009 HC RX 250: Performed by: NURSE PRACTITIONER

## 2021-10-14 PROCEDURE — G0463 HOSPITAL OUTPT CLINIC VISIT: HCPCS | Mod: 25

## 2021-10-14 PROCEDURE — 87799 DETECT AGENT NOS DNA QUANT: CPT

## 2021-10-14 PROCEDURE — 87086 URINE CULTURE/COLONY COUNT: CPT

## 2021-10-14 PROCEDURE — 99215 OFFICE O/P EST HI 40 MIN: CPT | Mod: 24

## 2021-10-14 RX ORDER — LIDOCAINE HYDROCHLORIDE 20 MG/ML
JELLY TOPICAL ONCE
Status: COMPLETED | OUTPATIENT
Start: 2021-10-14 | End: 2021-10-14

## 2021-10-14 RX ORDER — LEVOFLOXACIN 250 MG/1
500 TABLET, FILM COATED ORAL ONCE
Status: COMPLETED | OUTPATIENT
Start: 2021-10-14 | End: 2021-10-14

## 2021-10-14 RX ADMIN — LEVOFLOXACIN 500 MG: 250 TABLET, FILM COATED ORAL at 13:08

## 2021-10-14 RX ADMIN — LIDOCAINE HYDROCHLORIDE: 20 JELLY TOPICAL at 13:10

## 2021-10-14 ASSESSMENT — MIFFLIN-ST. JEOR: SCORE: 1680.75

## 2021-10-14 ASSESSMENT — PAIN SCALES - GENERAL: PAINLEVEL: NO PAIN (0)

## 2021-10-14 NOTE — LETTER
10/14/2021       RE: Kailash Sorto  [address]  [facility] [address]     Dear Colleague,    Thank you for referring your patient, Kailash Sorto, to the [facility] [place] at [facility]. Please see a copy of my visit note below.    TRANSPLANT NEPHROLOGY EARLY POST TRANSPLANT VISIT    Assessment & Plan   # DDKT: Stable   - Baseline Creatinine: ~ 1.1   - Proteinuria: Not checked post transplant   - Date DSA Last Checked: Sep/2021      Latest DSA: No DSA at time of transplant   - BK Viremia: No   - Kidney Tx Biopsy: No    # Immunosuppression: Tacrolimus immediate release (goal 8-10) and Mycophenolate mofetil (dose 1000 mg every 12 hours)   - Induction with Recent Transplant:  Intermediate Intensity   - Continue with intensive monitoring of immunosuppression for efficacy and toxicity.   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 Positive CMV level from outside lab- will repeat level in our lab today. Continue treatment dose for now.    # Hypertension: Borderline control;  Goal BP: < 130/80   - Volume status: Euvolemic  EDW ~ 169lbs   - Changes: No- due to diarrhea will hold off on adding BP medications    # Anemia in Chronic Renal Disease: Hgb: Stable      KODY: No   - Iron studies: Replete    # Mineral Bone Disorder:   - Secondary renal hyperparathyroidism; PTH level: Minimally elevated ( pg/ml)        On treatment: None  - Vitamin D; level: Low        On supplement: Yes  - Calcium; level: Low        On supplement: No  - Phosphorus; level: Low        On supplement: No    # Electrolytes:   - Potassium; level: Normal        On supplement: No  - Magnesium; level: Low        On supplement: Yes  - Bicarbonate; level: Normal        On supplement: No    # Dysuria: Feels slight burning at the end of urination. Still has  stent in place - stent is being removed today.   - Will obtain UA and urine culture prior to stent removal today    # CMV viremia: CMV discordant and blood tests were positive on last check, but these were done at a different lab. Will recheck CMV viral load in our lab today. Will continue on treatment dose of Valcyte for now.     # Diarrhea: Pt with 3-5 BMs daily, sometimes loose, since time of transplant. Unclear if this is early CMV (as above) vs medication related (mag ox or MMF).  Will continue to monitor for now.    # Medical Compliance: Yes    # COVID-19 Virus Review: Discussed COVID-19 virus and the potential medical risks.  Reviewed preventative health recommendations, including wearing a mask where appropriate.  Recommended COVID vaccination should be up to date with either an initial vaccination or booster shot when appropriate.  Asked the patient to inform the transplant center if they are exposed or diagnosed with this virus.    # COVID Vaccination Up To Date: Not vaccinated prior to transplant. Pt unsure about vaccine at this time. Discussed he would be eligible at 3m post-transplant. Will discuss again at next visit.    # Transplant History:  Etiology of Kidney Failure: Suspect potential underlying GN vs HTN  Tx: DDKT  Transplant: 9/13/2021 (Kidney)  Donor Type: Donation after Brain Death Donor Class:   Crossmatch at time of Tx: negative  DSA at time of Tx: No  Significant changes in immunosuppression: None  CMV IgG Ab High Risk Discordance (D+/R-): Yes  EBV IgG Ab High Risk Discordance (D+/R-): No  Significant transplant-related complications: CMV Viremia - low level but done at outside lab, will recheck today. On treatment dose Valcyte since 10/9/21    Transplant Office Phone Number: [phone]    Assessment and plan was discussed with the patient and he voiced his understanding and agreement.    Return visit: No follow-ups on file.    Samantha Saravia MD    Chief Complaint   Mr. Sorto is a 35  year old here for kidney transplant and immunosuppression management.     History of Present Illness   Getting up frequently overnight to urinate.   Trying to eat healthy and feels like he's at a healthy weight/back to his target weight.   No SOB, no CP, no fevers or chills.  He's having 3-4 bowel movements per day, one is typically pretty loose.   Also reports burning at the end of urination. Still has stent in place (getting removed today) and he feels he has more burning/irritation when he is less hydrated.       Home BP: 130s/80s-90s    Problem List   Patient Active Problem List   Diagnosis     Hyperlipidemia LDL goal <100     End stage renal failure on dialysis (H)     Anemia of chronic kidney failure     Renovascular hypertension     AVF (arteriovenous fistula) (H)     Kidney transplant candidate     Kidney transplant recipient       Allergies   Allergies   Allergen Reactions     Valcyte [Valganciclovir] Other (See Comments)     Cannot receive this medication while on CMV monoclonal antibody study, due to end 12/9/2021.       Medications   Current Outpatient Medications   Medication Sig     acetaminophen (TYLENOL) 325 MG tablet Take 2 tablets (650 mg) by mouth every 6 hours as needed for other (For optimal non-opioid multimodal pain management to improve pain control.)     amLODIPine (NORVASC) 10 MG tablet Take 1 tablet (10 mg) by mouth daily     atorvastatin (LIPITOR) 10 MG tablet Take 1 tablet (10 mg) by mouth daily     magnesium oxide (MAG-OX) 400 MG tablet Take 1 tablet (400 mg) by mouth 2 times daily     mycophenolate (GENERIC EQUIVALENT) 250 MG capsule Take 4 capsules (1,000 mg) by mouth 2 times daily     predniSONE (DELTASONE) 10 MG tablet Take 6 tablets (60 mg) by mouth daily for 1 day, THEN 4 tablets (40 mg) daily for 2 days, THEN 2 tablets (20 mg) daily for 7 days, THEN 1.5 tablets (15 mg) daily for 7 days, THEN 1 tablet (10 mg) daily for 7 days, THEN 0.5 tablets (5 mg) daily for 3 days.     study -  "NPC-21 vs. placebo, IDS# [id number], 6 mg/kg; 12 mg/kg; or placebo in 0.9% sodium chloride intermittent infusion Inject 300 mLs into the vein once for 1 dose     sulfamethoxazole-trimethoprim (BACTRIM) 400-80 MG tablet Take 1 tablet by mouth daily     tacrolimus (GENERIC EQUIVALENT) 1 MG capsule Take 6 capsules (6 mg) by mouth 2 times daily     valGANciclovir (VALCYTE) 450 MG tablet Take 2 tablets (900 mg) by mouth 2 times daily     vitamin D2 (ERGOCALCIFEROL) 70205 units (1250 mcg) capsule Take 1 capsule (50,000 Units) by mouth once a week     [START ON 11/8/2021] cholecalciferol (VITAMIN D3) 25 mcg (1000 units) capsule Take 2 capsules (50 mcg) by mouth daily (Patient not taking: Reported on 10/14/2021)     predniSONE (DELTASONE) 5 MG tablet Take 1 tablet (5 mg) by mouth daily To complete prednisone taper. (Patient not taking: Reported on 10/14/2021)     tacrolimus (GENERIC EQUIVALENT) 0.5 MG capsule Take 1 capsule (0.5 mg) by mouth 2 times daily as needed As instructed by transplant team. (Patient not taking: Reported on 10/14/2021)     No current facility-administered medications for this visit.     There are no discontinued medications.    Physical Exam   Vital Signs: /89 (BP Location: Left arm, Patient Position: Sitting, Cuff Size: Adult Regular)   Pulse 85   Temp 97.6  F (36.4  C) (Oral)   Resp 18   Ht 1.702 m (5' 7.01\")   Wt 78.2 kg (172 lb 6.4 oz)   SpO2 99%   BMI 27.00 kg/m      GENERAL APPEARANCE: alert and no distress  HENT: mouth without ulcers or lesions  LYMPHATICS: no cervical or supraclavicular nodes  RESP: lungs clear to auscultation - no rales, rhonchi or wheezes  CV: regular rhythm, normal rate, no rub, no murmur  EDEMA: no LE edema bilaterally  ABDOMEN: soft, nondistended, nontender, bowel sounds normal  MS: extremities normal - no gross deformities noted, no evidence of inflammation in joints, no muscle tenderness  SKIN: no rash  ACCESS: RUE AVF with + thrill  TX KIDNEY: non tender, well " healed incision    Data     Renal Latest Ref Rng & Units 10/11/2021 10/7/2021 10/4/2021   Na 133 - 144 mmol/L 139 137 142   K 3.4 - 5.3 mmol/L 3.6 3.7 4.1   Cl 94 - 109 mmol/L 111(H) 107 112(H)   CO2 20 - 32 mmol/L 23 21 20   BUN 7 - 30 mg/dL 17 14 14   Cr 0.66 - 1.25 mg/dL 1.10 1.08 1.14   Glucose 70 - 99 mg/dL 86 84 91   Ca  8.5 - 10.1 mg/dL 8.2(L) 9.1 8.6   Mg 1.6 - 2.3 mg/dL 1.2(L) - 1.4(L)     Bone Health Latest Ref Rng & Units 10/11/2021 10/4/2021 9/27/2021   Phos 2.5 - 4.5 mg/dL 2.3(L) 2.9 2.4(L)   PTHi 18 - 80 pg/mL - - -   Vit D Def 20 - 75 ug/L - - -     Heme Latest Ref Rng & Units 10/11/2021 10/7/2021 10/4/2021   WBC 4.0 - 11.0 10e3/uL 3.2(L) 5.6 5.8   Hgb 13.3 - 17.7 g/dL 10.7(L) 11.2(L) 10.1(L)   Plt 150 - 450 10e3/uL 242 232 254   ABSOLUTE NEUTROPHIL 1.6 - 8.3 10e9/L - - -   ABSOLUTE LYMPHOCYTES 0.8 - 5.3 10e9/L - - -   ABSOLUTE MONOCYTES 0.0 - 1.3 10e9/L - - -   ABSOLUTE EOSINOPHILS 0.0 - 0.7 10e9/L - - -   ABSOLUTE BASOPHILS 0.0 - 0.2 10e9/L - - -   ABS IMMATURE GRANULOCYTES 0 - 0.4 10e9/L - - -   ABSOLUTE NUCLEATED RBC - - - -     Liver Latest Ref Rng & Units 9/13/2021 7/13/2020 6/22/2020   AP 40 - 150 U/L 122 - -   TBili 0.2 - 1.3 mg/dL 0.4 - -   ALT 0 - 70 U/L 24 20 -   AST 0 - 45 U/L 15 20 23   Tot Protein 6.8 - 8.8 g/dL 7.7 - -   Albumin 3.4 - 5.0 g/dL 3.8 - -     Pancreas Latest Ref Rng & Units 9/13/2021   A1C 0.0 - 5.6 % 5.3     Iron studies Latest Ref Rng & Units 9/17/2021 9/14/2021 5/11/2016   Iron 35 - 180 ug/dL 123 18(L) 38   Iron sat 15 - 46 % 69(H) 10(L) 17   Ferritin 26 - 388 ng/mL 770(H) - -     [facility] Txp Virology Latest Ref Rng & Units 10/8/2021 9/18/2021 9/16/2021   CMV QUANT IU/ML Not Detected IU/mL - Not Detected Not Detected   LOG IU/ML OF CMVQNT - 2.8 - -   EBV CAPSID ANTIBODY IGG No detectable antibody. - - -   Hep B Core NR - - -   HIV 1&2 NEG - - -        Recent Labs   Lab Test 09/16/21  0724 09/17/21  0656 10/04/21  0835 10/07/21  1016 10/11/21  0830   DOSTAC 9/16/2021  --   --   10/6/2021  --    TACROL <3.0*   < > 12.1 10.7 10.1    < > = values in this interval not displayed.     This patient has been seen and evaluated by me, Idalmis Cote MD.  I have reviewed the note and agree with plan of care as documented by the fellow.  Idalmis Cote MD on 10/14/2021 at 11:50 AM              Again, thank you for allowing me to participate in the care of your patient.      Sincerely,    Early Post Transplant

## 2021-10-14 NOTE — TELEPHONE ENCOUNTER
Patient Call: Transplant Lab/Orders  Route to LPN  Post Transplant Days: 31  When patient is less than 60 days post-transplant, route high priority    Reason for Call: Clarification; which order? [facility]  Callback needed? Yes    Yumiko called to verify if patient is missing any labs drawn from today ([facility]) and if so when would we like for her to draw them (Tomorrow or Monday)    Retur n Call Needed  Same as documented in contacts section  When to return call?: Same day: Route High Priority

## 2021-10-14 NOTE — NURSING NOTE
"Chief Complaint   Patient presents with     RECHECK     S/P Kidney TX 9/13/2021     Vital signs:  Temp: 97.6  F (36.4  C) Temp src: Oral BP: 135/89 Pulse: 85   Resp: 18 SpO2: 99 %     Height: 170.2 cm (5' 7.01\") Weight: 78.2 kg (172 lb 6.4 oz)  Estimated body mass index is 27 kg/m  as calculated from the following:    Height as of this encounter: 1.702 m (5' 7.01\").    Weight as of this encounter: 78.2 kg (172 lb 6.4 oz).        Nicole James, [facility]  10/14/2021 9:08 AM      "

## 2021-10-14 NOTE — NURSING NOTE
Prepped patient for procedure with no complications.  Assisted with stent removal.  Administered Levaquin after procedure.  Patient was discharged in stable condition.

## 2021-10-14 NOTE — NURSING NOTE
Chief Complaint   Patient presents with     Blood Draw     Labs drawn via  by RN in lab. Labs only.      Anuradha Hebert RN

## 2021-10-14 NOTE — LETTER
10/14/2021         RE: Kailash Sorto  [address]  [facility] [address]        Dear Colleague,    Thank you for referring your patient, Kailash Sorto, to the [facility]PL[facility]. Please see a copy of my visit note below.    See procedure       Again, thank you for allowing me to participate in the care of your patient.        Sincerely,        Lluvia Hernandez NP

## 2021-10-14 NOTE — PROGRESS NOTES
TRANSPLANT NEPHROLOGY EARLY POST TRANSPLANT VISIT    Assessment & Plan   # DDKT: Stable   - Baseline Creatinine: ~ 1.1   - Proteinuria: Not checked post transplant   - Date DSA Last Checked: Sep/2021      Latest DSA: No DSA at time of transplant   - BK Viremia: No   - Kidney Tx Biopsy: No    # Immunosuppression: Tacrolimus immediate release (goal 8-10) and Mycophenolate mofetil (dose 1000 mg every 12 hours)   - Induction with Recent Transplant:  Intermediate Intensity   - Continue with intensive monitoring of immunosuppression for efficacy and toxicity.   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 Positive CMV level from outside lab- will repeat level in our lab today. Continue treatment dose for now.    # Hypertension: Borderline control;  Goal BP: < 130/80   - Volume status: Euvolemic  EDW ~ 169lbs   - Changes: No- due to diarrhea will hold off on adding BP medications    # Anemia in Chronic Renal Disease: Hgb: Stable      KODY: No   - Iron studies: Replete    # Mineral Bone Disorder:   - Secondary renal hyperparathyroidism; PTH level: Minimally elevated ( pg/ml)        On treatment: None  - Vitamin D; level: Low        On supplement: Yes  - Calcium; level: Low        On supplement: No  - Phosphorus; level: Low        On supplement: No    # Electrolytes:   - Potassium; level: Normal        On supplement: No  - Magnesium; level: Low        On supplement: Yes  - Bicarbonate; level: Normal        On supplement: No    # Dysuria: Feels slight burning at the end of urination. Still has stent in place - stent is being removed today.   - Will obtain UA and urine culture prior to stent removal today    # CMV viremia: CMV discordant and blood tests were positive on last check, but these were done at a different lab. Will recheck CMV viral load in our lab today. Will continue on treatment dose of Valcyte for now.      # Diarrhea: Pt with 3-5 BMs daily, sometimes loose, since time of transplant. Unclear if this is early CMV (as above) vs medication related (mag ox or MMF).  Will continue to monitor for now.    # Medical Compliance: Yes    # COVID-19 Virus Review: Discussed COVID-19 virus and the potential medical risks.  Reviewed preventative health recommendations, including wearing a mask where appropriate.  Recommended COVID vaccination should be up to date with either an initial vaccination or booster shot when appropriate.  Asked the patient to inform the transplant center if they are exposed or diagnosed with this virus.    # COVID Vaccination Up To Date: Not vaccinated prior to transplant. Pt unsure about vaccine at this time. Discussed he would be eligible at 3m post-transplant. Will discuss again at next visit.    # Transplant History:  Etiology of Kidney Failure: Suspect potential underlying GN vs HTN  Tx: DDKT  Transplant: 9/13/2021 (Kidney)  Donor Type: Donation after Brain Death Donor Class:   Crossmatch at time of Tx: negative  DSA at time of Tx: No  Significant changes in immunosuppression: None  CMV IgG Ab High Risk Discordance (D+/R-): Yes  EBV IgG Ab High Risk Discordance (D+/R-): No  Significant transplant-related complications: CMV Viremia - low level but done at outside lab, will recheck today. On treatment dose Valcyte since 10/9/21    Transplant Office Phone Number: [phone]    Assessment and plan was discussed with the patient and he voiced his understanding and agreement.    Return visit: No follow-ups on file.    Samantha Saravia MD    Chief Complaint   Mr. Sorto is a 35 year old here for kidney transplant and immunosuppression management.     History of Present Illness   Getting up frequently overnight to urinate.   Trying to eat healthy and feels like he's at a healthy weight/back to his target weight.   No SOB, no CP, no fevers or chills.  He's having 3-4 bowel movements per day, one is  typically pretty loose.   Also reports burning at the end of urination. Still has stent in place (getting removed today) and he feels he has more burning/irritation when he is less hydrated.       Home BP: 130s/80s-90s    Problem List   Patient Active Problem List   Diagnosis     Hyperlipidemia LDL goal <100     End stage renal failure on dialysis (H)     Anemia of chronic kidney failure     Renovascular hypertension     AVF (arteriovenous fistula) (H)     Kidney transplant candidate     Kidney transplant recipient       Allergies   Allergies   Allergen Reactions     Valcyte [Valganciclovir] Other (See Comments)     Cannot receive this medication while on CMV monoclonal antibody study, due to end 12/9/2021.       Medications   Current Outpatient Medications   Medication Sig     acetaminophen (TYLENOL) 325 MG tablet Take 2 tablets (650 mg) by mouth every 6 hours as needed for other (For optimal non-opioid multimodal pain management to improve pain control.)     amLODIPine (NORVASC) 10 MG tablet Take 1 tablet (10 mg) by mouth daily     atorvastatin (LIPITOR) 10 MG tablet Take 1 tablet (10 mg) by mouth daily     magnesium oxide (MAG-OX) 400 MG tablet Take 1 tablet (400 mg) by mouth 2 times daily     mycophenolate (GENERIC EQUIVALENT) 250 MG capsule Take 4 capsules (1,000 mg) by mouth 2 times daily     predniSONE (DELTASONE) 10 MG tablet Take 6 tablets (60 mg) by mouth daily for 1 day, THEN 4 tablets (40 mg) daily for 2 days, THEN 2 tablets (20 mg) daily for 7 days, THEN 1.5 tablets (15 mg) daily for 7 days, THEN 1 tablet (10 mg) daily for 7 days, THEN 0.5 tablets (5 mg) daily for 3 days.     study - NPC-21 vs. placebo, IDS# 5665, 6 mg/kg; 12 mg/kg; or placebo in 0.9% sodium chloride intermittent infusion Inject 300 mLs into the vein once for 1 dose     sulfamethoxazole-trimethoprim (BACTRIM) 400-80 MG tablet Take 1 tablet by mouth daily     tacrolimus (GENERIC EQUIVALENT) 1 MG capsule Take 6 capsules (6 mg) by mouth 2  "times daily     valGANciclovir (VALCYTE) 450 MG tablet Take 2 tablets (900 mg) by mouth 2 times daily     vitamin D2 (ERGOCALCIFEROL) 09758 units (1250 mcg) capsule Take 1 capsule (50,000 Units) by mouth once a week     [START ON 11/8/2021] cholecalciferol (VITAMIN D3) 25 mcg (1000 units) capsule Take 2 capsules (50 mcg) by mouth daily (Patient not taking: Reported on 10/14/2021)     predniSONE (DELTASONE) 5 MG tablet Take 1 tablet (5 mg) by mouth daily To complete prednisone taper. (Patient not taking: Reported on 10/14/2021)     tacrolimus (GENERIC EQUIVALENT) 0.5 MG capsule Take 1 capsule (0.5 mg) by mouth 2 times daily as needed As instructed by transplant team. (Patient not taking: Reported on 10/14/2021)     No current facility-administered medications for this visit.     There are no discontinued medications.    Physical Exam   Vital Signs: /89 (BP Location: Left arm, Patient Position: Sitting, Cuff Size: Adult Regular)   Pulse 85   Temp 97.6  F (36.4  C) (Oral)   Resp 18   Ht 1.702 m (5' 7.01\")   Wt 78.2 kg (172 lb 6.4 oz)   SpO2 99%   BMI 27.00 kg/m      GENERAL APPEARANCE: alert and no distress  HENT: mouth without ulcers or lesions  LYMPHATICS: no cervical or supraclavicular nodes  RESP: lungs clear to auscultation - no rales, rhonchi or wheezes  CV: regular rhythm, normal rate, no rub, no murmur  EDEMA: no LE edema bilaterally  ABDOMEN: soft, nondistended, nontender, bowel sounds normal  MS: extremities normal - no gross deformities noted, no evidence of inflammation in joints, no muscle tenderness  SKIN: no rash  ACCESS: RUE AVF with + thrill  TX KIDNEY: non tender, well healed incision    Data     Renal Latest Ref Rng & Units 10/11/2021 10/7/2021 10/4/2021   Na 133 - 144 mmol/L 139 137 142   K 3.4 - 5.3 mmol/L 3.6 3.7 4.1   Cl 94 - 109 mmol/L 111(H) 107 112(H)   CO2 20 - 32 mmol/L 23 21 20   BUN 7 - 30 mg/dL 17 14 14   Cr 0.66 - 1.25 mg/dL 1.10 1.08 1.14   Glucose 70 - 99 mg/dL 86 84 91 "   Ca  8.5 - 10.1 mg/dL 8.2(L) 9.1 8.6   Mg 1.6 - 2.3 mg/dL 1.2(L) - 1.4(L)     Bone Health Latest Ref Rng & Units 10/11/2021 10/4/2021 9/27/2021   Phos 2.5 - 4.5 mg/dL 2.3(L) 2.9 2.4(L)   PTHi 18 - 80 pg/mL - - -   Vit D Def 20 - 75 ug/L - - -     Heme Latest Ref Rng & Units 10/11/2021 10/7/2021 10/4/2021   WBC 4.0 - 11.0 10e3/uL 3.2(L) 5.6 5.8   Hgb 13.3 - 17.7 g/dL 10.7(L) 11.2(L) 10.1(L)   Plt 150 - 450 10e3/uL 242 232 254   ABSOLUTE NEUTROPHIL 1.6 - 8.3 10e9/L - - -   ABSOLUTE LYMPHOCYTES 0.8 - 5.3 10e9/L - - -   ABSOLUTE MONOCYTES 0.0 - 1.3 10e9/L - - -   ABSOLUTE EOSINOPHILS 0.0 - 0.7 10e9/L - - -   ABSOLUTE BASOPHILS 0.0 - 0.2 10e9/L - - -   ABS IMMATURE GRANULOCYTES 0 - 0.4 10e9/L - - -   ABSOLUTE NUCLEATED RBC - - - -     Liver Latest Ref Rng & Units 9/13/2021 7/13/2020 6/22/2020   AP 40 - 150 U/L 122 - -   TBili 0.2 - 1.3 mg/dL 0.4 - -   ALT 0 - 70 U/L 24 20 -   AST 0 - 45 U/L 15 20 23   Tot Protein 6.8 - 8.8 g/dL 7.7 - -   Albumin 3.4 - 5.0 g/dL 3.8 - -     Pancreas Latest Ref Rng & Units 9/13/2021   A1C 0.0 - 5.6 % 5.3     Iron studies Latest Ref Rng & Units 9/17/2021 9/14/2021 5/11/2016   Iron 35 - 180 ug/dL 123 18(L) 38   Iron sat 15 - 46 % 69(H) 10(L) 17   Ferritin 26 - 388 ng/mL 770(H) - -     [facility] Txp Virology Latest Ref Rng & Units 10/8/2021 9/18/2021 9/16/2021   CMV QUANT IU/ML Not Detected IU/mL - Not Detected Not Detected   LOG IU/ML OF CMVQNT - 2.8 - -   EBV CAPSID ANTIBODY IGG No detectable antibody. - - -   Hep B Core NR - - -   HIV 1&2 NEG - - -        Recent Labs   Lab Test 09/16/21  0724 09/17/21  0656 10/04/21  0835 10/07/21  1016 10/11/21  0830   DOSTAC 9/16/2021  --   --  10/6/2021  --    TACROL <3.0*   < > 12.1 10.7 10.1    < > = values in this interval not displayed.     This patient has been seen and evaluated by me, Idalmis Cote MD.  I have reviewed the note and agree with plan of care as documented by the fellow.  Idalmis Cote MD on 10/14/2021 at 11:50 AM

## 2021-10-14 NOTE — LETTER
10/14/2021      RE: Kailash Sorto  [address]  [facility] [address]       TRANSPLANT NEPHROLOGY EARLY POST TRANSPLANT VISIT    Assessment & Plan   # DDKT: Stable   - Baseline Creatinine: ~ 1.1   - Proteinuria: Not checked post transplant   - Date DSA Last Checked: Sep/2021      Latest DSA: No DSA at time of transplant   - BK Viremia: No   - Kidney Tx Biopsy: No    # Immunosuppression: Tacrolimus immediate release (goal 8-10) and Mycophenolate mofetil (dose 1000 mg every 12 hours)   - Induction with Recent Transplant:  Intermediate Intensity   - Continue with intensive monitoring of immunosuppression for efficacy and toxicity.   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 Positive CMV level from outside lab- will repeat level in our lab today. Continue treatment dose for now.    # Hypertension: Borderline control;  Goal BP: < 130/80   - Volume status: Euvolemic  EDW ~ 169lbs   - Changes: No- due to diarrhea will hold off on adding BP medications    # Anemia in Chronic Renal Disease: Hgb: Stable      KODY: No   - Iron studies: Replete    # Mineral Bone Disorder:   - Secondary renal hyperparathyroidism; PTH level: Minimally elevated ( pg/ml)        On treatment: None  - Vitamin D; level: Low        On supplement: Yes  - Calcium; level: Low        On supplement: No  - Phosphorus; level: Low        On supplement: No    # Electrolytes:   - Potassium; level: Normal        On supplement: No  - Magnesium; level: Low        On supplement: Yes  - Bicarbonate; level: Normal        On supplement: No    # Dysuria: Feels slight burning at the end of urination. Still has stent in place - stent is being removed today.   - Will obtain UA and urine culture prior to stent removal today    # CMV viremia: CMV discordant and blood tests were positive on last check, but these were done at a different lab. Will  recheck CMV viral load in our lab today. Will continue on treatment dose of Valcyte for now.     # Diarrhea: Pt with 3-5 BMs daily, sometimes loose, since time of transplant. Unclear if this is early CMV (as above) vs medication related (mag ox or MMF).  Will continue to monitor for now.    # Medical Compliance: Yes    # COVID-19 Virus Review: Discussed COVID-19 virus and the potential medical risks.  Reviewed preventative health recommendations, including wearing a mask where appropriate.  Recommended COVID vaccination should be up to date with either an initial vaccination or booster shot when appropriate.  Asked the patient to inform the transplant center if they are exposed or diagnosed with this virus.    # COVID Vaccination Up To Date: Not vaccinated prior to transplant. Pt unsure about vaccine at this time. Discussed he would be eligible at 3m post-transplant. Will discuss again at next visit.    # Transplant History:  Etiology of Kidney Failure: Suspect potential underlying GN vs HTN  Tx: DDKT  Transplant: 9/13/2021 (Kidney)  Donor Type: Donation after Brain Death Donor Class:   Crossmatch at time of Tx: negative  DSA at time of Tx: No  Significant changes in immunosuppression: None  CMV IgG Ab High Risk Discordance (D+/R-): Yes  EBV IgG Ab High Risk Discordance (D+/R-): No  Significant transplant-related complications: CMV Viremia - low level but done at outside lab, will recheck today. On treatment dose Valcyte since 10/9/21    Transplant Office Phone Number: [phone]    Assessment and plan was discussed with the patient and he voiced his understanding and agreement.    Return visit: No follow-ups on file.    Samantha Saravia MD    Chief Complaint   Mr. Sorto is a 35 year old here for kidney transplant and immunosuppression management.     History of Present Illness   Getting up frequently overnight to urinate.   Trying to eat healthy and feels like he's at a healthy weight/back to his target  weight.   No SOB, no CP, no fevers or chills.  He's having 3-4 bowel movements per day, one is typically pretty loose.   Also reports burning at the end of urination. Still has stent in place (getting removed today) and he feels he has more burning/irritation when he is less hydrated.       Home BP: 130s/80s-90s    Problem List   Patient Active Problem List   Diagnosis     Hyperlipidemia LDL goal <100     End stage renal failure on dialysis (H)     Anemia of chronic kidney failure     Renovascular hypertension     AVF (arteriovenous fistula) (H)     Kidney transplant candidate     Kidney transplant recipient       Allergies   Allergies   Allergen Reactions     Valcyte [Valganciclovir] Other (See Comments)     Cannot receive this medication while on CMV monoclonal antibody study, due to end 12/9/2021.       Medications   Current Outpatient Medications   Medication Sig     acetaminophen (TYLENOL) 325 MG tablet Take 2 tablets (650 mg) by mouth every 6 hours as needed for other (For optimal non-opioid multimodal pain management to improve pain control.)     amLODIPine (NORVASC) 10 MG tablet Take 1 tablet (10 mg) by mouth daily     atorvastatin (LIPITOR) 10 MG tablet Take 1 tablet (10 mg) by mouth daily     magnesium oxide (MAG-OX) 400 MG tablet Take 1 tablet (400 mg) by mouth 2 times daily     mycophenolate (GENERIC EQUIVALENT) 250 MG capsule Take 4 capsules (1,000 mg) by mouth 2 times daily     predniSONE (DELTASONE) 10 MG tablet Take 6 tablets (60 mg) by mouth daily for 1 day, THEN 4 tablets (40 mg) daily for 2 days, THEN 2 tablets (20 mg) daily for 7 days, THEN 1.5 tablets (15 mg) daily for 7 days, THEN 1 tablet (10 mg) daily for 7 days, THEN 0.5 tablets (5 mg) daily for 3 days.     study - NPC-21 vs. placebo, IDS# [id number], 6 mg/kg; 12 mg/kg; or placebo in 0.9% sodium chloride intermittent infusion Inject 300 mLs into the vein once for 1 dose     sulfamethoxazole-trimethoprim (BACTRIM) 400-80 MG tablet Take 1 tablet by  "mouth daily     tacrolimus (GENERIC EQUIVALENT) 1 MG capsule Take 6 capsules (6 mg) by mouth 2 times daily     valGANciclovir (VALCYTE) 450 MG tablet Take 2 tablets (900 mg) by mouth 2 times daily     vitamin D2 (ERGOCALCIFEROL) 27930 units (1250 mcg) capsule Take 1 capsule (50,000 Units) by mouth once a week     [START ON 11/8/2021] cholecalciferol (VITAMIN D3) 25 mcg (1000 units) capsule Take 2 capsules (50 mcg) by mouth daily (Patient not taking: Reported on 10/14/2021)     predniSONE (DELTASONE) 5 MG tablet Take 1 tablet (5 mg) by mouth daily To complete prednisone taper. (Patient not taking: Reported on 10/14/2021)     tacrolimus (GENERIC EQUIVALENT) 0.5 MG capsule Take 1 capsule (0.5 mg) by mouth 2 times daily as needed As instructed by transplant team. (Patient not taking: Reported on 10/14/2021)     No current facility-administered medications for this visit.     There are no discontinued medications.    Physical Exam   Vital Signs: /89 (BP Location: Left arm, Patient Position: Sitting, Cuff Size: Adult Regular)   Pulse 85   Temp 97.6  F (36.4  C) (Oral)   Resp 18   Ht 1.702 m (5' 7.01\")   Wt 78.2 kg (172 lb 6.4 oz)   SpO2 99%   BMI 27.00 kg/m      GENERAL APPEARANCE: alert and no distress  HENT: mouth without ulcers or lesions  LYMPHATICS: no cervical or supraclavicular nodes  RESP: lungs clear to auscultation - no rales, rhonchi or wheezes  CV: regular rhythm, normal rate, no rub, no murmur  EDEMA: no LE edema bilaterally  ABDOMEN: soft, nondistended, nontender, bowel sounds normal  MS: extremities normal - no gross deformities noted, no evidence of inflammation in joints, no muscle tenderness  SKIN: no rash  ACCESS: RUE AVF with + thrill  TX KIDNEY: non tender, well healed incision    Data     Renal Latest Ref Rng & Units 10/11/2021 10/7/2021 10/4/2021   Na 133 - 144 mmol/L 139 137 142   K 3.4 - 5.3 mmol/L 3.6 3.7 4.1   Cl 94 - 109 mmol/L 111(H) 107 112(H)   CO2 20 - 32 mmol/L 23 21 20   BUN " 7 - 30 mg/dL 17 14 14   Cr 0.66 - 1.25 mg/dL 1.10 1.08 1.14   Glucose 70 - 99 mg/dL 86 84 91   Ca  8.5 - 10.1 mg/dL 8.2(L) 9.1 8.6   Mg 1.6 - 2.3 mg/dL 1.2(L) - 1.4(L)     Bone Health Latest Ref Rng & Units 10/11/2021 10/4/2021 9/27/2021   Phos 2.5 - 4.5 mg/dL 2.3(L) 2.9 2.4(L)   PTHi 18 - 80 pg/mL - - -   Vit D Def 20 - 75 ug/L - - -     Heme Latest Ref Rng & Units 10/11/2021 10/7/2021 10/4/2021   WBC 4.0 - 11.0 10e3/uL 3.2(L) 5.6 5.8   Hgb 13.3 - 17.7 g/dL 10.7(L) 11.2(L) 10.1(L)   Plt 150 - 450 10e3/uL 242 232 254   ABSOLUTE NEUTROPHIL 1.6 - 8.3 10e9/L - - -   ABSOLUTE LYMPHOCYTES 0.8 - 5.3 10e9/L - - -   ABSOLUTE MONOCYTES 0.0 - 1.3 10e9/L - - -   ABSOLUTE EOSINOPHILS 0.0 - 0.7 10e9/L - - -   ABSOLUTE BASOPHILS 0.0 - 0.2 10e9/L - - -   ABS IMMATURE GRANULOCYTES 0 - 0.4 10e9/L - - -   ABSOLUTE NUCLEATED RBC - - - -     Liver Latest Ref Rng & Units 9/13/2021 7/13/2020 6/22/2020   AP 40 - 150 U/L 122 - -   TBili 0.2 - 1.3 mg/dL 0.4 - -   ALT 0 - 70 U/L 24 20 -   AST 0 - 45 U/L 15 20 23   Tot Protein 6.8 - 8.8 g/dL 7.7 - -   Albumin 3.4 - 5.0 g/dL 3.8 - -     Pancreas Latest Ref Rng & Units 9/13/2021   A1C 0.0 - 5.6 % 5.3     Iron studies Latest Ref Rng & Units 9/17/20212021 9/14/2021 5/11/2016   Iron 35 - 180 ug/dL 123 18(L) 38   Iron sat 15 - 46 % 69(H) 10(L) 17   Ferritin 26 - 388 ng/mL 770(H) - -     [facility] Txp Virology Latest Ref Rng & Units 10/8/2021 9/18/2021 9/16/2021   CMV QUANT IU/ML Not Detected IU/mL - Not Detected Not Detected   LOG IU/ML OF CMVQNT - 2.8 - -   EBV CAPSID ANTIBODY IGG No detectable antibody. - - -   Hep B Core NR - - -   HIV 1&2 NEG - - -        Recent Labs   Lab Test 09/16/21  0724 09/17/21  0656 10/04/21  0835 10/07/21  1016 10/11/21  0830   DOSTAC 9/16/2021  --   --  10/6/2021  --    TACROL <3.0*   < > 12.1 10.7 10.1    < > = values in this interval not displayed.     This patient has been seen and evaluated by me, Idalmis Cote MD.  I have reviewed the note and agree with plan of care as  documented by the fellow.  Idalmis Cote MD on 10/14/2021 at 11:50 AM              Early Post Transplant

## 2021-10-15 ENCOUNTER — LAB REQUISITION (OUTPATIENT)
Dept: LAB | Facility: CLINIC | Age: 36
End: 2021-10-15
Payer: MEDICARE

## 2021-10-15 DIAGNOSIS — Z94.0 KIDNEY TRANSPLANT RECIPIENT: ICD-10-CM

## 2021-10-15 DIAGNOSIS — Z00.6 RESEARCH STUDY PATIENT: Primary | ICD-10-CM

## 2021-10-15 DIAGNOSIS — Z94.0 KIDNEY TRANSPLANT STATUS: ICD-10-CM

## 2021-10-15 DIAGNOSIS — Z48.288 ENCOUNTER FOR AFTERCARE FOLLOWING MULTIPLE ORGAN TRANSPLANT: ICD-10-CM

## 2021-10-15 DIAGNOSIS — Z79.899 OTHER LONG TERM (CURRENT) DRUG THERAPY: ICD-10-CM

## 2021-10-15 LAB
ATRIAL RATE - MUSE: 70 BPM
BACTERIA UR CULT: NO GROWTH
BKV DNA # SPEC NAA+PROBE: NOT DETECTED COPIES/ML
DIASTOLIC BLOOD PRESSURE - MUSE: NORMAL MMHG
INTERPRETATION ECG - MUSE: NORMAL
P AXIS - MUSE: 73 DEGREES
PR INTERVAL - MUSE: 134 MS
QRS DURATION - MUSE: 110 MS
QT - MUSE: 396 MS
QTC - MUSE: 427 MS
R AXIS - MUSE: 49 DEGREES
SYSTOLIC BLOOD PRESSURE - MUSE: NORMAL MMHG
T AXIS - MUSE: -1 DEGREES
VENTRICULAR RATE- MUSE: 70 BPM

## 2021-10-15 PROCEDURE — 80197 ASSAY OF TACROLIMUS: CPT | Performed by: TRANSPLANT SURGERY

## 2021-10-16 LAB
TACROLIMUS BLD-MCNC: 13.3 UG/L (ref 5–15)
TME LAST DOSE: NORMAL H
TME LAST DOSE: NORMAL H

## 2021-10-17 DIAGNOSIS — Z94.0 KIDNEY REPLACED BY TRANSPLANT: ICD-10-CM

## 2021-10-17 DIAGNOSIS — B25.9 CMV (CYTOMEGALOVIRUS INFECTION) (H): Primary | ICD-10-CM

## 2021-10-18 ENCOUNTER — MEDICAL CORRESPONDENCE (OUTPATIENT)
Dept: HEALTH INFORMATION MANAGEMENT | Facility: CLINIC | Age: 36
End: 2021-10-18
Payer: MEDICARE

## 2021-10-18 DIAGNOSIS — Z94.0 KIDNEY TRANSPLANT RECIPIENT: Primary | ICD-10-CM

## 2021-10-18 LAB
ALLOSURE DD-CFDNA: 0.7 %
DONOR IDENTIFICATION: NORMAL
DSA COMMENTS: NORMAL
DSA PRESENT: NO
DSA TEST METHOD: NORMAL
INT SUB RESULT: NORMAL
INTERF SUBSTANCE: NORMAL
INTSUB TEST METHOD: NORMAL
ORGAN: NORMAL
SA 1 CELL: NORMAL
SA 1 TEST METHOD: NORMAL
SA 2 CELL: NORMAL
SA 2 TEST METHOD: NORMAL
SA1 HI RISK ABY: NORMAL
SA1 MOD RISK ABY: NORMAL
SA2 HI RISK ABY: NORMAL
SA2 MOD RISK ABY: NORMAL
UNACCEPTABLE ANTIGENS: NORMAL
UNOS CPRA: 2
ZZZINT SUB COMMENTS: NORMAL
ZZZSA 1  COMMENTS: NORMAL
ZZZSA 2 COMMENTS: NORMAL

## 2021-10-18 RX ORDER — VALGANCICLOVIR 450 MG/1
900 TABLET, FILM COATED ORAL 2 TIMES DAILY
Qty: 360 TABLET | Refills: 1 | Status: SHIPPED | OUTPATIENT
Start: 2021-10-18 | End: 2021-12-20

## 2021-10-18 RX ORDER — TACROLIMUS 1 MG/1
5 CAPSULE ORAL 2 TIMES DAILY
Qty: 900 CAPSULE | Refills: 3 | Status: SHIPPED | OUTPATIENT
Start: 2021-10-18 | End: 2021-10-26

## 2021-10-18 NOTE — TELEPHONE ENCOUNTER
patient confirmed this was an accurate 12-hour trough. Verified Tacrolimus IR dose 6 mg BID. Confirmed no new medications or illness. Confirmed no missed doses. Patient confirms dose decrease of Tacrolimus IR dose to 5 mg BID and repeat labs in 1 week

## 2021-10-18 NOTE — TELEPHONE ENCOUNTER
ISSUE:   Tacrolimus IR level 13.3 on 10/15, goal 8-10, dose 6 mg BID.    PLAN:   Please call patient and confirm this was an accurate 12-hour trough. Verify Tacrolimus IR dose 6 mg BID. Confirm no new medications or illness. Confirm no missed doses. If accurate trough and accurate dose, decrease Tacrolimus IR dose to 5 mg BID and repeat labs in 1 week.

## 2021-10-18 NOTE — TELEPHONE ENCOUNTER
[facility] [employer] message sent to patient regarding:  Tacrolimus IR level 13.3 on 10/15, goal 8-10, dose 6 mg BID.     PLAN:   Please call patient and confirm this was an accurate 12-hour trough. Verify Tacrolimus IR dose 6 mg BID. Confirm no new medications or illness. Confirm no missed doses. If accurate trough and accurate dose, decrease Tacrolimus IR dose to 5 mg BID and repeat labs in 1 week.

## 2021-10-20 ENCOUNTER — TELEPHONE (OUTPATIENT)
Dept: TRANSPLANT | Facility: CLINIC | Age: 36
End: 2021-10-20

## 2021-10-20 NOTE — TELEPHONE ENCOUNTER
Post Kidney and Pancreas Transplant Team Conference  Date: 10/20/2021  Transplant Coordinator: Yas Card     Attendees:  [x]  Dr. Abdi  [x] Sandy Shay LPN     []  Dr. Martin [x] Marisa Jeffers RN [] Sofia Del Valle LPN   []  Dr. Cote [x] Majo Kevin, SHAYY    [x]  Dr. Goodwin [] Nikki Palmer RN [x] Brady Peter, BryD   [] Dr. Vale [] Carline Myers RN    [] Dr. Toscano [] Dago Damon RN    [x] Dr. Chandler [] Maribell Orr RN    [] Dr. Loyola [] Roxie Logan RN    []  Dr. Wilder [] Windy Napier, SHAYY    [] Surgery Fellow [x] Yas Card RN    [x] Lluvia Hernandez, LILLIANA [] Lesa Raza RN        Verbal Plan Read Back:   No changes at this time    Routed to RN Coordinator   Sandy Shay LPN

## 2021-10-25 ENCOUNTER — LAB REQUISITION (OUTPATIENT)
Dept: LAB | Facility: CLINIC | Age: 36
End: 2021-10-25
Payer: MEDICARE

## 2021-10-25 DIAGNOSIS — Z94.0 KIDNEY TRANSPLANT STATUS: ICD-10-CM

## 2021-10-25 DIAGNOSIS — Z48.288 ENCOUNTER FOR AFTERCARE FOLLOWING MULTIPLE ORGAN TRANSPLANT: ICD-10-CM

## 2021-10-25 DIAGNOSIS — Z79.899 OTHER LONG TERM (CURRENT) DRUG THERAPY: ICD-10-CM

## 2021-10-25 LAB
ANION GAP SERPL CALCULATED.3IONS-SCNC: 4 MMOL/L (ref 3–14)
BASOPHILS # BLD AUTO: 0 10E3/UL (ref 0–0.2)
BASOPHILS NFR BLD AUTO: 1 %
BUN SERPL-MCNC: 17 MG/DL (ref 7–30)
CALCIUM SERPL-MCNC: 8.9 MG/DL (ref 8.5–10.1)
CHLORIDE BLD-SCNC: 106 MMOL/L (ref 94–109)
CO2 SERPL-SCNC: 25 MMOL/L (ref 20–32)
CREAT SERPL-MCNC: 0.99 MG/DL (ref 0.66–1.25)
EOSINOPHIL # BLD AUTO: 0 10E3/UL (ref 0–0.7)
EOSINOPHIL NFR BLD AUTO: 0 %
ERYTHROCYTE [DISTWIDTH] IN BLOOD BY AUTOMATED COUNT: 13.1 % (ref 10–15)
GFR SERPL CREATININE-BSD FRML MDRD: >90 ML/MIN/1.73M2
GLUCOSE BLD-MCNC: 89 MG/DL (ref 70–99)
HCT VFR BLD AUTO: 35.9 % (ref 40–53)
HGB BLD-MCNC: 12 G/DL (ref 13.3–17.7)
IMM GRANULOCYTES # BLD: 0 10E3/UL
IMM GRANULOCYTES NFR BLD: 0 %
LYMPHOCYTES # BLD AUTO: 0.9 10E3/UL (ref 0.8–5.3)
LYMPHOCYTES NFR BLD AUTO: 29 %
MAGNESIUM SERPL-MCNC: 1.3 MG/DL (ref 1.6–2.3)
MCH RBC QN AUTO: 29.1 PG (ref 26.5–33)
MCHC RBC AUTO-ENTMCNC: 33.4 G/DL (ref 31.5–36.5)
MCV RBC AUTO: 87 FL (ref 78–100)
MONOCYTES # BLD AUTO: 0.2 10E3/UL (ref 0–1.3)
MONOCYTES NFR BLD AUTO: 6 %
NEUTROPHILS # BLD AUTO: 2 10E3/UL (ref 1.6–8.3)
NEUTROPHILS NFR BLD AUTO: 64 %
NRBC # BLD AUTO: 0 10E3/UL
NRBC BLD AUTO-RTO: 0 /100
PHOSPHATE SERPL-MCNC: 2.3 MG/DL (ref 2.5–4.5)
PLATELET # BLD AUTO: 254 10E3/UL (ref 150–450)
POTASSIUM BLD-SCNC: 3.6 MMOL/L (ref 3.4–5.3)
RBC # BLD AUTO: 4.13 10E6/UL (ref 4.4–5.9)
SODIUM SERPL-SCNC: 135 MMOL/L (ref 133–144)
TACROLIMUS BLD-MCNC: 16.1 UG/L (ref 5–15)
TME LAST DOSE: ABNORMAL H
TME LAST DOSE: ABNORMAL H
WBC # BLD AUTO: 3.1 10E3/UL (ref 4–11)

## 2021-10-25 PROCEDURE — 80197 ASSAY OF TACROLIMUS: CPT | Performed by: TRANSPLANT SURGERY

## 2021-10-25 PROCEDURE — 84100 ASSAY OF PHOSPHORUS: CPT | Performed by: TRANSPLANT SURGERY

## 2021-10-25 PROCEDURE — 80048 BASIC METABOLIC PNL TOTAL CA: CPT | Performed by: TRANSPLANT SURGERY

## 2021-10-25 PROCEDURE — 85025 COMPLETE CBC W/AUTO DIFF WBC: CPT | Performed by: TRANSPLANT SURGERY

## 2021-10-25 PROCEDURE — 83735 ASSAY OF MAGNESIUM: CPT | Performed by: TRANSPLANT SURGERY

## 2021-10-26 ENCOUNTER — TELEPHONE (OUTPATIENT)
Dept: TRANSPLANT | Facility: CLINIC | Age: 36
End: 2021-10-26

## 2021-10-26 DIAGNOSIS — Z94.0 KIDNEY TRANSPLANT RECIPIENT: ICD-10-CM

## 2021-10-26 RX ORDER — TACROLIMUS 1 MG/1
4 CAPSULE ORAL 2 TIMES DAILY
Qty: 720 CAPSULE | Refills: 3 | Status: SHIPPED | OUTPATIENT
Start: 2021-10-26 | End: 2021-10-29

## 2021-10-26 NOTE — TELEPHONE ENCOUNTER
ISSUE:   Tacrolimus IR level 16.1 on 10/25, goal 8-10, dose 5 mg BID.    PLAN:   Please call patient and confirm this was an accurate 12-hour trough. Verify Tacrolimus IR dose 5 mg BID. Confirm no new medications or illness. Confirm no missed doses. If accurate trough and accurate dose, decrease Tacrolimus IR dose to 4 mg BID and repeat labs in 3 days    OUTCOME:   Spoke with patient, they confirm accurate trough level and current dose 5 mg BID. Patient confirmed dose change to 4 mg BID and to repeat labs in 3 days. Orders sent to preferred pharmacy for dose change and lab for repeat labs. Patient voiced understanding of plan.     Due for CMV. Kailash will remind lab to check CMV on Thursday.

## 2021-10-27 ENCOUNTER — TELEPHONE (OUTPATIENT)
Dept: TRANSPLANT | Facility: CLINIC | Age: 36
End: 2021-10-27

## 2021-10-27 NOTE — TELEPHONE ENCOUNTER
Post Kidney and Pancreas Transplant Team Conference  Date: 10/27/2021  Transplant Coordinator: Yas Card RN     Attendees:  [x]  Dr. Abdi  [x] Sandy Shay LPN     [x]  Dr. Martin [x] Marisa Jeffers RN [] Sofia Del Valle LPN   [x]  Dr. Cote [x] Majo Kevin, SHAYY         [x]  Dr. Goodwin [] Nikki Palmer, SHAYY [x] Brady Peter, PharmD   [x] Dr. Vale [] Carline Myers, SHAYY Patel, SHAYY   [] Dr. Toscano [] Dago Damon, RN Jessica Mosqueda, SHAYY   [] Dr. Chandler [] SHAYY El Dr.   [] Dr. Loyola [] Roxie Logan RN    []  Dr. Wilder [] Windy Napier, RN    [] Surgery Fellow [x] Yas Card RN    [] Lluvia Hernandez NP [] Lesa Raza RN        Verbal Plan Read Back:   Continue current treatment plan    Routed to RN Coordinator   Sandy Shay LPN

## 2021-10-28 ENCOUNTER — MEDICAL CORRESPONDENCE (OUTPATIENT)
Dept: HEALTH INFORMATION MANAGEMENT | Facility: CLINIC | Age: 36
End: 2021-10-28

## 2021-10-28 ENCOUNTER — LAB REQUISITION (OUTPATIENT)
Dept: LAB | Facility: CLINIC | Age: 36
End: 2021-10-28
Payer: MEDICARE

## 2021-10-28 DIAGNOSIS — Z94.0 KIDNEY TRANSPLANT STATUS: ICD-10-CM

## 2021-10-28 DIAGNOSIS — Z48.288 ENCOUNTER FOR AFTERCARE FOLLOWING MULTIPLE ORGAN TRANSPLANT: ICD-10-CM

## 2021-10-28 DIAGNOSIS — Z79.899 OTHER LONG TERM (CURRENT) DRUG THERAPY: ICD-10-CM

## 2021-10-28 LAB
ANION GAP SERPL CALCULATED.3IONS-SCNC: 6 MMOL/L (ref 3–14)
BUN SERPL-MCNC: 13 MG/DL (ref 7–30)
CALCIUM SERPL-MCNC: 8.7 MG/DL (ref 8.5–10.1)
CHLORIDE BLD-SCNC: 113 MMOL/L (ref 94–109)
CMV DNA IU/ML, INSTRUMENT: 2592 IU/ML
CMV DNA SPEC NAA+PROBE-LOG#: 3.4 {LOG_COPIES}/ML
CO2 SERPL-SCNC: 21 MMOL/L (ref 20–32)
CREAT SERPL-MCNC: 1.06 MG/DL (ref 0.66–1.25)
ERYTHROCYTE [DISTWIDTH] IN BLOOD BY AUTOMATED COUNT: 13.2 % (ref 10–15)
GFR SERPL CREATININE-BSD FRML MDRD: >90 ML/MIN/1.73M2
GLUCOSE BLD-MCNC: 88 MG/DL (ref 70–99)
HCT VFR BLD AUTO: 34.9 % (ref 40–53)
HGB BLD-MCNC: 11.6 G/DL (ref 13.3–17.7)
MAGNESIUM SERPL-MCNC: 1.4 MG/DL (ref 1.6–2.3)
MCH RBC QN AUTO: 29 PG (ref 26.5–33)
MCHC RBC AUTO-ENTMCNC: 33.2 G/DL (ref 31.5–36.5)
MCV RBC AUTO: 87 FL (ref 78–100)
PHOSPHATE SERPL-MCNC: 2.3 MG/DL (ref 2.5–4.5)
PLATELET # BLD AUTO: 244 10E3/UL (ref 150–450)
POTASSIUM BLD-SCNC: 3.9 MMOL/L (ref 3.4–5.3)
RBC # BLD AUTO: 4 10E6/UL (ref 4.4–5.9)
SODIUM SERPL-SCNC: 140 MMOL/L (ref 133–144)
TACROLIMUS BLD-MCNC: 12.4 UG/L (ref 5–15)
TME LAST DOSE: NORMAL H
TME LAST DOSE: NORMAL H
WBC # BLD AUTO: 3 10E3/UL (ref 4–11)

## 2021-10-28 PROCEDURE — 85027 COMPLETE CBC AUTOMATED: CPT | Performed by: TRANSPLANT SURGERY

## 2021-10-28 PROCEDURE — 83735 ASSAY OF MAGNESIUM: CPT | Performed by: TRANSPLANT SURGERY

## 2021-10-28 PROCEDURE — 80048 BASIC METABOLIC PNL TOTAL CA: CPT | Performed by: TRANSPLANT SURGERY

## 2021-10-28 PROCEDURE — 84100 ASSAY OF PHOSPHORUS: CPT | Performed by: TRANSPLANT SURGERY

## 2021-10-28 PROCEDURE — 80197 ASSAY OF TACROLIMUS: CPT | Performed by: TRANSPLANT SURGERY

## 2021-10-29 ENCOUNTER — TELEPHONE (OUTPATIENT)
Dept: TRANSPLANT | Facility: CLINIC | Age: 36
End: 2021-10-29

## 2021-10-29 DIAGNOSIS — Z48.298 AFTERCARE FOLLOWING ORGAN TRANSPLANT: Primary | ICD-10-CM

## 2021-10-29 DIAGNOSIS — Z94.0 KIDNEY REPLACED BY TRANSPLANT: ICD-10-CM

## 2021-10-29 DIAGNOSIS — Z94.0 KIDNEY TRANSPLANT RECIPIENT: ICD-10-CM

## 2021-10-29 RX ORDER — TACROLIMUS 1 MG/1
3 CAPSULE ORAL 2 TIMES DAILY
Qty: 180 CAPSULE | Refills: 11 | Status: SHIPPED | OUTPATIENT
Start: 2021-10-29 | End: 2021-12-09

## 2021-10-29 NOTE — TELEPHONE ENCOUNTER
ISSUE  Tac elevated  Rising CMV (currently on 900 mg bid, on research study)    Plan: reduce IS to goal, check MPA and IgG.   Kailash is asymptomatic.     ISSUE:   Tacrolimus IR level 12.4 on 10/28, goal 8-10, dose 4 mg BID.    PLAN:   Please call patient and confirm this was an accurate 12-hour trough. Verify Tacrolimus IR dose 4 mg BID. Confirm no new medications or illness. Confirm no missed doses. If accurate trough and accurate dose, decrease Tacrolimus IR dose to 3 mg BID and repeat labs in 1 weeks    OUTCOME:   Spoke with patient, they confirm accurate trough level and current dose 4 mg BID. Patient confirmed dose change to 3 mg BID and to repeat labs in 1 weeks. Orders sent to preferred pharmacy for dose change and lab for repeat labs. Patient voiced understanding of plan.

## 2021-11-01 DIAGNOSIS — Z94.0 KIDNEY TRANSPLANT RECIPIENT: Primary | ICD-10-CM

## 2021-11-01 RX ORDER — MAGNESIUM OXIDE 400 MG/1
400 TABLET ORAL 2 TIMES DAILY
Qty: 60 TABLET | Refills: 1 | Status: SHIPPED | OUTPATIENT
Start: 2021-11-01 | End: 2022-01-14

## 2021-11-02 ENCOUNTER — MEDICAL CORRESPONDENCE (OUTPATIENT)
Dept: HEALTH INFORMATION MANAGEMENT | Facility: CLINIC | Age: 36
End: 2021-11-02
Payer: MEDICARE

## 2021-11-09 ENCOUNTER — TELEPHONE (OUTPATIENT)
Dept: TRANSPLANT | Facility: CLINIC | Age: 36
End: 2021-11-09
Payer: MEDICARE

## 2021-11-09 NOTE — LETTER
OUTPATIENT LABORATORY TEST ORDER    Patient: Kailash Sorto     Transplant Date: 9/13/2021  YOB: 1985     Ordered By: Dr. Amado Vale  MRN: [id number]     Issued Date/Time: 11/9/2021  3:03 PM         Expiration Date: 9/13/2022    Diagnosis: Kidney Transplant (ICD-10 Z94.0)    Aftercare following organ transplant (ICD-10 Z48.288)    Long term use of medications (ICD-10 Z79.899)      Lab results to be available on the same day drawn    Patient should release information to the [facility], [facility].     Please fax all results to [phone]    Critical Labs to be paged to      First 8 weeks post-transplant (9/13/2021 - 11/13/2021)  Labs 2x/week (Monday and Thursday)    CBC with platelets    Basic Metabolic Panel (Sodium, Potassium, Chloride, Creatinine, CO2, Urea Nitrogen, glucose, Calcium)    Tacrolimus/Prograf/ drug level  Labs weekly (Monday)    Phosphorus, magnesium    CMV PCR QT    Months 2-4 post-transplant (11/14/2021 - 1/14/2022)  Labs 1x weekly (Monday or Tuesday)    CBC with platelets    Basic Metabolic Panel (Sodium, Potassium, Chloride, Creatinine, CO2, Urea Nitrogen, glucose, Calcium)    Tacrolimus/Prograf/ drug level    CMV PCR QT  Labs monthly     Phosphorus, magnesium    BK (Polyoma Virus) PCR Quantitative/Plasma  **At month 3 (12/13/2021)    Allosure (kit will be mailed to lab by [facility])    Months 4-7 post-transplant (1/15/2022 - 4/15/2022)  Labs every other week    CBC with platelets    Basic Metabolic Panel (Sodium, Potassium, Chloride, Creatinine, CO2, Urea Nitrogen, glucose, Calcium)    Tacrolimus/Prograf/ drug level  Monthly    Phosphorus, magnesium    BK (Polyoma Virus) PCR Quantitative/Plasma          OUTPATIENT LABORATORY TEST ORDER    Patient: Kailash Sorto     Transplant Date: 9/13/2021  YOB: 1985     Ordered By: Dr. Amado Vale  MRN: [id number]     Issued Date/Time:  11/9/2021         Expiration Date: 9/13/2022    Diagnosis: Kidney Transplant (ICD-10 Z94.0)    Aftercare following organ transplant (ICD-10 Z48.288)    Long term use of medications (ICD-10 Z79.899)    Months 7-12 post-transplant (4/16/2022 - 9/13/2022)  Monthly    CBC with platelets    Basic Metabolic Panel (Sodium, Potassium, Chloride, Creatinine, CO2, Urea Nitrogen, glucose, Calcium)    Tacrolimus/Prograf/ drug level    BK (Polyoma Virus) PCR Quantitative/Plasma    CMV PCR QT    At 2 months post-transplant (Due: 11/13/2021)     BK Virus PCR Quantitative/Plasma    Mycophenolic Acid (MPA) drug level    Urine protein/creatinine    Allosure (kit will be mailed to lab by [facility])    At 4 months post-transplant  (Due: 1/13/2022)    DSA PRA    PTH    Urine protein/creatinine    Allosure(kit will be mailed to lab by [facility])    At 6 months post-transplant (Due: 3/13/2022)    Hepatic panel    Hemoglobin A1c    Uric Acid    Lipid panel    Urine protein/creatinine    Allosure (kit to be mailed to [facility])     At 7 months post-transplant  (Due: 4/13/2022)     PRA/DSA    At 9 months post-transplant (Due: 6/13/2022)     Urine protein/creatinine    Allosure( kit will be mailed to lab by [facility])                At 12 months post-transplant (Fasting labs) Due: 9/13/2022     Hepatic panel    Hemoglobin A1c    Uric Acid    Lipid panel    Urine protein/creatinine    DSA PRA    PTH    Vitamin D    Allosure (kit will be mailed to lab by [facility])    If you have any questions please call the Transplant Center at [phone] option 5. All lab results should be faxed to [phone]    .

## 2021-11-09 NOTE — TELEPHONE ENCOUNTER
Left message for patient stating that he is overdue for txp lab draw.  Current standing lab orders sent to patient via [facility] et[facility] and mail.

## 2021-11-09 NOTE — LETTER
OUTPATIENT LABORATORY TEST ORDER    Patient: Kailash Sorto     Transplant Date: 9/13/2021  YOB: 1985     Ordered By: Dr. Amado Vale  MRN: [id number]     Issued Date/Time: 11/9/2021  3:03 PM         Expiration Date: 9/13/2022    Diagnosis: Kidney Transplant (ICD-10 Z94.0)    Aftercare following organ transplant (ICD-10 Z48.288)    Long term use of medications (ICD-10 Z79.899)      Lab results to be available on the same day drawn    Patient should release information to the [facility], [facility].     Please fax all results to [phone]    Critical Labs to be paged to      First 8 weeks post-transplant (9/13/2021 - 11/13/2021)  Labs 2x/week (Monday and Thursday)    CBC with platelets    Basic Metabolic Panel (Sodium, Potassium, Chloride, Creatinine, CO2, Urea Nitrogen, glucose, Calcium)    Tacrolimus/Prograf/ drug level  Labs weekly (Monday)    Phosphorus, magnesium    CMV PCR QT    Months 2-4 post-transplant (11/14/2021 - 1/14/2022)  Labs 1x weekly (Monday or Tuesday)    CBC with platelets    Basic Metabolic Panel (Sodium, Potassium, Chloride, Creatinine, CO2, Urea Nitrogen, glucose, Calcium)    Tacrolimus/Prograf/ drug level    CMV PCR QT  Labs monthly     Phosphorus, magnesium    BK (Polyoma Virus) PCR Quantitative/Plasma  **At month 3 (12/13/2021)    Allosure (kit will be mailed to lab by [facility] [employer]    Months 4-7 post-transplant (1/15/2022 - 4/15/2022)  Labs every other week    CBC with platelets    Basic Metabolic Panel (Sodium, Potassium, Chloride, Creatinine, CO2, Urea Nitrogen, glucose, Calcium)    Tacrolimus/Prograf/ drug level  Monthly    Phosphorus, magnesium    BK (Polyoma Virus) PCR Quantitative/Plasma          OUTPATIENT LABORATORY TEST ORDER    Patient: Kailash Sorto     Transplant Date: 9/13/2021  YOB: 1985     Ordered By: Dr. Amado Vale  MRN: [id number]     Issued Date/Time:  11/9/2021         Expiration Date: 9/13/2022    Diagnosis: Kidney Transplant (ICD-10 Z94.0)    Aftercare following organ transplant (ICD-10 Z48.288)    Long term use of medications (ICD-10 Z79.899)    Months 7-12 post-transplant (4/16/2022 - 9/13/2022)  Monthly    CBC with platelets    Basic Metabolic Panel (Sodium, Potassium, Chloride, Creatinine, CO2, Urea Nitrogen, glucose, Calcium)    Tacrolimus/Prograf/ drug level    BK (Polyoma Virus) PCR Quantitative/Plasma    CMV PCR QT    At 2 months post-transplant (Due: 11/13/2021)     BK Virus PCR Quantitative/Plasma    Mycophenolic Acid (MPA) drug level    Urine protein/creatinine    Allosure (kit will be mailed to lab by [facility])    At 4 months post-transplant  (Due: 1/13/2022)    DSA PRA    PTH    Urine protein/creatinine    Allosure(kit will be mailed to lab by [facility])    At 6 months post-transplant (Due: 3/13/2022)    Hepatic panel    Hemoglobin A1c    Uric Acid    Lipid panel    Urine protein/creatinine    Allosure (kit to be mailed to [facility])     At 7 months post-transplant  (Due: 4/13/2022)     PRA/DSA    At 9 months post-transplant (Due: 6/13/2022)     Urine protein/creatinine    Allosure( kit will be mailed to lab by [facility])                At 12 months post-transplant (Fasting labs) Due: 9/13/2022     Hepatic panel    Hemoglobin A1c    Uric Acid    Lipid panel    Urine protein/creatinine    DSA PRA    PTH    Vitamin D    Allosure (kit will be mailed to lab by [facility])    If you have any questions please call the Transplant Center at [phone] option 5. All lab results should be faxed to [phone]    .

## 2021-11-09 NOTE — TELEPHONE ENCOUNTER
ISSUE:  Due for full set of transplant labs and CMV check.     Please call patient and request labs ASAP. Remind Kailash of weekly labs (and weekly CMV) recommendations.

## 2021-11-12 ENCOUNTER — VIRTUAL VISIT (OUTPATIENT)
Dept: PHARMACY | Facility: CLINIC | Age: 36
End: 2021-11-12
Payer: COMMERCIAL

## 2021-11-12 ENCOUNTER — VIRTUAL VISIT (OUTPATIENT)
Dept: NEPHROLOGY | Facility: CLINIC | Age: 36
End: 2021-11-12
Attending: TRANSPLANT SURGERY
Payer: MEDICARE

## 2021-11-12 DIAGNOSIS — I15.0 RENOVASCULAR HYPERTENSION: ICD-10-CM

## 2021-11-12 DIAGNOSIS — Z94.0 KIDNEY TRANSPLANT RECIPIENT: Primary | ICD-10-CM

## 2021-11-12 DIAGNOSIS — E55.9 VITAMIN D DEFICIENCY: ICD-10-CM

## 2021-11-12 DIAGNOSIS — D84.9 IMMUNOSUPPRESSION (H): ICD-10-CM

## 2021-11-12 DIAGNOSIS — Z94.0 HTN, KIDNEY TRANSPLANT RELATED: ICD-10-CM

## 2021-11-12 DIAGNOSIS — E78.5 HYPERLIPIDEMIA LDL GOAL <100: ICD-10-CM

## 2021-11-12 DIAGNOSIS — D72.819 LEUKOPENIA, UNSPECIFIED TYPE: ICD-10-CM

## 2021-11-12 DIAGNOSIS — I15.1 HTN, KIDNEY TRANSPLANT RELATED: ICD-10-CM

## 2021-11-12 DIAGNOSIS — B25.9 CYTOMEGALOVIRUS (CMV) VIREMIA (H): ICD-10-CM

## 2021-11-12 PROCEDURE — 99207 PR NO CHARGE LOS: CPT | Performed by: PHARMACIST

## 2021-11-12 PROCEDURE — G0463 HOSPITAL OUTPT CLINIC VISIT: HCPCS | Mod: PN,RTG

## 2021-11-12 PROCEDURE — 99215 OFFICE O/P EST HI 40 MIN: CPT | Mod: 24

## 2021-11-12 RX ORDER — BIOTIN 1 MG
1000 TABLET ORAL DAILY
COMMUNITY
End: 2022-03-11

## 2021-11-12 NOTE — PROGRESS NOTES
Kailash is a 35 year old who is being evaluated via a billable video visit.      How would you like to obtain your AVS? MyChart  If the video visit is dropped, the invitation should be resent by: Text to cell phone: [phone]  Will anyone else be joining your video visit? Simona Tanner, EMT

## 2021-11-12 NOTE — LETTER
11/12/2021     RE: Kailash Sorto  [address]  [facility] [address]     Dear Colleague,    Thank you for referring your patient, Kailash Sorto, to the [facility] [place] at [facility]. Please see a copy of my visit note below.    Video-Visit Details    Type of service:  Video Visit    Video Start Time: 1004  Video End Time:1014    Originating Location (pt. Location): Home    Distant Location (provider location):  [facility] [place]     Platform used for Video Visit: [employer]    TRANSPLANT NEPHROLOGY EARLY POST TRANSPLANT VISIT    Recommendations:   Labs next week to follow tacrolimus levels, CMV PCR.   Continue with labs every 2 weeks at least.   Table COVID vaccination at 3 month appointment   RTC in 1 month virtually       Assessment & Plan   # DDKT: Stable   - Baseline Creatinine: ~ 1.1   - Proteinuria: Not checked post transplant   - Date DSA Last Checked: Sep/2021      Latest DSA: No DSA at time of transplant   - BK Viremia: No   - Kidney Tx Biopsy: No    # Immunosuppression: Tacrolimus immediate release (goal 8-10) and Mycophenolate mofetil (dose 1000 mg every 12 hours); tacrolimus recently decreased from 4mg BID to 3mg BID.    - Induction with Recent Transplant:  Intermediate Intensity   - Continue with intensive monitoring of immunosuppression for efficacy and toxicity.   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 Positive CMV level from outside lab- will repeat level in our lab today. Continue treatment dose for now.    # Hypertension: Borderline control;  Goal BP: < 130/80   - Volume status: Euvolemic  EDW ~ 169lbs   - Changes: No- near goal. Will continue to follow labs, tacrolimus levels. If persistently above 130s with tac to goal, can consider changing amlodipine to  nifedipine.     # Anemia in Chronic Renal Disease: Hgb: Stable      KODY: No   - Iron studies: Replete     #Leukopenia  -Likely 2/2 valcyte, mycophenolate, CMV   -ANC reasonable on 10/25.   -Continue to monitor with differential    # Mineral Bone Disorder:   - Secondary renal hyperparathyroidism; PTH level: Minimally elevated ( pg/ml)        On treatment: None  - Vitamin D; level: Low        On supplement: Yes  - Calcium; level: Low        On supplement: No  - Phosphorus; level: Low        On supplement: No    # Electrolytes:   - Potassium; level: Normal        On supplement: No  - Magnesium; level: Low        On supplement: Yes; stable.   - Bicarbonate; level: Normal        On supplement: No    # CMV viremia: CMV discordant and blood tests were positive on last check, but these were done at a different lab. Will recheck CMV viral load in our lab today. Will continue on treatment dose of Valcyte for now.     # Diarrhea: resolved      # Medical Compliance: Yes    # COVID-19 Virus Review: Discussed COVID-19 virus and the potential medical risks.  Reviewed preventative health recommendations, including wearing a mask where appropriate.  Recommended COVID vaccination should be up to date with either an initial vaccination or booster shot when appropriate.  Asked the patient to inform the transplant center if they are exposed or diagnosed with this virus.    # COVID Vaccination Up To Date: Not vaccinated. At next visit (3 months) will be eligible for vaccination. Would retable at this time.     # Transplant History:  Etiology of Kidney Failure: Suspect potential underlying GN vs HTN  Tx: DDKT  Transplant: 9/13/2021 (Kidney)  Donor Type: Donation after Brain Death Donor Class:   Crossmatch at time of Tx: negative  DSA at time of Tx: No  Significant changes in immunosuppression: None  CMV IgG Ab High Risk Discordance (D+/R-): Yes  EBV IgG Ab High Risk Discordance (D+/R-): No  Significant transplant-related  complications: CMV Viremia - low level but done at outside lab, will recheck today. On treatment dose Valcyte since 10/9/21    Transplant Office Phone Number: [phone]    Assessment and plan was discussed with the patient and he voiced his understanding and agreement.    Return visit: Return in about 1 month (around 12/12/2021).     Abdelrahman Romo MD  Transplant Nephrology   Pager: [phone]      Chief Complaint   Mr. Sorto is a 35 year old here for kidney transplant and immunosuppression management.     History of Present Illness    Things are going well. Everything is going good. Son has a cold, gave it to him. Little bit of sore throat, but afebrile, no nausea, vomiting. Keeping medications down.  Working on sleep schedule.     Concerns for today:  No concerns.     Has some stable nocturia, but otherwise no voiding issues.   Occasionally having loose stools. Last diarrhea 4-5 days ago. Has at least once a week.     He has had issues getting labs. CMV nurse going to check and see if she can do labs.     Trying to eat healthy and feels like he's at a healthy weight/back to his target weight.   No SOB, no CP, no fevers or chills.    Home BP: 130s/80s-90s consistently.     Problem List   Patient Active Problem List   Diagnosis     Hyperlipidemia LDL goal <100     End stage renal failure on dialysis (H)     Anemia of chronic kidney failure     Renovascular hypertension     AVF (arteriovenous fistula) (H)     Kidney transplant candidate     Kidney transplant recipient       Allergies   Allergies   Allergen Reactions     Valcyte [Valganciclovir] Other (See Comments)     Cannot receive this medication while on CMV monoclonal antibody study, due to end 12/9/2021.       Medications   Current Outpatient Medications   Medication Sig     acetaminophen (TYLENOL) 325 MG tablet Take 2 tablets (650 mg) by mouth every 6 hours as needed for other (For optimal non-opioid multimodal pain management to improve pain  control.)     amLODIPine (NORVASC) 10 MG tablet Take 1 tablet (10 mg) by mouth daily     atorvastatin (LIPITOR) 10 MG tablet Take 1 tablet (10 mg) by mouth daily     magnesium oxide (MAG-OX) 400 MG tablet Take 1 tablet (400 mg) by mouth 2 times daily     mycophenolate (GENERIC EQUIVALENT) 250 MG capsule Take 4 capsules (1,000 mg) by mouth 2 times daily     study - NPC-21 vs. placebo, IDS# 5665, 6 mg/kg; 12 mg/kg; or placebo in 0.9% sodium chloride intermittent infusion Inject 300 mLs into the vein once for 1 dose     sulfamethoxazole-trimethoprim (BACTRIM) 400-80 MG tablet Take 1 tablet by mouth daily     tacrolimus (GENERIC EQUIVALENT) 1 MG capsule Take 3 capsules (3 mg) by mouth 2 times daily     valGANciclovir (VALCYTE) 450 MG tablet Take 2 tablets (900 mg) by mouth 2 times daily     vitamin D2 (ERGOCALCIFEROL) 72546 units (1250 mcg) capsule Take 1 capsule (50,000 Units) by mouth once a week     cholecalciferol (VITAMIN D3) 25 mcg (1000 units) capsule Take 2 capsules (50 mcg) by mouth daily (Patient not taking: Reported on 10/14/2021)     predniSONE (DELTASONE) 5 MG tablet Take 1 tablet (5 mg) by mouth daily To complete prednisone taper. (Patient not taking: Reported on 10/14/2021)     No current facility-administered medications for this visit.     There are no discontinued medications.      Physical Exam   Vital Signs: There were no vitals taken for this visit.    GENERAL APPEARANCE: alert and no distress  HENT: no obvious abnormalities on appearance  RESP: breathing appears unremarkable with normal rate, no audible wheezing or cough and no apparent shortness of breath with conversation  MS: extremities normal - no gross deformities noted  SKIN: no apparent rash and normal skin tone  NEURO: speech is clear with no obvious neurological deficits  PSYCH: mentation appears normal and affect normal    Data   Renal Latest Ref Rng & Units 10/28/2021 10/25/2021 10/14/2021   Na 133 - 144 mmol/L 140 135 139   K 3.4 - 5.3  mmol/L 3.9 3.6 3.6   Cl 94 - 109 mmol/L 113(H) 106 109   CO2 20 - 32 mmol/L 21 25 24   BUN 7 - 30 mg/dL 13 17 16   Cr 0.66 - 1.25 mg/dL 1.06 0.99 1.09   Glucose 70 - 99 mg/dL 88 89 107(H)   Ca  8.5 - 10.1 mg/dL 8.7 8.9 9.2   Mg 1.6 - 2.3 mg/dL 1.4(L) 1.3(L) 1.2(L)     Bone Health Latest Ref Rng & Units 10/28/2021 10/25/2021 10/14/2021   Phos 2.5 - 4.5 mg/dL 2.3(L) 2.3(L) 1.8(L)   PTHi 18 - 80 pg/mL - - 157(H)   Vit D Def 20 - 75 ug/L - - 27     Heme Latest Ref Rng & Units 10/28/2021 10/25/2021 10/14/2021   WBC 4.0 - 11.0 10e3/uL 3.0(L) 3.1(L) 4.7   Hgb 13.3 - 17.7 g/dL 11.6(L) 12.0(L) 11.2(L)   Plt 150 - 450 10e3/uL 244 254 224   ABSOLUTE NEUTROPHIL 1.6 - 8.3 10e9/L - - -   ABSOLUTE LYMPHOCYTES 0.8 - 5.3 10e9/L - - -   ABSOLUTE MONOCYTES 0.0 - 1.3 10e9/L - - -   ABSOLUTE EOSINOPHILS 0.0 - 0.7 10e9/L - - -   ABSOLUTE BASOPHILS 0.0 - 0.2 10e9/L - - -   ABS IMMATURE GRANULOCYTES 0 - 0.4 10e9/L - - -   ABSOLUTE NUCLEATED RBC - - - -     Liver Latest Ref Rng & Units 9/13/2021 7/13/2020 6/22/2020   AP 40 - 150 U/L 122 - -   TBili 0.2 - 1.3 mg/dL 0.4 - -   ALT 0 - 70 U/L 24 20 -   AST 0 - 45 U/L 15 20 23   Tot Protein 6.8 - 8.8 g/dL 7.7 - -   Albumin 3.4 - 5.0 g/dL 3.8 - -     Pancreas Latest Ref Rng & Units 9/13/2021   A1C 0.0 - 5.6 % 5.3     Iron studies Latest Ref Rng & Units 9/17/2021 9/14/2021 5/11/2016   Iron 35 - 180 ug/dL 123 18(L) 38   Iron sat 15 - 46 % 69(H) 10(L) 17   Ferritin 26 - 388 ng/mL 770(H) - -     [facility] Txp Virology Latest Ref Rng & Units 10/28/2021 10/8/2021 9/18/2021   CMV QUANT IU/ML Not Detected IU/mL - - Not Detected   LOG IU/ML OF CMVQNT - 3.4 2.8 -   EBV CAPSID ANTIBODY IGG No detectable antibody. - - -   Hep B Core NR - - -   HIV 1&2 NEG - - -        Recent Labs   Lab Test 09/16/21  0724 09/17/21  0656 10/07/21  1016 10/11/21  0830 10/15/21  0831 10/25/21  0840 10/28/21  0835   DOSTAC 9/16/2021  --  10/6/2021  --   --   --  10/27/2021   TACROL <3.0*   < > 10.7   < > 13.3 16.1* 12.4    < > =  values in this interval not displayed.     Kailash is a 35 year old who is being evaluated via a billable video visit.      How would you like to obtain your AVS? MyChart  If the video visit is dropped, the invitation should be resent by: Text to cell phone: [phone]  Will anyone else be joining your video visit? No   Anna Tanner, EMT    Again, thank you for allowing me to participate in the care of your patient.      Sincerely,    [facility] Transplant

## 2021-11-12 NOTE — PROGRESS NOTES
Video-Visit Details    Type of service:  Video Visit    Video Start Time: 1004  Video End Time:1014    Originating Location (pt. Location): Home    Distant Location (provider location):  [facility]OLOG[facility] [place]     Platform used for Video Visit: [employer]    TRANSPLANT NEPHROLOGY EARLY POST TRANSPLANT VISIT    Recommendations:   Labs next week to follow tacrolimus levels, CMV PCR.   Continue with labs every 2 weeks at least.   Table COVID vaccination at 3 month appointment   RTC in 1 month virtually       Assessment & Plan   # DDKT: Stable   - Baseline Creatinine: ~ 1.1   - Proteinuria: Not checked post transplant   - Date DSA Last Checked: Sep/2021      Latest DSA: No DSA at time of transplant   - BK Viremia: No   - Kidney Tx Biopsy: No    # Immunosuppression: Tacrolimus immediate release (goal 8-10) and Mycophenolate mofetil (dose 1000 mg every 12 hours); tacrolimus recently decreased from 4mg BID to 3mg BID.    - Induction with Recent Transplant:  Intermediate Intensity   - Continue with intensive monitoring of immunosuppression for efficacy and toxicity.   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 Positive CMV level from outside lab- will repeat level in our lab today. Continue treatment dose for now.    # Hypertension: Borderline control;  Goal BP: < 130/80   - Volume status: Euvolemic  EDW ~ 169lbs   - Changes: No- near goal. Will continue to follow labs, tacrolimus levels. If persistently above 130s with tac to goal, can consider changing amlodipine to nifedipine.     # Anemia in Chronic Renal Disease: Hgb: Stable      KODY: No   - Iron studies: Replete     #Leukopenia  -Likely 2/2 valcyte, mycophenolate, CMV   -ANC reasonable on 10/25.   -Continue to monitor with differential    # Mineral Bone Disorder:   - Secondary renal hyperparathyroidism; PTH level: Minimally elevated (  pg/ml)        On treatment: None  - Vitamin D; level: Low        On supplement: Yes  - Calcium; level: Low        On supplement: No  - Phosphorus; level: Low        On supplement: No    # Electrolytes:   - Potassium; level: Normal        On supplement: No  - Magnesium; level: Low        On supplement: Yes; stable.   - Bicarbonate; level: Normal        On supplement: No    # CMV viremia: CMV discordant and blood tests were positive on last check, but these were done at a different lab. Will recheck CMV viral load in our lab today. Will continue on treatment dose of Valcyte for now.     # Diarrhea: resolved      # Medical Compliance: Yes    # COVID-19 Virus Review: Discussed COVID-19 virus and the potential medical risks.  Reviewed preventative health recommendations, including wearing a mask where appropriate.  Recommended COVID vaccination should be up to date with either an initial vaccination or booster shot when appropriate.  Asked the patient to inform the transplant center if they are exposed or diagnosed with this virus.    # COVID Vaccination Up To Date: Not vaccinated. At next visit (3 months) will be eligible for vaccination. Would retable at this time.     # Transplant History:  Etiology of Kidney Failure: Suspect potential underlying GN vs HTN  Tx: DDKT  Transplant: 9/13/2021 (Kidney)  Donor Type: Donation after Brain Death Donor Class:   Crossmatch at time of Tx: negative  DSA at time of Tx: No  Significant changes in immunosuppression: None  CMV IgG Ab High Risk Discordance (D+/R-): Yes  EBV IgG Ab High Risk Discordance (D+/R-): No  Significant transplant-related complications: CMV Viremia - low level but done at outside lab, will recheck today. On treatment dose Valcyte since 10/9/21    Transplant Office Phone Number: [phone]    Assessment and plan was discussed with the patient and he voiced his understanding and agreement.    Return visit: Return in about 1 month (around 12/12/2021).     Abdelrahman  PRATIMA Romo MD  Transplant Nephrology   Pager: [phone]      Chief Complaint   Mr. Sorto is a 35 year old here for kidney transplant and immunosuppression management.     History of Present Illness    Things are going well. Everything is going good. Son has a cold, gave it to him. Little bit of sore throat, but afebrile, no nausea, vomiting. Keeping medications down.  Working on sleep schedule.     Concerns for today:  No concerns.     Has some stable nocturia, but otherwise no voiding issues.   Occasionally having loose stools. Last diarrhea 4-5 days ago. Has at least once a week.     He has had issues getting labs. CMV nurse going to check and see if she can do labs.     Trying to eat healthy and feels like he's at a healthy weight/back to his target weight.   No SOB, no CP, no fevers or chills.    Home BP: 130s/80s-90s consistently.     Problem List   Patient Active Problem List   Diagnosis     Hyperlipidemia LDL goal <100     End stage renal failure on dialysis (H)     Anemia of chronic kidney failure     Renovascular hypertension     AVF (arteriovenous fistula) (H)     Kidney transplant candidate     Kidney transplant recipient       Allergies   Allergies   Allergen Reactions     Valcyte [Valganciclovir] Other (See Comments)     Cannot receive this medication while on CMV monoclonal antibody study, due to end 12/9/2021.       Medications   Current Outpatient Medications   Medication Sig     acetaminophen (TYLENOL) 325 MG tablet Take 2 tablets (650 mg) by mouth every 6 hours as needed for other (For optimal non-opioid multimodal pain management to improve pain control.)     amLODIPine (NORVASC) 10 MG tablet Take 1 tablet (10 mg) by mouth daily     atorvastatin (LIPITOR) 10 MG tablet Take 1 tablet (10 mg) by mouth daily     magnesium oxide (MAG-OX) 400 MG tablet Take 1 tablet (400 mg) by mouth 2 times daily     mycophenolate (GENERIC EQUIVALENT) 250 MG capsule Take 4 capsules (1,000 mg) by mouth 2 times daily      study - NPC-21 vs. placebo, IDS# 5665, 6 mg/kg; 12 mg/kg; or placebo in 0.9% sodium chloride intermittent infusion Inject 300 mLs into the vein once for 1 dose     sulfamethoxazole-trimethoprim (BACTRIM) 400-80 MG tablet Take 1 tablet by mouth daily     tacrolimus (GENERIC EQUIVALENT) 1 MG capsule Take 3 capsules (3 mg) by mouth 2 times daily     valGANciclovir (VALCYTE) 450 MG tablet Take 2 tablets (900 mg) by mouth 2 times daily     vitamin D2 (ERGOCALCIFEROL) 19993 units (1250 mcg) capsule Take 1 capsule (50,000 Units) by mouth once a week     cholecalciferol (VITAMIN D3) 25 mcg (1000 units) capsule Take 2 capsules (50 mcg) by mouth daily (Patient not taking: Reported on 10/14/2021)     predniSONE (DELTASONE) 5 MG tablet Take 1 tablet (5 mg) by mouth daily To complete prednisone taper. (Patient not taking: Reported on 10/14/2021)     No current facility-administered medications for this visit.     There are no discontinued medications.    Physical Exam   Vital Signs: There were no vitals taken for this visit.    GENERAL APPEARANCE: alert and no distress  HENT: no obvious abnormalities on appearance  RESP: breathing appears unremarkable with normal rate, no audible wheezing or cough and no apparent shortness of breath with conversation  MS: extremities normal - no gross deformities noted  SKIN: no apparent rash and normal skin tone  NEURO: speech is clear with no obvious neurological deficits  PSYCH: mentation appears normal and affect normal      Data     Renal Latest Ref Rng & Units 10/28/2021 10/25/2021 10/14/2021   Na 133 - 144 mmol/L 140 135 139   K 3.4 - 5.3 mmol/L 3.9 3.6 3.6   Cl 94 - 109 mmol/L 113(H) 106 109   CO2 20 - 32 mmol/L 21 25 24   BUN 7 - 30 mg/dL 13 17 16   Cr 0.66 - 1.25 mg/dL 1.06 0.99 1.09   Glucose 70 - 99 mg/dL 88 89 107(H)   Ca  8.5 - 10.1 mg/dL 8.7 8.9 9.2   Mg 1.6 - 2.3 mg/dL 1.4(L) 1.3(L) 1.2(L)     Bone Health Latest Ref Rng & Units 10/28/2021 10/25/2021 10/14/2021   Phos 2.5 -  4.5 mg/dL 2.3(L) 2.3(L) 1.8(L)   PTHi 18 - 80 pg/mL - - 157(H)   Vit D Def 20 - 75 ug/L - - 27     Heme Latest Ref Rng & Units 10/28/2021 10/25/2021 10/14/2021   WBC 4.0 - 11.0 10e3/uL 3.0(L) 3.1(L) 4.7   Hgb 13.3 - 17.7 g/dL 11.6(L) 12.0(L) 11.2(L)   Plt 150 - 450 10e3/uL 244 254 224   ABSOLUTE NEUTROPHIL 1.6 - 8.3 10e9/L - - -   ABSOLUTE LYMPHOCYTES 0.8 - 5.3 10e9/L - - -   ABSOLUTE MONOCYTES 0.0 - 1.3 10e9/L - - -   ABSOLUTE EOSINOPHILS 0.0 - 0.7 10e9/L - - -   ABSOLUTE BASOPHILS 0.0 - 0.2 10e9/L - - -   ABS IMMATURE GRANULOCYTES 0 - 0.4 10e9/L - - -   ABSOLUTE NUCLEATED RBC - - - -     Liver Latest Ref Rng & Units 9/13/2021 7/13/2020 6/22/2020   AP 40 - 150 U/L 122 - -   TBili 0.2 - 1.3 mg/dL 0.4 - -   ALT 0 - 70 U/L 24 20 -   AST 0 - 45 U/L 15 20 23   Tot Protein 6.8 - 8.8 g/dL 7.7 - -   Albumin 3.4 - 5.0 g/dL 3.8 - -     Pancreas Latest Ref Rng & Units 9/13/2021   A1C 0.0 - 5.6 % 5.3     Iron studies Latest Ref Rng & Units 9/17/2021 9/14/2021 5/11/2016   Iron 35 - 180 ug/dL 123 18(L) 38   Iron sat 15 - 46 % 69(H) 10(L) 17   Ferritin 26 - 388 ng/mL 770(H) - -     [facility] Txp Virology Latest Ref Rng & Units 10/28/2021 10/8/2021 9/18/2021   CMV QUANT IU/ML Not Detected IU/mL - - Not Detected   LOG IU/ML OF CMVQNT - 3.4 2.8 -   EBV CAPSID ANTIBODY IGG No detectable antibody. - - -   Hep B Core NR - - -   HIV 1&2 NEG - - -        Recent Labs   Lab Test 09/16/21  0724 09/17/21  0656 10/07/21  1016 10/11/21  0830 10/15/21  0831 10/25/21  0840 10/28/21  0835   DOSTAC 9/16/2021  --  10/6/2021  --   --   --  10/27/2021   TACROL <3.0*   < > 10.7   < > 13.3 16.1* 12.4    < > = values in this interval not displayed.

## 2021-11-12 NOTE — PATIENT INSTRUCTIONS
Recommendations from today's MTM visit:                                                       1. When you are done with the high dose vitamin D start 25-50mcg (3556-4079 units) once daily.   2. Okay to take 1mg daily of Biotin.     Follow-up: 2 months    It was great to speak with you today.  I value your experience and would be very thankful for your time with providing feedback on our clinic survey. You may receive a survey via email or text message in the next few days.     To schedule another MTM appointment, please call the clinic directly or you may call the MTM scheduling line at [phone] or toll-free at [phone].     My Clinical Pharmacist's contact information:                                                      Please feel free to contact me with any questions or concerns you have.      PLAN:;ag

## 2021-11-12 NOTE — PROGRESS NOTES
Medication Therapy Management (MTM) Encounter    ASSESSMENT:                            Medication Adherence/Access: No issues identified.  Okay to take biotin 1 to 5 mg daily if needed.    Renal Transplant:   Stable.    Vitamin D Deficiency: When done with the high-dose vitamin D patient should start vitamin D3 1000 to 2000 units once daily.    Hypertension: BP at goal <140/90 2 months post transplant.     Hyperlipidemia: Stable.     PLAN:                            Pt to...  1. When you are done with the high dose vitamin D start 25-50mcg (5903-2911 units) once daily.   2. Okay to take 1mg daily of Biotin.     Follow-up: 2 months       SUBJECTIVE/OBJECTIVE:                          Kailash Sorto is a 35 year old male called for a follow-up visit. He was referred to me from txp team. Today's visit is a follow-up MTM visit from 9/20.     Reason for visit: 2 months post txp. Wondering if he can start Biotin.     Allergies/ADRs: Reviewed in chart  Past Medical History: Reviewed in chart  Tobacco: He reports that he has never smoked. He has never used smokeless tobacco.  Alcohol: not currently using    Medication Adherence/Access: no issues reported    Renal Transplant:  Current immunosuppressants include Tacrolimus 3mg twice daily, Mycophenolate Mofetil 1000mg twice daily. Pt reports loose stools once or twice weekly, occasional headaches, tremors mild.   Transplant date: 9/13/21  Estimated Creatinine Clearance: 108.6 mL/min (based on SCr of 1.06 mg/dL).  CMV infection: Taking Valcyte 900mg twice daily. Donor (+), Recipient (-), treat 6 months post tx   PCP prophylaxis: Bactrim S S daily  Supplements: Mag Oxide 400mg twice daily ( 2 hours from Mycophenolate Mofetil).   Tx Coordinator: Sweta Card, Using Med Card: Yes  Immunizations: annual flu shot 2020; Wuudwexaep79:  unknown; Prevnar 13: unknown; TDaP:  2008; Shingrix: unknown, HBV: immunity per last titers, COVID: unknown  Magnesium   Date Value Ref Range  Status   10/28/2021 1.4 (L) 1.6 - 2.3 mg/dL Final   05/13/2016 2.5 (H) 1.6 - 2.3 mg/dL Final     Vitamin D Deficiency: Pt is taking Vitamin D2 50,000 units weekly.   Lab Results   Component Value Date    VITDT 27 10/14/2021    VITDT 16 (L) 09/17/2021    VITDT 16 (L) 09/14/2021     Hypertension: Current medications include Amlodipine 10mg every evening.  Patient does self-monitor blood pressure. 130/80s.  Patient reports no current medication side effects.  BP Readings from Last 3 Encounters:   10/14/21 135/89   10/04/21 (!) 143/84   09/18/21 (!) 149/89     Hyperlipidemia: Current therapy includes Atorvastatin 10mg daily.  Patient reports no significant myalgias or other side effects.  The ASCVD Risk score ([place] MADRID Jr., et al., 2013) failed to calculate for the following reasons:    The 2013 ASCVD risk score is only valid for ages 40 to 79  Recent Labs   Lab Test 09/13/21  0500 08/04/16  0711   CHOL 211* 190   HDL 56 73   * 104*   TRIG 97 62     Today's Vitals: There were no vitals taken for this visit.  ----------------      I spent 15 minutes with this patient today. All changes were made via collaborative practice agreement with txp team. A copy of the visit note was provided to the patient's referring provider.    The patient was sent via CallVU a summary of these recommendations.     Brady Peter PharmD  [facility] Pharmacist    Phone: [phone]     Telemedicine Visit Details  Type of service:  Telephone visit  Start Time: 1:09 PM  End Time: 1:24 PM  Originating Location (patient location): Home  Distant Location (provider location):  [facility]     Medication Therapy Recommendations  Vitamin D deficiency    Current Medication: cholecalciferol (VITAMIN D3) 25 mcg (1000 units) capsule   Rationale: Does not understand instructions - Adherence - Adherence   Recommendation: Provide Education   Status: Patient Agreed - Adherence/Education

## 2021-11-12 NOTE — PATIENT INSTRUCTIONS
Labs next week to follow tacrolimus levels, CMV PCR.   Continue with labs every 2 weeks at least.   Table COVID vaccination at 3 month appointment     RTC in 1 month virtually

## 2021-11-17 DIAGNOSIS — Z94.0 KIDNEY TRANSPLANT RECIPIENT: Primary | ICD-10-CM

## 2021-11-17 RX ORDER — ATORVASTATIN CALCIUM 10 MG/1
10 TABLET, FILM COATED ORAL DAILY
Qty: 30 TABLET | Refills: 1 | Status: SHIPPED | OUTPATIENT
Start: 2021-11-17 | End: 2022-01-21

## 2021-11-18 ENCOUNTER — LAB (OUTPATIENT)
Dept: LAB | Facility: CLINIC | Age: 36
End: 2021-11-18
Payer: MEDICARE

## 2021-11-18 ENCOUNTER — TELEPHONE (OUTPATIENT)
Dept: TRANSPLANT | Facility: CLINIC | Age: 36
End: 2021-11-18

## 2021-11-18 DIAGNOSIS — Z94.0 KIDNEY REPLACED BY TRANSPLANT: ICD-10-CM

## 2021-11-18 DIAGNOSIS — Z79.899 ENCOUNTER FOR LONG-TERM CURRENT USE OF MEDICATION: ICD-10-CM

## 2021-11-18 DIAGNOSIS — Z20.828 CONTACT WITH AND (SUSPECTED) EXPOSURE TO OTHER VIRAL COMMUNICABLE DISEASES: ICD-10-CM

## 2021-11-18 DIAGNOSIS — Z48.298 AFTERCARE FOLLOWING ORGAN TRANSPLANT: ICD-10-CM

## 2021-11-18 DIAGNOSIS — Z48.298 AFTERCARE FOLLOWING ORGAN TRANSPLANT: Primary | ICD-10-CM

## 2021-11-18 LAB
ANION GAP SERPL CALCULATED.3IONS-SCNC: 3 MMOL/L (ref 3–14)
BUN SERPL-MCNC: 15 MG/DL (ref 7–30)
CALCIUM SERPL-MCNC: 9.4 MG/DL (ref 8.5–10.1)
CHLORIDE BLD-SCNC: 110 MMOL/L (ref 94–109)
CO2 SERPL-SCNC: 27 MMOL/L (ref 20–32)
CREAT SERPL-MCNC: 0.97 MG/DL (ref 0.66–1.25)
CREAT UR-MCNC: 318 MG/DL
ERYTHROCYTE [DISTWIDTH] IN BLOOD BY AUTOMATED COUNT: 13.2 % (ref 10–15)
GFR SERPL CREATININE-BSD FRML MDRD: >90 ML/MIN/1.73M2
GLUCOSE BLD-MCNC: 99 MG/DL (ref 70–99)
HCT VFR BLD AUTO: 38.9 % (ref 40–53)
HGB BLD-MCNC: 13.1 G/DL (ref 13.3–17.7)
MAGNESIUM SERPL-MCNC: 1.5 MG/DL (ref 1.6–2.3)
MCH RBC QN AUTO: 29.4 PG (ref 26.5–33)
MCHC RBC AUTO-ENTMCNC: 33.7 G/DL (ref 31.5–36.5)
MCV RBC AUTO: 87 FL (ref 78–100)
PHOSPHATE SERPL-MCNC: 3 MG/DL (ref 2.5–4.5)
PLATELET # BLD AUTO: 282 10E3/UL (ref 150–450)
POTASSIUM BLD-SCNC: 3.6 MMOL/L (ref 3.4–5.3)
PROT UR-MCNC: 0.33 G/L
PROT/CREAT 24H UR: 0.1 G/G CR (ref 0–0.2)
RBC # BLD AUTO: 4.46 10E6/UL (ref 4.4–5.9)
SODIUM SERPL-SCNC: 140 MMOL/L (ref 133–144)
TACROLIMUS BLD-MCNC: 28.5 UG/L (ref 5–15)
TME LAST DOSE: ABNORMAL H
TME LAST DOSE: ABNORMAL H
WBC # BLD AUTO: 3.5 10E3/UL (ref 4–11)

## 2021-11-18 PROCEDURE — 86833 HLA CLASS II HIGH DEFIN QUAL: CPT

## 2021-11-18 PROCEDURE — 36415 COLL VENOUS BLD VENIPUNCTURE: CPT

## 2021-11-18 PROCEDURE — 80048 BASIC METABOLIC PNL TOTAL CA: CPT

## 2021-11-18 PROCEDURE — 80197 ASSAY OF TACROLIMUS: CPT

## 2021-11-18 PROCEDURE — 83735 ASSAY OF MAGNESIUM: CPT

## 2021-11-18 PROCEDURE — 87799 DETECT AGENT NOS DNA QUANT: CPT

## 2021-11-18 PROCEDURE — 84156 ASSAY OF PROTEIN URINE: CPT

## 2021-11-18 PROCEDURE — 85027 COMPLETE CBC AUTOMATED: CPT

## 2021-11-18 PROCEDURE — 86832 HLA CLASS I HIGH DEFIN QUAL: CPT

## 2021-11-18 PROCEDURE — 80180 DRUG SCRN QUAN MYCOPHENOLATE: CPT

## 2021-11-18 PROCEDURE — 84100 ASSAY OF PHOSPHORUS: CPT

## 2021-11-18 NOTE — TELEPHONE ENCOUNTER
ISSUE:  -missed CMV PCR Quant    Call placed to [facility] lab, will check with performing lab to see if it can be added on a call RNCC back.

## 2021-11-19 ENCOUNTER — TELEPHONE (OUTPATIENT)
Dept: TRANSPLANT | Facility: CLINIC | Age: 36
End: 2021-11-19
Payer: MEDICARE

## 2021-11-19 LAB
BKV DNA # SPEC NAA+PROBE: NOT DETECTED COPIES/ML
CMV DNA SPEC NAA+PROBE-ACNC: <137 IU/ML
CMV DNA SPEC NAA+PROBE-LOG#: <2.1 {LOG_COPIES}/ML
DONOR IDENTIFICATION: NORMAL
DSA COMMENTS: NORMAL
DSA PRESENT: NO
DSA TEST METHOD: NORMAL
MYCOPHENOLATE SERPL LC/MS/MS-MCNC: 5.62 MG/L (ref 1–3.5)
MYCOPHENOLATE SERPL LC/MS/MS-MCNC: 6.16 MG/L (ref 1–3.5)
MYCOPHENOLATE-G SERPL LC/MS/MS-MCNC: 54.6 MG/L (ref 30–95)
MYCOPHENOLATE-G SERPL LC/MS/MS-MCNC: 57.7 MG/L (ref 30–95)
ORGAN: NORMAL
SA 1 CELL: NORMAL
SA 1 TEST METHOD: NORMAL
SA 2 CELL: NORMAL
SA 2 TEST METHOD: NORMAL
SA1 HI RISK ABY: NORMAL
SA1 MOD RISK ABY: NORMAL
SA2 HI RISK ABY: NORMAL
SA2 MOD RISK ABY: NORMAL
TME LAST DOSE: ABNORMAL H
UNACCEPTABLE ANTIGENS: NORMAL
UNOS CPRA: 2
ZZZSA 1  COMMENTS: NORMAL
ZZZSA 2 COMMENTS: NORMAL

## 2021-11-19 NOTE — TELEPHONE ENCOUNTER
ISSUE:  tacro level drawn 11/18 = 28. Drawn at 11AM, appears to not be a true trough.    PLAN:  Await dose change until confirmed trough level.  VM left and MyC message sent.    OUTCOME:  Kailash sent MyC message - this was NOT a true trough. NO DOSE CHANGE.    See MyC message re: BPs.

## 2021-12-02 ENCOUNTER — LAB (OUTPATIENT)
Dept: LAB | Facility: CLINIC | Age: 36
End: 2021-12-02
Payer: MEDICARE

## 2021-12-02 DIAGNOSIS — Z48.298 AFTERCARE FOLLOWING ORGAN TRANSPLANT: ICD-10-CM

## 2021-12-02 DIAGNOSIS — Z79.899 ENCOUNTER FOR LONG-TERM CURRENT USE OF MEDICATION: ICD-10-CM

## 2021-12-02 DIAGNOSIS — Z20.828 CONTACT WITH AND (SUSPECTED) EXPOSURE TO OTHER VIRAL COMMUNICABLE DISEASES: ICD-10-CM

## 2021-12-02 DIAGNOSIS — B25.9 CMV (CYTOMEGALOVIRUS INFECTION) (H): ICD-10-CM

## 2021-12-02 DIAGNOSIS — Z94.0 KIDNEY REPLACED BY TRANSPLANT: ICD-10-CM

## 2021-12-02 LAB
ANION GAP SERPL CALCULATED.3IONS-SCNC: 5 MMOL/L (ref 3–14)
BUN SERPL-MCNC: 12 MG/DL (ref 7–30)
CALCIUM SERPL-MCNC: 9 MG/DL (ref 8.5–10.1)
CHLORIDE BLD-SCNC: 110 MMOL/L (ref 94–109)
CO2 SERPL-SCNC: 27 MMOL/L (ref 20–32)
CREAT SERPL-MCNC: 1.04 MG/DL (ref 0.66–1.25)
ERYTHROCYTE [DISTWIDTH] IN BLOOD BY AUTOMATED COUNT: 13.5 % (ref 10–15)
GFR SERPL CREATININE-BSD FRML MDRD: >90 ML/MIN/1.73M2
GLUCOSE BLD-MCNC: 98 MG/DL (ref 70–99)
HCT VFR BLD AUTO: 38.1 % (ref 40–53)
HGB BLD-MCNC: 12.7 G/DL (ref 13.3–17.7)
MCH RBC QN AUTO: 29.2 PG (ref 26.5–33)
MCHC RBC AUTO-ENTMCNC: 33.3 G/DL (ref 31.5–36.5)
MCV RBC AUTO: 88 FL (ref 78–100)
PLATELET # BLD AUTO: 230 10E3/UL (ref 150–450)
POTASSIUM BLD-SCNC: 3.7 MMOL/L (ref 3.4–5.3)
RBC # BLD AUTO: 4.35 10E6/UL (ref 4.4–5.9)
SODIUM SERPL-SCNC: 142 MMOL/L (ref 133–144)
WBC # BLD AUTO: 3.4 10E3/UL (ref 4–11)

## 2021-12-02 PROCEDURE — 85027 COMPLETE CBC AUTOMATED: CPT

## 2021-12-02 PROCEDURE — 80197 ASSAY OF TACROLIMUS: CPT

## 2021-12-02 PROCEDURE — 80048 BASIC METABOLIC PNL TOTAL CA: CPT

## 2021-12-02 PROCEDURE — 36415 COLL VENOUS BLD VENIPUNCTURE: CPT

## 2021-12-03 ENCOUNTER — TELEPHONE (OUTPATIENT)
Dept: TRANSPLANT | Facility: CLINIC | Age: 36
End: 2021-12-03
Payer: MEDICARE

## 2021-12-03 ENCOUNTER — MYC MEDICAL ADVICE (OUTPATIENT)
Dept: TRANSPLANT | Facility: CLINIC | Age: 36
End: 2021-12-03
Payer: MEDICARE

## 2021-12-03 LAB
CMV DNA SPEC NAA+PROBE-ACNC: <137 IU/ML
CMV DNA SPEC NAA+PROBE-LOG#: <2.1 {LOG_COPIES}/ML
TACROLIMUS BLD-MCNC: 14.1 UG/L (ref 5–15)
TME LAST DOSE: NORMAL H
TME LAST DOSE: NORMAL H

## 2021-12-03 NOTE — TELEPHONE ENCOUNTER
ISSUE:  Tacrolimus 14.1 on 12/2, goal 8-10    PLAN:  Call and confirm a 12 hour trough and current tacrolimus dose of 3 mg BID  Any recent illness, diarrhea, or medication changes?  If an accurate trough, decrease dose to 2 mg BID  Repeat labs next week ensuring a 12 hour trough     OUTCOME:  [employer] message sent.

## 2021-12-06 ENCOUNTER — TELEPHONE (OUTPATIENT)
Dept: TRANSPLANT | Facility: CLINIC | Age: 36
End: 2021-12-06
Payer: MEDICARE

## 2021-12-06 DIAGNOSIS — Z94.0 KIDNEY TRANSPLANT RECIPIENT: ICD-10-CM

## 2021-12-09 ENCOUNTER — LAB (OUTPATIENT)
Dept: LAB | Facility: CLINIC | Age: 36
End: 2021-12-09
Payer: MEDICARE

## 2021-12-09 DIAGNOSIS — Z94.0 KIDNEY REPLACED BY TRANSPLANT: ICD-10-CM

## 2021-12-09 DIAGNOSIS — Z79.899 ENCOUNTER FOR LONG-TERM CURRENT USE OF MEDICATION: ICD-10-CM

## 2021-12-09 DIAGNOSIS — Z20.828 CONTACT WITH AND (SUSPECTED) EXPOSURE TO OTHER VIRAL COMMUNICABLE DISEASES: ICD-10-CM

## 2021-12-09 DIAGNOSIS — Z48.298 AFTERCARE FOLLOWING ORGAN TRANSPLANT: ICD-10-CM

## 2021-12-09 LAB
ANION GAP SERPL CALCULATED.3IONS-SCNC: 5 MMOL/L (ref 3–14)
BUN SERPL-MCNC: 13 MG/DL (ref 7–30)
CALCIUM SERPL-MCNC: 8.7 MG/DL (ref 8.5–10.1)
CHLORIDE BLD-SCNC: 110 MMOL/L (ref 94–109)
CO2 SERPL-SCNC: 26 MMOL/L (ref 20–32)
CREAT SERPL-MCNC: 0.9 MG/DL (ref 0.66–1.25)
ERYTHROCYTE [DISTWIDTH] IN BLOOD BY AUTOMATED COUNT: 13.3 % (ref 10–15)
GFR SERPL CREATININE-BSD FRML MDRD: >90 ML/MIN/1.73M2
GLUCOSE BLD-MCNC: 102 MG/DL (ref 70–99)
HCT VFR BLD AUTO: 37.6 % (ref 40–53)
HGB BLD-MCNC: 12.4 G/DL (ref 13.3–17.7)
MCH RBC QN AUTO: 28.9 PG (ref 26.5–33)
MCHC RBC AUTO-ENTMCNC: 33 G/DL (ref 31.5–36.5)
MCV RBC AUTO: 88 FL (ref 78–100)
PLATELET # BLD AUTO: 244 10E3/UL (ref 150–450)
POTASSIUM BLD-SCNC: 3.2 MMOL/L (ref 3.4–5.3)
RBC # BLD AUTO: 4.29 10E6/UL (ref 4.4–5.9)
SODIUM SERPL-SCNC: 141 MMOL/L (ref 133–144)
TACROLIMUS BLD-MCNC: 8.2 UG/L (ref 5–15)
TME LAST DOSE: NORMAL H
TME LAST DOSE: NORMAL H
WBC # BLD AUTO: 2.2 10E3/UL (ref 4–11)

## 2021-12-09 PROCEDURE — 36415 COLL VENOUS BLD VENIPUNCTURE: CPT

## 2021-12-09 PROCEDURE — 80197 ASSAY OF TACROLIMUS: CPT

## 2021-12-09 PROCEDURE — 85027 COMPLETE CBC AUTOMATED: CPT

## 2021-12-09 PROCEDURE — 80048 BASIC METABOLIC PNL TOTAL CA: CPT

## 2021-12-09 RX ORDER — TACROLIMUS 1 MG/1
2 CAPSULE ORAL 2 TIMES DAILY
Qty: 120 CAPSULE | Refills: 11 | Status: SHIPPED | OUTPATIENT
Start: 2021-12-09 | End: 2022-01-07

## 2021-12-09 NOTE — TELEPHONE ENCOUNTER
Kailash confirmed current tacrolimus dose of 3 mg bid, confirmed trough. He will reduce tacrolimus to 2 mg bid and repeat labs this week.

## 2021-12-15 ENCOUNTER — TELEPHONE (OUTPATIENT)
Dept: TRANSPLANT | Facility: CLINIC | Age: 36
End: 2021-12-15
Payer: MEDICARE

## 2021-12-15 NOTE — TELEPHONE ENCOUNTER
Post Kidney and Pancreas Transplant Team Conference  Date: 12/15/2021  Transplant Coordinator: Yas Card     Attendees:  [x]  Dr. Abdi [x] Ritika Patel, RN [x] Sandy Shay LPN     [x]  Dr. Martin [x] Marisa Jeffers, SHAYY [] Sofia Del Valle LPN   [x]  Dr. Cote [x] Majo Kevin, SHAYY    [x]  Dr. Goodwin [] Nikki Palmer RN [x] Brady Peter, PharmD   [] Dr. Romo [] Theresa Rust, SHAYY    [x] Dr. Toscano [] Dago Damon RN    [] Dr. Chandler [] Maribell Orr RN [] Carline Myers RN   [] Dr. Loyola [] Roxie Logan RN    []  Dr. Wilder [] Jessica Pierre RN    [] Dr. Vale [x] Yas Card RN    [x] Lluvia Hernandez, LILLIANA [] Lesa Raza RN        Verbal Plan Read Back:   No changes at this time    Routed to RN Coordinator   Sandy Shay LPN

## 2021-12-16 ENCOUNTER — TELEPHONE (OUTPATIENT)
Dept: NEPHROLOGY | Facility: CLINIC | Age: 36
End: 2021-12-16

## 2021-12-16 ENCOUNTER — LAB (OUTPATIENT)
Dept: LAB | Facility: CLINIC | Age: 36
End: 2021-12-16
Payer: MEDICARE

## 2021-12-16 DIAGNOSIS — Z79.899 ENCOUNTER FOR LONG-TERM CURRENT USE OF MEDICATION: ICD-10-CM

## 2021-12-16 DIAGNOSIS — Z48.298 AFTERCARE FOLLOWING ORGAN TRANSPLANT: Primary | ICD-10-CM

## 2021-12-16 DIAGNOSIS — Z48.298 AFTERCARE FOLLOWING ORGAN TRANSPLANT: ICD-10-CM

## 2021-12-16 DIAGNOSIS — Z20.828 CONTACT WITH AND (SUSPECTED) EXPOSURE TO OTHER VIRAL COMMUNICABLE DISEASES: ICD-10-CM

## 2021-12-16 DIAGNOSIS — Z94.0 KIDNEY REPLACED BY TRANSPLANT: ICD-10-CM

## 2021-12-16 LAB
ANION GAP SERPL CALCULATED.3IONS-SCNC: 7 MMOL/L (ref 3–14)
BASOPHILS # BLD MANUAL: 0 10E3/UL (ref 0–0.2)
BASOPHILS NFR BLD MANUAL: 3 %
BUN SERPL-MCNC: 9 MG/DL (ref 7–30)
CALCIUM SERPL-MCNC: 8.9 MG/DL (ref 8.5–10.1)
CHLORIDE BLD-SCNC: 110 MMOL/L (ref 94–109)
CO2 SERPL-SCNC: 23 MMOL/L (ref 20–32)
CREAT SERPL-MCNC: 0.89 MG/DL (ref 0.66–1.25)
EOSINOPHIL # BLD MANUAL: 0 10E3/UL (ref 0–0.7)
EOSINOPHIL NFR BLD MANUAL: 3 %
ERYTHROCYTE [DISTWIDTH] IN BLOOD BY AUTOMATED COUNT: 13.4 % (ref 10–15)
GFR SERPL CREATININE-BSD FRML MDRD: >90 ML/MIN/1.73M2
GLUCOSE BLD-MCNC: 116 MG/DL (ref 70–99)
HCT VFR BLD AUTO: 39.5 % (ref 40–53)
HGB BLD-MCNC: 12.9 G/DL (ref 13.3–17.7)
LYMPHOCYTES # BLD MANUAL: 0.5 10E3/UL (ref 0.8–5.3)
LYMPHOCYTES NFR BLD MANUAL: 40 %
MAGNESIUM SERPL-MCNC: 1.5 MG/DL (ref 1.6–2.3)
MCH RBC QN AUTO: 28.7 PG (ref 26.5–33)
MCHC RBC AUTO-ENTMCNC: 32.7 G/DL (ref 31.5–36.5)
MCV RBC AUTO: 88 FL (ref 78–100)
METAMYELOCYTES # BLD MANUAL: 0 10E3/UL
METAMYELOCYTES NFR BLD MANUAL: 1 %
MONOCYTES # BLD MANUAL: 0.2 10E3/UL (ref 0–1.3)
MONOCYTES NFR BLD MANUAL: 20 %
MYELOCYTES # BLD MANUAL: 0 10E3/UL
MYELOCYTES NFR BLD MANUAL: 1 %
NEUTROPHILS # BLD MANUAL: 0.4 10E3/UL (ref 1.6–8.3)
NEUTROPHILS NFR BLD MANUAL: 32 %
NRBC # BLD AUTO: 0 10E3/UL
NRBC BLD AUTO-RTO: 0 /100
PHOSPHATE SERPL-MCNC: 2.3 MG/DL (ref 2.5–4.5)
PLAT MORPH BLD: ABNORMAL
PLATELET # BLD AUTO: 226 10E3/UL (ref 150–450)
POTASSIUM BLD-SCNC: 3.8 MMOL/L (ref 3.4–5.3)
RBC # BLD AUTO: 4.49 10E6/UL (ref 4.4–5.9)
RBC MORPH BLD: ABNORMAL
SODIUM SERPL-SCNC: 140 MMOL/L (ref 133–144)
TACROLIMUS BLD-MCNC: 10.7 UG/L (ref 5–15)
TME LAST DOSE: NORMAL H
TME LAST DOSE: NORMAL H
WBC # BLD AUTO: 1.2 10E3/UL (ref 4–11)
WBC # BLD AUTO: 1.3 10E3/UL (ref 4–11)

## 2021-12-16 PROCEDURE — 83735 ASSAY OF MAGNESIUM: CPT

## 2021-12-16 PROCEDURE — 84100 ASSAY OF PHOSPHORUS: CPT

## 2021-12-16 PROCEDURE — 36415 COLL VENOUS BLD VENIPUNCTURE: CPT

## 2021-12-16 PROCEDURE — 87799 DETECT AGENT NOS DNA QUANT: CPT

## 2021-12-16 PROCEDURE — 80197 ASSAY OF TACROLIMUS: CPT

## 2021-12-16 PROCEDURE — 85027 COMPLETE CBC AUTOMATED: CPT

## 2021-12-16 PROCEDURE — 80048 BASIC METABOLIC PNL TOTAL CA: CPT

## 2021-12-16 NOTE — TELEPHONE ENCOUNTER
[facility] Call Center    Phone Message    May a detailed message be left on voicemail: no     Reason for Call: Other: Natasha from [facility] calling Dr Martin with critical values for White Count at 1.2.    No need to call back     Action Taken: Message routed to:  [facility] Nephro    Travel Screening: Not Applicable

## 2021-12-16 NOTE — TELEPHONE ENCOUNTER
Spoke with [facility] labs, missed CMV level. They are able to add it on to blood drawn today.     Will also add differential for CBC.

## 2021-12-17 LAB — CMV DNA SPEC NAA+PROBE-ACNC: NOT DETECTED IU/ML

## 2021-12-20 ENCOUNTER — TELEPHONE (OUTPATIENT)
Dept: TRANSPLANT | Facility: CLINIC | Age: 36
End: 2021-12-20
Payer: MEDICARE

## 2021-12-20 DIAGNOSIS — B25.9 CMV (CYTOMEGALOVIRUS INFECTION) (H): ICD-10-CM

## 2021-12-20 DIAGNOSIS — D70.9 NEUTROPENIA (H): ICD-10-CM

## 2021-12-20 DIAGNOSIS — Z94.0 KIDNEY REPLACED BY TRANSPLANT: ICD-10-CM

## 2021-12-20 DIAGNOSIS — Z94.0 KIDNEY TRANSPLANT RECIPIENT: ICD-10-CM

## 2021-12-20 LAB — BKV DNA # SPEC NAA+PROBE: NOT DETECTED COPIES/ML

## 2021-12-20 RX ORDER — MYCOPHENOLATE MOFETIL 250 MG/1
750 CAPSULE ORAL 2 TIMES DAILY
Qty: 720 CAPSULE | Refills: 3
Start: 2021-12-20 | End: 2021-12-30

## 2021-12-20 RX ORDER — VALGANCICLOVIR 450 MG/1
900 TABLET, FILM COATED ORAL DAILY
Qty: 360 TABLET | Refills: 1
Start: 2021-12-20 | End: 2022-02-01

## 2021-12-20 RX ORDER — ALBUTEROL SULFATE 90 UG/1
1-2 AEROSOL, METERED RESPIRATORY (INHALATION)
Status: CANCELLED
Start: 2021-12-20

## 2021-12-20 RX ORDER — DIPHENHYDRAMINE HYDROCHLORIDE 50 MG/ML
50 INJECTION INTRAMUSCULAR; INTRAVENOUS
Status: CANCELLED
Start: 2021-12-20

## 2021-12-20 RX ORDER — MEPERIDINE HYDROCHLORIDE 25 MG/ML
25 INJECTION INTRAMUSCULAR; INTRAVENOUS; SUBCUTANEOUS EVERY 30 MIN PRN
Status: CANCELLED | OUTPATIENT
Start: 2021-12-20

## 2021-12-20 RX ORDER — EPINEPHRINE 1 MG/ML
0.3 INJECTION, SOLUTION, CONCENTRATE INTRAVENOUS EVERY 5 MIN PRN
Status: CANCELLED | OUTPATIENT
Start: 2021-12-20

## 2021-12-20 RX ORDER — NALOXONE HYDROCHLORIDE 0.4 MG/ML
0.2 INJECTION, SOLUTION INTRAMUSCULAR; INTRAVENOUS; SUBCUTANEOUS
Status: CANCELLED | OUTPATIENT
Start: 2021-12-20

## 2021-12-20 RX ORDER — ALBUTEROL SULFATE 0.83 MG/ML
2.5 SOLUTION RESPIRATORY (INHALATION)
Status: CANCELLED | OUTPATIENT
Start: 2021-12-20

## 2021-12-20 RX ORDER — METHYLPREDNISOLONE SODIUM SUCCINATE 125 MG/2ML
125 INJECTION, POWDER, LYOPHILIZED, FOR SOLUTION INTRAMUSCULAR; INTRAVENOUS
Status: CANCELLED
Start: 2021-12-20

## 2021-12-20 NOTE — TELEPHONE ENCOUNTER
ISSUE:  Low WBC and ANC    Cristino Martin MD Poucher, Jessica, RN  Also Neupogen   Since we are deploying neupogen, ok to lower MMF to 750 mg po bid now       CMV undetected x3, currently on 900 mg bid. Reviewed with elyse Dukes to reduce Valcyte to 900 mg daily. Continue weekly CMV checks.     Left VM for patient requesting return phone call.     ADDENDUM:Kailash confirmed plan, will repeat labs this week. Neupogen therapy plan updated.

## 2021-12-21 ENCOUNTER — TELEPHONE (OUTPATIENT)
Dept: TRANSPLANT | Facility: CLINIC | Age: 36
End: 2021-12-21
Payer: MEDICARE

## 2021-12-21 NOTE — TELEPHONE ENCOUNTER
Received notification of +covid test yesterday. Current symptoms include congestion, loss of smell and taste, and cough and occasional diarrhea. Cough started about 1 week ago and he lost his smell and taste 2 days ago.     Current immunosuppression:   - mg bid (just reduced yesterday)   -Tac goal 8-10    Recommendations (reviewed with Dr. Goodwin):  -monoclonal antibodies if within window   -no further changes to immunosuppression at this time   -ok to receive neupogen with +covid. Request sent to schedulers.

## 2021-12-28 ENCOUNTER — TELEPHONE (OUTPATIENT)
Dept: INTERNAL MEDICINE | Facility: CLINIC | Age: 36
End: 2021-12-28
Payer: MEDICARE

## 2021-12-28 NOTE — TELEPHONE ENCOUNTER
Patient needs a form for his absence from work due to COVID 19. Patient did a home test from [facility] that was positive and he did have symptoms of COVID 19.  He states that both his son and wife had a positive PCR.    Dr. Dumont please advise if you are willing to sign form or if patient needs some type of visit (E visit or virtual).  Form is due 1/7/22.    Can we leave a detailed message on this number? YES  Phone number patient can be reached at: Cell number on file:    Telephone Information:   Mobile [phone]       Nuris Horton RN  [facility] Triage

## 2021-12-28 NOTE — TELEPHONE ENCOUNTER
Appears patient has not seen me in over 1 year+.  Will need a virtual visit for discussion or else perhaps his transplant team who he has seen frequently can write note.

## 2021-12-29 ENCOUNTER — TELEPHONE (OUTPATIENT)
Dept: TRANSPLANT | Facility: CLINIC | Age: 36
End: 2021-12-29
Payer: MEDICARE

## 2021-12-29 NOTE — TELEPHONE ENCOUNTER
Post Kidney and Pancreas Transplant Team Conference  Date: 12/29/2021  Transplant Coordinator: Yas Card     Attendees:  [x]  Dr. Abdi [x] Ritika Patel, RN [x] Sandy Shay LPN     []  Dr. Martin [] Marisa Jeffers RN [] Sofia Del Valle LPN   [x]  Dr. Cote [x] Majo Kevin, SHAYY    [x]  Dr. Goodwin [] Nikki Palmer RN [] Brady Peter, PharmD   [x] Dr. Romo [] Theresa Rust, SHAYY    [] Dr. Toscano [] Dago Damon RN    [] Dr. Chandler [] Maribell Orr RN [] Carline Myers RN   [] Dr. Loyola [] Roxie Logan RN    []  Dr. Wilder [] Jessica Pierre RN    [] Dr. Vale [x] Yas Card RN    [] Lluvia Hernandez NP [] Lesa Raza RN        Verbal Plan Read Back:   Continue current treatment plan      Routed to RN Coordinator   Sandy Shay LPN

## 2021-12-29 NOTE — TELEPHONE ENCOUNTER
An evisit will not be enough to address issue as needs virtual phone or video with another provider as I am not available and am out of town and not available to do an Evisit until my return next week.

## 2021-12-29 NOTE — TELEPHONE ENCOUNTER
JOAO    Called pt and notified him of the message below. He is going to submit an evisit to discuss.     Pina Hightower RN

## 2021-12-29 NOTE — TELEPHONE ENCOUNTER
Called pt and assisted with scheduling visit. Pt will upload form in [facility], otherwise fax it in or have someone drop it off.    Pina Hightower RN

## 2021-12-29 NOTE — TELEPHONE ENCOUNTER
Okay to schedule telephone call in a same day slot since you have no availability and pt needs form completed by 1/7/22?     Pina Hightower RN

## 2021-12-30 ENCOUNTER — TELEPHONE (OUTPATIENT)
Dept: TRANSPLANT | Facility: CLINIC | Age: 36
End: 2021-12-30
Payer: MEDICARE

## 2021-12-30 ENCOUNTER — TELEPHONE (OUTPATIENT)
Dept: TRANSPLANT | Facility: CLINIC | Age: 36
End: 2021-12-30

## 2021-12-30 DIAGNOSIS — U07.1 CLINICAL DIAGNOSIS OF COVID-19: Primary | ICD-10-CM

## 2021-12-30 DIAGNOSIS — Z94.0 KIDNEY TRANSPLANT RECIPIENT: ICD-10-CM

## 2021-12-30 RX ORDER — MYCOPHENOLATE MOFETIL 250 MG/1
500 CAPSULE ORAL 2 TIMES DAILY
Qty: 360 CAPSULE | Refills: 3
Start: 2021-12-30 | End: 2022-01-04

## 2021-12-30 RX ORDER — BENZONATATE 100 MG/1
100 CAPSULE ORAL 3 TIMES DAILY PRN
Qty: 21 CAPSULE | Refills: 0
Start: 2021-12-30 | End: 2022-01-03

## 2021-12-30 NOTE — TELEPHONE ENCOUNTER
Patient Call: General  Reason for call:     Sonja called to speak coordinator about everyone in their home having ongoing COVID concerns for 3+ weeks.     Patient called facility for monoclonal antibody treatment but hasn't gotten a call back.     Patient has been having coughing attacks all day/night. States temp has been 101.7 off and on.     Call back needed? Yes    Return Call Needed  Same as documented in contacts section  When to return call?: Same day: Route High Priority

## 2021-12-30 NOTE — TELEPHONE ENCOUNTER
"Kailash tested positive for Covid Monday, had symptoms since the Monday prior and thought it was a cold. Wife states he is getting progressively worse, new onset fevers and severe cough. Will reach out to MD for immunosuppression reduction guidance. Pt's wife also wanted RNCC to know Kailash hasn't gotten labs in a few weeks due to \"no appointment times available at his clinic\". Number given for [facility] lab to make appointment.   "

## 2021-12-31 ENCOUNTER — TELEPHONE (OUTPATIENT)
Dept: TRANSPLANT | Facility: CLINIC | Age: 36
End: 2021-12-31
Payer: MEDICARE

## 2021-12-31 ENCOUNTER — TELEPHONE (OUTPATIENT)
Dept: TRANSPLANT | Facility: CLINIC | Age: 36
End: 2021-12-31

## 2021-12-31 DIAGNOSIS — U07.1 CLINICAL DIAGNOSIS OF COVID-19: Primary | ICD-10-CM

## 2021-12-31 RX ORDER — BENZONATATE 100 MG/1
100 CAPSULE ORAL 3 TIMES DAILY PRN
Qty: 20 CAPSULE | Refills: 0 | Status: SHIPPED | OUTPATIENT
Start: 2021-12-31 | End: 2022-01-14

## 2021-12-31 RX ORDER — BENZONATATE 100 MG/1
100 CAPSULE ORAL 3 TIMES DAILY PRN
Qty: 20 CAPSULE | Refills: 0
Start: 2021-12-31 | End: 2021-12-31

## 2021-12-31 NOTE — TELEPHONE ENCOUNTER
Call returned to patient. Patient advised that SOT doesn't recommend any cough medication for patient. Patient v\u that if med is needed to use Coricidin HBP.

## 2021-12-31 NOTE — TELEPHONE ENCOUNTER
Chart reviewed. Documentation for Tessalon 100 mg capsule TID PRN ordered by Dr. Goodwin yesterday 12/30/21; resent prescription to [facility].      Maribell Orr, RN, BSN  Solid Organ Transplant, Post Kidney and Pancreas  Transplant Care Coordinator  [phone]

## 2021-12-31 NOTE — TELEPHONE ENCOUNTER
Patient Call: looking for a refill on Tessalon for his coughing sent to [facility] [address] [place], [place] [address]      Call back needed? Yes  Wife Sonja [phone]    Return Call Needed  Same as documented in contacts section  When to return call?: Same day: Route High Priority

## 2021-12-31 NOTE — TELEPHONE ENCOUNTER
Patient Call: Meds    Reason for call: Writer advised prescription was sent to pharmacy. Sonja states she just spoke with them before calling us and they advised they did not receive anything    Call back needed? Yes    Return Call Needed  Same as documented in contacts section  When to return call?: Same day: Route High Priority

## 2021-12-31 NOTE — TELEPHONE ENCOUNTER
Patient Call: Rashaad for Tessalon (for coughing)    [facility] [address]        Call back needed? Yes    Return Call Needed  Same as documented in contacts section  When to return call?: Same day: Route High Priority

## 2021-12-31 NOTE — TELEPHONE ENCOUNTER
Sonja called back to state the pharmacy advised they have not received anything from us for medication.     benzonatate (TESSALON) 100 MG capsule

## 2022-01-03 ENCOUNTER — MYC MEDICAL ADVICE (OUTPATIENT)
Dept: INTERNAL MEDICINE | Facility: CLINIC | Age: 37
End: 2022-01-03

## 2022-01-03 ENCOUNTER — VIRTUAL VISIT (OUTPATIENT)
Dept: INTERNAL MEDICINE | Facility: CLINIC | Age: 37
End: 2022-01-03
Payer: MEDICARE

## 2022-01-03 DIAGNOSIS — U07.1 INFECTION DUE TO 2019 NOVEL CORONAVIRUS: Primary | ICD-10-CM

## 2022-01-03 PROCEDURE — 99442 PR PHYSICIAN TELEPHONE EVALUATION 11-20 MIN: CPT | Mod: 95 | Performed by: INTERNAL MEDICINE

## 2022-01-03 NOTE — PROGRESS NOTES
"Kailash is a 36 year old who is being evaluated via a billable telephone visit.      What phone number would you like to be contacted at? [phone]  How would you like to obtain your AVS? [facility]    Assessment & Plan     (U07.1) Infection due to 2019 novel coronavirus  (primary encounter diagnosis)  Comment: Discussed with patient supportive care.  He appears to be improving although he states that he feels warm but has not formally checked his temperature.  He was advised that if he has a fever that he should remain in in isolation until resolved.  Since he is working remotely it appears that he should be able to return to work as of the 10th of this month.  He will make available to me the forms needed to be sent to his employer.  Plan:     BMI:   Estimated body mass index is 27 kg/m  as calculated from the following:    Height as of 10/14/21: 1.702 m (5' 7.01\").    Weight as of 10/14/21: 78.2 kg (172 lb 6.4 oz).   Weight management plan: Discussed healthy diet and exercise guidelines    See Patient Instructions    Return in about 2 weeks (around 1/17/2022) for if symptoms recur or worsen.    Homero Dumont MD  [facility]   Kailash is a 36 year old who presents for the following health issues     HPI     Patient has not seen me in clinic since August 2020 and that was for preop assessment.  He has not been seen in clinic since and prior to that not since January 2019.  Is been followed extensively through the transplant clinic in nephrology clinic.    Concern for COVID-19:    Covid test OTC 12/20/21 was +.      Patient states that he did a rapid over-the-counter test and was positive.  He states he worked at home remotely through the  and was planning on going back to work on 10 January again working remotely.  His symptoms have improved although he does have a mild cough.  He states that he thinks that he may still have a fever although he has not checked it and " reports that he primarily feels warm at night.    It sounds as if overall he is improving.    About how many days ago did these symptoms start? 12/20/21 (2 weeks)   Is this your first visit for this illness? Yes  In the 14 days before your symptoms started, have you had close contact with someone with COVID-19 (Coronavirus)? Yes, I have been in contact with someone who has COVID-19/Coronavirus (confirmed by lab test).  Do you have a fever or chills? Yes, the highest temperature was 101  Are you having new or worsening difficulty breathing? Yes   Please describe what kind of difficulty you are having breathing:   No dyspnea, or dyspnea at patients normal baseline  Do you have new or worsening cough? Yes, I am coughing up mucus.  Have you had any new or unexplained body aches? No    Have you experienced any of the following NEW symptoms?    Headache: No    Sore throat: YES    Loss of taste or smell: YES- starting to return     Chest pain: No    Diarrhea: No    Rash: No  What treatments have you tried? OTC cough syrup, tessalon   Who do you live with? Wife and son   Are you, or a household member, a healthcare worker or a ? No  Do you live in a nursing home, group home, or shelter? No  Do you have a way to get food/medications if quarantined? Yes, I have a friend or family member who can help me.    Patient needing forms filled out for time missed from work due to covid. Patient has been out of work since 12/25/21 and has an anticipated return to work date of 1/10/22. Patient will attempt to upload forms to [employer].     Review of Systems:    EYES: NEGATIVE for vision changes or irritation  ENT/MOUTH: NEGATIVE for ear, mouth  RESP: NEGATIVE for significant SOB  CV: NEGATIVE for chest pain, palpitations or peripheral edema  GI: NEGATIVE for nausea, abdominal pain, heartburn  : NEGATIVE for frequency, dysuria, or hematuria  MUSCULOSKELETAL: NEGATIVE for significant arthralgias or myalgia  NEURO: NEGATIVE  for weakness, dizziness or paresthesias  HEME: NEGATIVE for bleeding problems  PSYCHIATRIC: NEGATIVE for changes in mood or affect      Objective       Vitals:  No vitals were obtained today due to virtual visit.    Physical Exam   Healthy, alert and no distress  PSYCH: Alert and oriented times 3; coherent speech, normal   rate and volume, able to articulate logical thoughts, able   to abstract reason, no tangential thoughts, no hallucinations   or delusions  His affect is normal  RESP: Mild cough, no audible wheezing, able to talk in full sentences  Remainder of exam unable to be completed due to telephone visits        Phone call duration: 12 minutes

## 2022-01-03 NOTE — TELEPHONE ENCOUNTER
Please see attached mychart and address as appropriate.   Route back if Triage support is needed.    Sonja Medrano RN

## 2022-01-04 DIAGNOSIS — Z94.0 KIDNEY TRANSPLANT RECIPIENT: Primary | ICD-10-CM

## 2022-01-04 RX ORDER — MYCOPHENOLATE MOFETIL 250 MG/1
500 CAPSULE ORAL 2 TIMES DAILY
Qty: 360 CAPSULE | Refills: 3 | Status: SHIPPED | OUTPATIENT
Start: 2022-01-04 | End: 2022-03-16

## 2022-01-06 ENCOUNTER — LAB (OUTPATIENT)
Dept: LAB | Facility: CLINIC | Age: 37
End: 2022-01-06
Payer: MEDICARE

## 2022-01-06 DIAGNOSIS — B25.9 CMV (CYTOMEGALOVIRUS INFECTION) (H): ICD-10-CM

## 2022-01-06 DIAGNOSIS — Z20.828 CONTACT WITH AND (SUSPECTED) EXPOSURE TO OTHER VIRAL COMMUNICABLE DISEASES: ICD-10-CM

## 2022-01-06 DIAGNOSIS — Z79.899 ENCOUNTER FOR LONG-TERM CURRENT USE OF MEDICATION: ICD-10-CM

## 2022-01-06 DIAGNOSIS — Z94.0 KIDNEY REPLACED BY TRANSPLANT: ICD-10-CM

## 2022-01-06 DIAGNOSIS — Z48.298 AFTERCARE FOLLOWING ORGAN TRANSPLANT: ICD-10-CM

## 2022-01-06 DIAGNOSIS — Z48.298 AFTERCARE FOLLOWING ORGAN TRANSPLANT: Primary | ICD-10-CM

## 2022-01-06 LAB
ANION GAP SERPL CALCULATED.3IONS-SCNC: 3 MMOL/L (ref 3–14)
BUN SERPL-MCNC: 11 MG/DL (ref 7–30)
CALCIUM SERPL-MCNC: 8.6 MG/DL (ref 8.5–10.1)
CHLORIDE BLD-SCNC: 111 MMOL/L (ref 94–109)
CMV DNA SPEC NAA+PROBE-ACNC: NOT DETECTED IU/ML
CO2 SERPL-SCNC: 26 MMOL/L (ref 20–32)
CREAT SERPL-MCNC: 0.93 MG/DL (ref 0.66–1.25)
ERYTHROCYTE [DISTWIDTH] IN BLOOD BY AUTOMATED COUNT: 12.3 % (ref 10–15)
GFR SERPL CREATININE-BSD FRML MDRD: >90 ML/MIN/1.73M2
GLUCOSE BLD-MCNC: 107 MG/DL (ref 70–99)
HCT VFR BLD AUTO: 38.4 % (ref 40–53)
HGB BLD-MCNC: 12.6 G/DL (ref 13.3–17.7)
MCH RBC QN AUTO: 28.7 PG (ref 26.5–33)
MCHC RBC AUTO-ENTMCNC: 32.8 G/DL (ref 31.5–36.5)
MCV RBC AUTO: 88 FL (ref 78–100)
PLATELET # BLD AUTO: 505 10E3/UL (ref 150–450)
POTASSIUM BLD-SCNC: 3.5 MMOL/L (ref 3.4–5.3)
RBC # BLD AUTO: 4.39 10E6/UL (ref 4.4–5.9)
SODIUM SERPL-SCNC: 140 MMOL/L (ref 133–144)
TACROLIMUS BLD-MCNC: 15.9 UG/L (ref 5–15)
TME LAST DOSE: ABNORMAL H
TME LAST DOSE: ABNORMAL H
WBC # BLD AUTO: 2.7 10E3/UL (ref 4–11)

## 2022-01-06 PROCEDURE — 80048 BASIC METABOLIC PNL TOTAL CA: CPT

## 2022-01-06 PROCEDURE — 85027 COMPLETE CBC AUTOMATED: CPT

## 2022-01-06 PROCEDURE — 80197 ASSAY OF TACROLIMUS: CPT

## 2022-01-06 PROCEDURE — 36415 COLL VENOUS BLD VENIPUNCTURE: CPT

## 2022-01-07 ENCOUNTER — TELEPHONE (OUTPATIENT)
Dept: TRANSPLANT | Facility: CLINIC | Age: 37
End: 2022-01-07
Payer: MEDICARE

## 2022-01-07 DIAGNOSIS — Z48.298 AFTERCARE FOLLOWING ORGAN TRANSPLANT: Primary | ICD-10-CM

## 2022-01-07 DIAGNOSIS — Z94.0 KIDNEY TRANSPLANT RECIPIENT: ICD-10-CM

## 2022-01-07 RX ORDER — TACROLIMUS 1 MG/1
1 CAPSULE ORAL 2 TIMES DAILY
Qty: 60 CAPSULE | Refills: 11 | Status: SHIPPED | OUTPATIENT
Start: 2022-01-07 | End: 2022-01-28

## 2022-01-07 RX ORDER — TACROLIMUS 0.5 MG/1
0.5 CAPSULE ORAL 2 TIMES DAILY
Qty: 60 CAPSULE | Refills: 11 | Status: SHIPPED | OUTPATIENT
Start: 2022-01-07 | End: 2022-01-28

## 2022-01-07 NOTE — TELEPHONE ENCOUNTER
ISSUE:   Tacrolimus IR level 15.9 on 1/6, goal 8-10, dose 2 mg BID.    PLAN:   Please call patient and confirm this was an accurate 12-hour trough. Verify Tacrolimus IR dose 2 mg BID. Confirm no new medications or illness. Confirm no missed doses. If accurate trough and accurate dose, decrease Tacrolimus IR dose to 1.5 mg BID and repeat labs in 1 weeks    OUTCOME:   Spoke with patient, they confirm accurate trough level and current dose 2 mg BID. Patient confirmed dose change to 1.5 mg BID and to repeat labs in 1 weeks. Orders sent to preferred pharmacy for dose change and lab for repeat labs. Patient voiced understanding of plan.     Due for MPA check. Will order for Monday or Tuesday of next week.

## 2022-01-09 ENCOUNTER — HEALTH MAINTENANCE LETTER (OUTPATIENT)
Age: 37
End: 2022-01-09

## 2022-01-13 ENCOUNTER — LAB (OUTPATIENT)
Dept: LAB | Facility: CLINIC | Age: 37
End: 2022-01-13
Payer: MEDICARE

## 2022-01-13 DIAGNOSIS — B25.9 CMV (CYTOMEGALOVIRUS INFECTION) (H): ICD-10-CM

## 2022-01-13 DIAGNOSIS — Z94.0 KIDNEY REPLACED BY TRANSPLANT: ICD-10-CM

## 2022-01-13 DIAGNOSIS — Z48.298 AFTERCARE FOLLOWING ORGAN TRANSPLANT: ICD-10-CM

## 2022-01-13 DIAGNOSIS — Z20.828 CONTACT WITH AND (SUSPECTED) EXPOSURE TO OTHER VIRAL COMMUNICABLE DISEASES: ICD-10-CM

## 2022-01-13 DIAGNOSIS — Z79.899 ENCOUNTER FOR LONG-TERM CURRENT USE OF MEDICATION: ICD-10-CM

## 2022-01-13 LAB
ANION GAP SERPL CALCULATED.3IONS-SCNC: 8 MMOL/L (ref 3–14)
BUN SERPL-MCNC: 7 MG/DL (ref 7–30)
CALCIUM SERPL-MCNC: 8.9 MG/DL (ref 8.5–10.1)
CHLORIDE BLD-SCNC: 112 MMOL/L (ref 94–109)
CO2 SERPL-SCNC: 23 MMOL/L (ref 20–32)
CREAT SERPL-MCNC: 0.9 MG/DL (ref 0.66–1.25)
ERYTHROCYTE [DISTWIDTH] IN BLOOD BY AUTOMATED COUNT: 12.9 % (ref 10–15)
GFR SERPL CREATININE-BSD FRML MDRD: >90 ML/MIN/1.73M2
GLUCOSE BLD-MCNC: 102 MG/DL (ref 70–99)
HCT VFR BLD AUTO: 39.1 % (ref 40–53)
HGB BLD-MCNC: 12.6 G/DL (ref 13.3–17.7)
MAGNESIUM SERPL-MCNC: 1.9 MG/DL (ref 1.6–2.3)
MCH RBC QN AUTO: 28.6 PG (ref 26.5–33)
MCHC RBC AUTO-ENTMCNC: 32.2 G/DL (ref 31.5–36.5)
MCV RBC AUTO: 89 FL (ref 78–100)
MYCOPHENOLATE SERPL LC/MS/MS-MCNC: 1.1 MG/L (ref 1–3.5)
MYCOPHENOLATE-G SERPL LC/MS/MS-MCNC: 18.6 MG/L (ref 30–95)
PHOSPHATE SERPL-MCNC: 3.3 MG/DL (ref 2.5–4.5)
PLATELET # BLD AUTO: 367 10E3/UL (ref 150–450)
POTASSIUM BLD-SCNC: 4.1 MMOL/L (ref 3.4–5.3)
RBC # BLD AUTO: 4.4 10E6/UL (ref 4.4–5.9)
SODIUM SERPL-SCNC: 143 MMOL/L (ref 133–144)
TACROLIMUS BLD-MCNC: 7.5 UG/L (ref 5–15)
TME LAST DOSE: ABNORMAL H
TME LAST DOSE: ABNORMAL H
TME LAST DOSE: NORMAL H
TME LAST DOSE: NORMAL H
WBC # BLD AUTO: 2.5 10E3/UL (ref 4–11)

## 2022-01-13 PROCEDURE — 80180 DRUG SCRN QUAN MYCOPHENOLATE: CPT

## 2022-01-13 PROCEDURE — 87799 DETECT AGENT NOS DNA QUANT: CPT

## 2022-01-13 PROCEDURE — 80048 BASIC METABOLIC PNL TOTAL CA: CPT

## 2022-01-13 PROCEDURE — 84100 ASSAY OF PHOSPHORUS: CPT

## 2022-01-13 PROCEDURE — 85027 COMPLETE CBC AUTOMATED: CPT

## 2022-01-13 PROCEDURE — 36415 COLL VENOUS BLD VENIPUNCTURE: CPT

## 2022-01-13 PROCEDURE — 80197 ASSAY OF TACROLIMUS: CPT

## 2022-01-13 PROCEDURE — 83735 ASSAY OF MAGNESIUM: CPT

## 2022-01-14 ENCOUNTER — TELEPHONE (OUTPATIENT)
Dept: TRANSPLANT | Facility: CLINIC | Age: 37
End: 2022-01-14
Payer: MEDICARE

## 2022-01-14 ENCOUNTER — VIRTUAL VISIT (OUTPATIENT)
Dept: PHARMACY | Facility: CLINIC | Age: 37
End: 2022-01-14
Payer: COMMERCIAL

## 2022-01-14 ENCOUNTER — VIRTUAL VISIT (OUTPATIENT)
Dept: NEPHROLOGY | Facility: CLINIC | Age: 37
End: 2022-01-14
Attending: TRANSPLANT SURGERY
Payer: MEDICARE

## 2022-01-14 ENCOUNTER — VIRTUAL VISIT (OUTPATIENT)
Dept: TRANSPLANT | Facility: CLINIC | Age: 37
End: 2022-01-14
Attending: TRANSPLANT SURGERY
Payer: MEDICARE

## 2022-01-14 DIAGNOSIS — B25.9 CYTOMEGALOVIRUS (CMV) VIREMIA (H): ICD-10-CM

## 2022-01-14 DIAGNOSIS — Z94.0 KIDNEY TRANSPLANT RECIPIENT: Primary | ICD-10-CM

## 2022-01-14 DIAGNOSIS — D84.9 IMMUNOSUPPRESSION (H): ICD-10-CM

## 2022-01-14 DIAGNOSIS — I15.0 RENOVASCULAR HYPERTENSION: ICD-10-CM

## 2022-01-14 DIAGNOSIS — Z48.298 AFTERCARE FOLLOWING ORGAN TRANSPLANT: Primary | ICD-10-CM

## 2022-01-14 DIAGNOSIS — Z94.0 HTN, KIDNEY TRANSPLANT RELATED: ICD-10-CM

## 2022-01-14 DIAGNOSIS — D70.2 OTHER DRUG-INDUCED NEUTROPENIA (H): ICD-10-CM

## 2022-01-14 DIAGNOSIS — Z94.0 KIDNEY REPLACED BY TRANSPLANT: Primary | ICD-10-CM

## 2022-01-14 DIAGNOSIS — E78.5 HYPERLIPIDEMIA LDL GOAL <100: ICD-10-CM

## 2022-01-14 DIAGNOSIS — I15.1 HTN, KIDNEY TRANSPLANT RELATED: ICD-10-CM

## 2022-01-14 LAB
BKV DNA # SPEC NAA+PROBE: NOT DETECTED COPIES/ML
CMV DNA SPEC NAA+PROBE-ACNC: <137 IU/ML
CMV DNA SPEC NAA+PROBE-LOG#: <2.1 {LOG_COPIES}/ML

## 2022-01-14 PROCEDURE — 99607 MTMS BY PHARM ADDL 15 MIN: CPT | Performed by: PHARMACIST

## 2022-01-14 PROCEDURE — 99215 OFFICE O/P EST HI 40 MIN: CPT | Mod: 95

## 2022-01-14 PROCEDURE — 99605 MTMS BY PHARM NP 15 MIN: CPT | Performed by: PHARMACIST

## 2022-01-14 RX ORDER — CHOLECALCIFEROL (VITAMIN D3) 50 MCG
1 TABLET ORAL DAILY
Qty: 90 TABLET | Refills: 1 | Status: SHIPPED | OUTPATIENT
Start: 2022-01-14 | End: 2023-03-23

## 2022-01-14 RX ORDER — MAGNESIUM OXIDE 400 MG/1
400 TABLET ORAL DAILY
Qty: 60 TABLET | Refills: 1 | COMMUNITY
Start: 2022-01-14 | End: 2022-02-07

## 2022-01-14 ASSESSMENT — PAIN SCALES - GENERAL: PAINLEVEL: NO PAIN (0)

## 2022-01-14 NOTE — PATIENT INSTRUCTIONS
Change valcyte to 900mg daily  Decrease magnesium to 400mg daily  Stop ergocalfierol  Start cholecalciferol 2000 units daily  COVID vaccine on or after 1/20/22

## 2022-01-14 NOTE — PATIENT INSTRUCTIONS
Recommendations from today's MTM visit:                                                       1. You are due COVID vaccination (3 doses about a month apart each) once you are no longer quarantine, can get a flu shot as well.   2. At 6 months post transplant due for Prevnar 13, Tetanus, Shingrix, Shingrix, 8 weeks after Pneumovax 23, 2nd Shingrix. These have to be  2 weeks from your COVID vaccine as well.  3. If BPs are running over 130/80, let the team know.     Follow-up: as needed    It was great to speak with you today.  I value your experience and would be very thankful for your time with providing feedback on our clinic survey. You may receive a survey via email or text message in the next few days.     To schedule another MTM appointment, please call the clinic directly or you may call the MTM scheduling line at [phone] or toll-free at [phone].     My Clinical Pharmacist's contact information:                                                      Please feel free to contact me with any questions or concerns you have.      Brady Peter, PharmD  MTM Pharmacist    Phone: [phone]

## 2022-01-14 NOTE — PROGRESS NOTES
Post Transplant Patient Social Work Assessment -Outpatient    Patient Name: Kailash Sorto  : 1985  Age: 36 year old  MRN: [id number]  Date of transplant: DD Kidney 2021    Patient known to this writer from follow up in the transplant program. Writer called Kailash today to update assessment.      Presenting Information   Living Situation: in [place], [place] with his wife and son  Functional Status: Independent  Cultural/Language/Spiritual Considerations: None indicated a this time.    Support System  Primary Support Person Wife Sonja  Other support:  In-laws    Health Care Directive  Decision Maker: Self  Alternate Decision Maker: Sonja  Health Care Directive: Declined completing    Mental Health/Coping:   History of Mental Health: No known history  History of Chemical Health: No known history  Current status: Appropriate  Coping: Kailash indicated when under stress he will get fresh air or do something fun.  Services Needed/Recommended: None indicated at this time.    Financial   Income: Employed full time.  Impact of transplant on income: N/A  Insurance and medication coverage: Medicare and [employer]  Financial concerns: None indicated at this time.  Resources needed: None indicated at this time.      Education provided by SW: Social Work role outpatient setting and provided this writer's contact information.l    Assessment and recommendations and plan:  Discussed Medicare coverage and Kailash indicated he continues to pay his Part B premiums.  Kailash indicated his transplanted kidney is functioning well.

## 2022-01-14 NOTE — PROGRESS NOTES
Kailash is a 36 year old who is being evaluated via a billable video visit.      How would you like to obtain your AVS? MyChart  If the video visit is dropped, the invitation should be resent by: Text to cell phone: [phone]  Will anyone else be joining your video visit? No    Video Start Time: 10:47 AM  Video-Visit Details    Type of service:  Video Visit    Video End Time:11:11 AM    Originating Location (pt. Location): Home    Distant Location (provider location):  [facility] NEPHROLOGY [facility]     Platform used for Video Visit: [employer]

## 2022-01-14 NOTE — TELEPHONE ENCOUNTER
ISSUE:  MPA 1.1    Currently on  mg bid, low WBCs.     Reviewed with Dr. Goodwin, no changes for now. Repeat MPA in 1 week.

## 2022-01-14 NOTE — LETTER
2022         RE: Kailash Sorto  [address]  [place] [address]        Dear Colleague,    Thank you for referring your patient, Kailash Sorto, to the [facility] TRANSPLANT [facility]. Please see a copy of my visit note below.    Post Transplant Patient Social Work Assessment -Outpatient    Patient Name: Kailash Sorto  : 1985  Age: 36 year old  MRN: [id number]  Date of transplant: DD Kidney 2021    Patient known to this writer from follow up in the transplant program. Writer called Kailash today to update assessment.      Presenting Information   Living Situation: in [place], [place] with his wife and son  Functional Status: Independent  Cultural/Language/Spiritual Considerations: None indicated a this time.    Support System  Primary Support Person Wife Sonja  Other support:  In-laws    Health Care Directive  Decision Maker: Self  Alternate Decision Maker: Sonja  Health Care Directive: Declined completing    Mental Health/Coping:   History of Mental Health: No known history  History of Chemical Health: No known history  Current status: Appropriate  Coping: Kailash indicated when under stress he will get fresh air or do something fun.  Services Needed/Recommended: None indicated at this time.    Financial   Income: Employed full time.  Impact of transplant on income: N/A  Insurance and medication coverage: Medicare and [employer]  Financial concerns: None indicated at this time.  Resources needed: None indicated at this time.      Education provided by MAGALY: Social Work role outpatient setting and provided this writer's contact information.l    Assessment and recommendations and plan:  Discussed Medicare coverage and Kailash indicated he continues to pay his Part B premiums.  Kailash indicated his transplanted kidney is functioning well.              Again, thank you for allowing me to participate in the care of your patient.         Sincerely,        Laura Zuniga, LICSW

## 2022-01-14 NOTE — PROGRESS NOTES
Medication Therapy Management (MTM) Encounter    ASSESSMENT:                            Medication Adherence/Access: No issues identified    Renal Transplant:  Discussed Valcyte stop date and vacinations due now and at 6 months. Had a recent COVID infection so will have to wait until symptoms subside with negative test for vaccination    Hypertension: BPs near goal of <130/80, although traditionally does run a little higher. Patient to reach out if BPs do not drop below.    Hyperlipidemia: Stable.     PLAN:                            1. You are due COVID vaccination (3 doses about a month apart each) once you are no longer quarantine, can get a flu shot as well.   2. At 6 months post transplant due for Prevnar 13, Tetanus, Shingrix, Shingrix, 8 weeks after Pneumovax 23, 2nd Shingrix. These have to be  2 weeks from your COVID vaccine series as well.  3. If BPs are running over 130/80, let the team know.     Follow-up: as needed    SUBJECTIVE/OBJECTIVE:                          Kailash Sorto is a 36 year old male called for a follow-up visit.  Today's visit is a follow-up MTM visit from 11/2021     Reason for visit: 4 months post transplant.    Allergies/ADRs: Reviewed in chart  Past Medical History: Reviewed in chart  Tobacco: He reports that he has never smoked. He has never used smokeless tobacco.  Alcohol: not currently using, waiting 6 months post txp.     Medication Adherence/Access: a few late doses. No missed doses. Using phone alarms    Renal Transplant:  Current immunosuppressants include Tacrolimus 1.5mg twice daily, Mycophenolate Mofetil 500mg twice daily. Pt reports loose stools when he was covid positive, but now that he is over covid he is doing better.   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  PCP prophylaxis: Bactrim S S daily  Supplements: Mag Oxide 400mg daily ( 2 hours from Mycophenolate Mofetil), biotin 1000mcg daily for a  oleary beard. Vitamin D 2000 units daily started today.   Tx Coordinator: Sweta Card, Using Med Card: Yes  Immunizations: annual flu shot 2020; [id number]:  unknown; Prevnar 13: unknown; TDaP:  2008; Shingrix: unknown, HBV: immunity per last titers, COVID: had COVID 12/20, week and a half without symptoms. Has not been vaccinated  Magnesium   Date Value Ref Range Status   01/13/2022 1.9 1.6 - 2.3 mg/dL Final   05/13/2016 2.5 (H) 1.6 - 2.3 mg/dL Final     Hypertension: Current medications include Amlodipine 10mg at bedtime.  Patient does not been self-monitoring blood pressure.  Patient reports no current medication side effects.  BP Readings from Last 3 Encounters:   10/14/21 135/89   10/04/21 (!) 143/84   09/18/21 (!) 149/89     Hyperlipidemia: Current therapy includes Atorvastatin 10mg daily.  Patient reports no significant myalgias or other side effects.  Recent Labs   Lab Test 09/13/21  0500 08/04/16  0711   CHOL 211* 190   HDL 56 73   * 104*   TRIG 97 62     Today's Vitals: There were no vitals taken for this visit.  ----------------    I spent 30 minutes with this patient today. All changes were made via collaborative practice agreement with Dr. Goodwin. A copy of the visit note was provided to the patient's provider(s).    The patient was sent via [email or web address] a summary of these recommendations.     Brady Peter, PharmD  MTM Pharmacist    Phone: [phone]     Telemedicine Visit Details  Type of service:  Telephone visit  Start Time: 11:10 am  End Time: 11:40 AM  Originating Location (patient location): Home  Distant Location (provider location):  [facility]     Medication Therapy Recommendations  Kidney transplant recipient    Rationale: Preventive therapy - Needs additional medication therapy - Indication   Recommendation: Order Vaccine - Moderna COVID-19 Vaccine 100 MCG/0.5ML Susp   Status: Patient Agreed - Adherence/Education

## 2022-01-14 NOTE — LETTER
Date:January 15, 2022      Provider requested that no letter be sent. Do not send.       [facility]

## 2022-01-14 NOTE — LETTER
1/14/2022     RE: Kailash Sorto  [address]  [place] [address]     Dear Colleague,    Thank you for referring your patient, Kailash Sorto, to the [facility] [place] [facility]. Please see a copy of my visit note below.    TRANSPLANT NEPHROLOGY EARLY POST TRANSPLANT VISIT    Assessment & Plan   # DDKT: Stable   - Baseline Creatinine: ~ 0.9-1.1   - Proteinuria: Normal (<0.2 grams)   - Date DSA Last Checked: Nov/2021      Latest DSA: No   - BK Viremia: No   - Kidney Tx Biopsy: No    # Immunosuppression: Tacrolimus immediate release (goal 8-10) and Mycophenolate mofetil (dose 500 mg every 12 hours)   - Induction with Recent Transplant:  Intermediate Intensity   - Continue with intensive monitoring of immunosuppression for efficacy and toxicity.   - Changes: Not at this time. MMF decreased for CMV viremia, neutropenia, and COVID but MPA level therapeutic on 1/13/22    # Infection Prophylaxis:   - PJP: Sulfa/TMP (Bactrim)  - CMV: Being treated for CMV viremia and/or disease. 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 Patient is CMV IgG Ab discordant (D+/R-) and will continue on Valcyte x 6 months, then check CMV PCR monthly until 12 months post transplant.    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    -S/p induction with valcyte, now on prophyalctic dose valcyte 900mg daily (was taking 450mg bid, told to change to 900mg daily). Continue for 6m post txp    # Neutropenia:   -Likely 2/2 MMF and CMV   -Improved without neupogen    # Hypertension: Borderline control;  Goal BP: < 130/80   - Volume status: Mildly hypervolemic     - Changes: Not at this time. Continue amlodipine 10mg daily, have nurse coordinator check on BP and will consider changing to nifedipine    # Anemia in Chronic Renal Disease: Hgb: Stable      KODY: No   - Iron studies: Replete    # Mineral Bone Disorder:   - Secondary renal  hyperparathyroidism; PTH level: Mildly elevated (151-300 pg/ml)        On treatment: None  - Vitamin D; level: Normal        On supplement: No. Completed ergocalciferol x8 weeks  - Calcium; level: Normal        On supplement: No  - Phosphorus; level: Normal        On supplement: No    # Electrolytes:   - Potassium; level: Normal        On supplement: No  - Magnesium; level: Normal        On supplement: Yes, decrease to 400mg daily   - Bicarbonate; level: Normal        On supplement: No    # Medical Compliance: Yes    # COVID-19 Virus Review: Discussed COVID-19 virus and the potential medical risks.  Reviewed preventative health recommendations, including wearing a mask where appropriate.  Recommended COVID vaccination should be up to date with either an initial vaccination or booster shot when appropriate.  Asked the patient to inform the transplant center if they are exposed or diagnosed with this virus.   -Tested positive for COVID on 12/20/21. Did not receive monoclonal antibodies    # COVID Vaccination Up To Date: Not vaccinated, will get it after 1/20/22 (1 month after COVID)    # Transplant History:  Etiology of Kidney Failure: Suspect potential underlying GN vs HTN  Tx: DDKT  Transplant: 9/13/2021 (Kidney)  Donor Type: Donation after Brain Death Donor Class:   Crossmatch at time of Tx: negative  DSA at time of Tx: No  Significant changes in immunosuppression: None  CMV IgG Ab High Risk Discordance (D+/R-): Yes  EBV IgG Ab High Risk Discordance (D+/R-): No  Significant transplant-related complications: CMV Viremia    Transplant Office Phone Number: [phone]    Assessment and plan was discussed with the patient and he voiced his understanding and agreement.    Return visit: Return in 8 weeks (on 3/11/2022) for 6 month post transplant visit.    Rober Goodwin MD    Chief Complaint   Mr. Sorto is a 36 year old here for routine follow up, kidney transplant and immunosuppression management.     History of  Present Illness   The patient feels well since recovering from COVID. He denies N/V/D, fever, chills, SOB, chest pain, LE edema. He denies abdominal pain but does occasionally feel the transplant there.     Home BP: 135 systolic when checking    Problem List   Patient Active Problem List   Diagnosis     Hyperlipidemia LDL goal <100     End stage renal failure on dialysis (H)     Anemia of chronic kidney failure     Renovascular hypertension     AVF (arteriovenous fistula) (H)     Kidney transplant candidate     Kidney transplant recipient     Neutropenia (H)       Allergies   Allergies   Allergen Reactions     Valcyte [Valganciclovir] Other (See Comments)     Cannot receive this medication while on CMV monoclonal antibody study, due to end 12/9/2021.       Medications   Current Outpatient Medications   Medication Sig     atorvastatin (LIPITOR) 10 MG tablet Take 1 tablet (10 mg) by mouth daily     acetaminophen (TYLENOL) 325 MG tablet Take 2 tablets (650 mg) by mouth every 6 hours as needed for other (For optimal non-opioid multimodal pain management to improve pain control.)     amLODIPine (NORVASC) 10 MG tablet Take 1 tablet (10 mg) by mouth daily     benzonatate (TESSALON) 100 MG capsule Take 1 capsule (100 mg) by mouth 3 times daily as needed for cough     biotin 1000 MCG TABS tablet Take 1,000 mcg by mouth daily     magnesium oxide (MAG-OX) 400 MG tablet Take 1 tablet (400 mg) by mouth 2 times daily     mycophenolate (GENERIC EQUIVALENT) 250 MG capsule Take 2 capsules (500 mg) by mouth 2 times daily     study - NPC-21 vs. placebo, IDS# 5665, 6 mg/kg; 12 mg/kg; or placebo in 0.9% sodium chloride intermittent infusion Inject 300 mLs into the vein once for 1 dose     sulfamethoxazole-trimethoprim (BACTRIM) 400-80 MG tablet Take 1 tablet by mouth daily     tacrolimus (GENERIC EQUIVALENT) 0.5 MG capsule Take 1 capsule (0.5 mg) by mouth 2 times daily Total dose = 1.5 mg twice daily     tacrolimus (GENERIC EQUIVALENT)  1 MG capsule Take 1 capsule (1 mg) by mouth 2 times daily Total dose = 1.5 mg twice daily     valGANciclovir (VALCYTE) 450 MG tablet Take 2 tablets (900 mg) by mouth daily     vitamin D2 (ERGOCALCIFEROL) 55260 units (1250 mcg) capsule Take 1 capsule (50,000 Units) by mouth once a week     No current facility-administered medications for this visit.     There are no discontinued medications.    Physical Exam   Vital Signs: There were no vitals taken for this visit.    GENERAL APPEARANCE: alert and no distress  HENT: no obvious abnormalities on appearance  RESP: breathing appears unremarkable with normal rate, no audible wheezing or cough and no apparent shortness of breath with conversation  MS: extremities normal - no gross deformities noted  SKIN: no apparent rash and normal skin tone  NEURO: speech is clear with no obvious neurological deficits  PSYCH: mentation appears normal and affect normal    Data     Renal Latest Ref Rng & Units 1/13/2022 1/6/2022 12/16/2021   Na 133 - 144 mmol/L 143 140 140   K 3.4 - 5.3 mmol/L 4.1 3.5 3.8   Cl 94 - 109 mmol/L 112(H) 111(H) 110(H)   CO2 20 - 32 mmol/L 23 26 23   BUN 7 - 30 mg/dL 7 11 9   Cr 0.66 - 1.25 mg/dL 0.90 0.93 0.89   Glucose 70 - 99 mg/dL 102(H) 107(H) 116(H)   Ca  8.5 - 10.1 mg/dL 8.9 8.6 8.9   Mg 1.6 - 2.3 mg/dL 1.9 - 1.5(L)     Bone Health Latest Ref Rng & Units 1/13/2022 12/16/2021 11/18/2021   Phos 2.5 - 4.5 mg/dL 3.3 2.3(L) 3.0   PTHi 18 - 80 pg/mL - - -   Vit D Def 20 - 75 ug/L - - -     Heme Latest Ref Rng & Units 1/13/2022 1/6/2022 12/16/2021   WBC 4.0 - 11.0 10e3/uL 2.5(L) 2.7(L) 1.2(L)   Hgb 13.3 - 17.7 g/dL 12.6(L) 12.6(L) -   Plt 150 - 450 10e3/uL 367 505(H) -   ABSOLUTE NEUTROPHIL 1.6 - 8.3 10e3/uL - - -   ABSOLUTE LYMPHOCYTES 0.8 - 5.3 10e3/uL - - -   ABSOLUTE MONOCYTES 0.0 - 1.3 10e3/uL - - -   ABSOLUTE EOSINOPHILS 0.0 - 0.7 10e3/uL - - -   ABSOLUTE BASOPHILS 0.0 - 0.2 10e9/L - - -   ABS IMMATURE GRANULOCYTES 0 - 0.4 10e9/L - - -   ABSOLUTE NUCLEATED  RBC - - - -     Liver Latest Ref Rng & Units 9/13/2021 7/13/2020 6/22/2020   AP 40 - 150 U/L 122 - -   TBili 0.2 - 1.3 mg/dL 0.4 - -   ALT 0 - 70 U/L 24 20 -   AST 0 - 45 U/L 15 20 23   Tot Protein 6.8 - 8.8 g/dL 7.7 - -   Albumin 3.4 - 5.0 g/dL 3.8 - -     Pancreas Latest Ref Rng & Units 9/13/2021   A1C 0.0 - 5.6 % 5.3     Iron studies Latest Ref Rng & Units 9/17/2021 9/14/2021 5/11/2016   Iron 35 - 180 ug/dL 123 18(L) 38   Iron sat 15 - 46 % 69(H) 10(L) 17   Ferritin 26 - 388 ng/mL 770(H) - -     [facility] Txp Virology Latest Ref Rng & Units 1/6/2022 12/16/2021 12/2/2021   CMV QUANT IU/ML Not Detected IU/mL Not Detected Not Detected <137(A)   LOG IU/ML OF CMVQNT - - - <2.1   EBV CAPSID ANTIBODY IGG No detectable antibody. - - -   Hep B Core NR - - -   HIV 1&2 NEG - - -        Recent Labs   Lab Test 12/16/21  0828 01/06/22  0905 01/13/22  0928   DOSTAC 12/15/2021 1/5/2022 1/12/2022   TACROL 10.7 15.9* 7.5     Recent Labs   Lab Test 11/18/21  1058 01/13/22  0928   DOSMPA 11/18/2021   8:45 AM 1/12/2022   8:45 PM   MPACID 6.16*  5.62* 1.10   MPAG 57.7  54.6 18.6*     Kailash is a 36 year old who is being evaluated via a billable video visit.      How would you like to obtain your AVS? MyChart  If the video visit is dropped, the invitation should be resent by: Text to cell phone: [phone]  Will anyone else be joining your video visit? No    Video Start Time: 10:47 AM  Video-Visit Details  Type of service:  Video Visit  Video End Time:11:11 AM  Originating Location (pt. Location): Home  Distant Location (provider location):  [facility] NEPH[facility]OLOG[facility]   Platform used for Video Visit: Carmen    Again, thank you for allowing me to participate in the care of your patient.      Sincerely,    Early Post Transplant

## 2022-01-14 NOTE — PROGRESS NOTES
TRANSPLANT NEPHROLOGY EARLY POST TRANSPLANT VISIT    Assessment & Plan   # DDKT: Stable   - Baseline Creatinine: ~ 0.9-1.1   - Proteinuria: Normal (<0.2 grams)   - Date DSA Last Checked: Nov/2021      Latest DSA: No   - BK Viremia: No   - Kidney Tx Biopsy: No    # Immunosuppression: Tacrolimus immediate release (goal 8-10) and Mycophenolate mofetil (dose 500 mg every 12 hours)   - Induction with Recent Transplant:  Intermediate Intensity   - Continue with intensive monitoring of immunosuppression for efficacy and toxicity.   - Changes: Not at this time. MMF decreased for CMV viremia, neutropenia, and COVID but MPA level therapeutic on 1/13/22    # Infection Prophylaxis:   - PJP: Sulfa/TMP (Bactrim)  - CMV: Being treated for CMV viremia and/or disease. 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 Patient is CMV IgG Ab discordant (D+/R-) and will continue on Valcyte x 6 months, then check CMV PCR monthly until 12 months post transplant.    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    -S/p induction with valcyte, now on prophyalctic dose valcyte 900mg daily (was taking 450mg bid, told to change to 900mg daily). Continue for 6m post txp    # Neutropenia:   -Likely 2/2 MMF and CMV   -Improved without neupogen    # Hypertension: Borderline control;  Goal BP: < 130/80   - Volume status: Mildly hypervolemic     - Changes: Not at this time. Continue amlodipine 10mg daily, have nurse coordinator check on BP and will consider changing to nifedipine    # Anemia in Chronic Renal Disease: Hgb: Stable      KODY: No   - Iron studies: Replete    # Mineral Bone Disorder:   - Secondary renal hyperparathyroidism; PTH level: Mildly elevated (151-300 pg/ml)        On treatment: None  - Vitamin D; level: Normal        On supplement: No. Completed ergocalciferol x8 weeks  - Calcium; level: Normal        On supplement: No  - Phosphorus; level: Normal        On supplement: No    # Electrolytes:   - Potassium; level:  Normal        On supplement: No  - Magnesium; level: Normal        On supplement: Yes, decrease to 400mg daily   - Bicarbonate; level: Normal        On supplement: No    # Medical Compliance: Yes    # COVID-19 Virus Review: Discussed COVID-19 virus and the potential medical risks.  Reviewed preventative health recommendations, including wearing a mask where appropriate.  Recommended COVID vaccination should be up to date with either an initial vaccination or booster shot when appropriate.  Asked the patient to inform the transplant center if they are exposed or diagnosed with this virus.   -Tested positive for COVID on 12/20/21. Did not receive monoclonal antibodies    # COVID Vaccination Up To Date: Not vaccinated, will get it after 1/20/22 (1 month after COVID)    # Transplant History:  Etiology of Kidney Failure: Suspect potential underlying GN vs HTN  Tx: DDKT  Transplant: 9/13/2021 (Kidney)  Donor Type: Donation after Brain Death Donor Class:   Crossmatch at time of Tx: negative  DSA at time of Tx: No  Significant changes in immunosuppression: None  CMV IgG Ab High Risk Discordance (D+/R-): Yes  EBV IgG Ab High Risk Discordance (D+/R-): No  Significant transplant-related complications: CMV Viremia    Transplant Office Phone Number: [phone]    Assessment and plan was discussed with the patient and he voiced his understanding and agreement.    Return visit: Return in 8 weeks (on 3/11/2022) for 6 month post transplant visit.    Rober Goodwin MD    Chief Complaint   Mr. Sorto is a 36 year old here for routine follow up, kidney transplant and immunosuppression management.     History of Present Illness   The patient feels well since recovering from COVID. He denies N/V/D, fever, chills, SOB, chest pain, LE edema. He denies abdominal pain but does occasionally feel the transplant there.     Home BP: 135 systolic when checking    Problem List   Patient Active Problem List   Diagnosis     Hyperlipidemia LDL goal  <100     End stage renal failure on dialysis (H)     Anemia of chronic kidney failure     Renovascular hypertension     AVF (arteriovenous fistula) (H)     Kidney transplant candidate     Kidney transplant recipient     Neutropenia (H)       Allergies   Allergies   Allergen Reactions     Valcyte [Valganciclovir] Other (See Comments)     Cannot receive this medication while on CMV monoclonal antibody study, due to end 12/9/2021.       Medications   Current Outpatient Medications   Medication Sig     atorvastatin (LIPITOR) 10 MG tablet Take 1 tablet (10 mg) by mouth daily     acetaminophen (TYLENOL) 325 MG tablet Take 2 tablets (650 mg) by mouth every 6 hours as needed for other (For optimal non-opioid multimodal pain management to improve pain control.)     amLODIPine (NORVASC) 10 MG tablet Take 1 tablet (10 mg) by mouth daily     benzonatate (TESSALON) 100 MG capsule Take 1 capsule (100 mg) by mouth 3 times daily as needed for cough     biotin 1000 MCG TABS tablet Take 1,000 mcg by mouth daily     magnesium oxide (MAG-OX) 400 MG tablet Take 1 tablet (400 mg) by mouth 2 times daily     mycophenolate (GENERIC EQUIVALENT) 250 MG capsule Take 2 capsules (500 mg) by mouth 2 times daily     study - NPC-21 vs. placebo, IDS# 5665, 6 mg/kg; 12 mg/kg; or placebo in 0.9% sodium chloride intermittent infusion Inject 300 mLs into the vein once for 1 dose     sulfamethoxazole-trimethoprim (BACTRIM) 400-80 MG tablet Take 1 tablet by mouth daily     tacrolimus (GENERIC EQUIVALENT) 0.5 MG capsule Take 1 capsule (0.5 mg) by mouth 2 times daily Total dose = 1.5 mg twice daily     tacrolimus (GENERIC EQUIVALENT) 1 MG capsule Take 1 capsule (1 mg) by mouth 2 times daily Total dose = 1.5 mg twice daily     valGANciclovir (VALCYTE) 450 MG tablet Take 2 tablets (900 mg) by mouth daily     vitamin D2 (ERGOCALCIFEROL) 06943 units (1250 mcg) capsule Take 1 capsule (50,000 Units) by mouth once a week     No current facility-administered  medications for this visit.     There are no discontinued medications.    Physical Exam   Vital Signs: There were no vitals taken for this visit.    GENERAL APPEARANCE: alert and no distress  HENT: no obvious abnormalities on appearance  RESP: breathing appears unremarkable with normal rate, no audible wheezing or cough and no apparent shortness of breath with conversation  MS: extremities normal - no gross deformities noted  SKIN: no apparent rash and normal skin tone  NEURO: speech is clear with no obvious neurological deficits  PSYCH: mentation appears normal and affect normal    Data     Renal Latest Ref Rng & Units 1/13/2022 1/6/2022 12/16/2021   Na 133 - 144 mmol/L 143 140 140   K 3.4 - 5.3 mmol/L 4.1 3.5 3.8   Cl 94 - 109 mmol/L 112(H) 111(H) 110(H)   CO2 20 - 32 mmol/L 23 26 23   BUN 7 - 30 mg/dL 7 11 9   Cr 0.66 - 1.25 mg/dL 0.90 0.93 0.89   Glucose 70 - 99 mg/dL 102(H) 107(H) 116(H)   Ca  8.5 - 10.1 mg/dL 8.9 8.6 8.9   Mg 1.6 - 2.3 mg/dL 1.9 - 1.5(L)     Bone Health Latest Ref Rng & Units 1/13/2022 12/16/2021 11/18/2021   Phos 2.5 - 4.5 mg/dL 3.3 2.3(L) 3.0   PTHi 18 - 80 pg/mL - - -   Vit D Def 20 - 75 ug/L - - -     Heme Latest Ref Rng & Units 1/13/2022 1/6/2022 12/16/2021   WBC 4.0 - 11.0 10e3/uL 2.5(L) 2.7(L) 1.2(L)   Hgb 13.3 - 17.7 g/dL 12.6(L) 12.6(L) -   Plt 150 - 450 10e3/uL 367 505(H) -   ABSOLUTE NEUTROPHIL 1.6 - 8.3 10e3/uL - - -   ABSOLUTE LYMPHOCYTES 0.8 - 5.3 10e3/uL - - -   ABSOLUTE MONOCYTES 0.0 - 1.3 10e3/uL - - -   ABSOLUTE EOSINOPHILS 0.0 - 0.7 10e3/uL - - -   ABSOLUTE BASOPHILS 0.0 - 0.2 10e9/L - - -   ABS IMMATURE GRANULOCYTES 0 - 0.4 10e9/L - - -   ABSOLUTE NUCLEATED RBC - - - -     Liver Latest Ref Rng & Units 9/13/2021 7/13/2020 6/22/2020   AP 40 - 150 U/L 122 - -   TBili 0.2 - 1.3 mg/dL 0.4 - -   ALT 0 - 70 U/L 24 20 -   AST 0 - 45 U/L 15 20 23   Tot Protein 6.8 - 8.8 g/dL 7.7 - -   Albumin 3.4 - 5.0 g/dL 3.8 - -     Pancreas Latest Ref Rng & Units 9/13/2021   A1C 0.0 - 5.6 % 5.3      Iron studies Latest Ref Rng & Units 9/17/2021 9/14/2021 5/11/2016   Iron 35 - 180 ug/dL 123 18(L) 38   Iron sat 15 - 46 % 69(H) 10(L) 17   Ferritin 26 - 388 ng/mL 770(H) - -     [facility] Txp Virology Latest Ref Rng & Units 1/6/2022 12/16/2021 12/2/2021   CMV QUANT IU/ML Not Detected IU/mL Not Detected Not Detected <137(A)   LOG IU/ML OF CMVQNT - - - <2.1   EBV CAPSID ANTIBODY IGG No detectable antibody. - - -   Hep B Core NR - - -   HIV 1&2 NEG - - -        Recent Labs   Lab Test 12/16/21  0828 01/06/22  0905 01/13/22  0928   DOSTAC 12/15/2021 1/5/2022 1/12/2022   TACROL 10.7 15.9* 7.5     Recent Labs   Lab Test 11/18/21  1058 01/13/22  0928   DOSMPA 11/18/2021   8:45 AM 1/12/2022   8:45 PM   MPACID 6.16*  5.62* 1.10   MPAG 57.7  54.6 18.6*

## 2022-01-20 DIAGNOSIS — Z94.0 KIDNEY TRANSPLANT RECIPIENT: ICD-10-CM

## 2022-01-21 RX ORDER — ATORVASTATIN CALCIUM 10 MG/1
10 TABLET, FILM COATED ORAL DAILY
Qty: 30 TABLET | Refills: 1 | Status: SHIPPED | OUTPATIENT
Start: 2022-01-21 | End: 2022-05-05

## 2022-01-27 ENCOUNTER — LAB (OUTPATIENT)
Dept: LAB | Facility: CLINIC | Age: 37
End: 2022-01-27
Payer: MEDICARE

## 2022-01-27 DIAGNOSIS — B25.9 CMV (CYTOMEGALOVIRUS INFECTION) (H): ICD-10-CM

## 2022-01-27 DIAGNOSIS — Z94.0 KIDNEY REPLACED BY TRANSPLANT: ICD-10-CM

## 2022-01-27 DIAGNOSIS — Z20.828 CONTACT WITH AND (SUSPECTED) EXPOSURE TO OTHER VIRAL COMMUNICABLE DISEASES: ICD-10-CM

## 2022-01-27 DIAGNOSIS — Z48.298 AFTERCARE FOLLOWING ORGAN TRANSPLANT: ICD-10-CM

## 2022-01-27 DIAGNOSIS — Z79.899 ENCOUNTER FOR LONG-TERM CURRENT USE OF MEDICATION: ICD-10-CM

## 2022-01-27 LAB
CREAT UR-MCNC: 135 MG/DL
ERYTHROCYTE [DISTWIDTH] IN BLOOD BY AUTOMATED COUNT: 13.4 % (ref 10–15)
HCT VFR BLD AUTO: 40.4 % (ref 40–53)
HGB BLD-MCNC: 13.4 G/DL (ref 13.3–17.7)
MCH RBC QN AUTO: 29.1 PG (ref 26.5–33)
MCHC RBC AUTO-ENTMCNC: 33.2 G/DL (ref 31.5–36.5)
MCV RBC AUTO: 88 FL (ref 78–100)
PLATELET # BLD AUTO: 199 10E3/UL (ref 150–450)
PROT UR-MCNC: 0.1 G/L
PROT/CREAT 24H UR: 0.07 G/G CR (ref 0–0.2)
PTH-INTACT SERPL-MCNC: 164 PG/ML (ref 18–80)
RBC # BLD AUTO: 4.61 10E6/UL (ref 4.4–5.9)
TACROLIMUS BLD-MCNC: 7.1 UG/L (ref 5–15)
TME LAST DOSE: NORMAL H
TME LAST DOSE: NORMAL H
WBC # BLD AUTO: 2.2 10E3/UL (ref 4–11)

## 2022-01-27 PROCEDURE — 36415 COLL VENOUS BLD VENIPUNCTURE: CPT

## 2022-01-27 PROCEDURE — 80180 DRUG SCRN QUAN MYCOPHENOLATE: CPT

## 2022-01-27 PROCEDURE — 84156 ASSAY OF PROTEIN URINE: CPT

## 2022-01-27 PROCEDURE — 80048 BASIC METABOLIC PNL TOTAL CA: CPT

## 2022-01-27 PROCEDURE — 86832 HLA CLASS I HIGH DEFIN QUAL: CPT

## 2022-01-27 PROCEDURE — 80197 ASSAY OF TACROLIMUS: CPT

## 2022-01-27 PROCEDURE — 86833 HLA CLASS II HIGH DEFIN QUAL: CPT

## 2022-01-27 PROCEDURE — 83735 ASSAY OF MAGNESIUM: CPT

## 2022-01-27 PROCEDURE — 85027 COMPLETE CBC AUTOMATED: CPT

## 2022-01-27 PROCEDURE — 83970 ASSAY OF PARATHORMONE: CPT

## 2022-01-27 PROCEDURE — 87799 DETECT AGENT NOS DNA QUANT: CPT

## 2022-01-27 PROCEDURE — 84100 ASSAY OF PHOSPHORUS: CPT

## 2022-01-28 ENCOUNTER — TELEPHONE (OUTPATIENT)
Dept: TRANSPLANT | Facility: CLINIC | Age: 37
End: 2022-01-28
Payer: MEDICARE

## 2022-01-28 DIAGNOSIS — Z48.298 AFTERCARE FOLLOWING ORGAN TRANSPLANT: ICD-10-CM

## 2022-01-28 DIAGNOSIS — Z94.0 KIDNEY TRANSPLANT RECIPIENT: ICD-10-CM

## 2022-01-28 LAB
ANION GAP SERPL CALCULATED.3IONS-SCNC: 6 MMOL/L (ref 3–14)
BKV DNA # SPEC NAA+PROBE: NOT DETECTED COPIES/ML
BUN SERPL-MCNC: 13 MG/DL (ref 7–30)
CALCIUM SERPL-MCNC: 9.2 MG/DL (ref 8.5–10.1)
CHLORIDE BLD-SCNC: 110 MMOL/L (ref 94–109)
CMV DNA IU/ML, INSTRUMENT: 263 IU/ML
CMV DNA SPEC NAA+PROBE-LOG#: 2.4 {LOG_COPIES}/ML
CO2 SERPL-SCNC: 25 MMOL/L (ref 20–32)
CREAT SERPL-MCNC: 1.03 MG/DL (ref 0.66–1.25)
DONOR IDENTIFICATION: NORMAL
DSA COMMENTS: NORMAL
DSA PRESENT: NO
DSA TEST METHOD: NORMAL
GFR SERPL CREATININE-BSD FRML MDRD: >90 ML/MIN/1.73M2
GLUCOSE BLD-MCNC: 102 MG/DL (ref 70–99)
MAGNESIUM SERPL-MCNC: 1.9 MG/DL (ref 1.6–2.3)
MYCOPHENOLATE SERPL LC/MS/MS-MCNC: 1.11 MG/L (ref 1–3.5)
MYCOPHENOLATE-G SERPL LC/MS/MS-MCNC: 21.9 MG/L (ref 30–95)
ORGAN: NORMAL
PHOSPHATE SERPL-MCNC: 3.6 MG/DL (ref 2.5–4.5)
POTASSIUM BLD-SCNC: 3.6 MMOL/L (ref 3.4–5.3)
SA 1 CELL: NORMAL
SA 1 TEST METHOD: NORMAL
SA 2 CELL: NORMAL
SA 2 TEST METHOD: NORMAL
SA1 HI RISK ABY: NORMAL
SA1 MOD RISK ABY: NORMAL
SA2 HI RISK ABY: NORMAL
SA2 MOD RISK ABY: NORMAL
SODIUM SERPL-SCNC: 141 MMOL/L (ref 133–144)
TME LAST DOSE: ABNORMAL H
TME LAST DOSE: ABNORMAL H
ZZZSA 1  COMMENTS: NORMAL
ZZZSA 2 COMMENTS: NORMAL

## 2022-01-28 RX ORDER — TACROLIMUS 0.5 MG/1
CAPSULE ORAL
Qty: 60 CAPSULE | Refills: 11
Start: 2022-01-28 | End: 2022-04-18

## 2022-01-28 RX ORDER — TACROLIMUS 1 MG/1
2 CAPSULE ORAL 2 TIMES DAILY
Qty: 120 CAPSULE | Refills: 11 | Status: SHIPPED | OUTPATIENT
Start: 2022-01-28 | End: 2022-04-08

## 2022-01-28 NOTE — TELEPHONE ENCOUNTER
ISSUE:   Tacrolimus IR level 7.1 on 1/27, goal 8-10, dose 1.5 mg BID.    PLAN:   Please call patient and confirm this was an accurate 12-hour trough. Verify Tacrolimus IR dose 1.5 mg BID. Confirm no new medications or illness. Confirm no missed doses. If accurate trough and accurate dose, increase Tacrolimus IR dose to 2 mg BID and repeat labs in 1 weeks    OUTCOME:   Spoke with patient, they confirm accurate trough level and current dose 1.5 mg BID. Patient confirmed dose change to 2 mg BID and to repeat labs in 1 weeks. Orders sent to preferred pharmacy for dose change and lab for repeat labs. Patient voiced understanding of plan.

## 2022-01-31 LAB
UNACCEPTABLE ANTIGENS: NORMAL
UNOS CPRA: 2

## 2022-02-01 ENCOUNTER — TELEPHONE (OUTPATIENT)
Dept: TRANSPLANT | Facility: CLINIC | Age: 37
End: 2022-02-01
Payer: MEDICARE

## 2022-02-01 DIAGNOSIS — I10 HYPERTENSION: Primary | ICD-10-CM

## 2022-02-01 DIAGNOSIS — Z94.0 KIDNEY TRANSPLANT RECIPIENT: ICD-10-CM

## 2022-02-01 DIAGNOSIS — B25.9 CMV (CYTOMEGALOVIRUS INFECTION) (H): ICD-10-CM

## 2022-02-01 RX ORDER — NIFEDIPINE 30 MG/1
30 TABLET, EXTENDED RELEASE ORAL DAILY
Qty: 30 TABLET | Refills: 3 | Status: SHIPPED | OUTPATIENT
Start: 2022-02-01 | End: 2022-03-03

## 2022-02-01 RX ORDER — VALGANCICLOVIR 450 MG/1
900 TABLET, FILM COATED ORAL 2 TIMES DAILY
Qty: 120 TABLET | Refills: 3 | Status: SHIPPED | OUTPATIENT
Start: 2022-02-01 | End: 2022-05-02

## 2022-02-07 DIAGNOSIS — Z94.0 KIDNEY TRANSPLANT RECIPIENT: ICD-10-CM

## 2022-02-07 RX ORDER — MAGNESIUM OXIDE 400 MG/1
400 TABLET ORAL DAILY
Qty: 60 TABLET | Refills: 1 | Status: SHIPPED | OUTPATIENT
Start: 2022-02-07 | End: 2023-06-05

## 2022-02-10 ENCOUNTER — TELEPHONE (OUTPATIENT)
Dept: NEPHROLOGY | Facility: CLINIC | Age: 37
End: 2022-02-10

## 2022-02-10 ENCOUNTER — LAB (OUTPATIENT)
Dept: LAB | Facility: CLINIC | Age: 37
End: 2022-02-10
Payer: MEDICARE

## 2022-02-10 DIAGNOSIS — B25.9 CMV (CYTOMEGALOVIRUS INFECTION) (H): ICD-10-CM

## 2022-02-10 DIAGNOSIS — Z94.0 KIDNEY REPLACED BY TRANSPLANT: ICD-10-CM

## 2022-02-10 DIAGNOSIS — Z48.298 AFTERCARE FOLLOWING ORGAN TRANSPLANT: ICD-10-CM

## 2022-02-10 DIAGNOSIS — Z79.899 ENCOUNTER FOR LONG-TERM CURRENT USE OF MEDICATION: ICD-10-CM

## 2022-02-10 DIAGNOSIS — Z20.828 CONTACT WITH AND (SUSPECTED) EXPOSURE TO OTHER VIRAL COMMUNICABLE DISEASES: ICD-10-CM

## 2022-02-10 LAB
ANION GAP SERPL CALCULATED.3IONS-SCNC: 6 MMOL/L (ref 3–14)
BUN SERPL-MCNC: 10 MG/DL (ref 7–30)
CALCIUM SERPL-MCNC: 9.1 MG/DL (ref 8.5–10.1)
CHLORIDE BLD-SCNC: 108 MMOL/L (ref 94–109)
CO2 SERPL-SCNC: 26 MMOL/L (ref 20–32)
CREAT SERPL-MCNC: 0.99 MG/DL (ref 0.66–1.25)
ERYTHROCYTE [DISTWIDTH] IN BLOOD BY AUTOMATED COUNT: 13.1 % (ref 10–15)
GFR SERPL CREATININE-BSD FRML MDRD: >90 ML/MIN/1.73M2
GLUCOSE BLD-MCNC: 102 MG/DL (ref 70–99)
HCT VFR BLD AUTO: 42.3 % (ref 40–53)
HGB BLD-MCNC: 13.9 G/DL (ref 13.3–17.7)
MAGNESIUM SERPL-MCNC: 1.4 MG/DL (ref 1.8–2.6)
MCH RBC QN AUTO: 29.3 PG (ref 26.5–33)
MCHC RBC AUTO-ENTMCNC: 32.9 G/DL (ref 31.5–36.5)
MCV RBC AUTO: 89 FL (ref 78–100)
MYCOPHENOLATE SERPL LC/MS/MS-MCNC: 1.53 MG/L (ref 1–3.5)
MYCOPHENOLATE-G SERPL LC/MS/MS-MCNC: 21.3 MG/L (ref 30–95)
PHOSPHATE SERPL-MCNC: 2.3 MG/DL (ref 2.5–4.5)
PLATELET # BLD AUTO: 255 10E3/UL (ref 150–450)
POTASSIUM BLD-SCNC: 3.3 MMOL/L (ref 3.4–5.3)
RBC # BLD AUTO: 4.74 10E6/UL (ref 4.4–5.9)
SODIUM SERPL-SCNC: 140 MMOL/L (ref 133–144)
TACROLIMUS BLD-MCNC: 7.2 UG/L (ref 5–15)
TME LAST DOSE: ABNORMAL H
TME LAST DOSE: ABNORMAL H
TME LAST DOSE: NORMAL H
TME LAST DOSE: NORMAL H
WBC # BLD AUTO: 1.3 10E3/UL (ref 4–11)

## 2022-02-10 PROCEDURE — 80048 BASIC METABOLIC PNL TOTAL CA: CPT

## 2022-02-10 PROCEDURE — 36415 COLL VENOUS BLD VENIPUNCTURE: CPT

## 2022-02-10 PROCEDURE — 85027 COMPLETE CBC AUTOMATED: CPT

## 2022-02-10 PROCEDURE — 80197 ASSAY OF TACROLIMUS: CPT

## 2022-02-10 PROCEDURE — 80180 DRUG SCRN QUAN MYCOPHENOLATE: CPT

## 2022-02-10 PROCEDURE — 84100 ASSAY OF PHOSPHORUS: CPT

## 2022-02-10 PROCEDURE — 83735 ASSAY OF MAGNESIUM: CPT

## 2022-02-10 NOTE — TELEPHONE ENCOUNTER
[facility] Call Center    Phone Message    May a detailed message be left on voicemail: yes     Reason for Call: Other: [facility] calling in with critical value.  White count is 1.29. No need for a call back     Action Taken: Message routed to:  [facility] ([facility] Neph    Travel Screening: Not Applicable

## 2022-02-11 LAB
CMV DNA IU/ML, INSTRUMENT: 366 IU/ML
CMV DNA SPEC NAA+PROBE-LOG#: 2.6 {LOG_COPIES}/ML

## 2022-02-23 ENCOUNTER — TELEPHONE (OUTPATIENT)
Dept: TRANSPLANT | Facility: CLINIC | Age: 37
End: 2022-02-23
Payer: MEDICARE

## 2022-02-23 DIAGNOSIS — Z48.298 AFTERCARE FOLLOWING ORGAN TRANSPLANT: Primary | ICD-10-CM

## 2022-02-23 NOTE — TELEPHONE ENCOUNTER
Post Kidney and Pancreas Transplant Team Conference  Date: 2/23/2022  Transplant Coordinator: Yas Card     Attendees:  []  Dr. Abdi [x] Ritika Patel, RN [x] Sandy Shay LPN     [x]  Dr. Martin [] Marisa Jeffers RN [] Sofia Del Valle LPN   [x]  Dr. Cote [x] Majo Kevin, SHAYY    [x]  Dr. Goodwin [] Nikki Palmer RN [] Brady Pteer, BryD   [x] Dr. Romo [] Theresa Rust, SHAYY    [] Dr. Toscano [] Dago Damon RN    [] Dr. Chandler [] Maribell Orr RN [] Carline Myers RN   [] Dr. Loyola [] Roxie Logan RN    []  Dr. Wilder [] Jessica Pierre RN    [] Dr. Vale [x] Yas Card, SHAYY    [x] Lluvia Hernandez, LILLIANA [] Lesa Raza RN        Verbal Plan Read Back:   Check IgG and CMV IgG    Routed to RN Coordinator   Sandy Shay LPN

## 2022-02-25 ENCOUNTER — LAB (OUTPATIENT)
Dept: LAB | Facility: CLINIC | Age: 37
End: 2022-02-25
Payer: MEDICARE

## 2022-02-25 DIAGNOSIS — Z20.828 CONTACT WITH AND (SUSPECTED) EXPOSURE TO OTHER VIRAL COMMUNICABLE DISEASES: ICD-10-CM

## 2022-02-25 DIAGNOSIS — Z94.0 KIDNEY REPLACED BY TRANSPLANT: ICD-10-CM

## 2022-02-25 DIAGNOSIS — Z79.899 ENCOUNTER FOR LONG-TERM CURRENT USE OF MEDICATION: ICD-10-CM

## 2022-02-25 DIAGNOSIS — Z48.298 AFTERCARE FOLLOWING ORGAN TRANSPLANT: ICD-10-CM

## 2022-02-25 LAB
ANION GAP SERPL CALCULATED.3IONS-SCNC: 5 MMOL/L (ref 3–14)
BUN SERPL-MCNC: 10 MG/DL (ref 7–30)
CALCIUM SERPL-MCNC: 9.1 MG/DL (ref 8.5–10.1)
CHLORIDE BLD-SCNC: 110 MMOL/L (ref 94–109)
CO2 SERPL-SCNC: 28 MMOL/L (ref 20–32)
CREAT SERPL-MCNC: 1.09 MG/DL (ref 0.66–1.25)
ERYTHROCYTE [DISTWIDTH] IN BLOOD BY AUTOMATED COUNT: 12.6 % (ref 10–15)
GFR SERPL CREATININE-BSD FRML MDRD: >90 ML/MIN/1.73M2
GLUCOSE BLD-MCNC: 100 MG/DL (ref 70–99)
HCT VFR BLD AUTO: 41.6 % (ref 40–53)
HGB BLD-MCNC: 14 G/DL (ref 13.3–17.7)
MAGNESIUM SERPL-MCNC: 1.6 MG/DL (ref 1.6–2.3)
MCH RBC QN AUTO: 29.5 PG (ref 26.5–33)
MCHC RBC AUTO-ENTMCNC: 33.7 G/DL (ref 31.5–36.5)
MCV RBC AUTO: 88 FL (ref 78–100)
PHOSPHATE SERPL-MCNC: 2 MG/DL (ref 2.5–4.5)
PLATELET # BLD AUTO: 266 10E3/UL (ref 150–450)
POTASSIUM BLD-SCNC: 3.5 MMOL/L (ref 3.4–5.3)
RBC # BLD AUTO: 4.75 10E6/UL (ref 4.4–5.9)
SODIUM SERPL-SCNC: 143 MMOL/L (ref 133–144)
TACROLIMUS BLD-MCNC: 7.1 UG/L (ref 5–15)
TME LAST DOSE: NORMAL H
TME LAST DOSE: NORMAL H
WBC # BLD AUTO: 1.6 10E3/UL (ref 4–11)

## 2022-02-25 PROCEDURE — 80048 BASIC METABOLIC PNL TOTAL CA: CPT | Performed by: PATHOLOGY

## 2022-02-25 PROCEDURE — 86644 CMV ANTIBODY: CPT | Performed by: PATHOLOGY

## 2022-02-25 PROCEDURE — 85027 COMPLETE CBC AUTOMATED: CPT | Performed by: PATHOLOGY

## 2022-02-25 PROCEDURE — 84100 ASSAY OF PHOSPHORUS: CPT | Performed by: PATHOLOGY

## 2022-02-25 PROCEDURE — 80197 ASSAY OF TACROLIMUS: CPT | Performed by: PATHOLOGY

## 2022-02-25 PROCEDURE — 80180 DRUG SCRN QUAN MYCOPHENOLATE: CPT | Performed by: PATHOLOGY

## 2022-02-25 PROCEDURE — 83735 ASSAY OF MAGNESIUM: CPT | Performed by: PATHOLOGY

## 2022-02-25 PROCEDURE — 87799 DETECT AGENT NOS DNA QUANT: CPT | Performed by: PATHOLOGY

## 2022-02-25 PROCEDURE — 99000 SPECIMEN HANDLING OFFICE-LAB: CPT | Performed by: PATHOLOGY

## 2022-02-25 PROCEDURE — 36415 COLL VENOUS BLD VENIPUNCTURE: CPT | Performed by: PATHOLOGY

## 2022-02-28 ENCOUNTER — TELEPHONE (OUTPATIENT)
Dept: TRANSPLANT | Facility: CLINIC | Age: 37
End: 2022-02-28
Payer: MEDICARE

## 2022-02-28 LAB
BKV DNA # SPEC NAA+PROBE: NOT DETECTED COPIES/ML
CMV IGG SERPL IA-ACNC: 0.61 U/ML
CMV IGG SERPL IA-ACNC: ABNORMAL

## 2022-03-01 LAB
MYCOPHENOLATE SERPL LC/MS/MS-MCNC: 1.39 MG/L (ref 1–3.5)
MYCOPHENOLATE-G SERPL LC/MS/MS-MCNC: 24.9 MG/L (ref 30–95)
TME LAST DOSE: ABNORMAL H
TME LAST DOSE: ABNORMAL H

## 2022-03-02 DIAGNOSIS — I10 HYPERTENSION: ICD-10-CM

## 2022-03-03 RX ORDER — NIFEDIPINE 30 MG/1
30 TABLET, EXTENDED RELEASE ORAL DAILY
Qty: 30 TABLET | Refills: 3 | Status: SHIPPED | OUTPATIENT
Start: 2022-03-03 | End: 2022-06-01

## 2022-03-08 NOTE — PROCEDURES
Transplant Surgery  Operative Note    Preop dx: Status post  Donor kidney transplant.  Post op dx: same   Procedure: Flexible cystoscopy and ureteral stent removal   Surgeon: linda contreras cnp  Assistant: leanna garcia MA  Anesthesia: local  EBL: 0   Specimens: none.  Findings: none abnormal. Stent inspected and noted to be intact.   Indication: The patient is status post kidney transplant and the ureteral stent is no longer needed.    Procedure: The patient was positioned supine on the table.  The groin was sterilely prepped and draped in the usual fashion. Time out was done. Urojet was applied to the urethra. A flexible cystoscope was inserted and advanced thru the urethra into the bladder. The stent was visualized and grasped. The cystoscope, grasper and stent were removed en-mass. The stent was visualized to be intact.  The bladder mucosa was normal in appearance. Antibiotics were administered. The patient tolerated the procedure well and was asked to void before leaving the clinic.     gait, locomotion, and balance/gross motor/muscle strength/ROM

## 2022-03-10 ENCOUNTER — LAB (OUTPATIENT)
Dept: LAB | Facility: CLINIC | Age: 37
End: 2022-03-10
Payer: MEDICARE

## 2022-03-10 DIAGNOSIS — B25.9 CMV (CYTOMEGALOVIRUS INFECTION) (H): ICD-10-CM

## 2022-03-10 DIAGNOSIS — Z48.298 AFTERCARE FOLLOWING ORGAN TRANSPLANT: ICD-10-CM

## 2022-03-10 DIAGNOSIS — Z79.899 ENCOUNTER FOR LONG-TERM CURRENT USE OF MEDICATION: ICD-10-CM

## 2022-03-10 DIAGNOSIS — Z94.0 KIDNEY REPLACED BY TRANSPLANT: ICD-10-CM

## 2022-03-10 DIAGNOSIS — Z20.828 CONTACT WITH AND (SUSPECTED) EXPOSURE TO OTHER VIRAL COMMUNICABLE DISEASES: ICD-10-CM

## 2022-03-10 LAB
ALBUMIN SERPL-MCNC: 3.9 G/DL (ref 3.4–5)
ALP SERPL-CCNC: 143 U/L (ref 40–150)
ALT SERPL W P-5'-P-CCNC: 54 U/L (ref 0–70)
ANION GAP SERPL CALCULATED.3IONS-SCNC: 9 MMOL/L (ref 3–14)
AST SERPL W P-5'-P-CCNC: 21 U/L (ref 0–45)
BILIRUB DIRECT SERPL-MCNC: 0.2 MG/DL (ref 0–0.2)
BILIRUB SERPL-MCNC: 0.6 MG/DL (ref 0.2–1.3)
BUN SERPL-MCNC: 11 MG/DL (ref 7–30)
CALCIUM SERPL-MCNC: 9.4 MG/DL (ref 8.5–10.1)
CHLORIDE BLD-SCNC: 108 MMOL/L (ref 94–109)
CHOLEST SERPL-MCNC: 134 MG/DL
CMV DNA SPEC NAA+PROBE-ACNC: NOT DETECTED IU/ML
CO2 SERPL-SCNC: 24 MMOL/L (ref 20–32)
CREAT SERPL-MCNC: 1.05 MG/DL (ref 0.66–1.25)
CREAT UR-MCNC: 106 MG/DL
ERYTHROCYTE [DISTWIDTH] IN BLOOD BY AUTOMATED COUNT: 12.7 % (ref 10–15)
FASTING STATUS PATIENT QL REPORTED: YES
GFR SERPL CREATININE-BSD FRML MDRD: >90 ML/MIN/1.73M2
GLUCOSE BLD-MCNC: 102 MG/DL (ref 70–99)
HBA1C MFR BLD: 5.5 % (ref 0–5.6)
HCT VFR BLD AUTO: 41.2 % (ref 40–53)
HDLC SERPL-MCNC: 54 MG/DL
HGB BLD-MCNC: 14.2 G/DL (ref 13.3–17.7)
LDLC SERPL CALC-MCNC: 64 MG/DL
MAGNESIUM SERPL-MCNC: 1.5 MG/DL (ref 1.6–2.3)
MCH RBC QN AUTO: 30.3 PG (ref 26.5–33)
MCHC RBC AUTO-ENTMCNC: 34.5 G/DL (ref 31.5–36.5)
MCV RBC AUTO: 88 FL (ref 78–100)
NONHDLC SERPL-MCNC: 80 MG/DL
PHOSPHATE SERPL-MCNC: 2.5 MG/DL (ref 2.5–4.5)
PLATELET # BLD AUTO: 265 10E3/UL (ref 150–450)
POTASSIUM BLD-SCNC: 3 MMOL/L (ref 3.4–5.3)
PROT SERPL-MCNC: 7.3 G/DL (ref 6.8–8.8)
PROT UR-MCNC: 0.11 G/L
PROT/CREAT 24H UR: 0.1 G/G CR (ref 0–0.2)
RBC # BLD AUTO: 4.69 10E6/UL (ref 4.4–5.9)
SODIUM SERPL-SCNC: 141 MMOL/L (ref 133–144)
TACROLIMUS BLD-MCNC: 7.5 UG/L (ref 5–15)
TME LAST DOSE: NORMAL H
TME LAST DOSE: NORMAL H
TRIGL SERPL-MCNC: 81 MG/DL
URATE SERPL-MCNC: 6 MG/DL (ref 3.5–7.2)
WBC # BLD AUTO: 4.2 10E3/UL (ref 4–11)

## 2022-03-10 PROCEDURE — 84156 ASSAY OF PROTEIN URINE: CPT

## 2022-03-10 PROCEDURE — 87799 DETECT AGENT NOS DNA QUANT: CPT

## 2022-03-10 PROCEDURE — 80061 LIPID PANEL: CPT

## 2022-03-10 PROCEDURE — 80180 DRUG SCRN QUAN MYCOPHENOLATE: CPT

## 2022-03-10 PROCEDURE — 83735 ASSAY OF MAGNESIUM: CPT

## 2022-03-10 PROCEDURE — 82248 BILIRUBIN DIRECT: CPT

## 2022-03-10 PROCEDURE — 84100 ASSAY OF PHOSPHORUS: CPT

## 2022-03-10 PROCEDURE — 85027 COMPLETE CBC AUTOMATED: CPT

## 2022-03-10 PROCEDURE — 80197 ASSAY OF TACROLIMUS: CPT

## 2022-03-10 PROCEDURE — 84550 ASSAY OF BLOOD/URIC ACID: CPT

## 2022-03-10 PROCEDURE — 83036 HEMOGLOBIN GLYCOSYLATED A1C: CPT

## 2022-03-10 PROCEDURE — 80053 COMPREHEN METABOLIC PANEL: CPT

## 2022-03-10 PROCEDURE — 36415 COLL VENOUS BLD VENIPUNCTURE: CPT

## 2022-03-11 ENCOUNTER — TELEPHONE (OUTPATIENT)
Dept: TRANSPLANT | Facility: CLINIC | Age: 37
End: 2022-03-11
Payer: MEDICARE

## 2022-03-11 ENCOUNTER — VIRTUAL VISIT (OUTPATIENT)
Dept: NEPHROLOGY | Facility: CLINIC | Age: 37
End: 2022-03-11
Attending: TRANSPLANT SURGERY
Payer: MEDICARE

## 2022-03-11 VITALS — DIASTOLIC BLOOD PRESSURE: 90 MMHG | SYSTOLIC BLOOD PRESSURE: 139 MMHG

## 2022-03-11 DIAGNOSIS — Z48.298 AFTERCARE FOLLOWING ORGAN TRANSPLANT: ICD-10-CM

## 2022-03-11 DIAGNOSIS — B25.9 CYTOMEGALOVIRUS (CMV) VIREMIA (H): ICD-10-CM

## 2022-03-11 DIAGNOSIS — D84.9 IMMUNOSUPPRESSION (H): ICD-10-CM

## 2022-03-11 DIAGNOSIS — E87.6 HYPOKALEMIA: Primary | ICD-10-CM

## 2022-03-11 DIAGNOSIS — Z29.89 NEED FOR PNEUMOCYSTIS PROPHYLAXIS: ICD-10-CM

## 2022-03-11 DIAGNOSIS — E55.9 VITAMIN D DEFICIENCY: ICD-10-CM

## 2022-03-11 DIAGNOSIS — E83.42 HYPOMAGNESEMIA: ICD-10-CM

## 2022-03-11 DIAGNOSIS — N25.81 SECONDARY RENAL HYPERPARATHYROIDISM (H): ICD-10-CM

## 2022-03-11 DIAGNOSIS — I15.1 HTN, KIDNEY TRANSPLANT RELATED: Primary | ICD-10-CM

## 2022-03-11 DIAGNOSIS — Z94.0 HTN, KIDNEY TRANSPLANT RELATED: Primary | ICD-10-CM

## 2022-03-11 LAB
BKV DNA # SPEC NAA+PROBE: NOT DETECTED COPIES/ML
MYCOPHENOLATE SERPL LC/MS/MS-MCNC: 2.6 MG/L (ref 1–3.5)
MYCOPHENOLATE-G SERPL LC/MS/MS-MCNC: 31.3 MG/L (ref 30–95)
TME LAST DOSE: NORMAL H
TME LAST DOSE: NORMAL H

## 2022-03-11 PROCEDURE — G0463 HOSPITAL OUTPT CLINIC VISIT: HCPCS | Mod: PN,RTG

## 2022-03-11 PROCEDURE — 99214 OFFICE O/P EST MOD 30 MIN: CPT | Mod: 95

## 2022-03-11 RX ORDER — LOSARTAN POTASSIUM 25 MG/1
25 TABLET ORAL AT BEDTIME
Qty: 90 TABLET | Refills: 3 | Status: SHIPPED | OUTPATIENT
Start: 2022-03-11 | End: 2023-03-05

## 2022-03-11 ASSESSMENT — PAIN SCALES - GENERAL: PAINLEVEL: NO PAIN (0)

## 2022-03-11 NOTE — LETTER
3/11/2022    RE: Kailash Sorto  [address]  [place] [facility] [address]     Kailash is a 36 year old who is being evaluated via a billable video visit.      How would you like to obtain your AVS? MyChart  If the video visit is dropped, the invitation should be resent by: Text to cell phone: [phone]  Will anyone else be joining your video visit? No    Video Start Time: 1000  Video-Visit Details  Type of service:  Video Visit  Video End Time:1021  Originating Location (pt. Location): Home  Distant Location (provider location):  [facility] [place]   Platform used for Video Visit: [facility]    TRANSPLANT NEPHROLOGY EARLY POST TRANSPLANT VISIT    Assessment & Plan   # DDKT: Stable   - Baseline Creatinine: ~ 0.9-1.1   - Proteinuria: Normal (<0.2 grams)   - Date DSA Last Checked: Jan/2022      Latest DSA: No cPRA: 2%   - BK Viremia: No   - Kidney Tx Biopsy: No    # Immunosuppression: Tacrolimus immediate release (goal 8-10) and Mycophenolate mofetil (dose 500 mg every 12 hours)   - Induction with Recent Transplant:  Intermediate Intensity   - Continue with intensive monitoring of immunosuppression for efficacy and toxicity.   - Changes: Yes - With CMV negative, will increase mycophenolate mofetil back up to 750 mg every 12 hours.    # Infection Prophylaxis:   - PJP: Sulfa/TMP (Bactrim)  - CMV: Valcyte, induction dosing for CMV treatment    # Hypertension: Borderline control;  Goal BP: < 130/80   - Changes: Yes - Will start losartan 25 mg daily.    # Mineral Bone Disorder:   - Secondary renal hyperparathyroidism; PTH level: Mildly elevated (151-300 pg/ml)        On treatment: None  - Vitamin D; level: Low        On supplement: Yes  - Calcium; level: Normal        On supplement: No    # Electrolytes:   - Potassium; level: Low        On supplement: No; Discussed increased dietary potassium and will start on ARB.  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   - Recommend continuing present Valcyte until a second consecutive CMV PCR is negative.  Would then decrease to maintenance Valcyte dosing and check CMV IgG Ab.  If CMV IgG Ab is positive, could then stop Valcyte after two weeks of maintenance dosing.  However, if CMV IgG Ab is negative, would extend out the maintenance dosing further.    # Medical Compliance: Yes    # COVID-19 Virus Review: Discussed COVID-19 virus and the potential medical risks.  Reviewed preventative health recommendations, including wearing a mask where appropriate.  Recommended COVID vaccination should be up to date with either an initial vaccination or booster shot when appropriate.  Asked the patient to inform the transplant center if they are exposed or diagnosed with this virus.    # COVID Vaccination Up To Date: Not vaccinated    # Transplant History:  Etiology of Kidney Failure: Unknown etiology  Tx: DDKT  Transplant: 9/13/2021 (Kidney)  Donor Type: Donation after Brain Death Donor Class:   Crossmatch at time of Tx: negative  DSA at time of Tx: No  Significant changes in immunosuppression: None  CMV IgG Ab High Risk Discordance (D+/R-): Yes  EBV IgG Ab High Risk Discordance (D+/R-): No  Significant transplant-related complications: CMV Viremia    Transplant Office Phone Number: [phone]    Assessment and plan was discussed with the patient and he voiced his understanding and agreement.    Return visit: Return in about 3 months (around 6/11/2022).    Yoni Abdi MD    Chief Complaint   Mr. Sorto is a 36 year old here for kidney transplant and immunosuppression management.     History of Present Illness    Mr. Sorto reports feeling good overall with some medical complaints.  Since last clinic visit, patient reports no hospitalizations or new medical complaints and has been doing well  overall.  His energy level has been pretty good and mostly normal now.  He is active, although just getting a little exercise during the winter, but tends to be busier in the summer playing softball and other outdoor activities.  Denies any chest pain or shortness of breath with exertion.  No leg swelling.    Appetite is good and weight is stable.  No nausea, vomiting or diarrhea.  No fever, sweats or chills.  No night sweats.    Home BP: 140/90s    Problem List   Patient Active Problem List   Diagnosis     Hyperlipidemia LDL goal <100     HTN, kidney transplant related     AVF (arteriovenous fistula) (H)     Kidney replaced by transplant     Neutropenia (H)     Aftercare following organ transplant     Need for pneumocystis prophylaxis     Immunosuppression (H)     Vitamin D deficiency     Hypomagnesemia     Cytomegalovirus (CMV) viremia (H)     Secondary renal hyperparathyroidism (H)       Allergies   Allergies   Allergen Reactions     Valcyte [Valganciclovir] Other (See Comments)     Cannot receive this medication while on CMV monoclonal antibody study, due to end 12/9/2021.       Medications   Current Outpatient Medications   Medication Sig     acetaminophen (TYLENOL) 325 MG tablet Take 2 tablets (650 mg) by mouth every 6 hours as needed for other (For optimal non-opioid multimodal pain management to improve pain control.)     atorvastatin (LIPITOR) 10 MG tablet Take 1 tablet (10 mg) by mouth daily     losartan (COZAAR) 25 MG tablet Take 1 tablet (25 mg) by mouth At Bedtime     magnesium oxide (MAG-OX) 400 MG tablet Take 1 tablet (400 mg) by mouth daily     NIFEdipine ER OSMOTIC (PROCARDIA XL) 30 MG 24 hr tablet Take 1 tablet (30 mg) by mouth daily     sulfamethoxazole-trimethoprim (BACTRIM) 400-80 MG tablet Take 1 tablet by mouth daily     tacrolimus (GENERIC EQUIVALENT) 0.5 MG capsule HOLD for dose change     tacrolimus (GENERIC EQUIVALENT) 1 MG capsule Take 2 capsules (2 mg) by mouth 2 times daily      valGANciclovir (VALCYTE) 450 MG tablet Take 2 tablets (900 mg) by mouth 2 times daily     vitamin D3 (CHOLECALCIFEROL) 50 mcg (2000 units) tablet Take 1 tablet (50 mcg) by mouth daily     mycophenolate (GENERIC EQUIVALENT) 250 MG capsule Take 3 capsules (750 mg) by mouth 2 times daily     study - NPC-21 vs. placebo, IDS# 5665, 6 mg/kg; 12 mg/kg; or placebo in 0.9% sodium chloride intermittent infusion Inject 300 mLs into the vein once for 1 dose     No current facility-administered medications for this visit.     Medications Discontinued During This Encounter   Medication Reason     biotin 1000 MCG TABS tablet        Physical Exam   Vital Signs: BP (!) 139/90     GENERAL APPEARANCE: alert and no distress  HENT: no obvious abnormalities on appearance  RESP: breathing appears unremarkable with normal rate, no audible wheezing or cough and no apparent shortness of breath with conversation  MS: extremities normal - no gross deformities noted  SKIN: no apparent rash and normal skin tone  NEURO: speech is clear with no obvious neurological deficits  PSYCH: mentation appears normal and affect normal    Data     Renal Latest Ref Rng & Units 3/24/2022 3/10/2022 2/25/2022   Na 133 - 144 mmol/L 140 141 143   K 3.4 - 5.3 mmol/L 4.0 3.0(L) 3.5   Cl 94 - 109 mmol/L 107 108 110(H)   CO2 20 - 32 mmol/L 27 24 28   BUN 7 - 30 mg/dL 11 11 10   Cr 0.66 - 1.25 mg/dL 1.28(H) 1.05 1.09   Glucose 70 - 99 mg/dL 113(H) 102(H) 100(H)   Ca  8.5 - 10.1 mg/dL 9.6 9.4 9.1   Mg 1.6 - 2.3 mg/dL 1.8 1.5(L) 1.6     Bone Health Latest Ref Rng & Units 3/24/2022 3/10/2022 2/25/2022   Phos 2.5 - 4.5 mg/dL 4.0 2.5 2.0(L)   PTHi 18 - 80 pg/mL - - -   Vit D Def 20 - 75 ug/L - - -     Heme Latest Ref Rng & Units 3/24/2022 3/10/2022 2/25/2022   WBC 4.0 - 11.0 10e3/uL 2.1(L) 4.2 1.6(L)   Hgb 13.3 - 17.7 g/dL 14.4 14.2 14.0   Plt 150 - 450 10e3/uL 245 265 266   ABSOLUTE NEUTROPHIL 1.6 - 8.3 10e3/uL - - -   ABSOLUTE LYMPHOCYTES 0.8 - 5.3 10e3/uL - - -   ABSOLUTE  MONOCYTES 0.0 - 1.3 10e3/uL - - -   ABSOLUTE EOSINOPHILS 0.0 - 0.7 10e3/uL - - -   ABSOLUTE BASOPHILS 0.0 - 0.2 10e9/L - - -   ABS IMMATURE GRANULOCYTES 0 - 0.4 10e9/L - - -   ABSOLUTE NUCLEATED RBC - - - -     Liver Latest Ref Rng & Units 3/10/2022 9/13/2021 7/13/2020   AP 40 - 150 U/L 143 122 -   TBili 0.2 - 1.3 mg/dL 0.6 0.4 -   DBili 0.0 - 0.2 mg/dL 0.2 - -   ALT 0 - 70 U/L 54 24 20   AST 0 - 45 U/L 21 15 20   Tot Protein 6.8 - 8.8 g/dL 7.3 7.7 -   Albumin 3.4 - 5.0 g/dL 3.9 3.8 -     Pancreas Latest Ref Rng & Units 3/10/2022 9/13/2021   A1C 0.0 - 5.6 % 5.5 5.3     Iron studies Latest Ref Rng & Units 9/17/2021 9/14/2021 5/11/2016   Iron 35 - 180 ug/dL 123 18(L) 38   Iron sat 15 - 46 % 69(H) 10(L) 17   Ferritin 26 - 388 ng/mL 770(H) - -     [facility] Txp Virology Latest Ref Rng & Units 3/24/2022 3/10/2022 2/10/2022   CMV QUANT IU/ML Not Detected IU/mL <137(A) Not Detected -   LOG IU/ML OF CMVQNT - <2.1 - 2.6   EBV CAPSID ANTIBODY IGG No detectable antibody. - - -   Hep B Core NR - - -   HIV 1&2 NEG - - -        Recent Labs   Lab Test 02/25/22  0851 03/10/22  0859 03/24/22  0920   DOSTAC 2/24/2022 3/9/2022 3/23/2022   TACROL 7.1 7.5 7.7     Recent Labs   Lab Test 02/25/22  0851 03/10/22  0859 03/24/22  0920   DOSMPA 2/24/2022   8:45 PM 3/9/2022   8:45 PM 3/23/2022   9:19 PM   MPACID 1.39 2.60 2.53   MPAG 24.9* 31.3 47.1       Yoni Abdi MD

## 2022-03-11 NOTE — LETTER
3/11/2022     RE: Kailash Sorto  [address]  [place] [place] [address]     Dear Colleague,    Thank you for referring your patient, Kailash Sorto, to the [facility] [place] at [facility]. Please see a copy of my visit note below.    Kailash is a 36 year old who is being evaluated via a billable video visit.      How would you like to obtain your AVS? MyChart  If the video visit is dropped, the invitation should be resent by: Text to cell phone: [phone]  Will anyone else be joining your video visit? No    Video Start Time: 1000  Video-Visit Details    Type of service:  Video Visit    Video End Time:1021    Originating Location (pt. Location): Home    Distant Location (provider location):  [facility] [place]     Platform used for Video Visit: [facility]      TRANSPLANT NEPHROLOGY EARLY POST TRANSPLANT VISIT    Assessment & Plan   # DDKT: Stable   - Baseline Creatinine: ~ 0.9-1.1   - Proteinuria: Normal (<0.2 grams)   - Date DSA Last Checked: Jan/2022      Latest DSA: No cPRA: 2%   - BK Viremia: No   - Kidney Tx Biopsy: No    # Immunosuppression: Tacrolimus immediate release (goal 8-10) and Mycophenolate mofetil (dose 500 mg every 12 hours)   - Induction with Recent Transplant:  Intermediate Intensity   - Continue with intensive monitoring of immunosuppression for efficacy and toxicity.   - Changes: Yes - With CMV negative, will increase mycophenolate mofetil back up to 750 mg every 12 hours.    # Infection Prophylaxis:   - PJP: Sulfa/TMP (Bactrim)  - CMV: Valcyte, induction dosing for CMV treatment    # Hypertension: Borderline control;  Goal BP: < 130/80   - Changes: Yes - Will start losartan 25 mg daily.    # Mineral Bone Disorder:   - Secondary renal hyperparathyroidism; PTH level: Mildly elevated (151-300 pg/ml)        On treatment: None  - Vitamin D; level: Low        On supplement: Yes  -  Calcium; level: Normal        On supplement: No    # Electrolytes:   - Potassium; level: Low        On supplement: No; Discussed increased dietary potassium and will start on ARB.  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   - Recommend continuing present Valcyte until a second consecutive CMV PCR is negative.  Would then decrease to maintenance Valcyte dosing and check CMV IgG Ab.  If CMV IgG Ab is positive, could then stop Valcyte after two weeks of maintenance dosing.  However, if CMV IgG Ab is negative, would extend out the maintenance dosing further.    # Medical Compliance: Yes    # COVID-19 Virus Review: Discussed COVID-19 virus and the potential medical risks.  Reviewed preventative health recommendations, including wearing a mask where appropriate.  Recommended COVID vaccination should be up to date with either an initial vaccination or booster shot when appropriate.  Asked the patient to inform the transplant center if they are exposed or diagnosed with this virus.    # COVID Vaccination Up To Date: Not vaccinated    # Transplant History:  Etiology of Kidney Failure: Unknown etiology  Tx: DDKT  Transplant: 9/13/2021 (Kidney)  Donor Type: Donation after Brain Death Donor Class:   Crossmatch at time of Tx: negative  DSA at time of Tx: No  Significant changes in immunosuppression: None  CMV IgG Ab High Risk Discordance (D+/R-): Yes  EBV IgG Ab High Risk Discordance (D+/R-): No  Significant transplant-related complications: CMV Viremia    Transplant Office Phone Number: [phone]    Assessment and plan was discussed with the patient and he voiced his understanding and agreement.    Return visit: Return in about 3 months (around 6/11/2022).    Yoni Abdi MD    Chief Complaint   Mr. Sorto is a 36 year old here for kidney transplant and  immunosuppression management.     History of Present Illness    Mr. Sorto reports feeling good overall with some medical complaints.  Since last clinic visit, patient reports no hospitalizations or new medical complaints and has been doing well overall.  His energy level has been pretty good and mostly normal now.  He is active, although just getting a little exercise during the winter, but tends to be busier in the summer playing softball and other outdoor activities.  Denies any chest pain or shortness of breath with exertion.  No leg swelling.    Appetite is good and weight is stable.  No nausea, vomiting or diarrhea.  No fever, sweats or chills.  No night sweats.    Home BP: 140/90s    Problem List   Patient Active Problem List   Diagnosis     Hyperlipidemia LDL goal <100     HTN, kidney transplant related     AVF (arteriovenous fistula) (H)     Kidney replaced by transplant     Neutropenia (H)     Aftercare following organ transplant     Need for pneumocystis prophylaxis     Immunosuppression (H)     Vitamin D deficiency     Hypomagnesemia     Cytomegalovirus (CMV) viremia (H)     Secondary renal hyperparathyroidism (H)       Allergies   Allergies   Allergen Reactions     Valcyte [Valganciclovir] Other (See Comments)     Cannot receive this medication while on CMV monoclonal antibody study, due to end 12/9/2021.       Medications   Current Outpatient Medications   Medication Sig     acetaminophen (TYLENOL) 325 MG tablet Take 2 tablets (650 mg) by mouth every 6 hours as needed for other (For optimal non-opioid multimodal pain management to improve pain control.)     atorvastatin (LIPITOR) 10 MG tablet Take 1 tablet (10 mg) by mouth daily     losartan (COZAAR) 25 MG tablet Take 1 tablet (25 mg) by mouth At Bedtime     magnesium oxide (MAG-OX) 400 MG tablet Take 1 tablet (400 mg) by mouth daily     NIFEdipine ER OSMOTIC (PROCARDIA XL) 30 MG 24 hr tablet Take 1 tablet (30 mg) by mouth daily      sulfamethoxazole-trimethoprim (BACTRIM) 400-80 MG tablet Take 1 tablet by mouth daily     tacrolimus (GENERIC EQUIVALENT) 0.5 MG capsule HOLD for dose change     tacrolimus (GENERIC EQUIVALENT) 1 MG capsule Take 2 capsules (2 mg) by mouth 2 times daily     valGANciclovir (VALCYTE) 450 MG tablet Take 2 tablets (900 mg) by mouth 2 times daily     vitamin D3 (CHOLECALCIFEROL) 50 mcg (2000 units) tablet Take 1 tablet (50 mcg) by mouth daily     mycophenolate (GENERIC EQUIVALENT) 250 MG capsule Take 3 capsules (750 mg) by mouth 2 times daily     study - NPC-21 vs. placebo, IDS# 5665, 6 mg/kg; 12 mg/kg; or placebo in 0.9% sodium chloride intermittent infusion Inject 300 mLs into the vein once for 1 dose     No current facility-administered medications for this visit.     Medications Discontinued During This Encounter   Medication Reason     biotin 1000 MCG TABS tablet        Physical Exam   Vital Signs: BP (!) 139/90     GENERAL APPEARANCE: alert and no distress  HENT: no obvious abnormalities on appearance  RESP: breathing appears unremarkable with normal rate, no audible wheezing or cough and no apparent shortness of breath with conversation  MS: extremities normal - no gross deformities noted  SKIN: no apparent rash and normal skin tone  NEURO: speech is clear with no obvious neurological deficits  PSYCH: mentation appears normal and affect normal    Data     Renal Latest Ref Rng & Units 3/24/2022 3/10/2022 2/25/2022   Na 133 - 144 mmol/L 140 141 143   K 3.4 - 5.3 mmol/L 4.0 3.0(L) 3.5   Cl 94 - 109 mmol/L 107 108 110(H)   CO2 20 - 32 mmol/L 27 24 28   BUN 7 - 30 mg/dL 11 11 10   Cr 0.66 - 1.25 mg/dL 1.28(H) 1.05 1.09   Glucose 70 - 99 mg/dL 113(H) 102(H) 100(H)   Ca  8.5 - 10.1 mg/dL 9.6 9.4 9.1   Mg 1.6 - 2.3 mg/dL 1.8 1.5(L) 1.6     Bone Health Latest Ref Rng & Units 3/24/2022 3/10/2022 2/25/2022   Phos 2.5 - 4.5 mg/dL 4.0 2.5 2.0(L)   PTHi 18 - 80 pg/mL - - -   Vit D Def 20 - 75 ug/L - - -     Heme Latest Ref Rng &  Units 3/24/2022 3/10/2022 2/25/2022   WBC 4.0 - 11.0 10e3/uL 2.1(L) 4.2 1.6(L)   Hgb 13.3 - 17.7 g/dL 14.4 14.2 14.0   Plt 150 - 450 10e3/uL 245 265 266   ABSOLUTE NEUTROPHIL 1.6 - 8.3 10e3/uL - - -   ABSOLUTE LYMPHOCYTES 0.8 - 5.3 10e3/uL - - -   ABSOLUTE MONOCYTES 0.0 - 1.3 10e3/uL - - -   ABSOLUTE EOSINOPHILS 0.0 - 0.7 10e3/uL - - -   ABSOLUTE BASOPHILS 0.0 - 0.2 10e9/L - - -   ABS IMMATURE GRANULOCYTES 0 - 0.4 10e9/L - - -   ABSOLUTE NUCLEATED RBC - - - -     Liver Latest Ref Rng & Units 3/10/2022 9/13/2021 7/13/2020   AP 40 - 150 U/L 143 122 -   TBili 0.2 - 1.3 mg/dL 0.6 0.4 -   DBili 0.0 - 0.2 mg/dL 0.2 - -   ALT 0 - 70 U/L 54 24 20   AST 0 - 45 U/L 21 15 20   Tot Protein 6.8 - 8.8 g/dL 7.3 7.7 -   Albumin 3.4 - 5.0 g/dL 3.9 3.8 -     Pancreas Latest Ref Rng & Units 3/10/2022 9/13/2021   A1C 0.0 - 5.6 % 5.5 5.3     Iron studies Latest Ref Rng & Units 9/17/2021 9/14/2021 5/11/2016   Iron 35 - 180 ug/dL 123 18(L) 38   Iron sat 15 - 46 % 69(H) 10(L) 17   Ferritin 26 - 388 ng/mL 770(H) - -     [facility] Txp Virology Latest Ref Rng & Units 3/24/2022 3/10/2022 2/10/2022   CMV QUANT IU/ML Not Detected IU/mL <137(A) Not Detected -   LOG IU/ML OF CMVQNT - <2.1 - 2.6   EBV CAPSID ANTIBODY IGG No detectable antibody. - - -   Hep B Core NR - - -   HIV 1&2 NEG - - -        Recent Labs   Lab Test 02/25/22  0851 03/10/22  0859 03/24/22  0920   DOSTAC 2/24/2022 3/9/2022 3/23/2022   TACROL 7.1 7.5 7.7     Recent Labs   Lab Test 02/25/22  0851 03/10/22  0859 03/24/22  0920   DOSMPA 2/24/2022   8:45 PM 3/9/2022   8:45 PM 3/23/2022   9:19 PM   MPACID 1.39 2.60 2.53   MPAG 24.9* 31.3 47.1       Again, thank you for allowing me to participate in the care of your patient.      Sincerely,    [facility] Transplant

## 2022-03-11 NOTE — PROGRESS NOTES
Kailash is a 36 year old who is being evaluated via a billable video visit.      How would you like to obtain your AVS? MyChart  If the video visit is dropped, the invitation should be resent by: Text to cell phone: [phone]  Will anyone else be joining your video visit? No    Video Start Time: 1000  Video-Visit Details    Type of service:  Video Visit    Video End Time:1021    Originating Location (pt. Location): Home    Distant Location (provider location):  [facility] [place]     Platform used for Video Visit: [employer]      TRANSPLANT NEPHROLOGY EARLY POST TRANSPLANT VISIT    Assessment & Plan   # DDKT: Stable   - Baseline Creatinine: ~ 0.9-1.1   - Proteinuria: Normal (<0.2 grams)   - Date DSA Last Checked: Jan/2022      Latest DSA: No cPRA: 2%   - BK Viremia: No   - Kidney Tx Biopsy: No    # Immunosuppression: Tacrolimus immediate release (goal 8-10) and Mycophenolate mofetil (dose 500 mg every 12 hours)   - Induction with Recent Transplant:  Intermediate Intensity   - Continue with intensive monitoring of immunosuppression for efficacy and toxicity.   - Changes: Yes - With CMV negative, will increase mycophenolate mofetil back up to 750 mg every 12 hours.    # Infection Prophylaxis:   - PJP: Sulfa/TMP (Bactrim)  - CMV: Valcyte, induction dosing for CMV treatment    # Hypertension: Borderline control;  Goal BP: < 130/80   - Changes: Yes - Will start losartan 25 mg daily.    # Mineral Bone Disorder:   - Secondary renal hyperparathyroidism; PTH level: Mildly elevated (151-300 pg/ml)        On treatment: None  - Vitamin D; level: Low        On supplement: Yes  - Calcium; level: Normal        On supplement: No    # Electrolytes:   - Potassium; level: Low        On supplement: No; Discussed increased dietary potassium and will start on ARB.  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   - Recommend continuing present Valcyte until a second consecutive CMV PCR is negative.  Would then decrease to maintenance Valcyte dosing and check CMV IgG Ab.  If CMV IgG Ab is positive, could then stop Valcyte after two weeks of maintenance dosing.  However, if CMV IgG Ab is negative, would extend out the maintenance dosing further.    # Medical Compliance: Yes    # COVID-19 Virus Review: Discussed COVID-19 virus and the potential medical risks.  Reviewed preventative health recommendations, including wearing a mask where appropriate.  Recommended COVID vaccination should be up to date with either an initial vaccination or booster shot when appropriate.  Asked the patient to inform the transplant center if they are exposed or diagnosed with this virus.    # COVID Vaccination Up To Date: Not vaccinated    # Transplant History:  Etiology of Kidney Failure: Unknown etiology  Tx: DDKT  Transplant: 9/13/2021 (Kidney)  Donor Type: Donation after Brain Death Donor Class:   Crossmatch at time of Tx: negative  DSA at time of Tx: No  Significant changes in immunosuppression: None  CMV IgG Ab High Risk Discordance (D+/R-): Yes  EBV IgG Ab High Risk Discordance (D+/R-): No  Significant transplant-related complications: CMV Viremia    Transplant Office Phone Number: [phone]    Assessment and plan was discussed with the patient and he voiced his understanding and agreement.    Return visit: Return in about 3 months (around 6/11/2022).    Yoni Abdi MD    Chief Complaint   Mr. Sorto is a 36 year old here for kidney transplant and immunosuppression management.     History of Present Illness    Mr. Sorto reports feeling good overall with some medical complaints.  Since last clinic visit, patient reports no hospitalizations or new medical complaints and has been doing well overall.  His energy level has been pretty good and mostly normal now.  He is active,  although just getting a little exercise during the winter, but tends to be busier in the summer playing softball and other outdoor activities.  Denies any chest pain or shortness of breath with exertion.  No leg swelling.    Appetite is good and weight is stable.  No nausea, vomiting or diarrhea.  No fever, sweats or chills.  No night sweats.    Home BP: 140/90s    Problem List   Patient Active Problem List   Diagnosis     Hyperlipidemia LDL goal <100     HTN, kidney transplant related     AVF (arteriovenous fistula) (H)     Kidney replaced by transplant     Neutropenia (H)     Aftercare following organ transplant     Need for pneumocystis prophylaxis     Immunosuppression (H)     Vitamin D deficiency     Hypomagnesemia     Cytomegalovirus (CMV) viremia (H)     Secondary renal hyperparathyroidism (H)       Allergies   Allergies   Allergen Reactions     Valcyte [Valganciclovir] Other (See Comments)     Cannot receive this medication while on CMV monoclonal antibody study, due to end 12/9/2021.       Medications   Current Outpatient Medications   Medication Sig     acetaminophen (TYLENOL) 325 MG tablet Take 2 tablets (650 mg) by mouth every 6 hours as needed for other (For optimal non-opioid multimodal pain management to improve pain control.)     atorvastatin (LIPITOR) 10 MG tablet Take 1 tablet (10 mg) by mouth daily     losartan (COZAAR) 25 MG tablet Take 1 tablet (25 mg) by mouth At Bedtime     magnesium oxide (MAG-OX) 400 MG tablet Take 1 tablet (400 mg) by mouth daily     NIFEdipine ER OSMOTIC (PROCARDIA XL) 30 MG 24 hr tablet Take 1 tablet (30 mg) by mouth daily     sulfamethoxazole-trimethoprim (BACTRIM) 400-80 MG tablet Take 1 tablet by mouth daily     tacrolimus (GENERIC EQUIVALENT) 0.5 MG capsule HOLD for dose change     tacrolimus (GENERIC EQUIVALENT) 1 MG capsule Take 2 capsules (2 mg) by mouth 2 times daily     valGANciclovir (VALCYTE) 450 MG tablet Take 2 tablets (900 mg) by mouth 2 times daily      vitamin D3 (CHOLECALCIFEROL) 50 mcg (2000 units) tablet Take 1 tablet (50 mcg) by mouth daily     mycophenolate (GENERIC EQUIVALENT) 250 MG capsule Take 3 capsules (750 mg) by mouth 2 times daily     study - NPC-21 vs. placebo, IDS# [id number], 6 mg/kg; 12 mg/kg; or placebo in 0.9% sodium chloride intermittent infusion Inject 300 mLs into the vein once for 1 dose     No current facility-administered medications for this visit.     Medications Discontinued During This Encounter   Medication Reason     biotin 1000 MCG TABS tablet        Physical Exam   Vital Signs: BP (!) 139/90     GENERAL APPEARANCE: alert and no distress  HENT: no obvious abnormalities on appearance  RESP: breathing appears unremarkable with normal rate, no audible wheezing or cough and no apparent shortness of breath with conversation  MS: extremities normal - no gross deformities noted  SKIN: no apparent rash and normal skin tone  NEURO: speech is clear with no obvious neurological deficits  PSYCH: mentation appears normal and affect normal    Data     Renal Latest Ref Rng & Units 3/24/2022 3/10/2022 2/25/2022   Na 133 - 144 mmol/L 140 141 143   K 3.4 - 5.3 mmol/L 4.0 3.0(L) 3.5   Cl 94 - 109 mmol/L 107 108 110(H)   CO2 20 - 32 mmol/L 27 24 28   BUN 7 - 30 mg/dL 11 11 10   Cr 0.66 - 1.25 mg/dL 1.28(H) 1.05 1.09   Glucose 70 - 99 mg/dL 113(H) 102(H) 100(H)   Ca  8.5 - 10.1 mg/dL 9.6 9.4 9.1   Mg 1.6 - 2.3 mg/dL 1.8 1.5(L) 1.6     Bone Health Latest Ref Rng & Units 3/24/2022 3/10/2022 2/25/2022   Phos 2.5 - 4.5 mg/dL 4.0 2.5 2.0(L)   PTHi 18 - 80 pg/mL - - -   Vit D Def 20 - 75 ug/L - - -     Heme Latest Ref Rng & Units 3/24/2022 3/10/2022 2/25/2022   WBC 4.0 - 11.0 10e3/uL 2.1(L) 4.2 1.6(L)   Hgb 13.3 - 17.7 g/dL 14.4 14.2 14.0   Plt 150 - 450 10e3/uL 245 265 266   ABSOLUTE NEUTROPHIL 1.6 - 8.3 10e3/uL - - -   ABSOLUTE LYMPHOCYTES 0.8 - 5.3 10e3/uL - - -   ABSOLUTE MONOCYTES 0.0 - 1.3 10e3/uL - - -   ABSOLUTE EOSINOPHILS 0.0 - 0.7 10e3/uL - - -    ABSOLUTE BASOPHILS 0.0 - 0.2 10e9/L - - -   ABS IMMATURE GRANULOCYTES 0 - 0.4 10e9/L - - -   ABSOLUTE NUCLEATED RBC - - - -     Liver Latest Ref Rng & Units 3/10/2022 9/13/2021 7/13/2020   AP 40 - 150 U/L 143 122 -   TBili 0.2 - 1.3 mg/dL 0.6 0.4 -   DBili 0.0 - 0.2 mg/dL 0.2 - -   ALT 0 - 70 U/L 54 24 20   AST 0 - 45 U/L 21 15 20   Tot Protein 6.8 - 8.8 g/dL 7.3 7.7 -   Albumin 3.4 - 5.0 g/dL 3.9 3.8 -     Pancreas Latest Ref Rng & Units 3/10/2022 9/13/2021   A1C 0.0 - 5.6 % 5.5 5.3     Iron studies Latest Ref Rng & Units 9/17/2021 9/14/2021 5/11/2016   Iron 35 - 180 ug/dL 123 18(L) 38   Iron sat 15 - 46 % 69(H) 10(L) 17   Ferritin 26 - 388 ng/mL 770(H) - -     [facility] Txp Virology Latest Ref Rng & Units 3/24/2022 3/10/2022 2/10/2022   CMV QUANT IU/ML Not Detected IU/mL <137(A) Not Detected -   LOG IU/ML OF CMVQNT - <2.1 - 2.6   EBV CAPSID ANTIBODY IGG No detectable antibody. - - -   Hep B Core NR - - -   HIV 1&2 NEG - - -        Recent Labs   Lab Test 02/25/22  0851 03/10/22  0859 03/24/22  0920   DOSTAC 2/24/2022 3/9/2022 3/23/2022   TACROL 7.1 7.5 7.7     Recent Labs   Lab Test 02/25/22  0851 03/10/22  0859 03/24/22  0920   DOSMPA 2/24/2022   8:45 PM 3/9/2022   8:45 PM 3/23/2022   9:19 PM   MPACID 1.39 2.60 2.53   MPAG 24.9* 31.3 47.1

## 2022-03-11 NOTE — TELEPHONE ENCOUNTER
ISSUE:  K 3.0    Left message with patient to evaluate GI losses or new medications. Repeat labs on Monday.     Cristino Martin MD Poucher, Jessica, RN  Potassium is low?   Needs increase his dietary intake and repeat asap

## 2022-03-14 LAB — ALLOSURE DD-CFDNA: 0.32 %

## 2022-03-16 DIAGNOSIS — Z94.0 KIDNEY TRANSPLANT RECIPIENT: Primary | ICD-10-CM

## 2022-03-16 RX ORDER — MYCOPHENOLATE MOFETIL 250 MG/1
750 CAPSULE ORAL 2 TIMES DAILY
Qty: 540 CAPSULE | Refills: 3 | Status: SHIPPED | OUTPATIENT
Start: 2022-03-16 | End: 2022-05-11

## 2022-03-24 ENCOUNTER — TELEPHONE (OUTPATIENT)
Dept: NEPHROLOGY | Facility: CLINIC | Age: 37
End: 2022-03-24

## 2022-03-24 ENCOUNTER — TELEPHONE (OUTPATIENT)
Dept: TRANSPLANT | Facility: CLINIC | Age: 37
End: 2022-03-24

## 2022-03-24 ENCOUNTER — LAB (OUTPATIENT)
Dept: LAB | Facility: CLINIC | Age: 37
End: 2022-03-24
Payer: MEDICARE

## 2022-03-24 DIAGNOSIS — B25.9 CMV (CYTOMEGALOVIRUS INFECTION) (H): ICD-10-CM

## 2022-03-24 DIAGNOSIS — Z94.0 KIDNEY REPLACED BY TRANSPLANT: ICD-10-CM

## 2022-03-24 DIAGNOSIS — Z79.899 ENCOUNTER FOR LONG-TERM CURRENT USE OF MEDICATION: ICD-10-CM

## 2022-03-24 DIAGNOSIS — Z20.828 CONTACT WITH AND (SUSPECTED) EXPOSURE TO OTHER VIRAL COMMUNICABLE DISEASES: ICD-10-CM

## 2022-03-24 DIAGNOSIS — E87.6 HYPOKALEMIA: ICD-10-CM

## 2022-03-24 DIAGNOSIS — Z48.298 AFTERCARE FOLLOWING ORGAN TRANSPLANT: ICD-10-CM

## 2022-03-24 DIAGNOSIS — Z48.298 AFTERCARE FOLLOWING ORGAN TRANSPLANT: Primary | ICD-10-CM

## 2022-03-24 LAB
ANION GAP SERPL CALCULATED.3IONS-SCNC: 6 MMOL/L (ref 3–14)
BUN SERPL-MCNC: 11 MG/DL (ref 7–30)
CALCIUM SERPL-MCNC: 9.6 MG/DL (ref 8.5–10.1)
CHLORIDE BLD-SCNC: 107 MMOL/L (ref 94–109)
CMV DNA SPEC NAA+PROBE-ACNC: <137 IU/ML
CMV DNA SPEC NAA+PROBE-LOG#: <2.1 {LOG_COPIES}/ML
CO2 SERPL-SCNC: 27 MMOL/L (ref 20–32)
CREAT SERPL-MCNC: 1.28 MG/DL (ref 0.66–1.25)
ERYTHROCYTE [DISTWIDTH] IN BLOOD BY AUTOMATED COUNT: 13.2 % (ref 10–15)
GFR SERPL CREATININE-BSD FRML MDRD: 75 ML/MIN/1.73M2
GLUCOSE BLD-MCNC: 113 MG/DL (ref 70–99)
HCT VFR BLD AUTO: 42.9 % (ref 40–53)
HGB BLD-MCNC: 14.4 G/DL (ref 13.3–17.7)
MAGNESIUM SERPL-MCNC: 1.8 MG/DL (ref 1.6–2.3)
MCH RBC QN AUTO: 30.2 PG (ref 26.5–33)
MCHC RBC AUTO-ENTMCNC: 33.6 G/DL (ref 31.5–36.5)
MCV RBC AUTO: 90 FL (ref 78–100)
PHOSPHATE SERPL-MCNC: 4 MG/DL (ref 2.5–4.5)
PLATELET # BLD AUTO: 245 10E3/UL (ref 150–450)
POTASSIUM BLD-SCNC: 4 MMOL/L (ref 3.4–5.3)
RBC # BLD AUTO: 4.77 10E6/UL (ref 4.4–5.9)
SODIUM SERPL-SCNC: 140 MMOL/L (ref 133–144)
TACROLIMUS BLD-MCNC: 7.7 UG/L (ref 5–15)
TME LAST DOSE: NORMAL H
TME LAST DOSE: NORMAL H
WBC # BLD AUTO: 2.1 10E3/UL (ref 4–11)

## 2022-03-24 PROCEDURE — 80197 ASSAY OF TACROLIMUS: CPT

## 2022-03-24 PROCEDURE — 84100 ASSAY OF PHOSPHORUS: CPT

## 2022-03-24 PROCEDURE — 83735 ASSAY OF MAGNESIUM: CPT

## 2022-03-24 PROCEDURE — 80180 DRUG SCRN QUAN MYCOPHENOLATE: CPT

## 2022-03-24 PROCEDURE — 85027 COMPLETE CBC AUTOMATED: CPT

## 2022-03-24 PROCEDURE — 36415 COLL VENOUS BLD VENIPUNCTURE: CPT

## 2022-03-24 PROCEDURE — 80048 BASIC METABOLIC PNL TOTAL CA: CPT

## 2022-03-24 NOTE — TELEPHONE ENCOUNTER
DATE:  3/24/2022     TIME OF RECEIPT FROM LAB:  1116    ORDERING PROVIDER:     LAB TEST:  WBC    LAB VALUE:  1.9    RESULTS GIVEN WITH READ-BACK TO (PROVIDER):  CORTEZ ACEVES    TIME LAB VALUE REPORTED TO PROVIDER:   1119

## 2022-03-24 NOTE — TELEPHONE ENCOUNTER
[facility] Call Center    Phone Message    May a detailed message be left on voicemail: yes     Reason for Call: [facility] Lab called to relay critical lab results for the patient. Writer put them on hold to transfer to Transplant and the caller hung up. Please review results. Thank you.    Action Taken: Message routed to:  Other: Transplant    Travel Screening: Not Applicable

## 2022-03-25 LAB
MYCOPHENOLATE SERPL LC/MS/MS-MCNC: 2.53 MG/L (ref 1–3.5)
MYCOPHENOLATE-G SERPL LC/MS/MS-MCNC: 47.1 MG/L (ref 30–95)
TME LAST DOSE: NORMAL H
TME LAST DOSE: NORMAL H

## 2022-03-31 PROBLEM — Z48.298 AFTERCARE FOLLOWING ORGAN TRANSPLANT: Status: ACTIVE | Noted: 2022-03-31

## 2022-03-31 PROBLEM — Z29.89 NEED FOR PNEUMOCYSTIS PROPHYLAXIS: Status: ACTIVE | Noted: 2022-03-31

## 2022-03-31 PROBLEM — B25.9 CYTOMEGALOVIRUS (CMV) VIREMIA (H): Status: ACTIVE | Noted: 2022-03-31

## 2022-03-31 PROBLEM — Z94.0 HTN, KIDNEY TRANSPLANT RELATED: Status: ACTIVE | Noted: 2018-07-26

## 2022-03-31 PROBLEM — I15.1 HTN, KIDNEY TRANSPLANT RELATED: Status: ACTIVE | Noted: 2018-07-26

## 2022-03-31 PROBLEM — E55.9 VITAMIN D DEFICIENCY: Status: ACTIVE | Noted: 2022-03-31

## 2022-03-31 PROBLEM — Z94.0 KIDNEY REPLACED BY TRANSPLANT: Status: ACTIVE | Noted: 2021-09-13

## 2022-03-31 PROBLEM — N25.81 SECONDARY RENAL HYPERPARATHYROIDISM (H): Status: ACTIVE | Noted: 2022-03-31

## 2022-03-31 PROBLEM — D84.9 IMMUNOSUPPRESSION (H): Status: ACTIVE | Noted: 2022-03-31

## 2022-03-31 PROBLEM — Z76.82 KIDNEY TRANSPLANT CANDIDATE: Status: RESOLVED | Noted: 2021-09-13 | Resolved: 2022-03-31

## 2022-03-31 PROBLEM — E83.42 HYPOMAGNESEMIA: Status: ACTIVE | Noted: 2022-03-31

## 2022-04-07 ENCOUNTER — LAB (OUTPATIENT)
Dept: LAB | Facility: CLINIC | Age: 37
End: 2022-04-07
Payer: MEDICARE

## 2022-04-07 DIAGNOSIS — Z94.0 KIDNEY REPLACED BY TRANSPLANT: ICD-10-CM

## 2022-04-07 DIAGNOSIS — B25.9 CMV (CYTOMEGALOVIRUS INFECTION) (H): ICD-10-CM

## 2022-04-07 DIAGNOSIS — Z79.899 ENCOUNTER FOR LONG-TERM CURRENT USE OF MEDICATION: ICD-10-CM

## 2022-04-07 DIAGNOSIS — Z48.298 AFTERCARE FOLLOWING ORGAN TRANSPLANT: ICD-10-CM

## 2022-04-07 DIAGNOSIS — Z20.828 CONTACT WITH AND (SUSPECTED) EXPOSURE TO OTHER VIRAL COMMUNICABLE DISEASES: ICD-10-CM

## 2022-04-07 LAB
ANION GAP SERPL CALCULATED.3IONS-SCNC: 9 MMOL/L (ref 3–14)
BUN SERPL-MCNC: 11 MG/DL (ref 7–30)
CALCIUM SERPL-MCNC: 9 MG/DL (ref 8.5–10.1)
CHLORIDE BLD-SCNC: 109 MMOL/L (ref 94–109)
CMV DNA IU/ML, INSTRUMENT: 160 IU/ML
CMV DNA SPEC NAA+PROBE-LOG#: 2.2 {LOG_COPIES}/ML
CO2 SERPL-SCNC: 22 MMOL/L (ref 20–32)
CREAT SERPL-MCNC: 1.06 MG/DL (ref 0.66–1.25)
ERYTHROCYTE [DISTWIDTH] IN BLOOD BY AUTOMATED COUNT: 12.9 % (ref 10–15)
GFR SERPL CREATININE-BSD FRML MDRD: >90 ML/MIN/1.73M2
GLUCOSE BLD-MCNC: 101 MG/DL (ref 70–99)
HCT VFR BLD AUTO: 40.5 % (ref 40–53)
HGB BLD-MCNC: 13.7 G/DL (ref 13.3–17.7)
MAGNESIUM SERPL-MCNC: 1.4 MG/DL (ref 1.6–2.3)
MCH RBC QN AUTO: 30.3 PG (ref 26.5–33)
MCHC RBC AUTO-ENTMCNC: 33.8 G/DL (ref 31.5–36.5)
MCV RBC AUTO: 90 FL (ref 78–100)
PHOSPHATE SERPL-MCNC: 3.2 MG/DL (ref 2.5–4.5)
PLATELET # BLD AUTO: 252 10E3/UL (ref 150–450)
POTASSIUM BLD-SCNC: 3.3 MMOL/L (ref 3.4–5.3)
RBC # BLD AUTO: 4.52 10E6/UL (ref 4.4–5.9)
SODIUM SERPL-SCNC: 140 MMOL/L (ref 133–144)
TACROLIMUS BLD-MCNC: 6.7 UG/L (ref 5–15)
TME LAST DOSE: NORMAL H
TME LAST DOSE: NORMAL H
WBC # BLD AUTO: 2.3 10E3/UL (ref 4–11)

## 2022-04-07 PROCEDURE — 80197 ASSAY OF TACROLIMUS: CPT

## 2022-04-07 PROCEDURE — 84100 ASSAY OF PHOSPHORUS: CPT

## 2022-04-07 PROCEDURE — 80048 BASIC METABOLIC PNL TOTAL CA: CPT

## 2022-04-07 PROCEDURE — 87799 DETECT AGENT NOS DNA QUANT: CPT

## 2022-04-07 PROCEDURE — 85027 COMPLETE CBC AUTOMATED: CPT

## 2022-04-07 PROCEDURE — 36415 COLL VENOUS BLD VENIPUNCTURE: CPT

## 2022-04-07 PROCEDURE — 83735 ASSAY OF MAGNESIUM: CPT

## 2022-04-08 DIAGNOSIS — Z94.0 KIDNEY TRANSPLANT RECIPIENT: Primary | ICD-10-CM

## 2022-04-08 LAB — BKV DNA # SPEC NAA+PROBE: NOT DETECTED COPIES/ML

## 2022-04-08 RX ORDER — TACROLIMUS 1 MG/1
2 CAPSULE ORAL 2 TIMES DAILY
Qty: 120 CAPSULE | Refills: 11 | Status: SHIPPED | OUTPATIENT
Start: 2022-04-08 | End: 2022-04-18

## 2022-04-08 NOTE — TELEPHONE ENCOUNTER
Patient reported dosage change:    Tacrolimus 1mg /0.5mg  1.5mg BID    Please verify and send new rx

## 2022-04-14 ENCOUNTER — TELEPHONE (OUTPATIENT)
Dept: NEPHROLOGY | Facility: CLINIC | Age: 37
End: 2022-04-14
Payer: MEDICARE

## 2022-04-15 DIAGNOSIS — Z48.298 AFTERCARE FOLLOWING ORGAN TRANSPLANT: Primary | ICD-10-CM

## 2022-04-15 DIAGNOSIS — B25.9 CMV (CYTOMEGALOVIRUS INFECTION) (H): ICD-10-CM

## 2022-04-18 DIAGNOSIS — Z94.0 KIDNEY TRANSPLANT RECIPIENT: Primary | ICD-10-CM

## 2022-04-18 DIAGNOSIS — Z48.298 AFTERCARE FOLLOWING ORGAN TRANSPLANT: ICD-10-CM

## 2022-04-18 RX ORDER — TACROLIMUS 0.5 MG/1
0.5 CAPSULE ORAL 2 TIMES DAILY
Qty: 60 CAPSULE | Refills: 11 | Status: SHIPPED | OUTPATIENT
Start: 2022-04-18 | End: 2022-11-04

## 2022-04-18 RX ORDER — TACROLIMUS 1 MG/1
1 CAPSULE ORAL 2 TIMES DAILY
Qty: 60 CAPSULE | Refills: 11 | Status: SHIPPED | OUTPATIENT
Start: 2022-04-18 | End: 2022-11-04

## 2022-04-21 ENCOUNTER — TELEPHONE (OUTPATIENT)
Dept: TRANSPLANT | Facility: CLINIC | Age: 37
End: 2022-04-21

## 2022-04-21 ENCOUNTER — LAB (OUTPATIENT)
Dept: LAB | Facility: CLINIC | Age: 37
End: 2022-04-21
Payer: MEDICARE

## 2022-04-21 DIAGNOSIS — Z20.828 CONTACT WITH AND (SUSPECTED) EXPOSURE TO OTHER VIRAL COMMUNICABLE DISEASES: ICD-10-CM

## 2022-04-21 DIAGNOSIS — B25.9 CMV (CYTOMEGALOVIRUS INFECTION) (H): ICD-10-CM

## 2022-04-21 DIAGNOSIS — Z48.298 AFTERCARE FOLLOWING ORGAN TRANSPLANT: ICD-10-CM

## 2022-04-21 DIAGNOSIS — Z79.899 ENCOUNTER FOR LONG-TERM CURRENT USE OF MEDICATION: ICD-10-CM

## 2022-04-21 DIAGNOSIS — Z94.0 KIDNEY REPLACED BY TRANSPLANT: ICD-10-CM

## 2022-04-21 LAB
ANION GAP SERPL CALCULATED.3IONS-SCNC: 6 MMOL/L (ref 3–14)
BUN SERPL-MCNC: 9 MG/DL (ref 7–30)
CALCIUM SERPL-MCNC: 9.3 MG/DL (ref 8.5–10.1)
CHLORIDE BLD-SCNC: 109 MMOL/L (ref 94–109)
CMV IGG SERPL IA-ACNC: 1.2 U/ML
CMV IGG SERPL IA-ACNC: ABNORMAL
CO2 SERPL-SCNC: 26 MMOL/L (ref 20–32)
CREAT SERPL-MCNC: 1.05 MG/DL (ref 0.66–1.25)
ERYTHROCYTE [DISTWIDTH] IN BLOOD BY AUTOMATED COUNT: 13 % (ref 10–15)
GFR SERPL CREATININE-BSD FRML MDRD: >90 ML/MIN/1.73M2
GLUCOSE BLD-MCNC: 100 MG/DL (ref 70–99)
HCT VFR BLD AUTO: 41.9 % (ref 40–53)
HGB BLD-MCNC: 14.2 G/DL (ref 13.3–17.7)
MAGNESIUM SERPL-MCNC: 1.6 MG/DL (ref 1.6–2.3)
MCH RBC QN AUTO: 30 PG (ref 26.5–33)
MCHC RBC AUTO-ENTMCNC: 33.9 G/DL (ref 31.5–36.5)
MCV RBC AUTO: 89 FL (ref 78–100)
PHOSPHATE SERPL-MCNC: 2.8 MG/DL (ref 2.5–4.5)
PLATELET # BLD AUTO: 244 10E3/UL (ref 150–450)
POTASSIUM BLD-SCNC: 3.4 MMOL/L (ref 3.4–5.3)
RBC # BLD AUTO: 4.73 10E6/UL (ref 4.4–5.9)
SODIUM SERPL-SCNC: 141 MMOL/L (ref 133–144)
TACROLIMUS BLD-MCNC: 6.8 UG/L (ref 5–15)
TME LAST DOSE: NORMAL H
TME LAST DOSE: NORMAL H
WBC # BLD AUTO: 1.5 10E3/UL (ref 4–11)

## 2022-04-21 PROCEDURE — 86833 HLA CLASS II HIGH DEFIN QUAL: CPT

## 2022-04-21 PROCEDURE — 86832 HLA CLASS I HIGH DEFIN QUAL: CPT

## 2022-04-21 PROCEDURE — 83735 ASSAY OF MAGNESIUM: CPT

## 2022-04-21 PROCEDURE — 82784 ASSAY IGA/IGD/IGG/IGM EACH: CPT

## 2022-04-21 PROCEDURE — 80048 BASIC METABOLIC PNL TOTAL CA: CPT

## 2022-04-21 PROCEDURE — 85027 COMPLETE CBC AUTOMATED: CPT

## 2022-04-21 PROCEDURE — 36415 COLL VENOUS BLD VENIPUNCTURE: CPT

## 2022-04-21 PROCEDURE — 80197 ASSAY OF TACROLIMUS: CPT

## 2022-04-21 PROCEDURE — 84100 ASSAY OF PHOSPHORUS: CPT

## 2022-04-21 PROCEDURE — 86644 CMV ANTIBODY: CPT

## 2022-04-21 NOTE — TELEPHONE ENCOUNTER
DATE:  4/21/2022     TIME OF RECEIPT FROM LAB:  0955    ORDERING PROVIDER: Dr. Martin    LAB TEST:  WBC    LAB VALUE:  1.5    RESULTS GIVEN WITH READ-BACK TO (PROVIDER):  LILI Garcia    TIME LAB VALUE REPORTED TO PROVIDER:   100

## 2022-04-22 LAB
DONOR IDENTIFICATION: NORMAL
DSA COMMENTS: NORMAL
DSA PRESENT: NO
DSA TEST METHOD: NORMAL
IGG SERPL-MCNC: 874 MG/DL (ref 610–1616)
ORGAN: NORMAL
SA 1 CELL: NORMAL
SA 1 TEST METHOD: NORMAL
SA 2 CELL: NORMAL
SA 2 TEST METHOD: NORMAL
SA1 HI RISK ABY: NORMAL
SA1 MOD RISK ABY: NORMAL
SA2 HI RISK ABY: NORMAL
SA2 MOD RISK ABY: NORMAL
UNACCEPTABLE ANTIGENS: NORMAL
UNOS CPRA: 2
ZZZSA 1  COMMENTS: NORMAL
ZZZSA 2 COMMENTS: NORMAL

## 2022-05-02 DIAGNOSIS — Z94.0 KIDNEY TRANSPLANT RECIPIENT: ICD-10-CM

## 2022-05-02 DIAGNOSIS — B25.9 CMV (CYTOMEGALOVIRUS INFECTION) (H): Primary | ICD-10-CM

## 2022-05-03 ENCOUNTER — TELEPHONE (OUTPATIENT)
Dept: TRANSPLANT | Facility: CLINIC | Age: 37
End: 2022-05-03
Payer: MEDICARE

## 2022-05-03 DIAGNOSIS — Z48.298 AFTERCARE FOLLOWING ORGAN TRANSPLANT: Primary | ICD-10-CM

## 2022-05-03 DIAGNOSIS — B25.9 CMV (CYTOMEGALOVIRUS INFECTION) (H): ICD-10-CM

## 2022-05-03 RX ORDER — VALGANCICLOVIR 450 MG/1
900 TABLET, FILM COATED ORAL 2 TIMES DAILY
Qty: 120 TABLET | Refills: 3 | Status: SHIPPED | OUTPATIENT
Start: 2022-05-03 | End: 2022-07-28

## 2022-05-03 NOTE — TELEPHONE ENCOUNTER
ISSUE:  Overdue for labs/CMV    Spoke with Kailash, planning on doing labs tomorrow. Will ensure CMV is drawn.

## 2022-05-05 ENCOUNTER — LAB (OUTPATIENT)
Dept: LAB | Facility: CLINIC | Age: 37
End: 2022-05-05
Payer: MEDICARE

## 2022-05-05 ENCOUNTER — TELEPHONE (OUTPATIENT)
Dept: TRANSPLANT | Facility: CLINIC | Age: 37
End: 2022-05-05

## 2022-05-05 DIAGNOSIS — B25.9 CMV (CYTOMEGALOVIRUS INFECTION) (H): ICD-10-CM

## 2022-05-05 DIAGNOSIS — Z48.298 AFTERCARE FOLLOWING ORGAN TRANSPLANT: ICD-10-CM

## 2022-05-05 DIAGNOSIS — Z94.0 KIDNEY TRANSPLANT RECIPIENT: ICD-10-CM

## 2022-05-05 LAB
ANION GAP SERPL CALCULATED.3IONS-SCNC: 5 MMOL/L (ref 3–14)
BASOPHILS # BLD MANUAL: 0 10E3/UL (ref 0–0.2)
BASOPHILS NFR BLD MANUAL: 1 %
BUN SERPL-MCNC: 8 MG/DL (ref 7–30)
CALCIUM SERPL-MCNC: 8.7 MG/DL (ref 8.5–10.1)
CHLORIDE BLD-SCNC: 109 MMOL/L (ref 94–109)
CO2 SERPL-SCNC: 27 MMOL/L (ref 20–32)
CREAT SERPL-MCNC: 1.11 MG/DL (ref 0.66–1.25)
EOSINOPHIL # BLD MANUAL: 0.1 10E3/UL (ref 0–0.7)
EOSINOPHIL NFR BLD MANUAL: 4 %
ERYTHROCYTE [DISTWIDTH] IN BLOOD BY AUTOMATED COUNT: 12.5 % (ref 10–15)
GFR SERPL CREATININE-BSD FRML MDRD: 89 ML/MIN/1.73M2
GLUCOSE BLD-MCNC: 98 MG/DL (ref 70–99)
HCT VFR BLD AUTO: 42.3 % (ref 40–53)
HGB BLD-MCNC: 14.1 G/DL (ref 13.3–17.7)
LYMPHOCYTES # BLD MANUAL: 1.2 10E3/UL (ref 0.8–5.3)
LYMPHOCYTES NFR BLD MANUAL: 66 %
MAGNESIUM SERPL-MCNC: 1.4 MG/DL (ref 1.6–2.3)
MCH RBC QN AUTO: 30 PG (ref 26.5–33)
MCHC RBC AUTO-ENTMCNC: 33.3 G/DL (ref 31.5–36.5)
MCV RBC AUTO: 90 FL (ref 78–100)
MONOCYTES # BLD MANUAL: 0.1 10E3/UL (ref 0–1.3)
MONOCYTES NFR BLD MANUAL: 8 %
NEUTROPHILS # BLD MANUAL: 0.4 10E3/UL (ref 1.6–8.3)
NEUTROPHILS NFR BLD MANUAL: 21 %
PHOSPHATE SERPL-MCNC: 2.9 MG/DL (ref 2.5–4.5)
PLAT MORPH BLD: ABNORMAL
PLATELET # BLD AUTO: 256 10E3/UL (ref 150–450)
POTASSIUM BLD-SCNC: 3.5 MMOL/L (ref 3.4–5.3)
RBC # BLD AUTO: 4.7 10E6/UL (ref 4.4–5.9)
RBC MORPH BLD: ABNORMAL
SODIUM SERPL-SCNC: 141 MMOL/L (ref 133–144)
TACROLIMUS BLD-MCNC: 7.8 UG/L (ref 5–15)
TME LAST DOSE: NORMAL H
TME LAST DOSE: NORMAL H
WBC # BLD AUTO: 1.8 10E3/UL (ref 4–11)

## 2022-05-05 PROCEDURE — 80197 ASSAY OF TACROLIMUS: CPT

## 2022-05-05 PROCEDURE — 80048 BASIC METABOLIC PNL TOTAL CA: CPT

## 2022-05-05 PROCEDURE — 85027 COMPLETE CBC AUTOMATED: CPT

## 2022-05-05 PROCEDURE — 83735 ASSAY OF MAGNESIUM: CPT

## 2022-05-05 PROCEDURE — 36415 COLL VENOUS BLD VENIPUNCTURE: CPT

## 2022-05-05 PROCEDURE — 84100 ASSAY OF PHOSPHORUS: CPT

## 2022-05-05 PROCEDURE — 87799 DETECT AGENT NOS DNA QUANT: CPT

## 2022-05-05 PROCEDURE — 85007 BL SMEAR W/DIFF WBC COUNT: CPT

## 2022-05-05 PROCEDURE — 80180 DRUG SCRN QUAN MYCOPHENOLATE: CPT

## 2022-05-05 RX ORDER — ATORVASTATIN CALCIUM 10 MG/1
10 TABLET, FILM COATED ORAL DAILY
Qty: 30 TABLET | Refills: 1 | Status: SHIPPED | OUTPATIENT
Start: 2022-05-05 | End: 2022-09-23

## 2022-05-05 NOTE — TELEPHONE ENCOUNTER
DATE:  5/5/2022     TIME OF RECEIPT FROM LAB:  1022    ORDERING PROVIDER:     LAB TEST:  WBC 1.9    LAB VALUE:  ANC 0.5    RESULTS GIVEN WITH READ-BACK TO (PROVIDER):  CORTEZ ACEVES    TIME LAB VALUE REPORTED TO PROVIDER:   1021

## 2022-05-05 NOTE — TELEPHONE ENCOUNTER
Adan called to report after running the differential the ANC is 0.4. No call back required unless necessary

## 2022-05-06 ENCOUNTER — TELEPHONE (OUTPATIENT)
Dept: TRANSPLANT | Facility: CLINIC | Age: 37
End: 2022-05-06
Payer: MEDICARE

## 2022-05-06 LAB
BKV DNA # SPEC NAA+PROBE: NOT DETECTED COPIES/ML
CMV DNA SPEC NAA+PROBE-ACNC: <137 IU/ML
CMV DNA SPEC NAA+PROBE-LOG#: <2.1 {LOG_COPIES}/ML
MYCOPHENOLATE SERPL LC/MS/MS-MCNC: 3.38 MG/L (ref 1–3.5)
MYCOPHENOLATE-G SERPL LC/MS/MS-MCNC: 29.2 MG/L (ref 30–95)
TME LAST DOSE: ABNORMAL H
TME LAST DOSE: ABNORMAL H

## 2022-05-06 NOTE — TELEPHONE ENCOUNTER
ISSUE:  WBC 1.8  ANC 0.4     Rober Goodwin MD Poucher, Jessica, RN  Needs neupogen please. Thanks     Therapy plans updated, sent to [facility] for scheduling. Left VM for patient.

## 2022-05-09 NOTE — TELEPHONE ENCOUNTER
CMV <137  CMV IgG 1.2    Current immunosuppression:   - mg bid (last MPA level 3.4)  -tacrolimus goal 6-8 (last tac level 7.8)    Currently on treatment dose Valcyte, 900 mg bid.    Kailash resistant to Neupogen, feels great. Requests he repeat labs to confirm the medication is needed. Sent to Dr. Goodwin for review.

## 2022-05-10 DIAGNOSIS — Z94.0 KIDNEY TRANSPLANT RECIPIENT: Primary | ICD-10-CM

## 2022-05-10 NOTE — TELEPHONE ENCOUNTER
Dr Goodwin recommends decreasing MMF to 500mg BID.   Recheck labs 1 week.   If CMV <137 again, consider dropping valcyte to ppx dosing.

## 2022-05-10 NOTE — TELEPHONE ENCOUNTER
ISSUE  Low WBC  CMV <137 x1    Patient does not want to do neupogen injections    PLAN:  ----- Message from Rober Goodwin MD sent at 5/10/2022  7:44 AM CDT -----  I would consider it a seroconversion. He needs 1 more negative or <137 CMV and then we can go to ppx dosing. Let's decrease MMF to 500mg bid please. Good call    Consider dropping valcyte to ppx dosing if another CMV <137  Please notify patient to reduce MMF to 500mg BID.     Majo Kevin RN BSN  Transplant Care Coordinator

## 2022-05-11 RX ORDER — MYCOPHENOLATE MOFETIL 250 MG/1
500 CAPSULE ORAL 2 TIMES DAILY
Qty: 360 CAPSULE | Refills: 3 | Status: SHIPPED | OUTPATIENT
Start: 2022-05-11 | End: 2022-09-28

## 2022-05-19 ENCOUNTER — LAB (OUTPATIENT)
Dept: LAB | Facility: CLINIC | Age: 37
End: 2022-05-19
Payer: MEDICARE

## 2022-05-19 ENCOUNTER — TELEPHONE (OUTPATIENT)
Dept: TRANSPLANT | Facility: CLINIC | Age: 37
End: 2022-05-19

## 2022-05-19 DIAGNOSIS — Z48.298 AFTERCARE FOLLOWING ORGAN TRANSPLANT: ICD-10-CM

## 2022-05-19 DIAGNOSIS — B25.9 CMV (CYTOMEGALOVIRUS INFECTION) (H): ICD-10-CM

## 2022-05-19 LAB
BASOPHILS # BLD AUTO: 0 10E3/UL (ref 0–0.2)
BASOPHILS NFR BLD AUTO: 3 %
CMV DNA SPEC NAA+PROBE-ACNC: NOT DETECTED IU/ML
EOSINOPHIL # BLD AUTO: 0.1 10E3/UL (ref 0–0.7)
EOSINOPHIL NFR BLD AUTO: 9 %
ERYTHROCYTE [DISTWIDTH] IN BLOOD BY AUTOMATED COUNT: 12.2 % (ref 10–15)
HCT VFR BLD AUTO: 42.2 % (ref 40–53)
HGB BLD-MCNC: 14.6 G/DL (ref 13.3–17.7)
IMM GRANULOCYTES # BLD: 0 10E3/UL
IMM GRANULOCYTES NFR BLD: 1 %
LYMPHOCYTES # BLD AUTO: 1 10E3/UL (ref 0.8–5.3)
LYMPHOCYTES NFR BLD AUTO: 62 %
MCH RBC QN AUTO: 30.6 PG (ref 26.5–33)
MCHC RBC AUTO-ENTMCNC: 34.6 G/DL (ref 31.5–36.5)
MCV RBC AUTO: 89 FL (ref 78–100)
MONOCYTES # BLD AUTO: 0.1 10E3/UL (ref 0–1.3)
MONOCYTES NFR BLD AUTO: 8 %
MYCOPHENOLATE SERPL LC/MS/MS-MCNC: 1.97 MG/L (ref 1–3.5)
MYCOPHENOLATE-G SERPL LC/MS/MS-MCNC: 31.8 MG/L (ref 30–95)
NEUTROPHILS # BLD AUTO: 0.3 10E3/UL (ref 1.6–8.3)
NEUTROPHILS NFR BLD AUTO: 17 %
NRBC # BLD AUTO: 0 10E3/UL
NRBC BLD AUTO-RTO: 0 /100
PLAT MORPH BLD: NORMAL
PLATELET # BLD AUTO: 225 10E3/UL (ref 150–450)
RBC # BLD AUTO: 4.77 10E6/UL (ref 4.4–5.9)
RBC MORPH BLD: NORMAL
TACROLIMUS BLD-MCNC: 6.9 UG/L (ref 5–15)
TME LAST DOSE: NORMAL H
WBC # BLD AUTO: 1.5 10E3/UL (ref 4–11)

## 2022-05-19 PROCEDURE — 36415 COLL VENOUS BLD VENIPUNCTURE: CPT

## 2022-05-19 PROCEDURE — 87799 DETECT AGENT NOS DNA QUANT: CPT

## 2022-05-19 PROCEDURE — 80180 DRUG SCRN QUAN MYCOPHENOLATE: CPT

## 2022-05-19 PROCEDURE — 84100 ASSAY OF PHOSPHORUS: CPT

## 2022-05-19 PROCEDURE — 80197 ASSAY OF TACROLIMUS: CPT

## 2022-05-19 PROCEDURE — 83735 ASSAY OF MAGNESIUM: CPT

## 2022-05-19 PROCEDURE — 85025 COMPLETE CBC W/AUTO DIFF WBC: CPT

## 2022-05-19 PROCEDURE — 80048 BASIC METABOLIC PNL TOTAL CA: CPT

## 2022-05-19 NOTE — TELEPHONE ENCOUNTER
DATE:  5/19/2022     TIME OF RECEIPT FROM LAB:  9:15    ORDERING PROVIDER: Rober Goodwin    LAB TEST:  wbc    LAB VALUE:  1.5    LAB TEST:  Absolute neutrophils    LAB VALUE:  0.3    RESULTS GIVEN WITH READ-BACK TO (PROVIDER):  Yas Card    TIME LAB VALUE REPORTED TO PROVIDER:   9:26

## 2022-05-20 LAB
ANION GAP SERPL CALCULATED.3IONS-SCNC: 8 MMOL/L (ref 3–14)
BKV DNA # SPEC NAA+PROBE: NOT DETECTED COPIES/ML
BUN SERPL-MCNC: 10 MG/DL (ref 7–30)
CALCIUM SERPL-MCNC: 9.2 MG/DL (ref 8.5–10.1)
CHLORIDE BLD-SCNC: 112 MMOL/L (ref 94–109)
CO2 SERPL-SCNC: 23 MMOL/L (ref 20–32)
CREAT SERPL-MCNC: 1.07 MG/DL (ref 0.66–1.25)
GFR SERPL CREATININE-BSD FRML MDRD: >90 ML/MIN/1.73M2
GLUCOSE BLD-MCNC: 99 MG/DL (ref 70–99)
MAGNESIUM SERPL-MCNC: 1.8 MG/DL (ref 1.6–2.3)
PHOSPHATE SERPL-MCNC: 3.3 MG/DL (ref 2.5–4.5)
POTASSIUM BLD-SCNC: 3.7 MMOL/L (ref 3.4–5.3)
SODIUM SERPL-SCNC: 143 MMOL/L (ref 133–144)

## 2022-06-01 DIAGNOSIS — I10 HYPERTENSION: Primary | ICD-10-CM

## 2022-06-01 RX ORDER — NIFEDIPINE 30 MG/1
30 TABLET, EXTENDED RELEASE ORAL DAILY
Qty: 30 TABLET | Refills: 0 | Status: SHIPPED | OUTPATIENT
Start: 2022-06-01 | End: 2023-03-23

## 2022-06-22 ENCOUNTER — TELEPHONE (OUTPATIENT)
Dept: TRANSPLANT | Facility: CLINIC | Age: 37
End: 2022-06-22

## 2022-06-22 NOTE — TELEPHONE ENCOUNTER
ISSUE:  Overdue for labs, has lab appt on 6/30.     Left detailed VM for patient requesting labs sooner than 6/30 and once weekly going forward.

## 2022-06-23 ENCOUNTER — LAB (OUTPATIENT)
Dept: LAB | Facility: CLINIC | Age: 37
End: 2022-06-23
Payer: MEDICARE

## 2022-06-23 DIAGNOSIS — B25.9 CMV (CYTOMEGALOVIRUS INFECTION) (H): ICD-10-CM

## 2022-06-23 DIAGNOSIS — Z48.298 AFTERCARE FOLLOWING ORGAN TRANSPLANT: ICD-10-CM

## 2022-06-23 LAB
ANION GAP SERPL CALCULATED.3IONS-SCNC: 8 MMOL/L (ref 3–14)
BASOPHILS # BLD MANUAL: 0 10E3/UL (ref 0–0.2)
BASOPHILS NFR BLD MANUAL: 0 %
BUN SERPL-MCNC: 11 MG/DL (ref 7–30)
CALCIUM SERPL-MCNC: 8.9 MG/DL (ref 8.5–10.1)
CHLORIDE BLD-SCNC: 111 MMOL/L (ref 94–109)
CMV DNA SPEC NAA+PROBE-ACNC: <137 IU/ML
CMV DNA SPEC NAA+PROBE-LOG#: <2.1 {LOG_COPIES}/ML
CO2 SERPL-SCNC: 25 MMOL/L (ref 20–32)
CREAT SERPL-MCNC: 1.01 MG/DL (ref 0.66–1.25)
EOSINOPHIL # BLD MANUAL: 0.1 10E3/UL (ref 0–0.7)
EOSINOPHIL NFR BLD MANUAL: 5 %
ERYTHROCYTE [DISTWIDTH] IN BLOOD BY AUTOMATED COUNT: 11.7 % (ref 10–15)
GFR SERPL CREATININE-BSD FRML MDRD: >90 ML/MIN/1.73M2
GLUCOSE BLD-MCNC: 103 MG/DL (ref 70–99)
HCT VFR BLD AUTO: 41.5 % (ref 40–53)
HGB BLD-MCNC: 14.7 G/DL (ref 13.3–17.7)
LYMPHOCYTES # BLD MANUAL: 1.4 10E3/UL (ref 0.8–5.3)
LYMPHOCYTES NFR BLD MANUAL: 56 %
MAGNESIUM SERPL-MCNC: 1.6 MG/DL (ref 1.6–2.3)
MCH RBC QN AUTO: 30.7 PG (ref 26.5–33)
MCHC RBC AUTO-ENTMCNC: 35.4 G/DL (ref 31.5–36.5)
MCV RBC AUTO: 87 FL (ref 78–100)
MONOCYTES # BLD MANUAL: 0.3 10E3/UL (ref 0–1.3)
MONOCYTES NFR BLD MANUAL: 11 %
MYCOPHENOLATE SERPL LC/MS/MS-MCNC: 1.12 MG/L (ref 1–3.5)
MYCOPHENOLATE-G SERPL LC/MS/MS-MCNC: 14.9 MG/L (ref 30–95)
MYELOCYTES # BLD MANUAL: 0 10E3/UL
MYELOCYTES NFR BLD MANUAL: 1 %
NEUTROPHILS # BLD MANUAL: 0.7 10E3/UL (ref 1.6–8.3)
NEUTROPHILS NFR BLD MANUAL: 27 %
PHOSPHATE SERPL-MCNC: 2.6 MG/DL (ref 2.5–4.5)
PLAT MORPH BLD: ABNORMAL
PLATELET # BLD AUTO: 202 10E3/UL (ref 150–450)
POTASSIUM BLD-SCNC: 3.5 MMOL/L (ref 3.4–5.3)
RBC # BLD AUTO: 4.79 10E6/UL (ref 4.4–5.9)
RBC MORPH BLD: ABNORMAL
SODIUM SERPL-SCNC: 144 MMOL/L (ref 133–144)
TACROLIMUS BLD-MCNC: 8.2 UG/L (ref 5–15)
TME LAST DOSE: ABNORMAL H
TME LAST DOSE: ABNORMAL H
TME LAST DOSE: NORMAL H
TME LAST DOSE: NORMAL H
WBC # BLD AUTO: 2.5 10E3/UL (ref 4–11)

## 2022-06-23 PROCEDURE — 85027 COMPLETE CBC AUTOMATED: CPT | Performed by: PATHOLOGY

## 2022-06-23 PROCEDURE — 84100 ASSAY OF PHOSPHORUS: CPT | Performed by: PATHOLOGY

## 2022-06-23 PROCEDURE — 99000 SPECIMEN HANDLING OFFICE-LAB: CPT | Performed by: PATHOLOGY

## 2022-06-23 PROCEDURE — 80180 DRUG SCRN QUAN MYCOPHENOLATE: CPT | Mod: 90 | Performed by: PATHOLOGY

## 2022-06-23 PROCEDURE — 80197 ASSAY OF TACROLIMUS: CPT | Mod: 90 | Performed by: PATHOLOGY

## 2022-06-23 PROCEDURE — 85007 BL SMEAR W/DIFF WBC COUNT: CPT | Performed by: PATHOLOGY

## 2022-06-23 PROCEDURE — 36415 COLL VENOUS BLD VENIPUNCTURE: CPT | Performed by: PATHOLOGY

## 2022-06-23 PROCEDURE — 80048 BASIC METABOLIC PNL TOTAL CA: CPT | Performed by: PATHOLOGY

## 2022-06-23 PROCEDURE — 87799 DETECT AGENT NOS DNA QUANT: CPT | Mod: 90 | Performed by: PATHOLOGY

## 2022-06-23 PROCEDURE — 83735 ASSAY OF MAGNESIUM: CPT | Performed by: PATHOLOGY

## 2022-06-24 LAB — BKV DNA # SPEC NAA+PROBE: NOT DETECTED COPIES/ML

## 2022-06-28 ENCOUNTER — TELEPHONE (OUTPATIENT)
Dept: TRANSPLANT | Facility: CLINIC | Age: 37
End: 2022-06-28

## 2022-06-28 NOTE — TELEPHONE ENCOUNTER
ISSUE:  CMV remains undetected on ppx dose Valcyte (reduced from treatment dose on 5/20/22)   CMV IgG now positive.     Rober Goodwin MD Poucher, Jessica, RN  Ok to stop Valcyte and let's just check CMV weekly for 2 weeks. If still negative ok to space out      Kailash verbalized understanding.

## 2022-07-07 ENCOUNTER — LAB (OUTPATIENT)
Dept: LAB | Facility: CLINIC | Age: 37
End: 2022-07-07
Payer: COMMERCIAL

## 2022-07-07 DIAGNOSIS — Z79.899 ENCOUNTER FOR LONG-TERM CURRENT USE OF MEDICATION: ICD-10-CM

## 2022-07-07 DIAGNOSIS — Z94.0 KIDNEY REPLACED BY TRANSPLANT: ICD-10-CM

## 2022-07-07 DIAGNOSIS — B25.9 CMV (CYTOMEGALOVIRUS INFECTION) (H): ICD-10-CM

## 2022-07-07 DIAGNOSIS — Z48.298 AFTERCARE FOLLOWING ORGAN TRANSPLANT: ICD-10-CM

## 2022-07-07 DIAGNOSIS — Z20.828 CONTACT WITH AND (SUSPECTED) EXPOSURE TO OTHER VIRAL COMMUNICABLE DISEASES: ICD-10-CM

## 2022-07-07 LAB
ANION GAP SERPL CALCULATED.3IONS-SCNC: 7 MMOL/L (ref 3–14)
BASOPHILS # BLD AUTO: 0 10E3/UL (ref 0–0.2)
BASOPHILS NFR BLD AUTO: 1 %
BUN SERPL-MCNC: 12 MG/DL (ref 7–30)
CALCIUM SERPL-MCNC: 9.5 MG/DL (ref 8.5–10.1)
CHLORIDE BLD-SCNC: 109 MMOL/L (ref 94–109)
CMV DNA SPEC NAA+PROBE-ACNC: <137 IU/ML
CMV DNA SPEC NAA+PROBE-LOG#: <2.1 {LOG_COPIES}/ML
CO2 SERPL-SCNC: 24 MMOL/L (ref 20–32)
CREAT SERPL-MCNC: 0.97 MG/DL (ref 0.66–1.25)
EOSINOPHIL # BLD AUTO: 0.1 10E3/UL (ref 0–0.7)
EOSINOPHIL NFR BLD AUTO: 5 %
ERYTHROCYTE [DISTWIDTH] IN BLOOD BY AUTOMATED COUNT: 11.6 % (ref 10–15)
GFR SERPL CREATININE-BSD FRML MDRD: >90 ML/MIN/1.73M2
GLUCOSE BLD-MCNC: 128 MG/DL (ref 70–99)
HCT VFR BLD AUTO: 41.7 % (ref 40–53)
HGB BLD-MCNC: 14.6 G/DL (ref 13.3–17.7)
IMM GRANULOCYTES # BLD: 0.1 10E3/UL
IMM GRANULOCYTES NFR BLD: 4 %
LYMPHOCYTES # BLD AUTO: 1.6 10E3/UL (ref 0.8–5.3)
LYMPHOCYTES NFR BLD AUTO: 59 %
MCH RBC QN AUTO: 30.2 PG (ref 26.5–33)
MCHC RBC AUTO-ENTMCNC: 35 G/DL (ref 31.5–36.5)
MCV RBC AUTO: 86 FL (ref 78–100)
MONOCYTES # BLD AUTO: 0.4 10E3/UL (ref 0–1.3)
MONOCYTES NFR BLD AUTO: 14 %
NEUTROPHILS # BLD AUTO: 0.5 10E3/UL (ref 1.6–8.3)
NEUTROPHILS NFR BLD AUTO: 17 %
NRBC # BLD AUTO: 0 10E3/UL
NRBC BLD AUTO-RTO: 0 /100
PLATELET # BLD AUTO: 251 10E3/UL (ref 150–450)
POTASSIUM BLD-SCNC: 3.4 MMOL/L (ref 3.4–5.3)
RBC # BLD AUTO: 4.84 10E6/UL (ref 4.4–5.9)
SODIUM SERPL-SCNC: 140 MMOL/L (ref 133–144)
TACROLIMUS BLD-MCNC: 7.9 UG/L (ref 5–15)
TME LAST DOSE: NORMAL H
TME LAST DOSE: NORMAL H
WBC # BLD AUTO: 2.7 10E3/UL (ref 4–11)

## 2022-07-07 PROCEDURE — 85025 COMPLETE CBC W/AUTO DIFF WBC: CPT

## 2022-07-07 PROCEDURE — 36415 COLL VENOUS BLD VENIPUNCTURE: CPT

## 2022-07-07 PROCEDURE — 80048 BASIC METABOLIC PNL TOTAL CA: CPT

## 2022-07-07 PROCEDURE — 80197 ASSAY OF TACROLIMUS: CPT

## 2022-07-11 LAB — ALLOSURE DD-CFDNA: 0.26 %

## 2022-07-14 ENCOUNTER — LAB (OUTPATIENT)
Dept: LAB | Facility: CLINIC | Age: 37
End: 2022-07-14
Payer: MEDICARE

## 2022-07-14 DIAGNOSIS — B25.9 CMV (CYTOMEGALOVIRUS INFECTION) (H): ICD-10-CM

## 2022-07-14 DIAGNOSIS — Z48.298 AFTERCARE FOLLOWING ORGAN TRANSPLANT: ICD-10-CM

## 2022-07-14 LAB
ANION GAP SERPL CALCULATED.3IONS-SCNC: 6 MMOL/L (ref 3–14)
BASOPHILS # BLD MANUAL: 0.1 10E3/UL (ref 0–0.2)
BASOPHILS NFR BLD MANUAL: 2 %
BUN SERPL-MCNC: 10 MG/DL (ref 7–30)
CALCIUM SERPL-MCNC: 9.1 MG/DL (ref 8.5–10.1)
CHLORIDE BLD-SCNC: 110 MMOL/L (ref 94–109)
CMV DNA SPEC NAA+PROBE-ACNC: <137 IU/ML
CMV DNA SPEC NAA+PROBE-LOG#: <2.1 {LOG_COPIES}/ML
CO2 SERPL-SCNC: 25 MMOL/L (ref 20–32)
CREAT SERPL-MCNC: 1.05 MG/DL (ref 0.66–1.25)
EOSINOPHIL # BLD MANUAL: 0 10E3/UL (ref 0–0.7)
EOSINOPHIL NFR BLD MANUAL: 1 %
ERYTHROCYTE [DISTWIDTH] IN BLOOD BY AUTOMATED COUNT: 11.7 % (ref 10–15)
GFR SERPL CREATININE-BSD FRML MDRD: >90 ML/MIN/1.73M2
GLUCOSE BLD-MCNC: 108 MG/DL (ref 70–99)
HCT VFR BLD AUTO: 41.2 % (ref 40–53)
HGB BLD-MCNC: 14.4 G/DL (ref 13.3–17.7)
LYMPHOCYTES # BLD MANUAL: 2.1 10E3/UL (ref 0.8–5.3)
LYMPHOCYTES NFR BLD MANUAL: 63 %
MCH RBC QN AUTO: 30.4 PG (ref 26.5–33)
MCHC RBC AUTO-ENTMCNC: 35 G/DL (ref 31.5–36.5)
MCV RBC AUTO: 87 FL (ref 78–100)
MONOCYTES # BLD MANUAL: 0.3 10E3/UL (ref 0–1.3)
MONOCYTES NFR BLD MANUAL: 9 %
NEUTROPHILS # BLD MANUAL: 0.8 10E3/UL (ref 1.6–8.3)
NEUTROPHILS NFR BLD MANUAL: 25 %
PLAT MORPH BLD: ABNORMAL
PLATELET # BLD AUTO: 275 10E3/UL (ref 150–450)
POTASSIUM BLD-SCNC: 3.5 MMOL/L (ref 3.4–5.3)
RBC # BLD AUTO: 4.74 10E6/UL (ref 4.4–5.9)
RBC MORPH BLD: ABNORMAL
SODIUM SERPL-SCNC: 141 MMOL/L (ref 133–144)
TACROLIMUS BLD-MCNC: 5.9 UG/L (ref 5–15)
TME LAST DOSE: NORMAL H
TME LAST DOSE: NORMAL H
VARIANT LYMPHS BLD QL SMEAR: PRESENT
WBC # BLD AUTO: 3.3 10E3/UL (ref 4–11)

## 2022-07-14 PROCEDURE — 85007 BL SMEAR W/DIFF WBC COUNT: CPT

## 2022-07-14 PROCEDURE — 36415 COLL VENOUS BLD VENIPUNCTURE: CPT

## 2022-07-14 PROCEDURE — 85027 COMPLETE CBC AUTOMATED: CPT

## 2022-07-14 PROCEDURE — 80048 BASIC METABOLIC PNL TOTAL CA: CPT

## 2022-07-14 PROCEDURE — 80197 ASSAY OF TACROLIMUS: CPT

## 2022-07-21 ENCOUNTER — TELEPHONE (OUTPATIENT)
Dept: TRANSPLANT | Facility: CLINIC | Age: 37
End: 2022-07-21

## 2022-07-21 ENCOUNTER — LAB (OUTPATIENT)
Dept: LAB | Facility: CLINIC | Age: 37
End: 2022-07-21
Payer: MEDICARE

## 2022-07-21 DIAGNOSIS — B25.9 CMV (CYTOMEGALOVIRUS INFECTION) (H): ICD-10-CM

## 2022-07-21 DIAGNOSIS — Z48.298 AFTERCARE FOLLOWING ORGAN TRANSPLANT: ICD-10-CM

## 2022-07-21 LAB
ANION GAP SERPL CALCULATED.3IONS-SCNC: 10 MMOL/L (ref 3–14)
BASOPHILS # BLD AUTO: 0 10E3/UL (ref 0–0.2)
BASOPHILS NFR BLD AUTO: 1 %
BUN SERPL-MCNC: 13 MG/DL (ref 7–30)
CALCIUM SERPL-MCNC: 8.9 MG/DL (ref 8.5–10.1)
CHLORIDE BLD-SCNC: 111 MMOL/L (ref 94–109)
CO2 SERPL-SCNC: 20 MMOL/L (ref 20–32)
CREAT SERPL-MCNC: 1.02 MG/DL (ref 0.66–1.25)
EOSINOPHIL # BLD AUTO: 0.1 10E3/UL (ref 0–0.7)
EOSINOPHIL NFR BLD AUTO: 5 %
ERYTHROCYTE [DISTWIDTH] IN BLOOD BY AUTOMATED COUNT: 11.9 % (ref 10–15)
GFR SERPL CREATININE-BSD FRML MDRD: >90 ML/MIN/1.73M2
GLUCOSE BLD-MCNC: 101 MG/DL (ref 70–99)
HCT VFR BLD AUTO: 42.1 % (ref 40–53)
HGB BLD-MCNC: 14.6 G/DL (ref 13.3–17.7)
IMM GRANULOCYTES # BLD: 0 10E3/UL
IMM GRANULOCYTES NFR BLD: 0 %
LYMPHOCYTES # BLD AUTO: 1.6 10E3/UL (ref 0.8–5.3)
LYMPHOCYTES NFR BLD AUTO: 57 %
MAGNESIUM SERPL-MCNC: 1.5 MG/DL (ref 1.6–2.3)
MCH RBC QN AUTO: 30.2 PG (ref 26.5–33)
MCHC RBC AUTO-ENTMCNC: 34.7 G/DL (ref 31.5–36.5)
MCV RBC AUTO: 87 FL (ref 78–100)
MONOCYTES # BLD AUTO: 0.6 10E3/UL (ref 0–1.3)
MONOCYTES NFR BLD AUTO: 22 %
NEUTROPHILS # BLD AUTO: 0.4 10E3/UL (ref 1.6–8.3)
NEUTROPHILS NFR BLD AUTO: 15 %
NRBC # BLD AUTO: 0 10E3/UL
NRBC BLD AUTO-RTO: 0 /100
PHOSPHATE SERPL-MCNC: 3.3 MG/DL (ref 2.5–4.5)
PLAT MORPH BLD: NORMAL
PLATELET # BLD AUTO: 237 10E3/UL (ref 150–450)
POTASSIUM BLD-SCNC: 3.6 MMOL/L (ref 3.4–5.3)
RBC # BLD AUTO: 4.84 10E6/UL (ref 4.4–5.9)
RBC MORPH BLD: NORMAL
SODIUM SERPL-SCNC: 141 MMOL/L (ref 133–144)
TACROLIMUS BLD-MCNC: 7.3 UG/L (ref 5–15)
TME LAST DOSE: NORMAL H
TME LAST DOSE: NORMAL H
WBC # BLD AUTO: 2.8 10E3/UL (ref 4–11)

## 2022-07-21 PROCEDURE — 80048 BASIC METABOLIC PNL TOTAL CA: CPT

## 2022-07-21 PROCEDURE — 36415 COLL VENOUS BLD VENIPUNCTURE: CPT | Performed by: INTERNAL MEDICINE

## 2022-07-21 PROCEDURE — 83735 ASSAY OF MAGNESIUM: CPT

## 2022-07-21 PROCEDURE — 84100 ASSAY OF PHOSPHORUS: CPT

## 2022-07-21 PROCEDURE — 80180 DRUG SCRN QUAN MYCOPHENOLATE: CPT

## 2022-07-21 PROCEDURE — 87799 DETECT AGENT NOS DNA QUANT: CPT

## 2022-07-21 PROCEDURE — 80197 ASSAY OF TACROLIMUS: CPT

## 2022-07-21 PROCEDURE — 85025 COMPLETE CBC W/AUTO DIFF WBC: CPT

## 2022-07-21 NOTE — TELEPHONE ENCOUNTER
DATE:  7/21/2022     TIME OF RECEIPT FROM LAB:  4:00    ORDERING PROVIDER: Obed    LAB TEST:  ABN    LAB VALUE:  0.4    RESULTS GIVEN WITH READ-BACK TO (PROVIDER):  Yas Card    TIME LAB VALUE REPORTED TO PROVIDER:   4:02

## 2022-07-22 ENCOUNTER — TELEPHONE (OUTPATIENT)
Dept: TRANSPLANT | Facility: CLINIC | Age: 37
End: 2022-07-22

## 2022-07-22 LAB
BKV DNA # SPEC NAA+PROBE: NOT DETECTED COPIES/ML
CMV DNA SPEC NAA+PROBE-ACNC: NOT DETECTED IU/ML

## 2022-07-22 NOTE — TELEPHONE ENCOUNTER
ISSUE:  ANC 0.4   CMV pending     Rober Goodwin MD Poucher, Jessica, RN  Needs neupogen. Please hold bactrim, get inhaled pentamidine please and next month check T cell subset please. Awaiting CMV. Please verify he is off valcyte.

## 2022-07-22 NOTE — TELEPHONE ENCOUNTER
Kiersten Jones discussed most recent lab results -    ANC ABSOLUTE NEUTROPHIL - 0.4 -     Reviewed recommendation to  Neupogen Filgrastim G-CSF subcutaneous -   Discussed he prefers to repeat labs  Prior to injection    Reviewed to hold his    Hold Bactrim  /  Sulfamethoxazole-trimethoprim (BACTRIM) 400-80

## 2022-07-25 LAB
MYCOPHENOLATE SERPL LC/MS/MS-MCNC: 1.88 MG/L (ref 1–3.5)
MYCOPHENOLATE-G SERPL LC/MS/MS-MCNC: 23.6 MG/L (ref 30–95)
TME LAST DOSE: ABNORMAL H
TME LAST DOSE: ABNORMAL H

## 2022-07-28 ENCOUNTER — LAB (OUTPATIENT)
Dept: LAB | Facility: CLINIC | Age: 37
End: 2022-07-28
Payer: MEDICARE

## 2022-07-28 DIAGNOSIS — Z48.298 AFTERCARE FOLLOWING ORGAN TRANSPLANT: ICD-10-CM

## 2022-07-28 DIAGNOSIS — B25.9 CMV (CYTOMEGALOVIRUS INFECTION) (H): ICD-10-CM

## 2022-07-28 LAB
ANION GAP SERPL CALCULATED.3IONS-SCNC: 9 MMOL/L (ref 3–14)
BASOPHILS # BLD MANUAL: 0 10E3/UL (ref 0–0.2)
BASOPHILS NFR BLD MANUAL: 0 %
BUN SERPL-MCNC: 13 MG/DL (ref 7–30)
CALCIUM SERPL-MCNC: 9.1 MG/DL (ref 8.5–10.1)
CHLORIDE BLD-SCNC: 110 MMOL/L (ref 94–109)
CO2 SERPL-SCNC: 24 MMOL/L (ref 20–32)
CREAT SERPL-MCNC: 0.95 MG/DL (ref 0.66–1.25)
EOSINOPHIL # BLD MANUAL: 0.3 10E3/UL (ref 0–0.7)
EOSINOPHIL NFR BLD MANUAL: 7 %
ERYTHROCYTE [DISTWIDTH] IN BLOOD BY AUTOMATED COUNT: 11.7 % (ref 10–15)
GFR SERPL CREATININE-BSD FRML MDRD: >90 ML/MIN/1.73M2
GLUCOSE BLD-MCNC: 98 MG/DL (ref 70–99)
HCT VFR BLD AUTO: 41.6 % (ref 40–53)
HGB BLD-MCNC: 14.5 G/DL (ref 13.3–17.7)
LYMPHOCYTES # BLD MANUAL: 2 10E3/UL (ref 0.8–5.3)
LYMPHOCYTES NFR BLD MANUAL: 56 %
MCH RBC QN AUTO: 30 PG (ref 26.5–33)
MCHC RBC AUTO-ENTMCNC: 34.9 G/DL (ref 31.5–36.5)
MCV RBC AUTO: 86 FL (ref 78–100)
MONOCYTES # BLD MANUAL: 0.4 10E3/UL (ref 0–1.3)
MONOCYTES NFR BLD MANUAL: 12 %
NEUTROPHILS # BLD MANUAL: 0.9 10E3/UL (ref 1.6–8.3)
NEUTROPHILS NFR BLD MANUAL: 25 %
PLAT MORPH BLD: ABNORMAL
PLATELET # BLD AUTO: 221 10E3/UL (ref 150–450)
POTASSIUM BLD-SCNC: 3.3 MMOL/L (ref 3.4–5.3)
RBC # BLD AUTO: 4.83 10E6/UL (ref 4.4–5.9)
RBC MORPH BLD: ABNORMAL
SODIUM SERPL-SCNC: 143 MMOL/L (ref 133–144)
TACROLIMUS BLD-MCNC: 7.8 UG/L (ref 5–15)
TME LAST DOSE: NORMAL H
TME LAST DOSE: NORMAL H
WBC # BLD AUTO: 3.6 10E3/UL (ref 4–11)

## 2022-07-28 PROCEDURE — 80197 ASSAY OF TACROLIMUS: CPT

## 2022-07-28 PROCEDURE — 36415 COLL VENOUS BLD VENIPUNCTURE: CPT

## 2022-07-28 PROCEDURE — 85007 BL SMEAR W/DIFF WBC COUNT: CPT

## 2022-07-28 PROCEDURE — 80180 DRUG SCRN QUAN MYCOPHENOLATE: CPT

## 2022-07-28 PROCEDURE — 80048 BASIC METABOLIC PNL TOTAL CA: CPT

## 2022-07-28 PROCEDURE — 85027 COMPLETE CBC AUTOMATED: CPT

## 2022-07-29 LAB
CMV DNA SPEC NAA+PROBE-ACNC: <137 IU/ML
CMV DNA SPEC NAA+PROBE-LOG#: <2.1 {LOG_COPIES}/ML
MYCOPHENOLATE SERPL LC/MS/MS-MCNC: 1.61 MG/L (ref 1–3.5)
MYCOPHENOLATE-G SERPL LC/MS/MS-MCNC: 21.5 MG/L (ref 30–95)
TME LAST DOSE: ABNORMAL H
TME LAST DOSE: ABNORMAL H

## 2022-08-11 ENCOUNTER — LAB (OUTPATIENT)
Dept: LAB | Facility: CLINIC | Age: 37
End: 2022-08-11
Payer: MEDICARE

## 2022-08-11 DIAGNOSIS — B25.9 CMV (CYTOMEGALOVIRUS INFECTION) (H): ICD-10-CM

## 2022-08-11 DIAGNOSIS — Z48.298 AFTERCARE FOLLOWING ORGAN TRANSPLANT: ICD-10-CM

## 2022-08-11 LAB
BASOPHILS # BLD AUTO: 0 10E3/UL (ref 0–0.2)
BASOPHILS NFR BLD AUTO: 1 %
CMV DNA SPEC NAA+PROBE-ACNC: <137 IU/ML
CMV DNA SPEC NAA+PROBE-LOG#: <2.1 {LOG_COPIES}/ML
EOSINOPHIL # BLD AUTO: 0.1 10E3/UL (ref 0–0.7)
EOSINOPHIL NFR BLD AUTO: 4 %
ERYTHROCYTE [DISTWIDTH] IN BLOOD BY AUTOMATED COUNT: 12.1 % (ref 10–15)
HCT VFR BLD AUTO: 41.4 % (ref 40–53)
HGB BLD-MCNC: 14.2 G/DL (ref 13.3–17.7)
LYMPHOCYTES # BLD AUTO: 1.5 10E3/UL (ref 0.8–5.3)
LYMPHOCYTES NFR BLD AUTO: 41 %
MAGNESIUM SERPL-MCNC: 1.7 MG/DL (ref 1.6–2.3)
MCH RBC QN AUTO: 29.8 PG (ref 26.5–33)
MCHC RBC AUTO-ENTMCNC: 34.3 G/DL (ref 31.5–36.5)
MCV RBC AUTO: 87 FL (ref 78–100)
MONOCYTES # BLD AUTO: 0.7 10E3/UL (ref 0–1.3)
MONOCYTES NFR BLD AUTO: 20 %
NEUTROPHILS # BLD AUTO: 1.2 10E3/UL (ref 1.6–8.3)
NEUTROPHILS NFR BLD AUTO: 34 %
PHOSPHATE SERPL-MCNC: 3.1 MG/DL (ref 2.5–4.5)
PLATELET # BLD AUTO: 202 10E3/UL (ref 150–450)
RBC # BLD AUTO: 4.76 10E6/UL (ref 4.4–5.9)
TACROLIMUS BLD-MCNC: 6.8 UG/L (ref 5–15)
TME LAST DOSE: NORMAL H
TME LAST DOSE: NORMAL H
WBC # BLD AUTO: 3.6 10E3/UL (ref 4–11)

## 2022-08-11 PROCEDURE — 36415 COLL VENOUS BLD VENIPUNCTURE: CPT

## 2022-08-11 PROCEDURE — 80180 DRUG SCRN QUAN MYCOPHENOLATE: CPT

## 2022-08-11 PROCEDURE — 85025 COMPLETE CBC W/AUTO DIFF WBC: CPT

## 2022-08-11 PROCEDURE — 83735 ASSAY OF MAGNESIUM: CPT

## 2022-08-11 PROCEDURE — 87799 DETECT AGENT NOS DNA QUANT: CPT

## 2022-08-11 PROCEDURE — 84100 ASSAY OF PHOSPHORUS: CPT

## 2022-08-11 PROCEDURE — 80197 ASSAY OF TACROLIMUS: CPT

## 2022-08-12 LAB
BKV DNA # SPEC NAA+PROBE: NOT DETECTED COPIES/ML
MYCOPHENOLATE SERPL LC/MS/MS-MCNC: 1.6 MG/L (ref 1–3.5)
MYCOPHENOLATE-G SERPL LC/MS/MS-MCNC: 19.2 MG/L (ref 30–95)
TME LAST DOSE: ABNORMAL H
TME LAST DOSE: ABNORMAL H

## 2022-08-18 ENCOUNTER — LAB (OUTPATIENT)
Dept: LAB | Facility: CLINIC | Age: 37
End: 2022-08-18
Payer: MEDICARE

## 2022-08-18 DIAGNOSIS — Z48.298 AFTERCARE FOLLOWING ORGAN TRANSPLANT: ICD-10-CM

## 2022-08-18 DIAGNOSIS — B25.9 CMV (CYTOMEGALOVIRUS INFECTION) (H): ICD-10-CM

## 2022-08-18 LAB
ANION GAP SERPL CALCULATED.3IONS-SCNC: 7 MMOL/L (ref 3–14)
BASOPHILS # BLD AUTO: 0 10E3/UL (ref 0–0.2)
BASOPHILS NFR BLD AUTO: 1 %
BUN SERPL-MCNC: 14 MG/DL (ref 7–30)
CALCIUM SERPL-MCNC: 9.4 MG/DL (ref 8.5–10.1)
CHLORIDE BLD-SCNC: 108 MMOL/L (ref 94–109)
CO2 SERPL-SCNC: 26 MMOL/L (ref 20–32)
CREAT SERPL-MCNC: 1.08 MG/DL (ref 0.66–1.25)
EOSINOPHIL # BLD AUTO: 0.2 10E3/UL (ref 0–0.7)
EOSINOPHIL NFR BLD AUTO: 6 %
ERYTHROCYTE [DISTWIDTH] IN BLOOD BY AUTOMATED COUNT: 11.5 % (ref 10–15)
GFR SERPL CREATININE-BSD FRML MDRD: >90 ML/MIN/1.73M2
GLUCOSE BLD-MCNC: 101 MG/DL (ref 70–99)
HCT VFR BLD AUTO: 44.8 % (ref 40–53)
HGB BLD-MCNC: 14.9 G/DL (ref 13.3–17.7)
IMM GRANULOCYTES # BLD: 0 10E3/UL
IMM GRANULOCYTES NFR BLD: 1 %
LYMPHOCYTES # BLD AUTO: 1.8 10E3/UL (ref 0.8–5.3)
LYMPHOCYTES NFR BLD AUTO: 58 %
MCH RBC QN AUTO: 29.7 PG (ref 26.5–33)
MCHC RBC AUTO-ENTMCNC: 33.3 G/DL (ref 31.5–36.5)
MCV RBC AUTO: 89 FL (ref 78–100)
MONOCYTES # BLD AUTO: 0.5 10E3/UL (ref 0–1.3)
MONOCYTES NFR BLD AUTO: 17 %
NEUTROPHILS # BLD AUTO: 0.5 10E3/UL (ref 1.6–8.3)
NEUTROPHILS NFR BLD AUTO: 17 %
NRBC # BLD AUTO: 0 10E3/UL
NRBC BLD AUTO-RTO: 0 /100
PLATELET # BLD AUTO: 245 10E3/UL (ref 150–450)
POTASSIUM BLD-SCNC: 3.4 MMOL/L (ref 3.4–5.3)
RBC # BLD AUTO: 5.02 10E6/UL (ref 4.4–5.9)
SODIUM SERPL-SCNC: 141 MMOL/L (ref 133–144)
TACROLIMUS BLD-MCNC: 6.9 UG/L (ref 5–15)
TME LAST DOSE: NORMAL H
TME LAST DOSE: NORMAL H
WBC # BLD AUTO: 3.1 10E3/UL (ref 4–11)

## 2022-08-18 PROCEDURE — 36415 COLL VENOUS BLD VENIPUNCTURE: CPT

## 2022-08-18 PROCEDURE — 80048 BASIC METABOLIC PNL TOTAL CA: CPT

## 2022-08-18 PROCEDURE — 85025 COMPLETE CBC W/AUTO DIFF WBC: CPT

## 2022-08-18 PROCEDURE — 80197 ASSAY OF TACROLIMUS: CPT

## 2022-08-19 LAB
CMV DNA IU/ML, INSTRUMENT: 171 IU/ML
CMV DNA SPEC NAA+PROBE-LOG#: 2.2 {LOG_COPIES}/ML

## 2022-08-22 ENCOUNTER — TELEPHONE (OUTPATIENT)
Dept: TRANSPLANT | Facility: CLINIC | Age: 37
End: 2022-08-22

## 2022-08-22 NOTE — TELEPHONE ENCOUNTER
ISSUE:   copies     Stopped ppx dose Valcyte on 6/29,     Rober Goodwin MD Poucher, Jessica, RN  Please repeat CMV in 1 week. I don't want to treat unless >1000 or symptomatic.

## 2022-08-25 ENCOUNTER — TELEPHONE (OUTPATIENT)
Dept: TRANSPLANT | Facility: CLINIC | Age: 37
End: 2022-08-25

## 2022-08-25 ENCOUNTER — LAB (OUTPATIENT)
Dept: LAB | Facility: CLINIC | Age: 37
End: 2022-08-25
Payer: MEDICARE

## 2022-08-25 DIAGNOSIS — B25.9 CMV (CYTOMEGALOVIRUS INFECTION) (H): ICD-10-CM

## 2022-08-25 DIAGNOSIS — E87.6 HYPOKALEMIA: Primary | ICD-10-CM

## 2022-08-25 DIAGNOSIS — Z48.298 AFTERCARE FOLLOWING ORGAN TRANSPLANT: ICD-10-CM

## 2022-08-25 LAB
ANION GAP SERPL CALCULATED.3IONS-SCNC: 7 MMOL/L (ref 3–14)
BASOPHILS # BLD AUTO: 0.1 10E3/UL (ref 0–0.2)
BASOPHILS NFR BLD AUTO: 1 %
BUN SERPL-MCNC: 9 MG/DL (ref 7–30)
CALCIUM SERPL-MCNC: 9.3 MG/DL (ref 8.5–10.1)
CHLORIDE BLD-SCNC: 106 MMOL/L (ref 94–109)
CMV DNA SPEC NAA+PROBE-ACNC: <137 IU/ML
CMV DNA SPEC NAA+PROBE-LOG#: <2.1 {LOG_COPIES}/ML
CO2 SERPL-SCNC: 26 MMOL/L (ref 20–32)
CREAT SERPL-MCNC: 0.99 MG/DL (ref 0.66–1.25)
EOSINOPHIL # BLD AUTO: 0.2 10E3/UL (ref 0–0.7)
EOSINOPHIL NFR BLD AUTO: 3 %
ERYTHROCYTE [DISTWIDTH] IN BLOOD BY AUTOMATED COUNT: 11.6 % (ref 10–15)
GFR SERPL CREATININE-BSD FRML MDRD: >90 ML/MIN/1.73M2
GLUCOSE BLD-MCNC: 107 MG/DL (ref 70–99)
HCT VFR BLD AUTO: 41.9 % (ref 40–53)
HGB BLD-MCNC: 14.5 G/DL (ref 13.3–17.7)
LYMPHOCYTES # BLD AUTO: 2.4 10E3/UL (ref 0.8–5.3)
LYMPHOCYTES NFR BLD AUTO: 38 %
MCH RBC QN AUTO: 29.8 PG (ref 26.5–33)
MCHC RBC AUTO-ENTMCNC: 34.6 G/DL (ref 31.5–36.5)
MCV RBC AUTO: 86 FL (ref 78–100)
MONOCYTES # BLD AUTO: 1 10E3/UL (ref 0–1.3)
MONOCYTES NFR BLD AUTO: 16 %
NEUTROPHILS # BLD AUTO: 2.8 10E3/UL (ref 1.6–8.3)
NEUTROPHILS NFR BLD AUTO: 43 %
PLATELET # BLD AUTO: 241 10E3/UL (ref 150–450)
POTASSIUM BLD-SCNC: 3 MMOL/L (ref 3.4–5.3)
RBC # BLD AUTO: 4.86 10E6/UL (ref 4.4–5.9)
SODIUM SERPL-SCNC: 139 MMOL/L (ref 133–144)
TACROLIMUS BLD-MCNC: 6.1 UG/L (ref 5–15)
TME LAST DOSE: NORMAL H
TME LAST DOSE: NORMAL H
WBC # BLD AUTO: 6.5 10E3/UL (ref 4–11)

## 2022-08-25 PROCEDURE — 80048 BASIC METABOLIC PNL TOTAL CA: CPT

## 2022-08-25 PROCEDURE — 36415 COLL VENOUS BLD VENIPUNCTURE: CPT

## 2022-08-25 PROCEDURE — 80197 ASSAY OF TACROLIMUS: CPT

## 2022-08-25 PROCEDURE — 85025 COMPLETE CBC W/AUTO DIFF WBC: CPT

## 2022-08-25 NOTE — TELEPHONE ENCOUNTER
ISSUE:  K 3.0    Rober Goodwin MD Poucher, Jessica, RN  Please start Kcl 20meq daily     Message sent to patient with recommendations.

## 2022-08-26 RX ORDER — POTASSIUM CHLORIDE 1500 MG/1
20 TABLET, EXTENDED RELEASE ORAL DAILY
Qty: 90 TABLET | Refills: 1 | Status: SHIPPED | OUTPATIENT
Start: 2022-08-26 | End: 2022-10-10

## 2022-09-08 ENCOUNTER — LAB (OUTPATIENT)
Dept: LAB | Facility: CLINIC | Age: 37
End: 2022-09-08
Payer: MEDICARE

## 2022-09-08 DIAGNOSIS — Z48.298 AFTERCARE FOLLOWING ORGAN TRANSPLANT: ICD-10-CM

## 2022-09-08 DIAGNOSIS — B25.9 CMV (CYTOMEGALOVIRUS INFECTION) (H): ICD-10-CM

## 2022-09-08 LAB
ANION GAP SERPL CALCULATED.3IONS-SCNC: 8 MMOL/L (ref 3–14)
BASOPHILS # BLD AUTO: 0 10E3/UL (ref 0–0.2)
BASOPHILS NFR BLD AUTO: 0 %
BUN SERPL-MCNC: 11 MG/DL (ref 7–30)
CALCIUM SERPL-MCNC: 8.9 MG/DL (ref 8.5–10.1)
CHLORIDE BLD-SCNC: 113 MMOL/L (ref 94–109)
CO2 SERPL-SCNC: 23 MMOL/L (ref 20–32)
CREAT SERPL-MCNC: 0.98 MG/DL (ref 0.66–1.25)
EOSINOPHIL # BLD AUTO: 0.1 10E3/UL (ref 0–0.7)
EOSINOPHIL NFR BLD AUTO: 2 %
ERYTHROCYTE [DISTWIDTH] IN BLOOD BY AUTOMATED COUNT: 12.2 % (ref 10–15)
GFR SERPL CREATININE-BSD FRML MDRD: >90 ML/MIN/1.73M2
GLUCOSE BLD-MCNC: 97 MG/DL (ref 70–99)
HCT VFR BLD AUTO: 39.7 % (ref 40–53)
HGB BLD-MCNC: 13.7 G/DL (ref 13.3–17.7)
LYMPHOCYTES # BLD AUTO: 2.4 10E3/UL (ref 0.8–5.3)
LYMPHOCYTES NFR BLD AUTO: 41 %
MAGNESIUM SERPL-MCNC: 1.7 MG/DL (ref 1.6–2.3)
MCH RBC QN AUTO: 29.7 PG (ref 26.5–33)
MCHC RBC AUTO-ENTMCNC: 34.5 G/DL (ref 31.5–36.5)
MCV RBC AUTO: 86 FL (ref 78–100)
MONOCYTES # BLD AUTO: 0.8 10E3/UL (ref 0–1.3)
MONOCYTES NFR BLD AUTO: 14 %
NEUTROPHILS # BLD AUTO: 2.5 10E3/UL (ref 1.6–8.3)
NEUTROPHILS NFR BLD AUTO: 43 %
PHOSPHATE SERPL-MCNC: 3.7 MG/DL (ref 2.5–4.5)
PLATELET # BLD AUTO: 253 10E3/UL (ref 150–450)
POTASSIUM BLD-SCNC: 3.3 MMOL/L (ref 3.4–5.3)
RBC # BLD AUTO: 4.61 10E6/UL (ref 4.4–5.9)
SODIUM SERPL-SCNC: 144 MMOL/L (ref 133–144)
TACROLIMUS BLD-MCNC: 5.1 UG/L (ref 5–15)
TME LAST DOSE: NORMAL H
TME LAST DOSE: NORMAL H
WBC # BLD AUTO: 5.9 10E3/UL (ref 4–11)

## 2022-09-08 PROCEDURE — 80048 BASIC METABOLIC PNL TOTAL CA: CPT

## 2022-09-08 PROCEDURE — 80197 ASSAY OF TACROLIMUS: CPT

## 2022-09-08 PROCEDURE — 84100 ASSAY OF PHOSPHORUS: CPT

## 2022-09-08 PROCEDURE — 80180 DRUG SCRN QUAN MYCOPHENOLATE: CPT

## 2022-09-08 PROCEDURE — 83735 ASSAY OF MAGNESIUM: CPT

## 2022-09-08 PROCEDURE — 85025 COMPLETE CBC W/AUTO DIFF WBC: CPT

## 2022-09-08 PROCEDURE — 36415 COLL VENOUS BLD VENIPUNCTURE: CPT

## 2022-09-08 PROCEDURE — 87799 DETECT AGENT NOS DNA QUANT: CPT

## 2022-09-09 LAB
BKV DNA # SPEC NAA+PROBE: NOT DETECTED COPIES/ML
CMV DNA SPEC NAA+PROBE-ACNC: <137 IU/ML
CMV DNA SPEC NAA+PROBE-LOG#: <2.1 {LOG_COPIES}/ML
MYCOPHENOLATE SERPL LC/MS/MS-MCNC: 1.57 MG/L (ref 1–3.5)
MYCOPHENOLATE-G SERPL LC/MS/MS-MCNC: 23.4 MG/L (ref 30–95)
TME LAST DOSE: ABNORMAL H
TME LAST DOSE: ABNORMAL H

## 2022-09-22 ENCOUNTER — LAB (OUTPATIENT)
Dept: LAB | Facility: CLINIC | Age: 37
End: 2022-09-22
Payer: MEDICARE

## 2022-09-22 DIAGNOSIS — Z48.298 AFTERCARE FOLLOWING ORGAN TRANSPLANT: ICD-10-CM

## 2022-09-22 DIAGNOSIS — Z20.828 CONTACT WITH AND (SUSPECTED) EXPOSURE TO OTHER VIRAL COMMUNICABLE DISEASES: ICD-10-CM

## 2022-09-22 DIAGNOSIS — B25.9 CMV (CYTOMEGALOVIRUS INFECTION) (H): ICD-10-CM

## 2022-09-22 DIAGNOSIS — Z94.0 KIDNEY REPLACED BY TRANSPLANT: ICD-10-CM

## 2022-09-22 DIAGNOSIS — Z79.899 ENCOUNTER FOR LONG-TERM CURRENT USE OF MEDICATION: ICD-10-CM

## 2022-09-22 LAB
ALBUMIN SERPL-MCNC: 3.8 G/DL (ref 3.4–5)
ALP SERPL-CCNC: 121 U/L (ref 40–150)
ALT SERPL W P-5'-P-CCNC: 28 U/L (ref 0–70)
ANION GAP SERPL CALCULATED.3IONS-SCNC: 5 MMOL/L (ref 3–14)
AST SERPL W P-5'-P-CCNC: 14 U/L (ref 0–45)
BASOPHILS # BLD AUTO: 0 10E3/UL (ref 0–0.2)
BASOPHILS NFR BLD AUTO: 0 %
BILIRUB DIRECT SERPL-MCNC: 0.2 MG/DL (ref 0–0.2)
BILIRUB SERPL-MCNC: 0.7 MG/DL (ref 0.2–1.3)
BUN SERPL-MCNC: 12 MG/DL (ref 7–30)
CALCIUM SERPL-MCNC: 9.1 MG/DL (ref 8.5–10.1)
CHLORIDE BLD-SCNC: 108 MMOL/L (ref 94–109)
CHOLEST SERPL-MCNC: 218 MG/DL
CO2 SERPL-SCNC: 28 MMOL/L (ref 20–32)
CREAT SERPL-MCNC: 1.07 MG/DL (ref 0.66–1.25)
DEPRECATED CALCIDIOL+CALCIFEROL SERPL-MC: 24 UG/L (ref 20–75)
EOSINOPHIL # BLD AUTO: 0.1 10E3/UL (ref 0–0.7)
EOSINOPHIL NFR BLD AUTO: 3 %
ERYTHROCYTE [DISTWIDTH] IN BLOOD BY AUTOMATED COUNT: 12.5 % (ref 10–15)
FASTING STATUS PATIENT QL REPORTED: YES
GFR SERPL CREATININE-BSD FRML MDRD: >90 ML/MIN/1.73M2
GLUCOSE BLD-MCNC: 102 MG/DL (ref 70–99)
HBA1C MFR BLD: 5.2 % (ref 0–5.6)
HCT VFR BLD AUTO: 43.1 % (ref 40–53)
HDLC SERPL-MCNC: 55 MG/DL
HGB BLD-MCNC: 14.7 G/DL (ref 13.3–17.7)
LDLC SERPL CALC-MCNC: 142 MG/DL
LYMPHOCYTES # BLD AUTO: 2.5 10E3/UL (ref 0.8–5.3)
LYMPHOCYTES NFR BLD AUTO: 48 %
MCH RBC QN AUTO: 29.6 PG (ref 26.5–33)
MCHC RBC AUTO-ENTMCNC: 34.1 G/DL (ref 31.5–36.5)
MCV RBC AUTO: 87 FL (ref 78–100)
MONOCYTES # BLD AUTO: 0.6 10E3/UL (ref 0–1.3)
MONOCYTES NFR BLD AUTO: 12 %
NEUTROPHILS # BLD AUTO: 2 10E3/UL (ref 1.6–8.3)
NEUTROPHILS NFR BLD AUTO: 37 %
NONHDLC SERPL-MCNC: 163 MG/DL
PLATELET # BLD AUTO: 208 10E3/UL (ref 150–450)
POTASSIUM BLD-SCNC: 3.4 MMOL/L (ref 3.4–5.3)
PROT SERPL-MCNC: 7.5 G/DL (ref 6.8–8.8)
PTH-INTACT SERPL-MCNC: 117 PG/ML (ref 15–65)
RBC # BLD AUTO: 4.97 10E6/UL (ref 4.4–5.9)
SODIUM SERPL-SCNC: 141 MMOL/L (ref 133–144)
TACROLIMUS BLD-MCNC: 6.2 UG/L (ref 5–15)
TME LAST DOSE: NORMAL H
TME LAST DOSE: NORMAL H
TRIGL SERPL-MCNC: 104 MG/DL
URATE SERPL-MCNC: 6.7 MG/DL (ref 3.5–7.2)
WBC # BLD AUTO: 5.3 10E3/UL (ref 4–11)

## 2022-09-22 PROCEDURE — 82306 VITAMIN D 25 HYDROXY: CPT

## 2022-09-22 PROCEDURE — 36415 COLL VENOUS BLD VENIPUNCTURE: CPT

## 2022-09-22 PROCEDURE — 82248 BILIRUBIN DIRECT: CPT

## 2022-09-22 PROCEDURE — 84550 ASSAY OF BLOOD/URIC ACID: CPT

## 2022-09-22 PROCEDURE — 80061 LIPID PANEL: CPT

## 2022-09-22 PROCEDURE — 85025 COMPLETE CBC W/AUTO DIFF WBC: CPT

## 2022-09-22 PROCEDURE — 83970 ASSAY OF PARATHORMONE: CPT

## 2022-09-22 PROCEDURE — 80053 COMPREHEN METABOLIC PANEL: CPT

## 2022-09-22 PROCEDURE — 83036 HEMOGLOBIN GLYCOSYLATED A1C: CPT

## 2022-09-22 PROCEDURE — 80197 ASSAY OF TACROLIMUS: CPT

## 2022-09-23 ENCOUNTER — TELEPHONE (OUTPATIENT)
Dept: TRANSPLANT | Facility: CLINIC | Age: 37
End: 2022-09-23

## 2022-09-23 DIAGNOSIS — Z94.0 KIDNEY TRANSPLANT RECIPIENT: ICD-10-CM

## 2022-09-23 RX ORDER — ATORVASTATIN CALCIUM 10 MG/1
10 TABLET, FILM COATED ORAL DAILY
Qty: 30 TABLET | Refills: 11 | Status: SHIPPED | OUTPATIENT
Start: 2022-09-23

## 2022-09-23 NOTE — TELEPHONE ENCOUNTER
ISSUE:  Elevated cholesterol    ----- Message from Rober Goodwin MD sent at 9/23/2022 11:33 AM CDT -----   Please increase atorvastatin to 20mg at bedtime     PLAN:  Increase atorvastatin to 20mg at bedtime    OUTCOME:  Patient states he hasn't been taking atorvastatin because refills ran out. Prescription renewed, recommended patient request future refills from PCP.   Patient v/u of instructions.

## 2022-09-27 LAB
CMV DNA SPEC NAA+PROBE-ACNC: <137 IU/ML
CMV DNA SPEC NAA+PROBE-LOG#: <2.1 {LOG_COPIES}/ML

## 2022-09-28 DIAGNOSIS — Z94.0 KIDNEY TRANSPLANT RECIPIENT: ICD-10-CM

## 2022-09-28 RX ORDER — MYCOPHENOLATE MOFETIL 250 MG/1
500 CAPSULE ORAL 2 TIMES DAILY
Qty: 360 CAPSULE | Refills: 3 | Status: SHIPPED | OUTPATIENT
Start: 2022-09-28 | End: 2023-01-11

## 2022-10-06 ENCOUNTER — LAB (OUTPATIENT)
Dept: LAB | Facility: CLINIC | Age: 37
End: 2022-10-06
Payer: MEDICARE

## 2022-10-06 DIAGNOSIS — Z48.298 AFTERCARE FOLLOWING ORGAN TRANSPLANT: ICD-10-CM

## 2022-10-06 DIAGNOSIS — B25.9 CMV (CYTOMEGALOVIRUS INFECTION) (H): ICD-10-CM

## 2022-10-06 LAB
ANION GAP SERPL CALCULATED.3IONS-SCNC: 6 MMOL/L (ref 3–14)
BASOPHILS # BLD AUTO: 0 10E3/UL (ref 0–0.2)
BASOPHILS NFR BLD AUTO: 0 %
BUN SERPL-MCNC: 11 MG/DL (ref 7–30)
CALCIUM SERPL-MCNC: 9.1 MG/DL (ref 8.5–10.1)
CHLORIDE BLD-SCNC: 108 MMOL/L (ref 94–109)
CO2 SERPL-SCNC: 27 MMOL/L (ref 20–32)
CREAT SERPL-MCNC: 1.06 MG/DL (ref 0.66–1.25)
EOSINOPHIL # BLD AUTO: 0.1 10E3/UL (ref 0–0.7)
EOSINOPHIL NFR BLD AUTO: 3 %
ERYTHROCYTE [DISTWIDTH] IN BLOOD BY AUTOMATED COUNT: 12.5 % (ref 10–15)
GFR SERPL CREATININE-BSD FRML MDRD: >90 ML/MIN/1.73M2
GLUCOSE BLD-MCNC: 116 MG/DL (ref 70–99)
HCT VFR BLD AUTO: 41.7 % (ref 40–53)
HGB BLD-MCNC: 14.4 G/DL (ref 13.3–17.7)
LYMPHOCYTES # BLD AUTO: 2.3 10E3/UL (ref 0.8–5.3)
LYMPHOCYTES NFR BLD AUTO: 47 %
MAGNESIUM SERPL-MCNC: 1.7 MG/DL (ref 1.6–2.3)
MCH RBC QN AUTO: 29.7 PG (ref 26.5–33)
MCHC RBC AUTO-ENTMCNC: 34.5 G/DL (ref 31.5–36.5)
MCV RBC AUTO: 86 FL (ref 78–100)
MONOCYTES # BLD AUTO: 0.6 10E3/UL (ref 0–1.3)
MONOCYTES NFR BLD AUTO: 12 %
NEUTROPHILS # BLD AUTO: 1.8 10E3/UL (ref 1.6–8.3)
NEUTROPHILS NFR BLD AUTO: 38 %
PHOSPHATE SERPL-MCNC: 3.5 MG/DL (ref 2.5–4.5)
PLATELET # BLD AUTO: 218 10E3/UL (ref 150–450)
POTASSIUM BLD-SCNC: 3.2 MMOL/L (ref 3.4–5.3)
RBC # BLD AUTO: 4.85 10E6/UL (ref 4.4–5.9)
SODIUM SERPL-SCNC: 141 MMOL/L (ref 133–144)
TACROLIMUS BLD-MCNC: 5.9 UG/L (ref 5–15)
TME LAST DOSE: NORMAL H
TME LAST DOSE: NORMAL H
WBC # BLD AUTO: 4.8 10E3/UL (ref 4–11)

## 2022-10-06 PROCEDURE — 80180 DRUG SCRN QUAN MYCOPHENOLATE: CPT

## 2022-10-06 PROCEDURE — 83735 ASSAY OF MAGNESIUM: CPT

## 2022-10-06 PROCEDURE — 85025 COMPLETE CBC W/AUTO DIFF WBC: CPT

## 2022-10-06 PROCEDURE — 87799 DETECT AGENT NOS DNA QUANT: CPT

## 2022-10-06 PROCEDURE — 80048 BASIC METABOLIC PNL TOTAL CA: CPT

## 2022-10-06 PROCEDURE — 80197 ASSAY OF TACROLIMUS: CPT

## 2022-10-06 PROCEDURE — 84100 ASSAY OF PHOSPHORUS: CPT

## 2022-10-06 PROCEDURE — 36415 COLL VENOUS BLD VENIPUNCTURE: CPT

## 2022-10-07 ENCOUNTER — TELEPHONE (OUTPATIENT)
Dept: TRANSPLANT | Facility: CLINIC | Age: 37
End: 2022-10-07

## 2022-10-07 DIAGNOSIS — E87.6 HYPOKALEMIA: ICD-10-CM

## 2022-10-07 LAB
BKV DNA # SPEC NAA+PROBE: NOT DETECTED COPIES/ML
CMV DNA SPEC NAA+PROBE-ACNC: NOT DETECTED IU/ML
MYCOPHENOLATE SERPL LC/MS/MS-MCNC: 1.12 MG/L (ref 1–3.5)
MYCOPHENOLATE-G SERPL LC/MS/MS-MCNC: 19.8 MG/L (ref 30–95)
TME LAST DOSE: ABNORMAL H
TME LAST DOSE: ABNORMAL H

## 2022-10-07 NOTE — TELEPHONE ENCOUNTER
ISSUE:  Hypokalemia     Rober Goodwin MD Poucher, Jessica, RN  Please increase Kcl to 40meq daily or 20meq BID     MyChart message sent to patient.

## 2022-10-10 RX ORDER — POTASSIUM CHLORIDE 1500 MG/1
20 TABLET, EXTENDED RELEASE ORAL 2 TIMES DAILY
Qty: 60 TABLET | Refills: 1 | Status: SHIPPED | OUTPATIENT
Start: 2022-10-10 | End: 2023-01-30

## 2022-10-13 ENCOUNTER — LAB (OUTPATIENT)
Dept: LAB | Facility: CLINIC | Age: 37
End: 2022-10-13
Payer: MEDICARE

## 2022-10-13 DIAGNOSIS — B25.9 CMV (CYTOMEGALOVIRUS INFECTION) (H): ICD-10-CM

## 2022-10-13 DIAGNOSIS — Z48.298 AFTERCARE FOLLOWING ORGAN TRANSPLANT: ICD-10-CM

## 2022-10-13 LAB
ANION GAP SERPL CALCULATED.3IONS-SCNC: 6 MMOL/L (ref 3–14)
BASOPHILS # BLD AUTO: 0 10E3/UL (ref 0–0.2)
BASOPHILS NFR BLD AUTO: 0 %
BUN SERPL-MCNC: 12 MG/DL (ref 7–30)
CALCIUM SERPL-MCNC: 9.2 MG/DL (ref 8.5–10.1)
CHLORIDE BLD-SCNC: 105 MMOL/L (ref 94–109)
CO2 SERPL-SCNC: 28 MMOL/L (ref 20–32)
CREAT SERPL-MCNC: 1.1 MG/DL (ref 0.66–1.25)
EOSINOPHIL # BLD AUTO: 0.1 10E3/UL (ref 0–0.7)
EOSINOPHIL NFR BLD AUTO: 3 %
ERYTHROCYTE [DISTWIDTH] IN BLOOD BY AUTOMATED COUNT: 12.3 % (ref 10–15)
GFR SERPL CREATININE-BSD FRML MDRD: 90 ML/MIN/1.73M2
GLUCOSE BLD-MCNC: 97 MG/DL (ref 70–99)
HCT VFR BLD AUTO: 43.5 % (ref 40–53)
HGB BLD-MCNC: 15 G/DL (ref 13.3–17.7)
LYMPHOCYTES # BLD AUTO: 2.6 10E3/UL (ref 0.8–5.3)
LYMPHOCYTES NFR BLD AUTO: 47 %
MCH RBC QN AUTO: 29.6 PG (ref 26.5–33)
MCHC RBC AUTO-ENTMCNC: 34.5 G/DL (ref 31.5–36.5)
MCV RBC AUTO: 86 FL (ref 78–100)
MONOCYTES # BLD AUTO: 0.7 10E3/UL (ref 0–1.3)
MONOCYTES NFR BLD AUTO: 13 %
NEUTROPHILS # BLD AUTO: 2 10E3/UL (ref 1.6–8.3)
NEUTROPHILS NFR BLD AUTO: 38 %
PLATELET # BLD AUTO: 211 10E3/UL (ref 150–450)
POTASSIUM BLD-SCNC: 3.4 MMOL/L (ref 3.4–5.3)
RBC # BLD AUTO: 5.07 10E6/UL (ref 4.4–5.9)
SODIUM SERPL-SCNC: 139 MMOL/L (ref 133–144)
TACROLIMUS BLD-MCNC: 6.2 UG/L (ref 5–15)
TME LAST DOSE: NORMAL H
TME LAST DOSE: NORMAL H
WBC # BLD AUTO: 5.4 10E3/UL (ref 4–11)

## 2022-10-13 PROCEDURE — 80048 BASIC METABOLIC PNL TOTAL CA: CPT

## 2022-10-13 PROCEDURE — 85025 COMPLETE CBC W/AUTO DIFF WBC: CPT

## 2022-10-13 PROCEDURE — 36415 COLL VENOUS BLD VENIPUNCTURE: CPT

## 2022-10-13 PROCEDURE — 80197 ASSAY OF TACROLIMUS: CPT

## 2022-10-15 ENCOUNTER — DOCUMENTATION ONLY (OUTPATIENT)
Dept: LAB | Facility: CLINIC | Age: 37
End: 2022-10-15

## 2022-10-15 NOTE — PROGRESS NOTES
Received a call from [facility]. They were unable to complete testing for CMV Quantitative PCR due to an instrument error. Test has been cancelled. Please have patient come in for repeat blood draw if needed. Thank you

## 2022-11-03 ENCOUNTER — LAB (OUTPATIENT)
Dept: LAB | Facility: CLINIC | Age: 37
End: 2022-11-03
Payer: MEDICARE

## 2022-11-03 DIAGNOSIS — Z48.298 AFTERCARE FOLLOWING ORGAN TRANSPLANT: ICD-10-CM

## 2022-11-03 DIAGNOSIS — B25.9 CMV (CYTOMEGALOVIRUS INFECTION) (H): ICD-10-CM

## 2022-11-03 LAB
ANION GAP SERPL CALCULATED.3IONS-SCNC: 8 MMOL/L (ref 3–14)
BASOPHILS # BLD AUTO: 0 10E3/UL (ref 0–0.2)
BASOPHILS NFR BLD AUTO: 0 %
BUN SERPL-MCNC: 8 MG/DL (ref 7–30)
CALCIUM SERPL-MCNC: 9.1 MG/DL (ref 8.5–10.1)
CHLORIDE BLD-SCNC: 110 MMOL/L (ref 94–109)
CMV DNA SPEC NAA+PROBE-ACNC: <137 IU/ML
CMV DNA SPEC NAA+PROBE-LOG#: <2.1 {LOG_COPIES}/ML
CO2 SERPL-SCNC: 24 MMOL/L (ref 20–32)
CREAT SERPL-MCNC: 0.99 MG/DL (ref 0.66–1.25)
EOSINOPHIL # BLD AUTO: 0.1 10E3/UL (ref 0–0.7)
EOSINOPHIL NFR BLD AUTO: 2 %
ERYTHROCYTE [DISTWIDTH] IN BLOOD BY AUTOMATED COUNT: 12.3 % (ref 10–15)
GFR SERPL CREATININE-BSD FRML MDRD: >90 ML/MIN/1.73M2
GLUCOSE BLD-MCNC: 93 MG/DL (ref 70–99)
HCT VFR BLD AUTO: 42 % (ref 40–53)
HGB BLD-MCNC: 14.4 G/DL (ref 13.3–17.7)
LYMPHOCYTES # BLD AUTO: 2.6 10E3/UL (ref 0.8–5.3)
LYMPHOCYTES NFR BLD AUTO: 50 %
MAGNESIUM SERPL-MCNC: 1.7 MG/DL (ref 1.6–2.3)
MCH RBC QN AUTO: 29.3 PG (ref 26.5–33)
MCHC RBC AUTO-ENTMCNC: 34.3 G/DL (ref 31.5–36.5)
MCV RBC AUTO: 86 FL (ref 78–100)
MONOCYTES # BLD AUTO: 0.6 10E3/UL (ref 0–1.3)
MONOCYTES NFR BLD AUTO: 11 %
NEUTROPHILS # BLD AUTO: 1.9 10E3/UL (ref 1.6–8.3)
NEUTROPHILS NFR BLD AUTO: 37 %
PHOSPHATE SERPL-MCNC: 2.9 MG/DL (ref 2.5–4.5)
PLATELET # BLD AUTO: 221 10E3/UL (ref 150–450)
POTASSIUM BLD-SCNC: 3.7 MMOL/L (ref 3.4–5.3)
RBC # BLD AUTO: 4.91 10E6/UL (ref 4.4–5.9)
SODIUM SERPL-SCNC: 142 MMOL/L (ref 133–144)
TACROLIMUS BLD-MCNC: 7.3 UG/L (ref 5–15)
TME LAST DOSE: NORMAL H
TME LAST DOSE: NORMAL H
WBC # BLD AUTO: 5.1 10E3/UL (ref 4–11)

## 2022-11-03 PROCEDURE — 85025 COMPLETE CBC W/AUTO DIFF WBC: CPT

## 2022-11-03 PROCEDURE — 36415 COLL VENOUS BLD VENIPUNCTURE: CPT

## 2022-11-03 PROCEDURE — 83735 ASSAY OF MAGNESIUM: CPT

## 2022-11-03 PROCEDURE — 80048 BASIC METABOLIC PNL TOTAL CA: CPT

## 2022-11-03 PROCEDURE — 84100 ASSAY OF PHOSPHORUS: CPT

## 2022-11-03 PROCEDURE — 80197 ASSAY OF TACROLIMUS: CPT

## 2022-11-03 PROCEDURE — 87799 DETECT AGENT NOS DNA QUANT: CPT

## 2022-11-04 DIAGNOSIS — Z94.0 KIDNEY TRANSPLANT RECIPIENT: ICD-10-CM

## 2022-11-04 DIAGNOSIS — Z48.298 AFTERCARE FOLLOWING ORGAN TRANSPLANT: Primary | ICD-10-CM

## 2022-11-04 DIAGNOSIS — Z94.0 KIDNEY REPLACED BY TRANSPLANT: ICD-10-CM

## 2022-11-04 LAB — BKV DNA # SPEC NAA+PROBE: NOT DETECTED COPIES/ML

## 2022-11-04 RX ORDER — TACROLIMUS 0.5 MG/1
CAPSULE ORAL
Qty: 60 CAPSULE | Refills: 11
Start: 2022-11-04 | End: 2022-11-18

## 2022-11-04 RX ORDER — TACROLIMUS 1 MG/1
1 CAPSULE ORAL 2 TIMES DAILY
Qty: 60 CAPSULE | Refills: 11 | Status: SHIPPED | OUTPATIENT
Start: 2022-11-04 | End: 2022-11-18

## 2022-11-04 NOTE — TELEPHONE ENCOUNTER
Left message and sent [employer] message to patient regarding:  Tacrolimus IR level 7.3 on 11/3, goal 4-6, dose 1.5 mg BID.  Overdue for DSA, orders placed.      PLAN:   Please call patient and confirm this was an accurate 12-hour trough. Verify Tacrolimus IR dose 1.5 mg BID. Confirm no new medications or illness. Confirm no missed doses. If accurate trough and accurate dose, decrease Tacrolimus IR dose to 1 mg BID and repeat labs in 1-2 weeks

## 2022-11-04 NOTE — TELEPHONE ENCOUNTER
ISSUE:   Tacrolimus IR level 7.3 on 11/3, goal 4-6, dose 1.5 mg BID.  Overdue for DSA, orders placed.     PLAN:   Please call patient and confirm this was an accurate 12-hour trough. Verify Tacrolimus IR dose 1.5 mg BID. Confirm no new medications or illness. Confirm no missed doses. If accurate trough and accurate dose, decrease Tacrolimus IR dose to 1 mg BID and repeat labs in 1-2 weeks    Yas Card RN    OUTCOME:   Spoke with patient, they confirm accurate trough level and current dose 1.5 mg BID. Patient confirmed dose change to 1 mg BID and to repeat labs in 1-2 weeks. Orders sent to preferred pharmacy for dose change and lab for repeat labs. Patient voiced understanding of plan.

## 2022-11-17 ENCOUNTER — LAB (OUTPATIENT)
Dept: LAB | Facility: CLINIC | Age: 37
End: 2022-11-17
Payer: MEDICARE

## 2022-11-17 DIAGNOSIS — B25.9 CMV (CYTOMEGALOVIRUS INFECTION) (H): ICD-10-CM

## 2022-11-17 DIAGNOSIS — Z94.0 KIDNEY REPLACED BY TRANSPLANT: ICD-10-CM

## 2022-11-17 DIAGNOSIS — Z48.298 AFTERCARE FOLLOWING ORGAN TRANSPLANT: ICD-10-CM

## 2022-11-17 LAB
ANION GAP SERPL CALCULATED.3IONS-SCNC: 14 MMOL/L (ref 7–15)
BASOPHILS # BLD AUTO: 0 10E3/UL (ref 0–0.2)
BASOPHILS NFR BLD AUTO: 0 %
BUN SERPL-MCNC: 13.8 MG/DL (ref 6–20)
CALCIUM SERPL-MCNC: 9.6 MG/DL (ref 8.6–10)
CHLORIDE SERPL-SCNC: 104 MMOL/L (ref 98–107)
CMV DNA SPEC NAA+PROBE-ACNC: <137 IU/ML
CMV DNA SPEC NAA+PROBE-LOG#: <2.1 {LOG_COPIES}/ML
CREAT SERPL-MCNC: 1.01 MG/DL (ref 0.67–1.17)
DEPRECATED HCO3 PLAS-SCNC: 22 MMOL/L (ref 22–29)
EOSINOPHIL # BLD AUTO: 0.1 10E3/UL (ref 0–0.7)
EOSINOPHIL NFR BLD AUTO: 2 %
ERYTHROCYTE [DISTWIDTH] IN BLOOD BY AUTOMATED COUNT: 12.7 % (ref 10–15)
GFR SERPL CREATININE-BSD FRML MDRD: >90 ML/MIN/1.73M2
GLUCOSE SERPL-MCNC: 98 MG/DL (ref 70–99)
HCT VFR BLD AUTO: 43.1 % (ref 40–53)
HGB BLD-MCNC: 14.7 G/DL (ref 13.3–17.7)
LYMPHOCYTES # BLD AUTO: 2.1 10E3/UL (ref 0.8–5.3)
LYMPHOCYTES NFR BLD AUTO: 43 %
MCH RBC QN AUTO: 29.2 PG (ref 26.5–33)
MCHC RBC AUTO-ENTMCNC: 34.1 G/DL (ref 31.5–36.5)
MCV RBC AUTO: 86 FL (ref 78–100)
MONOCYTES # BLD AUTO: 0.7 10E3/UL (ref 0–1.3)
MONOCYTES NFR BLD AUTO: 13 %
NEUTROPHILS # BLD AUTO: 2.1 10E3/UL (ref 1.6–8.3)
NEUTROPHILS NFR BLD AUTO: 42 %
PLATELET # BLD AUTO: 204 10E3/UL (ref 150–450)
POTASSIUM SERPL-SCNC: 3.7 MMOL/L (ref 3.4–5.3)
RBC # BLD AUTO: 5.04 10E6/UL (ref 4.4–5.9)
SODIUM SERPL-SCNC: 140 MMOL/L (ref 136–145)
TACROLIMUS BLD-MCNC: 3.9 UG/L (ref 5–15)
TME LAST DOSE: ABNORMAL H
TME LAST DOSE: ABNORMAL H
WBC # BLD AUTO: 5 10E3/UL (ref 4–11)

## 2022-11-17 PROCEDURE — 36415 COLL VENOUS BLD VENIPUNCTURE: CPT

## 2022-11-17 PROCEDURE — 80048 BASIC METABOLIC PNL TOTAL CA: CPT

## 2022-11-17 PROCEDURE — 85025 COMPLETE CBC W/AUTO DIFF WBC: CPT

## 2022-11-17 PROCEDURE — 86832 HLA CLASS I HIGH DEFIN QUAL: CPT

## 2022-11-17 PROCEDURE — 80197 ASSAY OF TACROLIMUS: CPT

## 2022-11-17 PROCEDURE — 86833 HLA CLASS II HIGH DEFIN QUAL: CPT

## 2022-11-18 ENCOUNTER — TELEPHONE (OUTPATIENT)
Dept: TRANSPLANT | Facility: CLINIC | Age: 37
End: 2022-11-18

## 2022-11-18 DIAGNOSIS — Z94.0 KIDNEY TRANSPLANT RECIPIENT: ICD-10-CM

## 2022-11-18 DIAGNOSIS — Z94.0 KIDNEY REPLACED BY TRANSPLANT: ICD-10-CM

## 2022-11-18 DIAGNOSIS — Z48.298 AFTERCARE FOLLOWING ORGAN TRANSPLANT: ICD-10-CM

## 2022-11-18 RX ORDER — TACROLIMUS 0.5 MG/1
0.5 CAPSULE ORAL EVERY MORNING
Qty: 90 CAPSULE | Refills: 3 | Status: SHIPPED | OUTPATIENT
Start: 2022-11-18 | End: 2023-03-27

## 2022-11-18 RX ORDER — TACROLIMUS 1 MG/1
1 CAPSULE ORAL 2 TIMES DAILY
Qty: 60 CAPSULE | Refills: 11
Start: 2022-11-18 | End: 2022-11-25

## 2022-11-18 NOTE — TELEPHONE ENCOUNTER
ISSUE:  Tac 3.9 (goal 4-6)   Currently taking 1 mg bid     Rober Goodwin MD Poucher, Jessica, RN  So close to goal. Could you increase him to 1.5mg in AM and 1mg in PM please?     Message sent to Kailash.

## 2022-11-19 LAB
DONOR IDENTIFICATION: NORMAL
DSA COMMENTS: NORMAL
DSA PRESENT: NO
DSA TEST METHOD: NORMAL
ORGAN: NORMAL
SA 1 CELL: NORMAL
SA 1 TEST METHOD: NORMAL
SA 2 CELL: NORMAL
SA 2 TEST METHOD: NORMAL
SA1 HI RISK ABY: NORMAL
SA1 MOD RISK ABY: NORMAL
SA2 HI RISK ABY: NORMAL
SA2 MOD RISK ABY: NORMAL
UNACCEPTABLE ANTIGENS: NORMAL
UNOS CPRA: 2
ZZZSA 1  COMMENTS: NORMAL
ZZZSA 2 COMMENTS: NORMAL

## 2022-11-21 ENCOUNTER — HEALTH MAINTENANCE LETTER (OUTPATIENT)
Age: 37
End: 2022-11-21

## 2022-11-25 DIAGNOSIS — Z94.0 KIDNEY REPLACED BY TRANSPLANT: ICD-10-CM

## 2022-11-25 DIAGNOSIS — Z94.0 KIDNEY TRANSPLANT RECIPIENT: ICD-10-CM

## 2022-11-25 DIAGNOSIS — Z48.298 AFTERCARE FOLLOWING ORGAN TRANSPLANT: ICD-10-CM

## 2022-11-25 RX ORDER — TACROLIMUS 1 MG/1
1 CAPSULE ORAL 2 TIMES DAILY
Qty: 60 CAPSULE | Refills: 11 | Status: SHIPPED | OUTPATIENT
Start: 2022-11-25 | End: 2023-03-27

## 2022-12-15 ENCOUNTER — LAB (OUTPATIENT)
Dept: LAB | Facility: CLINIC | Age: 37
End: 2022-12-15
Payer: MEDICARE

## 2022-12-15 DIAGNOSIS — Z48.298 AFTERCARE FOLLOWING ORGAN TRANSPLANT: ICD-10-CM

## 2022-12-15 DIAGNOSIS — B25.9 CMV (CYTOMEGALOVIRUS INFECTION) (H): ICD-10-CM

## 2022-12-15 LAB
ANION GAP SERPL CALCULATED.3IONS-SCNC: 12 MMOL/L (ref 7–15)
BASOPHILS # BLD AUTO: 0 10E3/UL (ref 0–0.2)
BASOPHILS NFR BLD AUTO: 0 %
BUN SERPL-MCNC: 12.9 MG/DL (ref 6–20)
CALCIUM SERPL-MCNC: 9.1 MG/DL (ref 8.6–10)
CHLORIDE SERPL-SCNC: 106 MMOL/L (ref 98–107)
CREAT SERPL-MCNC: 1.14 MG/DL (ref 0.67–1.17)
DEPRECATED HCO3 PLAS-SCNC: 25 MMOL/L (ref 22–29)
EOSINOPHIL # BLD AUTO: 0.1 10E3/UL (ref 0–0.7)
EOSINOPHIL NFR BLD AUTO: 2 %
ERYTHROCYTE [DISTWIDTH] IN BLOOD BY AUTOMATED COUNT: 12.7 % (ref 10–15)
GFR SERPL CREATININE-BSD FRML MDRD: 85 ML/MIN/1.73M2
GLUCOSE SERPL-MCNC: 100 MG/DL (ref 70–99)
HCT VFR BLD AUTO: 43.3 % (ref 40–53)
HGB BLD-MCNC: 14.9 G/DL (ref 13.3–17.7)
LYMPHOCYTES # BLD AUTO: 2.4 10E3/UL (ref 0.8–5.3)
LYMPHOCYTES NFR BLD AUTO: 48 %
MAGNESIUM SERPL-MCNC: 1.7 MG/DL (ref 1.7–2.3)
MCH RBC QN AUTO: 29.3 PG (ref 26.5–33)
MCHC RBC AUTO-ENTMCNC: 34.4 G/DL (ref 31.5–36.5)
MCV RBC AUTO: 85 FL (ref 78–100)
MONOCYTES # BLD AUTO: 0.6 10E3/UL (ref 0–1.3)
MONOCYTES NFR BLD AUTO: 12 %
NEUTROPHILS # BLD AUTO: 1.8 10E3/UL (ref 1.6–8.3)
NEUTROPHILS NFR BLD AUTO: 37 %
PHOSPHATE SERPL-MCNC: 3.6 MG/DL (ref 2.5–4.5)
PLATELET # BLD AUTO: 205 10E3/UL (ref 150–450)
POTASSIUM SERPL-SCNC: 3.6 MMOL/L (ref 3.4–5.3)
RBC # BLD AUTO: 5.08 10E6/UL (ref 4.4–5.9)
SODIUM SERPL-SCNC: 143 MMOL/L (ref 136–145)
TACROLIMUS BLD-MCNC: 4.7 UG/L (ref 5–15)
TME LAST DOSE: ABNORMAL H
TME LAST DOSE: ABNORMAL H
WBC # BLD AUTO: 5 10E3/UL (ref 4–11)

## 2022-12-15 PROCEDURE — 80048 BASIC METABOLIC PNL TOTAL CA: CPT

## 2022-12-15 PROCEDURE — 84100 ASSAY OF PHOSPHORUS: CPT

## 2022-12-15 PROCEDURE — 85025 COMPLETE CBC W/AUTO DIFF WBC: CPT

## 2022-12-15 PROCEDURE — 80197 ASSAY OF TACROLIMUS: CPT

## 2022-12-15 PROCEDURE — 87799 DETECT AGENT NOS DNA QUANT: CPT

## 2022-12-15 PROCEDURE — 36415 COLL VENOUS BLD VENIPUNCTURE: CPT

## 2022-12-15 PROCEDURE — 83735 ASSAY OF MAGNESIUM: CPT

## 2022-12-16 LAB
BKV DNA # SPEC NAA+PROBE: NOT DETECTED COPIES/ML
CMV DNA SPEC NAA+PROBE-ACNC: NOT DETECTED IU/ML

## 2022-12-16 NOTE — MR AVS SNAPSHOT
"              After Visit Summary   2018    Kailash Sorto    MRN: [id number]           Patient Information     Date Of Birth          1985        Visit Information        Provider Department      2018 4:00 PM Sammy Branch APRN CNP [facility]        Today's Diagnoses     End stage kidney disease (H)    -  1    Neutropenia, unspecified type (H)           Follow-ups after your visit        Who to contact     If you have questions or need follow up information about today's clinic visit or your schedule please contact [facility] NEPHROLOGY directly at [phone].  Normal or non-critical lab and imaging results will be communicated to you by [employer], letter or phone within 4 business days after the clinic has received the results. If you do not hear from us within 7 days, please contact the clinic through [employer] or phone. If you have a critical or abnormal lab result, we will notify you by phone as soon as possible.  Submit refill requests through [employer] or call your pharmacy and they will forward the refill request to us. Please allow 3 business days for your refill to be completed.          Additional Information About Your Visit        MyChart Information     [employer] lets you send messages to your doctor, view your test results, renew your prescriptions, schedule appointments and more. To sign up, go to [email or web address] . Click on \"Log in\" on the left side of the screen, which will take you to the Welcome page. Then click on \"Sign up Now\" on the right side of the page.     You will be asked to enter the access code listed below, as well as some personal information. Please follow the directions to create your username and password.     Your access code is: [email or web address]  Expires: 8/15/2018  6:31 AM     Your access code will  in 90 days. If you need help or a new code, please call your [place] clinic or [phone].        Care EveryWhere ID     This is your Care " EveryWhere ID. This could be used by other organizations to access your [place] medical records  [id number]        Your Vitals Were     Pulse Temperature Pulse Oximetry BMI (Body Mass Index)          65 98.3  F (36.8  C) (Oral) 95% 27.92 kg/m2         Blood Pressure from Last 3 Encounters:   05/31/18 121/74   08/04/16 134/86   08/04/16 134/86    Weight from Last 3 Encounters:   05/31/18 78.5 kg (173 lb)   08/04/16 75.1 kg (165 lb 8 oz)   08/04/16 75.1 kg (165 lb 8 oz)              We Performed the Following     CBC with platelets        Primary Care Provider Office Phone # Fax #    Homero Dumont [phone]       [address] [place] [id number]        Equal Access to Services     PATRICK DEY : Hadii aad ku hadasho Soab, waaxda luqadaha, qaybta kaalmada tawanna, elizabeth jacob . So [facility] [phone].    ATENCIÓN: Si habla español, tiene a rod disposición servicios gratuitos de asistencia lingüística. Llame al [phone].    We comply with applicable federal civil rights laws and [place] laws. We do not discriminate on the basis of race, color, national origin, age, disability, sex, sexual orientation, or gender identity.            Thank you!     Thank you for choosing [facility] NEPHROLOG[facility]  for your care. Our goal is always to provide you with excellent care. Hearing back from our patients is one way we can continue to improve our services. Please take a few minutes to complete the written survey that you may receive in the mail after your visit with us. Thank you!             Your Updated Medication List - Protect others around you: Learn how to safely use, store and throw away your medicines at www.disposemymeds.org.          This list is accurate as of 5/31/18 11:59 PM.  Always use your most recent med list.                   Brand Name Dispense Instructions for use Diagnosis    AMLODIPINE BESYLATE PO      Take 10 mg by mouth daily        calcium  No acetate 667 MG Caps capsule    PHOSLO     Take 667 mg by mouth One tablet with every meal and snack        HYDRALAZINE HCL PO      Take 25 mg by mouth 4 times daily        LABETALOL HCL PO      Take 200 mg by mouth 2 times daily        GARDENIA CAPS PO      Take 1 tablet by mouth daily

## 2023-01-05 ENCOUNTER — LAB (OUTPATIENT)
Dept: LAB | Facility: CLINIC | Age: 38
End: 2023-01-05
Payer: MEDICARE

## 2023-01-05 DIAGNOSIS — B25.9 CMV (CYTOMEGALOVIRUS INFECTION) (H): ICD-10-CM

## 2023-01-05 DIAGNOSIS — Z48.298 AFTERCARE FOLLOWING ORGAN TRANSPLANT: ICD-10-CM

## 2023-01-05 LAB
ANION GAP SERPL CALCULATED.3IONS-SCNC: 13 MMOL/L (ref 7–15)
BASOPHILS # BLD AUTO: 0 10E3/UL (ref 0–0.2)
BASOPHILS NFR BLD AUTO: 0 %
BUN SERPL-MCNC: 12.9 MG/DL (ref 6–20)
CALCIUM SERPL-MCNC: 9.5 MG/DL (ref 8.6–10)
CHLORIDE SERPL-SCNC: 105 MMOL/L (ref 98–107)
CREAT SERPL-MCNC: 1.17 MG/DL (ref 0.67–1.17)
DEPRECATED HCO3 PLAS-SCNC: 23 MMOL/L (ref 22–29)
EOSINOPHIL # BLD AUTO: 0.1 10E3/UL (ref 0–0.7)
EOSINOPHIL NFR BLD AUTO: 2 %
ERYTHROCYTE [DISTWIDTH] IN BLOOD BY AUTOMATED COUNT: 12.7 % (ref 10–15)
GFR SERPL CREATININE-BSD FRML MDRD: 83 ML/MIN/1.73M2
GLUCOSE SERPL-MCNC: 107 MG/DL (ref 70–99)
HCT VFR BLD AUTO: 43.3 % (ref 40–53)
HGB BLD-MCNC: 15.1 G/DL (ref 13.3–17.7)
LYMPHOCYTES # BLD AUTO: 2.9 10E3/UL (ref 0.8–5.3)
LYMPHOCYTES NFR BLD AUTO: 48 %
MAGNESIUM SERPL-MCNC: 1.7 MG/DL (ref 1.7–2.3)
MCH RBC QN AUTO: 29.7 PG (ref 26.5–33)
MCHC RBC AUTO-ENTMCNC: 34.9 G/DL (ref 31.5–36.5)
MCV RBC AUTO: 85 FL (ref 78–100)
MONOCYTES # BLD AUTO: 0.8 10E3/UL (ref 0–1.3)
MONOCYTES NFR BLD AUTO: 13 %
NEUTROPHILS # BLD AUTO: 2.3 10E3/UL (ref 1.6–8.3)
NEUTROPHILS NFR BLD AUTO: 37 %
PHOSPHATE SERPL-MCNC: 3.4 MG/DL (ref 2.5–4.5)
PLATELET # BLD AUTO: 230 10E3/UL (ref 150–450)
POTASSIUM SERPL-SCNC: 3.7 MMOL/L (ref 3.4–5.3)
RBC # BLD AUTO: 5.09 10E6/UL (ref 4.4–5.9)
SODIUM SERPL-SCNC: 141 MMOL/L (ref 136–145)
TACROLIMUS BLD-MCNC: 7.1 UG/L (ref 5–15)
TME LAST DOSE: NORMAL H
TME LAST DOSE: NORMAL H
WBC # BLD AUTO: 6.1 10E3/UL (ref 4–11)

## 2023-01-05 PROCEDURE — 80048 BASIC METABOLIC PNL TOTAL CA: CPT

## 2023-01-05 PROCEDURE — 83735 ASSAY OF MAGNESIUM: CPT

## 2023-01-05 PROCEDURE — 80197 ASSAY OF TACROLIMUS: CPT

## 2023-01-05 PROCEDURE — 87799 DETECT AGENT NOS DNA QUANT: CPT

## 2023-01-05 PROCEDURE — 85025 COMPLETE CBC W/AUTO DIFF WBC: CPT

## 2023-01-05 PROCEDURE — 36415 COLL VENOUS BLD VENIPUNCTURE: CPT

## 2023-01-05 PROCEDURE — 84100 ASSAY OF PHOSPHORUS: CPT

## 2023-01-11 DIAGNOSIS — Z94.0 KIDNEY TRANSPLANT RECIPIENT: Primary | ICD-10-CM

## 2023-01-11 RX ORDER — MYCOPHENOLATE MOFETIL 250 MG/1
500 CAPSULE ORAL 2 TIMES DAILY
Qty: 360 CAPSULE | Refills: 3 | Status: SHIPPED | OUTPATIENT
Start: 2023-01-11 | End: 2023-05-19

## 2023-01-19 ENCOUNTER — TELEPHONE (OUTPATIENT)
Dept: TRANSPLANT | Facility: CLINIC | Age: 38
End: 2023-01-19

## 2023-01-19 ENCOUNTER — LAB (OUTPATIENT)
Dept: LAB | Facility: CLINIC | Age: 38
End: 2023-01-19
Payer: MEDICARE

## 2023-01-19 DIAGNOSIS — Z48.298 AFTERCARE FOLLOWING ORGAN TRANSPLANT: ICD-10-CM

## 2023-01-19 DIAGNOSIS — Z94.0 KIDNEY REPLACED BY TRANSPLANT: Primary | ICD-10-CM

## 2023-01-19 DIAGNOSIS — B25.9 CMV (CYTOMEGALOVIRUS INFECTION) (H): ICD-10-CM

## 2023-01-19 LAB
ANION GAP SERPL CALCULATED.3IONS-SCNC: 15 MMOL/L (ref 7–15)
BASOPHILS # BLD AUTO: 0 10E3/UL (ref 0–0.2)
BASOPHILS NFR BLD AUTO: 0 %
BUN SERPL-MCNC: 10.1 MG/DL (ref 6–20)
CALCIUM SERPL-MCNC: 10.1 MG/DL (ref 8.6–10)
CHLORIDE SERPL-SCNC: 106 MMOL/L (ref 98–107)
CREAT SERPL-MCNC: 1.25 MG/DL (ref 0.67–1.17)
DEPRECATED HCO3 PLAS-SCNC: 21 MMOL/L (ref 22–29)
EOSINOPHIL # BLD AUTO: 0.2 10E3/UL (ref 0–0.7)
EOSINOPHIL NFR BLD AUTO: 3 %
ERYTHROCYTE [DISTWIDTH] IN BLOOD BY AUTOMATED COUNT: 12.3 % (ref 10–15)
GFR SERPL CREATININE-BSD FRML MDRD: 77 ML/MIN/1.73M2
GLUCOSE SERPL-MCNC: 102 MG/DL (ref 70–99)
HCT VFR BLD AUTO: 43 % (ref 40–53)
HGB BLD-MCNC: 14.6 G/DL (ref 13.3–17.7)
LYMPHOCYTES # BLD AUTO: 2.9 10E3/UL (ref 0.8–5.3)
LYMPHOCYTES NFR BLD AUTO: 49 %
MCH RBC QN AUTO: 29.1 PG (ref 26.5–33)
MCHC RBC AUTO-ENTMCNC: 34 G/DL (ref 31.5–36.5)
MCV RBC AUTO: 86 FL (ref 78–100)
MONOCYTES # BLD AUTO: 0.7 10E3/UL (ref 0–1.3)
MONOCYTES NFR BLD AUTO: 12 %
NEUTROPHILS # BLD AUTO: 2.1 10E3/UL (ref 1.6–8.3)
NEUTROPHILS NFR BLD AUTO: 36 %
PLATELET # BLD AUTO: 241 10E3/UL (ref 150–450)
POTASSIUM SERPL-SCNC: 3.9 MMOL/L (ref 3.4–5.3)
RBC # BLD AUTO: 5.02 10E6/UL (ref 4.4–5.9)
SODIUM SERPL-SCNC: 142 MMOL/L (ref 136–145)
TACROLIMUS BLD-MCNC: 6.3 UG/L (ref 5–15)
TME LAST DOSE: NORMAL H
TME LAST DOSE: NORMAL H
WBC # BLD AUTO: 5.9 10E3/UL (ref 4–11)

## 2023-01-19 PROCEDURE — 36415 COLL VENOUS BLD VENIPUNCTURE: CPT

## 2023-01-19 PROCEDURE — 80197 ASSAY OF TACROLIMUS: CPT

## 2023-01-19 PROCEDURE — 85025 COMPLETE CBC W/AUTO DIFF WBC: CPT

## 2023-01-19 PROCEDURE — 80048 BASIC METABOLIC PNL TOTAL CA: CPT

## 2023-01-19 NOTE — TELEPHONE ENCOUNTER
ISSUE:  Creatinine elevated, 1.25  Tac pending     Rober Goodwin MD Poucher, Jessica, RN  Appears volume depleted. Recommend increasing PO intake     Recommendations communicated to Kailash, will repeat labs again next week.

## 2023-01-20 LAB — CMV DNA SPEC NAA+PROBE-ACNC: NOT DETECTED IU/ML

## 2023-01-26 ENCOUNTER — LAB (OUTPATIENT)
Dept: LAB | Facility: CLINIC | Age: 38
End: 2023-01-26
Payer: MEDICARE

## 2023-01-26 DIAGNOSIS — Z48.298 AFTERCARE FOLLOWING ORGAN TRANSPLANT: ICD-10-CM

## 2023-01-26 DIAGNOSIS — Z94.0 KIDNEY REPLACED BY TRANSPLANT: ICD-10-CM

## 2023-01-26 DIAGNOSIS — B25.9 CMV (CYTOMEGALOVIRUS INFECTION) (H): ICD-10-CM

## 2023-01-26 LAB
ANION GAP SERPL CALCULATED.3IONS-SCNC: 6 MMOL/L (ref 7–15)
BASOPHILS # BLD AUTO: 0 10E3/UL (ref 0–0.2)
BASOPHILS NFR BLD AUTO: 0 %
BUN SERPL-MCNC: 11.7 MG/DL (ref 6–20)
CALCIUM SERPL-MCNC: 9.3 MG/DL (ref 8.6–10)
CHLORIDE SERPL-SCNC: 105 MMOL/L (ref 98–107)
CMV DNA SPEC NAA+PROBE-ACNC: NOT DETECTED IU/ML
CREAT SERPL-MCNC: 1.1 MG/DL (ref 0.67–1.17)
DEPRECATED HCO3 PLAS-SCNC: 30 MMOL/L (ref 22–29)
EOSINOPHIL # BLD AUTO: 0.2 10E3/UL (ref 0–0.7)
EOSINOPHIL NFR BLD AUTO: 3 %
ERYTHROCYTE [DISTWIDTH] IN BLOOD BY AUTOMATED COUNT: 12.3 % (ref 10–15)
GFR SERPL CREATININE-BSD FRML MDRD: 89 ML/MIN/1.73M2
GLUCOSE SERPL-MCNC: 98 MG/DL (ref 70–99)
HCT VFR BLD AUTO: 40.9 % (ref 40–53)
HGB BLD-MCNC: 14.1 G/DL (ref 13.3–17.7)
LYMPHOCYTES # BLD AUTO: 2.9 10E3/UL (ref 0.8–5.3)
LYMPHOCYTES NFR BLD AUTO: 45 %
MCH RBC QN AUTO: 29.4 PG (ref 26.5–33)
MCHC RBC AUTO-ENTMCNC: 34.5 G/DL (ref 31.5–36.5)
MCV RBC AUTO: 85 FL (ref 78–100)
MONOCYTES # BLD AUTO: 0.9 10E3/UL (ref 0–1.3)
MONOCYTES NFR BLD AUTO: 14 %
NEUTROPHILS # BLD AUTO: 2.5 10E3/UL (ref 1.6–8.3)
NEUTROPHILS NFR BLD AUTO: 38 %
PLATELET # BLD AUTO: 266 10E3/UL (ref 150–450)
POTASSIUM SERPL-SCNC: 3.3 MMOL/L (ref 3.4–5.3)
RBC # BLD AUTO: 4.8 10E6/UL (ref 4.4–5.9)
SODIUM SERPL-SCNC: 141 MMOL/L (ref 136–145)
TACROLIMUS BLD-MCNC: 6.1 UG/L (ref 5–15)
TME LAST DOSE: NORMAL H
TME LAST DOSE: NORMAL H
WBC # BLD AUTO: 6.6 10E3/UL (ref 4–11)

## 2023-01-26 PROCEDURE — 85025 COMPLETE CBC W/AUTO DIFF WBC: CPT

## 2023-01-26 PROCEDURE — 80197 ASSAY OF TACROLIMUS: CPT

## 2023-01-26 PROCEDURE — 80048 BASIC METABOLIC PNL TOTAL CA: CPT

## 2023-01-26 PROCEDURE — 36415 COLL VENOUS BLD VENIPUNCTURE: CPT

## 2023-01-30 DIAGNOSIS — E87.6 HYPOKALEMIA: Primary | ICD-10-CM

## 2023-01-30 RX ORDER — POTASSIUM CHLORIDE 1500 MG/1
20 TABLET, EXTENDED RELEASE ORAL 2 TIMES DAILY
Qty: 180 TABLET | Refills: 0 | Status: SHIPPED | OUTPATIENT
Start: 2023-01-30 | End: 2023-05-19

## 2023-02-16 ENCOUNTER — LAB (OUTPATIENT)
Dept: LAB | Facility: CLINIC | Age: 38
End: 2023-02-16
Payer: MEDICARE

## 2023-02-16 DIAGNOSIS — Z48.298 AFTERCARE FOLLOWING ORGAN TRANSPLANT: ICD-10-CM

## 2023-02-16 DIAGNOSIS — B25.9 CMV (CYTOMEGALOVIRUS INFECTION) (H): ICD-10-CM

## 2023-02-16 LAB
ANION GAP SERPL CALCULATED.3IONS-SCNC: 10 MMOL/L (ref 7–15)
BASOPHILS # BLD AUTO: 0 10E3/UL (ref 0–0.2)
BASOPHILS NFR BLD AUTO: 0 %
BUN SERPL-MCNC: 8 MG/DL (ref 6–20)
CALCIUM SERPL-MCNC: 9.5 MG/DL (ref 8.6–10)
CHLORIDE SERPL-SCNC: 106 MMOL/L (ref 98–107)
CMV DNA SPEC NAA+PROBE-ACNC: NOT DETECTED IU/ML
CREAT SERPL-MCNC: 1.12 MG/DL (ref 0.67–1.17)
DEPRECATED HCO3 PLAS-SCNC: 27 MMOL/L (ref 22–29)
EOSINOPHIL # BLD AUTO: 0.2 10E3/UL (ref 0–0.7)
EOSINOPHIL NFR BLD AUTO: 3 %
ERYTHROCYTE [DISTWIDTH] IN BLOOD BY AUTOMATED COUNT: 12.4 % (ref 10–15)
GFR SERPL CREATININE-BSD FRML MDRD: 87 ML/MIN/1.73M2
GLUCOSE SERPL-MCNC: 97 MG/DL (ref 70–99)
HCT VFR BLD AUTO: 42.1 % (ref 40–53)
HGB BLD-MCNC: 14.5 G/DL (ref 13.3–17.7)
LYMPHOCYTES # BLD AUTO: 2.7 10E3/UL (ref 0.8–5.3)
LYMPHOCYTES NFR BLD AUTO: 49 %
MAGNESIUM SERPL-MCNC: 1.5 MG/DL (ref 1.7–2.3)
MCH RBC QN AUTO: 29.5 PG (ref 26.5–33)
MCHC RBC AUTO-ENTMCNC: 34.4 G/DL (ref 31.5–36.5)
MCV RBC AUTO: 86 FL (ref 78–100)
MONOCYTES # BLD AUTO: 0.6 10E3/UL (ref 0–1.3)
MONOCYTES NFR BLD AUTO: 11 %
NEUTROPHILS # BLD AUTO: 2 10E3/UL (ref 1.6–8.3)
NEUTROPHILS NFR BLD AUTO: 37 %
PHOSPHATE SERPL-MCNC: 3.6 MG/DL (ref 2.5–4.5)
PLATELET # BLD AUTO: 196 10E3/UL (ref 150–450)
POTASSIUM SERPL-SCNC: 3.7 MMOL/L (ref 3.4–5.3)
RBC # BLD AUTO: 4.92 10E6/UL (ref 4.4–5.9)
SODIUM SERPL-SCNC: 143 MMOL/L (ref 136–145)
TACROLIMUS BLD-MCNC: 7.3 UG/L (ref 5–15)
TME LAST DOSE: NORMAL H
TME LAST DOSE: NORMAL H
WBC # BLD AUTO: 5.6 10E3/UL (ref 4–11)

## 2023-02-16 PROCEDURE — 87799 DETECT AGENT NOS DNA QUANT: CPT

## 2023-02-16 PROCEDURE — 36415 COLL VENOUS BLD VENIPUNCTURE: CPT

## 2023-02-16 PROCEDURE — 80048 BASIC METABOLIC PNL TOTAL CA: CPT

## 2023-02-16 PROCEDURE — 83735 ASSAY OF MAGNESIUM: CPT

## 2023-02-16 PROCEDURE — 80197 ASSAY OF TACROLIMUS: CPT

## 2023-02-16 PROCEDURE — 85025 COMPLETE CBC W/AUTO DIFF WBC: CPT

## 2023-02-16 PROCEDURE — 84100 ASSAY OF PHOSPHORUS: CPT

## 2023-02-17 LAB — BKV DNA # SPEC NAA+PROBE: NOT DETECTED COPIES/ML

## 2023-02-28 ENCOUNTER — TELEPHONE (OUTPATIENT)
Dept: OTHER | Facility: CLINIC | Age: 38
End: 2023-02-28
Payer: MEDICARE

## 2023-02-28 NOTE — TELEPHONE ENCOUNTER
[facility]    Who is the name of the provider?:  Camelia    What is the location you see this provider at/preferred location?: [place]  Person calling / Facility: Self  Phone number:  [phone]  Nurse call back needed:  Yes    Reason for call:   Kidney transplant 9/2021.  Requesting fistula reduction, no longer needing dialysis.     Pharmacy location:  NA  Outside Imaging: Not Applicable   Can we leave a detailed message on this number?  YES

## 2023-02-28 NOTE — TELEPHONE ENCOUNTER
9/13/21 kidney transplant.    LOV 5/13/21 with Dr. Denise.    Will discuss with Dr. Denise if AVF US is needed due to history of AVF aneurysms.    Sweta Valera, BSN, RN-[facility] [place]

## 2023-03-01 NOTE — TELEPHONE ENCOUNTER
Discussed with Dr. Denise.  No imaging needed.    Please contact patient to coordinate in clinic visit with Dr. Denise at next available.    Appt note:  Follow up to 5/13/21 visit with Dr. Denise; 9/13/21 kidney transplant; discuss possible ligation.    Sweta Valera, LEELEEN, RN-[facility] [place]

## 2023-03-03 NOTE — TELEPHONE ENCOUNTER
DANIELLEM requesting patient to call back to schedule appointment with Dr. Denise.  Sweta Valera, LEELEEN, RN-[facility] [place]

## 2023-03-05 DIAGNOSIS — I15.1 HTN, KIDNEY TRANSPLANT RELATED: Primary | ICD-10-CM

## 2023-03-05 DIAGNOSIS — Z94.0 HTN, KIDNEY TRANSPLANT RELATED: Primary | ICD-10-CM

## 2023-03-06 RX ORDER — LOSARTAN POTASSIUM 25 MG/1
25 TABLET ORAL AT BEDTIME
Qty: 90 TABLET | Refills: 0 | Status: SHIPPED | OUTPATIENT
Start: 2023-03-06 | End: 2023-05-31

## 2023-03-22 NOTE — H&P (VIEW-ONLY)
[facility]    Kailash Sorto is a 37-year-old patient who had been on hemodialysis via a right upper arm brachial to cephalic fistula originally placed 5 6/15/2016.  He required a vein patch on 7/12/2016, repair of an aneurysmal segment 5/28/2019 and again 8/25/2020.      Fistula is functioning well but he is now received a successful renal transplant placed 9/13/2021.  2/16/2023 SCr= 1.12.  Hemoglobin= 14.5.    He comes today to discuss removal of fistula.      PMH: Medications: Cozaar, tacrolimus, mycophenolate, Lipitor   Non-smoker.   Lives independently.  Very active.      Exam: Alert and appropriate.  Normal affect.  Well-developed.   Blood pressure 161/100 left arm.  Pulse 92 regular   Chest= clear   Cardiovascular= regular rate   Right upper arm with markedly dilated tortuous AVF    cephalic vein over 2 cm in diameter up to clavicle    High flow fistula            IMPRESSION: #1.  Well-functioning right pelvic renal transplant.  He has a high flow markedly dilated aneurysmal tortuous R upper arm brachial to cephalic fistula.  I feel that complete excision of the fistula is indicated which would require incision from the clavicle to the antecubital area.  Due to this a general anesthetic will be required.  This would be performed as an outpatient.  Risk benefits reviewed with patient today.       #2.  Hypertension.  He is discontinued his BP medications except for the Cozaar.  We will see if removal of the fistula will help his blood pressure otherwise he will need to have his medications reevaluated and this was discussed.    Spent 25 minutes with Kailash today.  We will schedule as outpatient surgery.         Payam Denise MD   This note was created using Dragon voice recognition software which may result in transcription errors.

## 2023-03-22 NOTE — PROGRESS NOTES
[facility]    Kailash Sorto is a 37-year-old patient who had been on hemodialysis via a right upper arm brachial to cephalic fistula originally placed 5 6/15/2016.  He required a vein patch on 7/12/2016, repair of an aneurysmal segment 5/28/2019 and again 8/25/2020.      Fistula is functioning well but he is now received a successful renal transplant placed 9/13/2021.  2/16/2023 SCr= 1.12.  Hemoglobin= 14.5.    He comes today to discuss removal of fistula.      PMH: Medications: Cozaar, tacrolimus, mycophenolate, Lipitor   Non-smoker.   Lives independently.  Very active.      Exam: Alert and appropriate.  Normal affect.  Well-developed.   Blood pressure 161/100 left arm.  Pulse 92 regular   Chest= clear   Cardiovascular= regular rate   Right upper arm with markedly dilated tortuous AVF    cephalic vein over 2 cm in diameter up to clavicle    High flow fistula            IMPRESSION: #1.  Well-functioning right pelvic renal transplant.  He has a high flow markedly dilated aneurysmal tortuous R upper arm brachial to cephalic fistula.  I feel that complete excision of the fistula is indicated which would require incision from the clavicle to the antecubital area.  Due to this a general anesthetic will be required.  This would be performed as an outpatient.  Risk benefits reviewed with patient today.       #2.  Hypertension.  He is discontinued his BP medications except for the Cozaar.  We will see if removal of the fistula will help his blood pressure otherwise he will need to have his medications reevaluated and this was discussed.    Spent 25 minutes with Kailash today.  We will schedule as outpatient surgery.         Payam Denise MD   This note was created using Dragon voice recognition software which may result in transcription errors.

## 2023-03-23 ENCOUNTER — OFFICE VISIT (OUTPATIENT)
Dept: OTHER | Facility: CLINIC | Age: 38
End: 2023-03-23
Attending: SURGERY
Payer: MEDICARE

## 2023-03-23 ENCOUNTER — TELEPHONE (OUTPATIENT)
Dept: OTHER | Facility: CLINIC | Age: 38
End: 2023-03-23

## 2023-03-23 VITALS — HEART RATE: 92 BPM | DIASTOLIC BLOOD PRESSURE: 100 MMHG | SYSTOLIC BLOOD PRESSURE: 161 MMHG

## 2023-03-23 DIAGNOSIS — Z94.0 KIDNEY REPLACED BY TRANSPLANT: ICD-10-CM

## 2023-03-23 DIAGNOSIS — I77.0 ANEURYSM OF ARTERIOVENOUS FISTULA (H): ICD-10-CM

## 2023-03-23 DIAGNOSIS — I10 BENIGN ESSENTIAL HYPERTENSION: ICD-10-CM

## 2023-03-23 DIAGNOSIS — I77.0 ARTERIOVENOUS FISTULA (H): Primary | ICD-10-CM

## 2023-03-23 PROCEDURE — 99214 OFFICE O/P EST MOD 30 MIN: CPT | Performed by: SURGERY

## 2023-03-23 PROCEDURE — G0463 HOSPITAL OUTPT CLINIC VISIT: HCPCS

## 2023-03-23 NOTE — PROGRESS NOTES
[facility]        Patient is here for a  follow up.     Pt is currently taking no meds that would impact our treatment plan.    BP (!) 161/100 (BP Location: Left arm, Patient Position: Chair, Cuff Size: Adult Regular)   Pulse 92     The provider has been notified that the patient has no concerns.     Questions patient would like addressed today are: N/A.    Refills are needed: N/A    Has homecare services and agency name:  Simona Nixon MA

## 2023-03-23 NOTE — TELEPHONE ENCOUNTER
CASE REQUEST RECEIVED FOR: EXCISION OF RIGHT UPPER EXTREMITY AVF    CASE ID: [id number]    Spoke with patient and dates to avoid for scheduling surgery are 3/25/23 through 4/3/23.    His preference for surgery would be either Friday, 4/7/23 or Friday, 4/21/23.

## 2023-03-23 NOTE — NURSING NOTE
Patient Education    Procedure: Excision of right upper extremity arteriovenous fistula  Diagnosis: s/p kidney transplant; AVF aneurysms  Anticoagulation Instruction: n/a  Pre-Operative Physical Exam: You need to have a pre-op physical exam within 30 days of your procedure. Your procedure may be cancelled if you do not have a current History and Physical. Call your PCP's office to schedule.  Allergies:  Updated in Epic  Bowel Prep: n/a  NPO for solid 8 hours prior to arrival time.   NPO for clear liquids 2 hours prior to arrival time.   Post Procedure Education: [facility] post-procedure fact sheet reviewed with patient.    Showering instructions reviewed: Yes    Learner(s):patient  Method: Listening, Reading  Barriers to Learning:No Barrier  Outcome: Patient did verbalize understanding of above education.    Sweta Valera, LEELEEN, RN-[facility] [place]

## 2023-03-24 ENCOUNTER — LAB (OUTPATIENT)
Dept: LAB | Facility: CLINIC | Age: 38
End: 2023-03-24
Payer: MEDICARE

## 2023-03-24 DIAGNOSIS — B25.9 CMV (CYTOMEGALOVIRUS INFECTION) (H): ICD-10-CM

## 2023-03-24 DIAGNOSIS — Z48.298 AFTERCARE FOLLOWING ORGAN TRANSPLANT: ICD-10-CM

## 2023-03-24 LAB
ANION GAP SERPL CALCULATED.3IONS-SCNC: 13 MMOL/L (ref 7–15)
BASOPHILS # BLD AUTO: 0 10E3/UL (ref 0–0.2)
BASOPHILS NFR BLD AUTO: 0 %
BUN SERPL-MCNC: 13.1 MG/DL (ref 6–20)
CALCIUM SERPL-MCNC: 9.7 MG/DL (ref 8.6–10)
CHLORIDE SERPL-SCNC: 105 MMOL/L (ref 98–107)
CREAT SERPL-MCNC: 1.16 MG/DL (ref 0.67–1.17)
DEPRECATED HCO3 PLAS-SCNC: 24 MMOL/L (ref 22–29)
EOSINOPHIL # BLD AUTO: 0.1 10E3/UL (ref 0–0.7)
EOSINOPHIL NFR BLD AUTO: 2 %
ERYTHROCYTE [DISTWIDTH] IN BLOOD BY AUTOMATED COUNT: 11.6 % (ref 10–15)
GFR SERPL CREATININE-BSD FRML MDRD: 84 ML/MIN/1.73M2
GLUCOSE SERPL-MCNC: 101 MG/DL (ref 70–99)
HCT VFR BLD AUTO: 43.8 % (ref 40–53)
HGB BLD-MCNC: 14.9 G/DL (ref 13.3–17.7)
IMM GRANULOCYTES # BLD: 0 10E3/UL
IMM GRANULOCYTES NFR BLD: 0 %
LYMPHOCYTES # BLD AUTO: 2.8 10E3/UL (ref 0.8–5.3)
LYMPHOCYTES NFR BLD AUTO: 53 %
MAGNESIUM SERPL-MCNC: 1.9 MG/DL (ref 1.7–2.3)
MCH RBC QN AUTO: 29.7 PG (ref 26.5–33)
MCHC RBC AUTO-ENTMCNC: 34 G/DL (ref 31.5–36.5)
MCV RBC AUTO: 87 FL (ref 78–100)
MONOCYTES # BLD AUTO: 0.5 10E3/UL (ref 0–1.3)
MONOCYTES NFR BLD AUTO: 10 %
NEUTROPHILS # BLD AUTO: 1.8 10E3/UL (ref 1.6–8.3)
NEUTROPHILS NFR BLD AUTO: 35 %
NRBC # BLD AUTO: 0 10E3/UL
NRBC BLD AUTO-RTO: 0 /100
PHOSPHATE SERPL-MCNC: 3.5 MG/DL (ref 2.5–4.5)
PLATELET # BLD AUTO: 262 10E3/UL (ref 150–450)
POTASSIUM SERPL-SCNC: 3.7 MMOL/L (ref 3.4–5.3)
RBC # BLD AUTO: 5.02 10E6/UL (ref 4.4–5.9)
SODIUM SERPL-SCNC: 142 MMOL/L (ref 136–145)
TACROLIMUS BLD-MCNC: 7.4 UG/L (ref 5–15)
TME LAST DOSE: NORMAL H
TME LAST DOSE: NORMAL H
WBC # BLD AUTO: 5.3 10E3/UL (ref 4–11)

## 2023-03-24 PROCEDURE — 36415 COLL VENOUS BLD VENIPUNCTURE: CPT

## 2023-03-24 PROCEDURE — 83735 ASSAY OF MAGNESIUM: CPT

## 2023-03-24 PROCEDURE — 85025 COMPLETE CBC W/AUTO DIFF WBC: CPT

## 2023-03-24 PROCEDURE — 80048 BASIC METABOLIC PNL TOTAL CA: CPT

## 2023-03-24 PROCEDURE — 80197 ASSAY OF TACROLIMUS: CPT

## 2023-03-24 PROCEDURE — 84100 ASSAY OF PHOSPHORUS: CPT

## 2023-03-24 PROCEDURE — 87799 DETECT AGENT NOS DNA QUANT: CPT

## 2023-03-24 NOTE — TELEPHONE ENCOUNTER
Spoke with patient and informed him of his scheduled surgery date/time of Friday, 4/7/23 @ 12:30pm with a check-in time of 10:30am at [facility].    He will schedule his pre-op exam appointment.    I scheduled the patient for his post-op appointment on Thursday, 4/23/23 with Anat Azar NP.

## 2023-03-25 LAB — CMV DNA SPEC NAA+PROBE-ACNC: NOT DETECTED IU/ML

## 2023-03-27 DIAGNOSIS — Z94.0 KIDNEY REPLACED BY TRANSPLANT: ICD-10-CM

## 2023-03-27 DIAGNOSIS — Z48.298 AFTERCARE FOLLOWING ORGAN TRANSPLANT: Primary | ICD-10-CM

## 2023-03-27 DIAGNOSIS — Z94.0 KIDNEY TRANSPLANT RECIPIENT: ICD-10-CM

## 2023-03-27 LAB — BKV DNA # SPEC NAA+PROBE: NOT DETECTED COPIES/ML

## 2023-03-27 RX ORDER — TACROLIMUS 0.5 MG/1
CAPSULE ORAL
Qty: 90 CAPSULE | Refills: 3 | Status: SHIPPED | OUTPATIENT
Start: 2023-03-27 | End: 2023-04-24

## 2023-03-27 RX ORDER — TACROLIMUS 1 MG/1
1 CAPSULE ORAL 2 TIMES DAILY
Qty: 60 CAPSULE | Refills: 11 | Status: SHIPPED | OUTPATIENT
Start: 2023-03-27 | End: 2023-04-24

## 2023-03-27 NOTE — TELEPHONE ENCOUNTER
Patient confirms this was an accurate 12-hour trough. Verified Tacrolimus IR dose 1.5/1 mg BID. Confirmed no new medications or illness. Confirmed no missed doses. Patient confirms decrease Tacrolimus IR dose to 1 mg BID and repeat labs in 1-2 weeks

## 2023-03-27 NOTE — TELEPHONE ENCOUNTER
ISSUE:   Tacrolimus IR level 7.3 on 3/24, goal 4-6, dose 1.5/1 mg BID.    PLAN:   Please call patient and confirm this was an accurate 12-hour trough. Verify Tacrolimus IR dose 1.5/1 mg BID. Confirm no new medications or illness. Confirm no missed doses. If accurate trough and accurate dose, decrease Tacrolimus IR dose to 1 mg BID and repeat labs in 1-2 weeks    Yas Card RN

## 2023-03-27 NOTE — TELEPHONE ENCOUNTER
Left message and sent [employer] message to patient regarding:  Tacrolimus IR level 7.3 on 3/24, goal 4-6, dose 1.5/1 mg BID.     PLAN:   Please call patient and confirm this was an accurate 12-hour trough. Verify Tacrolimus IR dose 1.5/1 mg BID. Confirm no new medications or illness. Confirm no missed doses. If accurate trough and accurate dose, decrease Tacrolimus IR dose to 1 mg BID and repeat labs in 1-2 weeks

## 2023-04-04 ENCOUNTER — OFFICE VISIT (OUTPATIENT)
Dept: OTHER | Facility: CLINIC | Age: 38
End: 2023-04-04
Attending: PHYSICIAN ASSISTANT
Payer: MEDICARE

## 2023-04-04 VITALS
OXYGEN SATURATION: 98 % | HEART RATE: 99 BPM | WEIGHT: 190 LBS | DIASTOLIC BLOOD PRESSURE: 100 MMHG | BODY MASS INDEX: 29.75 KG/M2 | SYSTOLIC BLOOD PRESSURE: 146 MMHG

## 2023-04-04 DIAGNOSIS — Z01.818 PRE-OP EXAM: Primary | ICD-10-CM

## 2023-04-04 PROCEDURE — G0463 HOSPITAL OUTPT CLINIC VISIT: HCPCS

## 2023-04-04 PROCEDURE — 93000 ELECTROCARDIOGRAM COMPLETE: CPT | Performed by: PHYSICIAN ASSISTANT

## 2023-04-04 PROCEDURE — 99214 OFFICE O/P EST MOD 30 MIN: CPT | Performed by: PHYSICIAN ASSISTANT

## 2023-04-04 PROCEDURE — G0463 HOSPITAL OUTPT CLINIC VISIT: HCPCS | Performed by: PHYSICIAN ASSISTANT

## 2023-04-04 NOTE — TELEPHONE ENCOUNTER
Spoke with patient and scheduled him for a pre-op physical appointment with LEATHA Ruvalcaba today 4/4/23 at [facility].

## 2023-04-04 NOTE — TELEPHONE ENCOUNTER
STANISLAV for patient asking him to call me.  We could work him in at [facility] for his pre-op physical appointment.

## 2023-04-04 NOTE — H&P (VIEW-ONLY)
[facility] [place]  [address]  [place] [address]  Phone: [phone]  Fax: [phone]  Primary Provider: Homero Dumont  Pre-op Performing Provider: KEVYN ACEVES      PREOPERATIVE EVALUATION:  Today's date: 4/4/2023    Kailash Sorto is a 37 year old male who presents for a preoperative evaluation.    Surgical Information:  Surgery/Procedure: Excision of RUE AV fistula.   Surgery Location: [facility]  Surgeon: Dr. Denise  Surgery Date: 4/7/23  Time of Surgery: 12:30 pm  Where patient plans to recover: At home with family    Assessment & Plan     The proposed surgical procedure is considered LOW risk.    Risks and Recommendations:  The patient has the following additional risks and recommendations for perioperative complications:   - No identified additional risk factors other than previously addressed    Medication Instructions:  Patient is to take all scheduled medications on the day of surgery      RECOMMENDATION:  APPROVAL GIVEN to proceed with proposed procedure, without further diagnostic evaluation.      Subjective     HPI related to upcoming procedure:   Kailash Sorto is a 37-year-old male who had been on hemodialysis via a right upper arm brachial to cephalic fistula originally placed in 2016.  He required a vein patch on 7/12/2016, repair of an aneurysmal segment 5/28/2019 and again 8/25/2020. His fistula functions well but he has now received a successful renal transplant in 9/2021. He saw Dr. 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 This will require incision from the clavicle to the antecubital area and general anesthesia.     Status of Chronic Conditions:    HYPERTENSION - Patient has longstanding history of HTN , currently denies any symptoms referable to elevated blood pressure. Specifically denies chest pain, palpitations,  dyspnea, orthopnea, PND or peripheral edema. Blood pressure readings have been in slightly elevated recently as he states he is under significant stress. Current medication regimen is as listed below. Patient denies any side effects of medication.     RENAL INSUFFICIENCY - Patient has a longstanding history of moderate-severe chronic renal insufficiency and is post-transplant in 9/2021. Last Cr 1.16.       Review of Systems  CONSTITUTIONAL: NEGATIVE for fever, chills, change in weight  INTEGUMENTARY/SKIN: NEGATIVE for worrisome rashes, moles or lesions  EYES: NEGATIVE for vision changes or irritation  ENT/MOUTH: NEGATIVE for ear, mouth and throat problems  RESP: NEGATIVE for significant cough or SOB  CV: NEGATIVE for chest pain, palpitations or peripheral edema  GI: NEGATIVE for nausea, abdominal pain, heartburn, or change in bowel habits  : NEGATIVE for frequency, dysuria, or hematuria  MUSCULOSKELETAL: NEGATIVE for significant arthralgias or myalgia  NEURO: NEGATIVE for weakness, dizziness or paresthesias  ENDOCRINE: NEGATIVE for temperature intolerance, skin/hair changes  HEME: NEGATIVE for bleeding problems  PSYCHIATRIC: NEGATIVE for changes in mood or affect    Patient Active Problem List    Diagnosis Date Noted     Aftercare following organ transplant 03/31/2022     Priority: Medium     Need for pneumocystis prophylaxis 03/31/2022     Priority: Medium     Immunosuppression (H) 03/31/2022     Priority: Medium     Vitamin D deficiency 03/31/2022     Priority: Medium     Hypomagnesemia 03/31/2022     Priority: Medium     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     Priority: Medium     Secondary renal hyperparathyroidism (H) 03/31/2022     Priority: Medium     Neutropenia (H) 12/20/2021     Priority: Medium     Kidney replaced by transplant 09/13/2021     Priority: Medium     AVF (arteriovenous fistula) (H) 08/20/2020     Priority: Medium     Added automatically from request for surgery [id number]       HTN,  kidney transplant related 2018     Priority: Medium     Hyperlipidemia LDL goal <100 2016     Priority: Medium      Past Medical History:   Diagnosis Date     Anemia of chronic kidney failure      Dialysis patient (H)      End stage kidney disease (H)      Hypertension      Past Surgical History:   Procedure Laterality Date     CREATE FISTULA ARTERIOVENOUS UPPER EXTREMITY Right 5/15/2016    Procedure: CREATE FISTULA ARTERIOVENOUS UPPER EXTREMITY;  Surgeon: Ricardo Gallo MD;  Location: [facility] OR     CREATE FISTULA ARTERIOVENOUS UPPER EXTREMITY Right 2016    Procedure: CREATE FISTULA ARTERIOVENOUS UPPER EXTREMITY;  Surgeon: Payam Denise MD;  Location: [facility] OR     EXCISE FISTULA ARTERIOVENOUS UPPER EXTREMITY Right 2019    Procedure: FIRST STAGE EXCISION OF RIGHT ARTERIOVENOUS FISTULA ANEURYMS & Vein patch angioplasty;  Surgeon: Payam Denise MD;  Location: [facility] OR     REVISION FISTULA ARTERIOVENOUS UPPER EXTREMITY Right 2020    Procedure: REPAIR RIGHT UPPER EXTREMITY ARTERIOVENOUS FISTULA  ANEURYSM WITH SKIN ULCER;  Surgeon: Payam Denise MD;  Location: [facility] OR     TRANSPLANT KIDNEY RECIPIENT  DONOR Left 2021    Procedure: TRANSPLANT, KIDNEY, RECIPIENT,  DONOR with ureteral stent placement;  Surgeon: Amado Vale MD;  Location: [facility] OR     VASCULAR SURGERY      placement of jugular tunnel catheter     wisdom teeth       Current Outpatient Medications   Medication Sig Dispense Refill     atorvastatin (LIPITOR) 10 MG tablet Take 1 tablet (10 mg) by mouth daily 30 tablet 11     losartan (COZAAR) 25 MG tablet Take 1 tablet (25 mg) by mouth At Bedtime 90 tablet 0     magnesium oxide (MAG-OX) 400 MG tablet Take 1 tablet (400 mg) by mouth daily 60 tablet 1     mycophenolate (GENERIC EQUIVALENT) 250 MG capsule Take 2 capsules (500 mg) by mouth 2 times daily 360 capsule 3     potassium chloride ER (KLOR-CON M) 20 MEQ CR tablet Take 1  tablet (20 mEq) by mouth 2 times daily 180 tablet 0     tacrolimus (GENERIC EQUIVALENT) 0.5 MG capsule HOLD FOR FUTURE DOSE CHANGES.  Profile Rx: patient will contact pharmacy when needed 90 capsule 3     tacrolimus (GENERIC EQUIVALENT) 1 MG capsule Take 1 capsule (1 mg) by mouth 2 times daily 60 capsule 11       Allergies   Allergen Reactions     Valcyte [Valganciclovir] Other (See Comments)     Cannot receive this medication while on CMV monoclonal antibody study, due to end 12/9/2021.        Social History     Tobacco Use     Smoking status: Never     Smokeless tobacco: Never   Vaping Use     Vaping status: Not on file   Substance Use Topics     Alcohol use: Not Currently     Comment: none since transplant     Family History   Problem Relation Age of Onset     Diabetes Father      Lung Cancer Maternal Grandmother      Hypertension Maternal Grandmother      Hypertension Other         uncle     History   Drug Use No         Objective     BP (!) 146/100 (BP Location: Left arm)   Pulse 99   Wt 190 lb (86.2 kg)   SpO2 98%   BMI 29.75 kg/m      Physical Exam    GENERAL APPEARANCE: healthy, alert and no distress     EYES: EOMI,  PERRL     HENT: Atraumatic     NECK: no asymmetry, masses, or scars      RESP: lungs clear to auscultation - no rales, rhonchi or wheezes     CV: regular rates and rhythm, normal S1 S2, no S3 or S4 and no murmur, click or rub     ABDOMEN:  soft, nontender, and bowel sounds normal     MS: extremities normal- no gross deformities noted other than RUE AV fistula being aneurysmal     SKIN: no suspicious lesions or rashes     NEURO: Normal strength and tone, sensory exam grossly normal, mentation intact and speech normal     PSYCH: mentation appears normal. and affect normal/bright      Recent Labs   Lab Test 03/24/23  0929 02/16/23  0854 10/06/22  0910 09/22/22  0910 03/24/22  0920 03/10/22  0859 09/13/21  1336 09/13/21  0500   HGB 14.9 14.5   < > 14.7   < > 14.2   < > 10.3*    196   < >  208   < > 265   < > 237   INR  --   --   --   --   --   --   --  0.88    143   < > 141   < > 141   < > 141   POTASSIUM 3.7 3.7   < > 3.4   < > 3.0*   < > 3.3*   CR 1.16 1.12   < > 1.07   < > 1.05   < > 7.02*   A1C  --   --   --  5.2  --  5.5  --  5.3    < > = values in this interval not displayed.        Diagnostics:  EKG: Normal Sinus Rhythm, combined atrial enlargement, LVH with associated changes, unchanged from previous tracings    Revised Cardiac Risk Index (RCRI):  The patient has the following serious cardiovascular risks for perioperative complications:   - No serious cardiac risks = 0 points     RCRI Interpretation: 0 points: Class I (very low risk - 0.4% complication rate)        Signed Electronically by: Theresa Del Valle PA-C  Copy of this evaluation report is provided to requesting physician.

## 2023-04-04 NOTE — TELEPHONE ENCOUNTER
Patient left a message on the surgery scheduling line that he was unable to get his pre-op exam appointment before his scheduled surgery this Friday, 4/7/23.    He would like to know if the surgery can be cancelled and rescheduled?

## 2023-04-04 NOTE — PROGRESS NOTES
[facility] [place]  [address]  [place] [address]  Phone: [phone]  Fax: [phone]  Primary Provider: Homero Dumont  Pre-op Performing Provider: KEVYN ACEVES      PREOPERATIVE EVALUATION:  Today's date: 4/4/2023    Kailash Sorto is a 37 year old male who presents for a preoperative evaluation.    Surgical Information:  Surgery/Procedure: Excision of RUE AV fistula.   Surgery Location: [facility]  Surgeon: Dr. Denise  Surgery Date: 4/7/23  Time of Surgery: 12:30 pm  Where patient plans to recover: At home with family    Assessment & Plan     The proposed surgical procedure is considered LOW risk.    Risks and Recommendations:  The patient has the following additional risks and recommendations for perioperative complications:   - No identified additional risk factors other than previously addressed    Medication Instructions:  Patient is to take all scheduled medications on the day of surgery      RECOMMENDATION:  APPROVAL GIVEN to proceed with proposed procedure, without further diagnostic evaluation.      Subjective     HPI related to upcoming procedure:   Kailash Sorto is a 37-year-old male who had been on hemodialysis via a right upper arm brachial to cephalic fistula originally placed in 2016.  He required a vein patch on 7/12/2016, repair of an aneurysmal segment 5/28/2019 and again 8/25/2020. His fistula functions well but he has now received a successful renal transplant in 9/2021. He saw Dr. 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 This will require incision from the clavicle to the antecubital area and general anesthesia.     Status of Chronic Conditions:    HYPERTENSION - Patient has longstanding history of HTN , currently denies any symptoms referable to elevated blood pressure. Specifically denies chest pain, palpitations,  dyspnea, orthopnea, PND or peripheral edema. Blood pressure readings have been in slightly elevated recently as he states he is under significant stress. Current medication regimen is as listed below. Patient denies any side effects of medication.     RENAL INSUFFICIENCY - Patient has a longstanding history of moderate-severe chronic renal insufficiency and is post-transplant in 9/2021. Last Cr 1.16.       Review of Systems  CONSTITUTIONAL: NEGATIVE for fever, chills, change in weight  INTEGUMENTARY/SKIN: NEGATIVE for worrisome rashes, moles or lesions  EYES: NEGATIVE for vision changes or irritation  ENT/MOUTH: NEGATIVE for ear, mouth and throat problems  RESP: NEGATIVE for significant cough or SOB  CV: NEGATIVE for chest pain, palpitations or peripheral edema  GI: NEGATIVE for nausea, abdominal pain, heartburn, or change in bowel habits  : NEGATIVE for frequency, dysuria, or hematuria  MUSCULOSKELETAL: NEGATIVE for significant arthralgias or myalgia  NEURO: NEGATIVE for weakness, dizziness or paresthesias  ENDOCRINE: NEGATIVE for temperature intolerance, skin/hair changes  HEME: NEGATIVE for bleeding problems  PSYCHIATRIC: NEGATIVE for changes in mood or affect    Patient Active Problem List    Diagnosis Date Noted     Aftercare following organ transplant 03/31/2022     Priority: Medium     Need for pneumocystis prophylaxis 03/31/2022     Priority: Medium     Immunosuppression (H) 03/31/2022     Priority: Medium     Vitamin D deficiency 03/31/2022     Priority: Medium     Hypomagnesemia 03/31/2022     Priority: Medium     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     Priority: Medium     Secondary renal hyperparathyroidism (H) 03/31/2022     Priority: Medium     Neutropenia (H) 12/20/2021     Priority: Medium     Kidney replaced by transplant 09/13/2021     Priority: Medium     AVF (arteriovenous fistula) (H) 08/20/2020     Priority: Medium     Added automatically from request for surgery [id number]       HTN,  kidney transplant related 2018     Priority: Medium     Hyperlipidemia LDL goal <100 2016     Priority: Medium      Past Medical History:   Diagnosis Date     Anemia of chronic kidney failure      Dialysis patient (H)      End stage kidney disease (H)      Hypertension      Past Surgical History:   Procedure Laterality Date     CREATE FISTULA ARTERIOVENOUS UPPER EXTREMITY Right 5/15/2016    Procedure: CREATE FISTULA ARTERIOVENOUS UPPER EXTREMITY;  Surgeon: Ricrado Gallo MD;  Location: [facility] OR     CREATE FISTULA ARTERIOVENOUS UPPER EXTREMITY Right 2016    Procedure: CREATE FISTULA ARTERIOVENOUS UPPER EXTREMITY;  Surgeon: Payam Denise MD;  Location: [facility] OR     EXCISE FISTULA ARTERIOVENOUS UPPER EXTREMITY Right 2019    Procedure: FIRST STAGE EXCISION OF RIGHT ARTERIOVENOUS FISTULA ANEURYMS & Vein patch angioplasty;  Surgeon: Payam Denise MD;  Location: [facility] OR     REVISION FISTULA ARTERIOVENOUS UPPER EXTREMITY Right 2020    Procedure: REPAIR RIGHT UPPER EXTREMITY ARTERIOVENOUS FISTULA  ANEURYSM WITH SKIN ULCER;  Surgeon: Payam Denise MD;  Location: [facility] OR     TRANSPLANT KIDNEY RECIPIENT  DONOR Left 2021    Procedure: TRANSPLANT, KIDNEY, RECIPIENT,  DONOR with ureteral stent placement;  Surgeon: Amado Vale MD;  Location: [facility] OR     VASCULAR SURGERY      placement of jugular tunnel catheter     wisdom teeth       Current Outpatient Medications   Medication Sig Dispense Refill     atorvastatin (LIPITOR) 10 MG tablet Take 1 tablet (10 mg) by mouth daily 30 tablet 11     losartan (COZAAR) 25 MG tablet Take 1 tablet (25 mg) by mouth At Bedtime 90 tablet 0     magnesium oxide (MAG-OX) 400 MG tablet Take 1 tablet (400 mg) by mouth daily 60 tablet 1     mycophenolate (GENERIC EQUIVALENT) 250 MG capsule Take 2 capsules (500 mg) by mouth 2 times daily 360 capsule 3     potassium chloride ER (KLOR-CON M) 20 MEQ CR tablet Take 1  tablet (20 mEq) by mouth 2 times daily 180 tablet 0     tacrolimus (GENERIC EQUIVALENT) 0.5 MG capsule HOLD FOR FUTURE DOSE CHANGES.  Profile Rx: patient will contact pharmacy when needed 90 capsule 3     tacrolimus (GENERIC EQUIVALENT) 1 MG capsule Take 1 capsule (1 mg) by mouth 2 times daily 60 capsule 11       Allergies   Allergen Reactions     Valcyte [Valganciclovir] Other (See Comments)     Cannot receive this medication while on CMV monoclonal antibody study, due to end 12/9/2021.        Social History     Tobacco Use     Smoking status: Never     Smokeless tobacco: Never   Vaping Use     Vaping status: Not on file   Substance Use Topics     Alcohol use: Not Currently     Comment: none since transplant     Family History   Problem Relation Age of Onset     Diabetes Father      Lung Cancer Maternal Grandmother      Hypertension Maternal Grandmother      Hypertension Other         uncle     History   Drug Use No         Objective     BP (!) 146/100 (BP Location: Left arm)   Pulse 99   Wt 190 lb (86.2 kg)   SpO2 98%   BMI 29.75 kg/m      Physical Exam    GENERAL APPEARANCE: healthy, alert and no distress     EYES: EOMI,  PERRL     HENT: Atraumatic     NECK: no asymmetry, masses, or scars      RESP: lungs clear to auscultation - no rales, rhonchi or wheezes     CV: regular rates and rhythm, normal S1 S2, no S3 or S4 and no murmur, click or rub     ABDOMEN:  soft, nontender, and bowel sounds normal     MS: extremities normal- no gross deformities noted other than RUE AV fistula being aneurysmal     SKIN: no suspicious lesions or rashes     NEURO: Normal strength and tone, sensory exam grossly normal, mentation intact and speech normal     PSYCH: mentation appears normal. and affect normal/bright      Recent Labs   Lab Test 03/24/23  0929 02/16/23  0854 10/06/22  0910 09/22/22  0910 03/24/22  0920 03/10/22  0859 09/13/21  1336 09/13/21  0500   HGB 14.9 14.5   < > 14.7   < > 14.2   < > 10.3*    196   < >  208   < > 265   < > 237   INR  --   --   --   --   --   --   --  0.88    143   < > 141   < > 141   < > 141   POTASSIUM 3.7 3.7   < > 3.4   < > 3.0*   < > 3.3*   CR 1.16 1.12   < > 1.07   < > 1.05   < > 7.02*   A1C  --   --   --  5.2  --  5.5  --  5.3    < > = values in this interval not displayed.        Diagnostics:  EKG: Normal Sinus Rhythm, combined atrial enlargement, LVH with associated changes, unchanged from previous tracings    Revised Cardiac Risk Index (RCRI):  The patient has the following serious cardiovascular risks for perioperative complications:   - No serious cardiac risks = 0 points     RCRI Interpretation: 0 points: Class I (very low risk - 0.4% complication rate)        Signed Electronically by: Theresa Del Valle PA-C  Copy of this evaluation report is provided to requesting physician.

## 2023-04-04 NOTE — PROGRESS NOTES
[facility]        Patient is here for a  follow up.     Pt is currently taking Statin.    BP (!) 155/101 (BP Location: Left arm, Patient Position: Chair, Cuff Size: Adult Regular)   Pulse 99   Wt 190 lb (86.2 kg)   SpO2 98%   BMI 29.75 kg/m      The provider has been notified that the patient has no concerns.     Questions patient would like addressed today are: N/A.    Refills are needed: N/A    Has homecare services and agency name:  Simona Nixon MA

## 2023-04-05 ENCOUNTER — TELEPHONE (OUTPATIENT)
Dept: OTHER | Facility: CLINIC | Age: 38
End: 2023-04-05
Payer: MEDICARE

## 2023-04-05 NOTE — TELEPHONE ENCOUNTER
[facility]    Who is the name of the provider?:  Theresa Del Valle    What is the location you see this provider at/preferred location?: [place]  Person calling / Facility: [facility] / [facility] pre op  Phone number:  [phone]  Nurse call back needed:  YES     Reason for call:  Minda was calling with regards to an incomplete pre op physical done 4/4 with Theresa. Please review and advise.    Pharmacy location:  n/a  Outside Imaging: n/a   Can we leave a detailed message on this number?  YES

## 2023-04-06 ENCOUNTER — TELEPHONE (OUTPATIENT)
Dept: OTHER | Facility: CLINIC | Age: 38
End: 2023-04-06
Payer: MEDICARE

## 2023-04-06 ENCOUNTER — ANESTHESIA EVENT (OUTPATIENT)
Dept: SURGERY | Facility: CLINIC | Age: 38
End: 2023-04-06
Payer: MEDICARE

## 2023-04-06 NOTE — TELEPHONE ENCOUNTER
[place] [facility]    I called Kailash Sorto about his surgery tomorrow.  Is a successful renal transplant and has a aneurysmal tortuous right upper arm fistula no longer needed due to the transplant.  We would plan to ligate and excise the fistula in his upper arm on 4/7/2023.    Patient has called in raising concerns about what would occur if he does have a transplant rejection now that the fistula has been removed.  He could consider replacement of this fistula with a Artergraft--I will need to discuss this further with him to see what he thinks about this.    3/24/2023 laboratory K= 3.7 SCr= 1.16 Hgb= 14.9    I left a message on the patient's cell phone and will try to talk to him later today.      Payam Denise MD     ADDENDUM:    I was able to speak with Kailash at 1700 hrs.  We did discuss the possibility of replacing the fistula with a bovine carotid artery.  He does have a history of high flow rates at 2000 mL/min on his duplex ultrasound a year and a half ago.  With his dilated 8 mm brachial artery even with the higher resistance carotid artery there may be a higher flow which potentially if worsen significantly could lead to cardiac overload though this would be relatively unlikely.  He did notice during his dialysis occasionally have some tingling of his hands implying there may have been a mild steal.    After discussing this he is decided to just have us do our original plan of removing the aneurysmal fistula.    Payam Denise MD

## 2023-04-07 ENCOUNTER — OFFICE VISIT (OUTPATIENT)
Dept: SURGERY | Facility: PHYSICIAN GROUP | Age: 38
End: 2023-04-07
Payer: MEDICARE

## 2023-04-07 ENCOUNTER — HOSPITAL ENCOUNTER (OUTPATIENT)
Facility: CLINIC | Age: 38
Discharge: HOME | End: 2023-04-07
Attending: SURGERY | Admitting: SURGERY
Payer: MEDICARE

## 2023-04-07 ENCOUNTER — ANESTHESIA (OUTPATIENT)
Dept: SURGERY | Facility: CLINIC | Age: 38
End: 2023-04-07
Payer: MEDICARE

## 2023-04-07 VITALS
HEART RATE: 62 BPM | TEMPERATURE: 97.9 F | SYSTOLIC BLOOD PRESSURE: 151 MMHG | OXYGEN SATURATION: 99 % | HEIGHT: 66 IN | WEIGHT: 188.3 LBS | DIASTOLIC BLOOD PRESSURE: 102 MMHG | RESPIRATION RATE: 16 BRPM | BODY MASS INDEX: 30.26 KG/M2

## 2023-04-07 DIAGNOSIS — Z94.0 KIDNEY REPLACED BY TRANSPLANT: Primary | ICD-10-CM

## 2023-04-07 DIAGNOSIS — I77.0 AVF (ARTERIOVENOUS FISTULA) (H): ICD-10-CM

## 2023-04-07 PROCEDURE — 93005 ELECTROCARDIOGRAM TRACING: CPT

## 2023-04-07 PROCEDURE — 999N000141 HC STATISTIC PRE-PROCEDURE NURSING ASSESSMENT: Performed by: SURGERY

## 2023-04-07 PROCEDURE — 250N000025 HC SEVOFLURANE, PER MIN: Performed by: SURGERY

## 2023-04-07 PROCEDURE — 36832 AV FISTULA REVISION OPEN: CPT | Mod: RT | Performed by: SURGERY

## 2023-04-07 PROCEDURE — 250N000011 HC RX IP 250 OP 636: Performed by: NURSE ANESTHETIST, CERTIFIED REGISTERED

## 2023-04-07 PROCEDURE — 710N000012 HC RECOVERY PHASE 2, PER MINUTE: Performed by: SURGERY

## 2023-04-07 PROCEDURE — 710N000009 HC RECOVERY PHASE 1, LEVEL 1, PER MIN: Performed by: SURGERY

## 2023-04-07 PROCEDURE — 370N000017 HC ANESTHESIA TECHNICAL FEE, PER MIN: Performed by: SURGERY

## 2023-04-07 PROCEDURE — 93010 ELECTROCARDIOGRAM REPORT: CPT | Performed by: INTERNAL MEDICINE

## 2023-04-07 PROCEDURE — 360N000076 HC SURGERY LEVEL 3, PER MIN: Performed by: SURGERY

## 2023-04-07 PROCEDURE — 250N000011 HC RX IP 250 OP 636: Performed by: SURGERY

## 2023-04-07 PROCEDURE — 999N000054 HC STATISTIC EKG NON-CHARGEABLE

## 2023-04-07 PROCEDURE — 250N000009 HC RX 250: Performed by: NURSE ANESTHETIST, CERTIFIED REGISTERED

## 2023-04-07 PROCEDURE — 258N000003 HC RX IP 258 OP 636: Performed by: NURSE ANESTHETIST, CERTIFIED REGISTERED

## 2023-04-07 PROCEDURE — 258N000003 HC RX IP 258 OP 636: Performed by: ANESTHESIOLOGY

## 2023-04-07 PROCEDURE — 250N000011 HC RX IP 250 OP 636: Performed by: ANESTHESIOLOGY

## 2023-04-07 PROCEDURE — 272N000001 HC OR GENERAL SUPPLY STERILE: Performed by: SURGERY

## 2023-04-07 PROCEDURE — 250N000009 HC RX 250: Performed by: SURGERY

## 2023-04-07 RX ORDER — ONDANSETRON 4 MG/1
4 TABLET, ORALLY DISINTEGRATING ORAL EVERY 30 MIN PRN
Status: DISCONTINUED | OUTPATIENT
Start: 2023-04-07 | End: 2023-04-07 | Stop reason: HOSPADM

## 2023-04-07 RX ORDER — PROPOFOL 10 MG/ML
INJECTION, EMULSION INTRAVENOUS CONTINUOUS PRN
Status: DISCONTINUED | OUTPATIENT
Start: 2023-04-07 | End: 2023-04-07

## 2023-04-07 RX ORDER — SODIUM CHLORIDE, SODIUM LACTATE, POTASSIUM CHLORIDE, CALCIUM CHLORIDE 600; 310; 30; 20 MG/100ML; MG/100ML; MG/100ML; MG/100ML
INJECTION, SOLUTION INTRAVENOUS CONTINUOUS
Status: DISCONTINUED | OUTPATIENT
Start: 2023-04-07 | End: 2023-04-07 | Stop reason: HOSPADM

## 2023-04-07 RX ORDER — ONDANSETRON 2 MG/ML
4 INJECTION INTRAMUSCULAR; INTRAVENOUS EVERY 30 MIN PRN
Status: DISCONTINUED | OUTPATIENT
Start: 2023-04-07 | End: 2023-04-07 | Stop reason: HOSPADM

## 2023-04-07 RX ORDER — LABETALOL HYDROCHLORIDE 5 MG/ML
5 INJECTION, SOLUTION INTRAVENOUS ONCE
Status: COMPLETED | OUTPATIENT
Start: 2023-04-07 | End: 2023-04-07

## 2023-04-07 RX ORDER — HYDROMORPHONE HCL IN WATER/PF 6 MG/30 ML
0.4 PATIENT CONTROLLED ANALGESIA SYRINGE INTRAVENOUS EVERY 5 MIN PRN
Status: DISCONTINUED | OUTPATIENT
Start: 2023-04-07 | End: 2023-04-07 | Stop reason: HOSPADM

## 2023-04-07 RX ORDER — FENTANYL CITRATE 0.05 MG/ML
25 INJECTION, SOLUTION INTRAMUSCULAR; INTRAVENOUS EVERY 5 MIN PRN
Status: DISCONTINUED | OUTPATIENT
Start: 2023-04-07 | End: 2023-04-07 | Stop reason: HOSPADM

## 2023-04-07 RX ORDER — LABETALOL HYDROCHLORIDE 5 MG/ML
10 INJECTION, SOLUTION INTRAVENOUS ONCE
Status: DISCONTINUED | OUTPATIENT
Start: 2023-04-07 | End: 2023-04-07 | Stop reason: HOSPADM

## 2023-04-07 RX ORDER — BUPIVACAINE HYDROCHLORIDE 5 MG/ML
INJECTION, SOLUTION PERINEURAL PRN
Status: DISCONTINUED | OUTPATIENT
Start: 2023-04-07 | End: 2023-04-07 | Stop reason: HOSPADM

## 2023-04-07 RX ORDER — LIDOCAINE HYDROCHLORIDE 10 MG/ML
INJECTION, SOLUTION INFILTRATION; PERINEURAL
Status: DISCONTINUED
Start: 2023-04-07 | End: 2023-04-07 | Stop reason: HOSPADM

## 2023-04-07 RX ORDER — HYDROCODONE BITARTRATE AND ACETAMINOPHEN 5; 325 MG/1; MG/1
1 TABLET ORAL EVERY 6 HOURS PRN
Qty: 15 TABLET | Refills: 0 | Status: SHIPPED | OUTPATIENT
Start: 2023-04-07 | End: 2023-04-10

## 2023-04-07 RX ORDER — ONDANSETRON 2 MG/ML
INJECTION INTRAMUSCULAR; INTRAVENOUS PRN
Status: DISCONTINUED | OUTPATIENT
Start: 2023-04-07 | End: 2023-04-07

## 2023-04-07 RX ORDER — BUPIVACAINE HYDROCHLORIDE 5 MG/ML
INJECTION, SOLUTION EPIDURAL; INTRACAUDAL
Status: DISCONTINUED
Start: 2023-04-07 | End: 2023-04-07 | Stop reason: HOSPADM

## 2023-04-07 RX ORDER — DEXAMETHASONE SODIUM PHOSPHATE 4 MG/ML
INJECTION, SOLUTION INTRA-ARTICULAR; INTRALESIONAL; INTRAMUSCULAR; INTRAVENOUS; SOFT TISSUE PRN
Status: DISCONTINUED | OUTPATIENT
Start: 2023-04-07 | End: 2023-04-07

## 2023-04-07 RX ORDER — FENTANYL CITRATE 0.05 MG/ML
50 INJECTION, SOLUTION INTRAMUSCULAR; INTRAVENOUS EVERY 5 MIN PRN
Status: DISCONTINUED | OUTPATIENT
Start: 2023-04-07 | End: 2023-04-07 | Stop reason: HOSPADM

## 2023-04-07 RX ORDER — CEFAZOLIN SODIUM/WATER 2 G/20 ML
2 SYRINGE (ML) INTRAVENOUS
Status: COMPLETED | OUTPATIENT
Start: 2023-04-07 | End: 2023-04-07

## 2023-04-07 RX ORDER — HYDROMORPHONE HCL IN WATER/PF 6 MG/30 ML
0.2 PATIENT CONTROLLED ANALGESIA SYRINGE INTRAVENOUS EVERY 5 MIN PRN
Status: DISCONTINUED | OUTPATIENT
Start: 2023-04-07 | End: 2023-04-07 | Stop reason: HOSPADM

## 2023-04-07 RX ORDER — LIDOCAINE HYDROCHLORIDE 20 MG/ML
INJECTION, SOLUTION INFILTRATION; PERINEURAL PRN
Status: DISCONTINUED | OUTPATIENT
Start: 2023-04-07 | End: 2023-04-07

## 2023-04-07 RX ORDER — FENTANYL CITRATE 50 UG/ML
INJECTION, SOLUTION INTRAMUSCULAR; INTRAVENOUS PRN
Status: DISCONTINUED | OUTPATIENT
Start: 2023-04-07 | End: 2023-04-07

## 2023-04-07 RX ORDER — LABETALOL HYDROCHLORIDE 5 MG/ML
10 INJECTION, SOLUTION INTRAVENOUS
Status: COMPLETED | OUTPATIENT
Start: 2023-04-07 | End: 2023-04-07

## 2023-04-07 RX ORDER — SODIUM CHLORIDE, SODIUM LACTATE, POTASSIUM CHLORIDE, CALCIUM CHLORIDE 600; 310; 30; 20 MG/100ML; MG/100ML; MG/100ML; MG/100ML
INJECTION, SOLUTION INTRAVENOUS CONTINUOUS PRN
Status: DISCONTINUED | OUTPATIENT
Start: 2023-04-07 | End: 2023-04-07

## 2023-04-07 RX ORDER — PROPOFOL 10 MG/ML
INJECTION, EMULSION INTRAVENOUS PRN
Status: DISCONTINUED | OUTPATIENT
Start: 2023-04-07 | End: 2023-04-07

## 2023-04-07 RX ADMIN — LIDOCAINE HYDROCHLORIDE 100 MG: 20 INJECTION, SOLUTION INFILTRATION; PERINEURAL at 12:05

## 2023-04-07 RX ADMIN — LABETALOL HYDROCHLORIDE 10 MG: 5 INJECTION INTRAVENOUS at 14:37

## 2023-04-07 RX ADMIN — SODIUM CHLORIDE, POTASSIUM CHLORIDE, SODIUM LACTATE AND CALCIUM CHLORIDE: 600; 310; 30; 20 INJECTION, SOLUTION INTRAVENOUS at 15:09

## 2023-04-07 RX ADMIN — ONDANSETRON 4 MG: 2 INJECTION INTRAMUSCULAR; INTRAVENOUS at 13:53

## 2023-04-07 RX ADMIN — PROPOFOL 30 MCG/KG/MIN: 10 INJECTION, EMULSION INTRAVENOUS at 12:13

## 2023-04-07 RX ADMIN — FENTANYL CITRATE 25 MCG: 50 INJECTION, SOLUTION INTRAMUSCULAR; INTRAVENOUS at 15:05

## 2023-04-07 RX ADMIN — DEXAMETHASONE SODIUM PHOSPHATE 4 MG: 4 INJECTION, SOLUTION INTRA-ARTICULAR; INTRALESIONAL; INTRAMUSCULAR; INTRAVENOUS; SOFT TISSUE at 12:13

## 2023-04-07 RX ADMIN — FENTANYL CITRATE 25 MCG: 50 INJECTION, SOLUTION INTRAMUSCULAR; INTRAVENOUS at 14:28

## 2023-04-07 RX ADMIN — MIDAZOLAM 2 MG: 1 INJECTION INTRAMUSCULAR; INTRAVENOUS at 12:00

## 2023-04-07 RX ADMIN — LABETALOL HYDROCHLORIDE 5 MG: 5 INJECTION INTRAVENOUS at 14:21

## 2023-04-07 RX ADMIN — Medication 2 G: at 12:00

## 2023-04-07 RX ADMIN — FENTANYL CITRATE 100 MCG: 50 INJECTION, SOLUTION INTRAMUSCULAR; INTRAVENOUS at 12:00

## 2023-04-07 RX ADMIN — SODIUM CHLORIDE, POTASSIUM CHLORIDE, SODIUM LACTATE AND CALCIUM CHLORIDE: 600; 310; 30; 20 INJECTION, SOLUTION INTRAVENOUS at 12:00

## 2023-04-07 RX ADMIN — PROPOFOL 180 MG: 10 INJECTION, EMULSION INTRAVENOUS at 12:05

## 2023-04-07 RX ADMIN — HYDROMORPHONE HYDROCHLORIDE 0.5 MG: 1 INJECTION, SOLUTION INTRAMUSCULAR; INTRAVENOUS; SUBCUTANEOUS at 12:31

## 2023-04-07 ASSESSMENT — ACTIVITIES OF DAILY LIVING (ADL)
ADLS_ACUITY_SCORE: 35

## 2023-04-07 ASSESSMENT — LIFESTYLE VARIABLES: TOBACCO_USE: 0

## 2023-04-07 NOTE — OR NURSING
Dr Barnes at bedside to consult about continued hypertension.  No further medication orders at this time.  Patient instructed to resume Blood pressure medications at home and continue to monitor at home; consult with transplant physician it diastolic BP continues over 100.

## 2023-04-07 NOTE — BRIEF OP NOTE
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  Complications: None.  Implants: * No implants in log *

## 2023-04-07 NOTE — ANESTHESIA PREPROCEDURE EVALUATION
Anesthesia Pre-Procedure Evaluation    Patient: Kailash Sorto   MRN: [id number] : 1985        Procedure : Procedure(s):  EXCISION OF RIGHT UPPER EXTREMITY ARTERIOVENOUS FISTULA          Past Medical History:   Diagnosis Date     Anemia of chronic kidney failure      Dialysis patient (H)      End stage kidney disease (H)      Hypertension       Past Surgical History:   Procedure Laterality Date     CREATE FISTULA ARTERIOVENOUS UPPER EXTREMITY Right 5/15/2016    Procedure: CREATE FISTULA ARTERIOVENOUS UPPER EXTREMITY;  Surgeon: Ricardo Gallo MD;  Location:  OR     CREATE FISTULA ARTERIOVENOUS UPPER EXTREMITY Right 2016    Procedure: CREATE FISTULA ARTERIOVENOUS UPPER EXTREMITY;  Surgeon: Payam Denise MD;  Location:  OR     EXCISE FISTULA ARTERIOVENOUS UPPER EXTREMITY Right 2019    Procedure: FIRST STAGE EXCISION OF RIGHT ARTERIOVENOUS FISTULA ANEURYMS & Vein patch angioplasty;  Surgeon: Payam Denise MD;  Location:  OR     REVISION FISTULA ARTERIOVENOUS UPPER EXTREMITY Right 2020    Procedure: REPAIR RIGHT UPPER EXTREMITY ARTERIOVENOUS FISTULA  ANEURYSM WITH SKIN ULCER;  Surgeon: Payam Denise MD;  Location:  OR     TRANSPLANT KIDNEY RECIPIENT  DONOR Left 2021    Procedure: TRANSPLANT, KIDNEY, RECIPIENT,  DONOR with ureteral stent placement;  Surgeon: Amado Vale MD;  Location: [facility] OR     VASCULAR SURGERY      placement of jugular tunnel catheter     wisdom teeth        No Known Allergies   Social History     Tobacco Use     Smoking status: Never     Smokeless tobacco: Never   Vaping Use     Vaping status: Never Used   Substance Use Topics     Alcohol use: Not Currently      Wt Readings from Last 1 Encounters:   23 85.4 kg (188 lb 4.8 oz)        Anesthesia Evaluation   Pt has had prior anesthetic.     No history of anesthetic complications       ROS/MED HX  ENT/Pulmonary:    (-) tobacco use, asthma and  sleep apnea   Neurologic:       Cardiovascular:     (+) Dyslipidemia hypertension-----    METS/Exercise Tolerance:     Hematologic:       Musculoskeletal:       GI/Hepatic:    (-) GERD   Renal/Genitourinary:     (+) renal disease (s.p translpant),     Endo:       Psychiatric/Substance Use:       Infectious Disease:       Malignancy:       Other:            Physical Exam    Airway        Mallampati: II   TM distance: > 3 FB   Neck ROM: full   Mouth opening: > 3 cm    Respiratory Devices and Support         Dental       (+) Completely normal teeth      Cardiovascular   cardiovascular exam normal          Pulmonary   pulmonary exam normal                OUTSIDE LABS:  CBC:   Lab Results   Component Value Date    WBC 5.3 03/24/2023    WBC 5.6 02/16/2023    HGB 14.9 03/24/2023    HGB 14.5 02/16/2023    HCT 43.8 03/24/2023    HCT 42.1 02/16/2023     03/24/2023     02/16/2023     BMP:   Lab Results   Component Value Date     03/24/2023     02/16/2023    POTASSIUM 3.7 03/24/2023    POTASSIUM 3.7 02/16/2023    CHLORIDE 105 03/24/2023    CHLORIDE 106 02/16/2023    CO2 24 03/24/2023    CO2 27 02/16/2023    BUN 13.1 03/24/2023    BUN 8.0 02/16/2023    CR 1.16 03/24/2023    CR 1.12 02/16/2023     (H) 03/24/2023    GLC 97 02/16/2023     COAGS:   Lab Results   Component Value Date    PTT 25 09/13/2021    INR 0.88 09/13/2021     POC: No results found for: BGM, HCG, HCGS  HEPATIC:   Lab Results   Component Value Date    ALBUMIN 3.8 09/22/2022    PROTTOTAL 7.5 09/22/2022    ALT 28 09/22/2022    AST 14 09/22/2022    ALKPHOS 121 09/22/2022    BILITOTAL 0.7 09/22/2022     OTHER:   Lab Results   Component Value Date    LACT 1.7 07/23/2018    A1C 5.2 09/22/2022    GRAHAM 9.7 03/24/2023    PHOS 3.5 03/24/2023    MAG 1.9 03/24/2023    TSH 0.89 05/10/2016       Anesthesia Plan    ASA Status:  3      Anesthesia Type: General.     - Airway: LMA              Consents    Anesthesia Plan(s) and associated risks,  benefits, and realistic alternatives discussed. Questions answered and patient/representative(s) expressed understanding.    - Discussed:     - Discussed with:  Patient         Postoperative Care    Pain management: IV analgesics, Oral pain medications.   PONV prophylaxis: Ondansetron (or other 5HT-3), Background Propofol Infusion     Comments:                Laya Barnes

## 2023-04-07 NOTE — ANESTHESIA POSTPROCEDURE EVALUATION
Patient: Kailash Sorto    Procedure: Procedure(s):  EXCISION OF RIGHT UPPER EXTREMITY ARTERIOVENOUS FISTULA       Anesthesia Type:  General    Note:  Disposition: Inpatient   Postop Pain Control: Uneventful            Sign Out: Well controlled pain   PONV: No   Neuro/Psych: Uneventful            Sign Out: Acceptable/Baseline neuro status   Airway/Respiratory: Uneventful            Sign Out: Acceptable/Baseline resp. status   CV/Hemodynamics: Uneventful            Sign Out: Acceptable CV status; No obvious hypovolemia; No obvious fluid overload   Other NRE: NONE   DID A NON-ROUTINE EVENT OCCUR?            Last vitals:  Vitals Value Taken Time   /101 04/07/23 1445   Temp 36.5  C (97.7  F) 04/07/23 1410   Pulse 60 04/07/23 1450   Resp 14 04/07/23 1450   SpO2 99 % 04/07/23 1450   Vitals shown include unvalidated device data.    Electronically Signed By: Laya Barnes  April 7, 2023  2:51 PM

## 2023-04-07 NOTE — ANESTHESIA CARE TRANSFER NOTE
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    Anesthesia Type:   General     Note:    Oropharynx: oropharynx clear of all foreign objects and spontaneously breathing  Level of Consciousness: drowsy  Oxygen Supplementation: nasal cannula  Level of Supplemental Oxygen (L/min / FiO2): 4  Independent Airway: airway patency satisfactory and stable  Dentition: dentition unchanged  Vital Signs Stable: post-procedure vital signs reviewed and stable  Report to RN Given: handoff report given  Patient transferred to: PACU  Comments: At end of procedure, spontaneous respirations, adequate tidal volumes, followed commands to voice, LMA removed atraumatically, airway patent after LMA removal. Oxygen via nasal cannula at 4 liters per minute to PACU. Oxygen tubing connected to wall O2 in PACU, SpO2, NiBP, and EKG monitors and alarms on and functioning, report on patient's clinical status given to PACU RN, RN questions answered.  Handoff Report: Identifed the Patient, Identified the Reponsible Provider, Reviewed the pertinent medical history, Discussed the surgical course, Reviewed Intra-OP anesthesia mangement and issues during anesthesia, Set expectations for post-procedure period and Allowed opportunity for questions and acknowledgement of understanding      Vitals:  Vitals Value Taken Time   BP     Temp     Pulse     Resp     SpO2 100 % 04/07/23 1411   Vitals shown include unvalidated device data.    Electronically Signed By: CASIMIRO Franklin CRNA  April 7, 2023  2:12 PM

## 2023-04-07 NOTE — OP NOTE
Procedure Date: 04/07/2023    PREOPERATIVE DIAGNOSIS:  Aneurysmal right upper arm brachial to cephalic arteriovenous fistula, no longer needed for hemodialysis.    POSTOPERATIVE DIAGNOSIS:  Aneurysmal right upper arm brachial to cephalic arteriovenous fistula, no longer needed for hemodialysis.    PROCEDURE:    1.  Excision of right upper arm brachial to cephalic aneurysmal fistula.  a.  Oversewing brachial to fistula distal upper arm anastomosis.  b.  Ligation of the cephalic vein at the clavicular level.    SURGEON:  Payam Denise MD    FIRST ASSISTANT:  Bobo Bauer M.D. (vascular fellow).    ANESTHESIA:  General -- LMA.    PREOPERATIVE MEDICATIONS:  Ancef 2 grams IV.    INDICATIONS FOR PROCEDURE:  A 37-year-old patient had been dialyzing via right upper arm brachial to cephalic fistula.  This has become very tortuous and aneurysmal up to 3 cm in diameter throughout most of its length.  It has a very high flow fistula, but he has had a successful renal transplant and no longer requires this fistula.  We felt that a total excision of the fistula was indicated due to its marked tortuosity, aneurysmal size and also concerns about the high flow in the fistula that could be detrimental to his cardiac function.  The patient comes to the operating room today under informed consent.    DESCRIPTION OF PROCEDURE:  The patient was brought to the operating room and induced under general anesthesia.  An LMA was placed.  Aneurysm was very easily visualized and palpable due to its large size and tortuosity.  Where he had undergone dialysis in the mid upper arm, the skin was somewhat thin, but there are no ulcers.  Right arm and axillary area prepped and draped.  Timeout was called, and the sites were identified.  We initially made an incision on the inferior lateral clavicle.  Dissection was carried down to identify the fistula as it was in the deltopectoral groove.  This was dissected free to allow placement of a ventral  vascular clamp.  We then made an incision closer to the elbow and dissected down to identify the inflow portion of the fistula.  Again, this was well over 3-4 cm in diameter.  This was encircled, and a vascular clamp was applied and clamped.    Oversewing Brachial Anastomosis:  We initially dissected down to the brachial artery, which was markedly dilated due to the high flow into the fistula.  There appeared to be a dual brachial artery with the fistula being anastomosed to the larger more medial branch.  We dissected off the accessory branch laterally to allow a vascular clamp we placed just at the anastomotic area.  The fistula was clamped distally, and the blood was pushed back into the central circulation with a clamping and then applied to the shoulder region.  We transected the fistula, and the anastomotic area was oversewn, leaving a cuff of fistula, arterialized tissue with a running 5-0 Prolene suture.  With release of the vascular clamp, we had excellent hemostasis.  There was no significant redundancy.  We had excellent distal pulse with good hemostasis.    Excision Of Aneurysmal Fistula:  We then made an elliptical incision from the proximal to the distal upper arm where the skin had been thinned.  This was dissected free with the aid of electrocautery, and we excised the aneurysmal portion up to the very proximal humerus. At this point, it was left aneurysmal, and this was clamped, over transected and oversewn with a running 5-0 Prolene suture.    We had a very dry field.  Good distal pulses.  Wounds were infiltrated with 0.5% Marcaine for postoperative analgesia.  The 3 incisions were closed with interrupted 3-0 Vicryl in the deepened layers.  Skin was closed with 4-0 Monocryl in subcuticular fashion.  Wounds were infiltrated with 0.5% Marcaine for post-analgesia.  Exofin with surgical adhesive was applied over the 3 incisions followed by gauze, Cynthia and Ace bandage.    The patient tolerated the  procedure well.    ESTIMATED BLOOD LOSS:  Less than 30 mL    COMPLICATIONS:  None.    Payam Denise MD        D: 2023   T: 2023   MT:     Name:     DIANELYS COOPER  MRN:      [id number]        Account:        [id number]   :      1985           Procedure Date: 2023     Document: [id number]

## 2023-04-07 NOTE — INTERVAL H&P NOTE
"I have reviewed the surgical (or preoperative) H&P that is linked to this encounter, and examined the patient. There are no significant changes    Clinical Conditions Present on Arrival:  Clinically Significant Risk Factors Present on Admission                    # Obesity: Estimated body mass index is 30.39 kg/m  as calculated from the following:    Height as of this encounter: 1.676 m (5' 6\").    Weight as of this encounter: 85.4 kg (188 lb 4.8 oz).       "

## 2023-04-07 NOTE — DISCHARGE INSTRUCTIONS
*** Please continue to monitor your blood pressure at home and resume blood pressure medications.  Consult with your primary physician if diastolic (lower number) continues over 100. ***        Same Day Surgery Discharge Instructions for  Sedation and General Anesthesia     It's not unusual to feel dizzy, light-headed or faint for up to 24 hours after surgery or while taking pain medication.  If you have these symptoms: sit for a few minutes before standing and have someone assist you when you get up to walk or use the bathroom.    You should rest and relax for the next 24 hours. We recommend you make arrangements to have an adult stay with you for at least 24 hours after your discharge.  Avoid hazardous and strenuous activity.    DO NOT DRIVE any vehicle or operate mechanical equipment for 24 hours following the end of your surgery.  Even though you may feel normal, your reactions may be affected by the medication you have received.    Do not drink alcoholic beverages for 24 hours following surgery.     Slowly progress to your regular diet as you feel able. It's not unusual to feel nauseated and/or vomit after receiving anesthesia.  If you develop these symptoms, drink clear liquids (apple juice, ginger ale, broth, 7-up, etc. ) until you feel better.  If your nausea and vomiting persists for 24 hours, please notify your surgeon.      All narcotic pain medications, along with inactivity and anesthesia, can cause constipation. Drinking plenty of liquids and increasing fiber intake will help.    For any questions of a medical nature, call your surgeon.    Do not make important decisions for 24 hours.    If you had general anesthesia, you may have a sore throat for a couple of days related to the breathing tube used during surgery.  You may use Cepacol lozenges to help with this discomfort.  If it worsens or if you develop a fever, contact your surgeon.     If you feel your pain is not well managed with the pain  medications prescribed by your surgeon, please contact your surgeon's office to let them know so they can address your concerns.       Reasons to contact your surgeon:    Signs of possible infection: Check your incision daily for redness, swelling, warmth, red streaks or foul drainage.   Elevated temperature.  Pain not controlled with pain medication and/or rest.   Uncontrolled nausea or vomiting.  Any questions or concerns.             ** If you have questions or concerns about your procedure  call Dr Payam Denise at [phone] **

## 2023-04-11 LAB
ATRIAL RATE - MUSE: 75 BPM
DIASTOLIC BLOOD PRESSURE - MUSE: NORMAL MMHG
INTERPRETATION ECG - MUSE: NORMAL
P AXIS - MUSE: 67 DEGREES
PR INTERVAL - MUSE: 146 MS
QRS DURATION - MUSE: 92 MS
QT - MUSE: 374 MS
QTC - MUSE: 417 MS
R AXIS - MUSE: 58 DEGREES
SYSTOLIC BLOOD PRESSURE - MUSE: NORMAL MMHG
T AXIS - MUSE: 35 DEGREES
VENTRICULAR RATE- MUSE: 75 BPM

## 2023-04-12 NOTE — PROGRESS NOTES
[place] [facility]    I called Kailash Sorto this morning to see how he is doing.  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  We ligated the anastomosis and remove the entire dilated fistula and overlying skin with primary closure.    He has noted some soreness following the procedure which is not surprising given the extent of the dissection.  Hydrocodone and ice is helping.  Reports some very mild swelling over the surgical site.  No issues with his hand as far as numbness or ischemic symptoms.      He has a follow-up appointment with us tomorrow.  I will see him at that time.       Payam Denise MD

## 2023-04-16 ENCOUNTER — HEALTH MAINTENANCE LETTER (OUTPATIENT)
Age: 38
End: 2023-04-16

## 2023-04-20 ENCOUNTER — LAB (OUTPATIENT)
Dept: LAB | Facility: CLINIC | Age: 38
End: 2023-04-20
Payer: MEDICARE

## 2023-04-20 ENCOUNTER — OFFICE VISIT (OUTPATIENT)
Dept: OTHER | Facility: CLINIC | Age: 38
End: 2023-04-20
Payer: MEDICARE

## 2023-04-20 VITALS
HEART RATE: 82 BPM | OXYGEN SATURATION: 96 % | SYSTOLIC BLOOD PRESSURE: 179 MMHG | RESPIRATION RATE: 16 BRPM | WEIGHT: 188 LBS | DIASTOLIC BLOOD PRESSURE: 130 MMHG | BODY MASS INDEX: 30.34 KG/M2

## 2023-04-20 DIAGNOSIS — Z48.298 AFTERCARE FOLLOWING ORGAN TRANSPLANT: ICD-10-CM

## 2023-04-20 DIAGNOSIS — B25.9 CMV (CYTOMEGALOVIRUS INFECTION) (H): ICD-10-CM

## 2023-04-20 DIAGNOSIS — M79.2 NEUROPATHIC PAIN: Primary | ICD-10-CM

## 2023-04-20 LAB
ANION GAP SERPL CALCULATED.3IONS-SCNC: 13 MMOL/L (ref 7–15)
BASOPHILS # BLD AUTO: 0 10E3/UL (ref 0–0.2)
BASOPHILS NFR BLD AUTO: 0 %
BUN SERPL-MCNC: 10.2 MG/DL (ref 6–20)
CALCIUM SERPL-MCNC: 10.1 MG/DL (ref 8.6–10)
CHLORIDE SERPL-SCNC: 100 MMOL/L (ref 98–107)
CREAT SERPL-MCNC: 1.04 MG/DL (ref 0.67–1.17)
DEPRECATED HCO3 PLAS-SCNC: 23 MMOL/L (ref 22–29)
EOSINOPHIL # BLD AUTO: 0.1 10E3/UL (ref 0–0.7)
EOSINOPHIL NFR BLD AUTO: 2 %
ERYTHROCYTE [DISTWIDTH] IN BLOOD BY AUTOMATED COUNT: 12.4 % (ref 10–15)
GFR SERPL CREATININE-BSD FRML MDRD: >90 ML/MIN/1.73M2
GLUCOSE SERPL-MCNC: 103 MG/DL (ref 70–99)
HCT VFR BLD AUTO: 43.2 % (ref 40–53)
HGB BLD-MCNC: 15.1 G/DL (ref 13.3–17.7)
LYMPHOCYTES # BLD AUTO: 2.6 10E3/UL (ref 0.8–5.3)
LYMPHOCYTES NFR BLD AUTO: 47 %
MAGNESIUM SERPL-MCNC: 1.8 MG/DL (ref 1.7–2.3)
MCH RBC QN AUTO: 29.8 PG (ref 26.5–33)
MCHC RBC AUTO-ENTMCNC: 35 G/DL (ref 31.5–36.5)
MCV RBC AUTO: 85 FL (ref 78–100)
MONOCYTES # BLD AUTO: 0.6 10E3/UL (ref 0–1.3)
MONOCYTES NFR BLD AUTO: 10 %
NEUTROPHILS # BLD AUTO: 2.3 10E3/UL (ref 1.6–8.3)
NEUTROPHILS NFR BLD AUTO: 42 %
PHOSPHATE SERPL-MCNC: 3 MG/DL (ref 2.5–4.5)
PLATELET # BLD AUTO: 285 10E3/UL (ref 150–450)
POTASSIUM SERPL-SCNC: 3.3 MMOL/L (ref 3.4–5.3)
RBC # BLD AUTO: 5.07 10E6/UL (ref 4.4–5.9)
SODIUM SERPL-SCNC: 136 MMOL/L (ref 136–145)
TACROLIMUS BLD-MCNC: 3.7 UG/L (ref 5–15)
TME LAST DOSE: ABNORMAL H
TME LAST DOSE: ABNORMAL H
WBC # BLD AUTO: 5.5 10E3/UL (ref 4–11)

## 2023-04-20 PROCEDURE — 99024 POSTOP FOLLOW-UP VISIT: CPT

## 2023-04-20 PROCEDURE — 84100 ASSAY OF PHOSPHORUS: CPT

## 2023-04-20 PROCEDURE — G0463 HOSPITAL OUTPT CLINIC VISIT: HCPCS

## 2023-04-20 PROCEDURE — 36415 COLL VENOUS BLD VENIPUNCTURE: CPT

## 2023-04-20 PROCEDURE — 83735 ASSAY OF MAGNESIUM: CPT

## 2023-04-20 PROCEDURE — 80197 ASSAY OF TACROLIMUS: CPT

## 2023-04-20 PROCEDURE — 80048 BASIC METABOLIC PNL TOTAL CA: CPT

## 2023-04-20 PROCEDURE — 87799 DETECT AGENT NOS DNA QUANT: CPT

## 2023-04-20 PROCEDURE — 85025 COMPLETE CBC W/AUTO DIFF WBC: CPT

## 2023-04-20 RX ORDER — GABAPENTIN 100 MG/1
100 CAPSULE ORAL 2 TIMES DAILY PRN
Qty: 60 CAPSULE | Refills: 0 | Status: SHIPPED | OUTPATIENT
Start: 2023-04-20

## 2023-04-20 RX ORDER — GABAPENTIN 100 MG/1
100 CAPSULE ORAL 2 TIMES DAILY PRN
Qty: 60 CAPSULE | Refills: 0 | Status: SHIPPED | OUTPATIENT
Start: 2023-04-20 | End: 2023-04-20

## 2023-04-20 NOTE — PROGRESS NOTES
VASCULAR SURGERY PROGRESS NOTE    LOCATION: [facility]    Kailash Sorto  Medical Record #:  [id number]  YOB: 1985  Age:  37 year old     Date of Service: 4/20/2023    PRIMARY CARE PROVIDER: Homero Dumont    Reason for visit:  2 week post-operative appointment    Mr. Kailash Sorto is a pleasant 37 year old male who underwent a ligation of the anastomosis of the aneurysmal right upper arm fistula and removal of the dilated fistula.     On examination Kailash is alert and oriented x4  /60 P 60  Incision is well healed, mild swelling noted, 5/5  strength, 2+ radial pulse  Reports sharp like shooting pain occasionally in his lower right arm    RECOMMENDATION/RISKS:  Discussed findings with Kailash about examination, overall pleased with recovery. 100 mg BID PRN of gabapentin to help with the nerve discomfort, reassured patient that no nerves were cut or injured during surgery, just irritated. Continue current medication regimen. Follow-up PRN. Kailash is in agreement of plan.     REVIEW OF SYSTEMS:    A 12 point ROS was reviewed and is negative except for what is listed above in HPI.    PHH:    Past Medical History:   Diagnosis Date     Anemia of chronic kidney failure      Dialysis patient (H)      End stage kidney disease (H)      Hypertension           Past Surgical History:   Procedure Laterality Date     CREATE FISTULA ARTERIOVENOUS UPPER EXTREMITY Right 5/15/2016    Procedure: CREATE FISTULA ARTERIOVENOUS UPPER EXTREMITY;  Surgeon: Ricardo Gallo MD;  Location:  OR     CREATE FISTULA ARTERIOVENOUS UPPER EXTREMITY Right 7/12/2016    Procedure: CREATE FISTULA ARTERIOVENOUS UPPER EXTREMITY;  Surgeon: Payam Denise MD;  Location:  OR     EXCISE FISTULA ARTERIOVENOUS UPPER EXTREMITY Right 5/28/2019    Procedure: FIRST STAGE EXCISION OF RIGHT ARTERIOVENOUS FISTULA ANEURYMS & Vein patch angioplasty;  Surgeon: Payam Denise MD;  Location:   OR     EXCISE FISTULA ARTERIOVENOUS UPPER EXTREMITY Right 2023    Procedure: EXCISION OF RIGHT UPPER EXTREMITY ARTERIOVENOUS FISTULA;  Surgeon: Payam Denise MD;  Location: [facility] OR     REVISION FISTULA ARTERIOVENOUS UPPER EXTREMITY Right 2020    Procedure: REPAIR RIGHT UPPER EXTREMITY ARTERIOVENOUS FISTULA  ANEURYSM WITH SKIN ULCER;  Surgeon: Payam Denise MD;  Location: [facility] OR     TRANSPLANT KIDNEY RECIPIENT  DONOR Left 2021    Procedure: TRANSPLANT, KIDNEY, RECIPIENT,  DONOR with ureteral stent placement;  Surgeon: Amado Vale MD;  Location: [facility] OR     VASCULAR SURGERY      placement of jugular tunnel catheter     wisdom teeth         ALLERGIES:  Patient has no known allergies.    MEDS:    Current Outpatient Medications:      atorvastatin (LIPITOR) 10 MG tablet, Take 1 tablet (10 mg) by mouth daily, Disp: 30 tablet, Rfl: 11     gabapentin (NEURONTIN) 100 MG capsule, Take 1 capsule (100 mg) by mouth 2 times daily as needed for neuropathic pain, Disp: 60 capsule, Rfl: 0     losartan (COZAAR) 25 MG tablet, Take 1 tablet (25 mg) by mouth At Bedtime, Disp: 90 tablet, Rfl: 0     mycophenolate (GENERIC EQUIVALENT) 250 MG capsule, Take 2 capsules (500 mg) by mouth 2 times daily, Disp: 360 capsule, Rfl: 3     potassium chloride ER (KLOR-CON M) 20 MEQ CR tablet, Take 1 tablet (20 mEq) by mouth 2 times daily, Disp: 180 tablet, Rfl: 0     magnesium oxide (MAG-OX) 400 MG tablet, Take 1 tablet (400 mg) by mouth daily (Patient not taking: Reported on 2023), Disp: 60 tablet, Rfl: 1     tacrolimus (GENERIC EQUIVALENT) 0.5 MG capsule, HOLD FOR FUTURE DOSE CHANGES.  Profile Rx: patient will contact pharmacy when needed (Patient not taking: Reported on 2023), Disp: 90 capsule, Rfl: 3     tacrolimus (GENERIC EQUIVALENT) 1 MG capsule, Take 1 capsule (1 mg) by mouth 2 times daily, Disp: 60 capsule, Rfl: 11    SOCIAL HABITS:    History   Smoking Status     Never   Smokeless  Tobacco     Never     Social History    Substance and Sexual Activity      Alcohol use: Not Currently      History   Drug Use No       FAMILY HISTORY:    Family History   Problem Relation Age of Onset     Diabetes Father      Lung Cancer Maternal Grandmother      Hypertension Maternal Grandmother      Hypertension Other         uncle       PE:  BP (!) 179/130   Pulse 82   Resp 16   Wt 188 lb (85.3 kg)   SpO2 96%   BMI 30.34 kg/m    Wt Readings from Last 1 Encounters:   04/20/23 188 lb (85.3 kg)     Body mass index is 30.34 kg/m .    LABS:      Sodium   Date Value Ref Range Status   03/24/2023 142 136 - 145 mmol/L Final   02/16/2023 143 136 - 145 mmol/L Final   01/26/2023 141 136 - 145 mmol/L Final   05/28/2019 142 133 - 144 mmol/L Final   07/23/2018 139 133 - 144 mmol/L Final   07/20/2018 136 133 - 144 mmol/L Final     Urea Nitrogen   Date Value Ref Range Status   03/24/2023 13.1 6.0 - 20.0 mg/dL Final   02/16/2023 8.0 6.0 - 20.0 mg/dL Final   01/26/2023 11.7 6.0 - 20.0 mg/dL Final   11/03/2022 8 7 - 30 mg/dL Final   10/13/2022 12 7 - 30 mg/dL Final   10/06/2022 11 7 - 30 mg/dL Final   05/28/2019 35 (H) 7 - 30 mg/dL Final   07/23/2018 22 7 - 30 mg/dL Final   07/20/2018 53 (H) 7 - 30 mg/dL Final     Hemoglobin   Date Value Ref Range Status   04/20/2023 15.1 13.3 - 17.7 g/dL Final   03/24/2023 14.9 13.3 - 17.7 g/dL Final   02/16/2023 14.5 13.3 - 17.7 g/dL Final   08/24/2020 10.0 (L) 13.3 - 17.7 g/dL Final   07/23/2018 10.7 (L) 13.3 - 17.7 g/dL Final   07/20/2018 10.4 (L) 13.3 - 17.7 g/dL Final     Platelet Count   Date Value Ref Range Status   04/20/2023 285 150 - 450 10e3/uL Final   03/24/2023 262 150 - 450 10e3/uL Final   02/16/2023 196 150 - 450 10e3/uL Final   08/24/2020 214 150 - 450 10e9/L Final   07/23/2018 298 150 - 450 10e9/L Final   07/20/2018 234 150 - 450 10e9/L Final     INR   Date Value Ref Range Status   09/13/2021 0.88 0.85 - 1.15 Final     Comment:     Effective 7/11/2021, the reference range for  this assay has changed.   08/04/2016 0.95 0.86 - 1.14 Final   05/15/2016 1.01 0.86 - 1.14 Final   05/11/2016 0.99 0.86 - 1.14 Final       30 minutes spent on the day of encounter doing chart review, history and exam, documentation, and further activities as noted.     Anat Azar NP  VASCULAR SURGERY

## 2023-04-20 NOTE — NURSING NOTE
[facility]        Patient is here for a follow up  to discuss post-op    Pt is currently taking Statin.    BP (!) 179/130   Pulse 82   Resp 16   Wt 188 lb (85.3 kg)   SpO2 96%   BMI 30.34 kg/m      The provider has been notified that the patient has no concerns.     Questions patient would like addressed today are: N/A.    Refills are needed: No    Has homecare services and agency name:  Simona Manning RN      
51.5

## 2023-04-21 ENCOUNTER — TELEPHONE (OUTPATIENT)
Dept: TRANSPLANT | Facility: CLINIC | Age: 38
End: 2023-04-21
Payer: MEDICARE

## 2023-04-21 DIAGNOSIS — Z48.298 AFTERCARE FOLLOWING ORGAN TRANSPLANT: ICD-10-CM

## 2023-04-21 DIAGNOSIS — Z94.0 KIDNEY REPLACED BY TRANSPLANT: ICD-10-CM

## 2023-04-21 DIAGNOSIS — Z94.0 KIDNEY TRANSPLANT RECIPIENT: ICD-10-CM

## 2023-04-21 LAB
BKV DNA # SPEC NAA+PROBE: NOT DETECTED COPIES/ML
CMV DNA SPEC NAA+PROBE-ACNC: NOT DETECTED IU/ML

## 2023-04-21 NOTE — TELEPHONE ENCOUNTER
ISSUE:  Potassium low  Tac 3.7 (goal 4-6)    Encouraged high potassium foods, list provided to Kailash.     ISSUE:   Tacrolimus IR level 3.7 on 4/20, goal 4-6, dose 1 mg BID.    PLAN:   Please call patient and confirm this was an accurate 12-hour trough. Verify Tacrolimus IR dose 1 mg BID. Confirm no new medications or illness. Confirm no missed doses. If accurate trough and accurate dose, increase Tacrolimus IR dose to 1.5/1 mg BID and repeat labs in 1 weeks    OUTCOME:   [employer] message sent to patient.

## 2023-04-24 RX ORDER — TACROLIMUS 1 MG/1
1 CAPSULE ORAL 2 TIMES DAILY
Qty: 60 CAPSULE | Refills: 11 | Status: SHIPPED | OUTPATIENT
Start: 2023-04-24 | End: 2023-09-01

## 2023-04-24 RX ORDER — TACROLIMUS 0.5 MG/1
0.5 CAPSULE ORAL EVERY MORNING
Qty: 90 CAPSULE | Refills: 3 | Status: SHIPPED | OUTPATIENT
Start: 2023-04-24 | End: 2023-09-01

## 2023-05-18 ENCOUNTER — LAB (OUTPATIENT)
Dept: LAB | Facility: CLINIC | Age: 38
End: 2023-05-18
Payer: MEDICARE

## 2023-05-18 DIAGNOSIS — Z48.298 AFTERCARE FOLLOWING ORGAN TRANSPLANT: ICD-10-CM

## 2023-05-18 DIAGNOSIS — B25.9 CMV (CYTOMEGALOVIRUS INFECTION) (H): ICD-10-CM

## 2023-05-18 LAB
ANION GAP SERPL CALCULATED.3IONS-SCNC: 13 MMOL/L (ref 7–15)
BASOPHILS # BLD AUTO: 0 10E3/UL (ref 0–0.2)
BASOPHILS NFR BLD AUTO: 0 %
BUN SERPL-MCNC: 10.2 MG/DL (ref 6–20)
CALCIUM SERPL-MCNC: 9.5 MG/DL (ref 8.6–10)
CHLORIDE SERPL-SCNC: 103 MMOL/L (ref 98–107)
CREAT SERPL-MCNC: 0.99 MG/DL (ref 0.67–1.17)
DEPRECATED HCO3 PLAS-SCNC: 25 MMOL/L (ref 22–29)
EOSINOPHIL # BLD AUTO: 0.2 10E3/UL (ref 0–0.7)
EOSINOPHIL NFR BLD AUTO: 4 %
ERYTHROCYTE [DISTWIDTH] IN BLOOD BY AUTOMATED COUNT: 12.1 % (ref 10–15)
GFR SERPL CREATININE-BSD FRML MDRD: >90 ML/MIN/1.73M2
GLUCOSE SERPL-MCNC: 94 MG/DL (ref 70–99)
HCT VFR BLD AUTO: 40.6 % (ref 40–53)
HGB BLD-MCNC: 14.2 G/DL (ref 13.3–17.7)
IMM GRANULOCYTES # BLD: 0 10E3/UL
IMM GRANULOCYTES NFR BLD: 0 %
LYMPHOCYTES # BLD AUTO: 2 10E3/UL (ref 0.8–5.3)
LYMPHOCYTES NFR BLD AUTO: 41 %
MAGNESIUM SERPL-MCNC: 1.5 MG/DL (ref 1.7–2.3)
MCH RBC QN AUTO: 28.7 PG (ref 26.5–33)
MCHC RBC AUTO-ENTMCNC: 35 G/DL (ref 31.5–36.5)
MCV RBC AUTO: 82 FL (ref 78–100)
MONOCYTES # BLD AUTO: 0.5 10E3/UL (ref 0–1.3)
MONOCYTES NFR BLD AUTO: 11 %
NEUTROPHILS # BLD AUTO: 2.1 10E3/UL (ref 1.6–8.3)
NEUTROPHILS NFR BLD AUTO: 44 %
PHOSPHATE SERPL-MCNC: 2.9 MG/DL (ref 2.5–4.5)
PLATELET # BLD AUTO: 221 10E3/UL (ref 150–450)
POTASSIUM SERPL-SCNC: 3 MMOL/L (ref 3.4–5.3)
RBC # BLD AUTO: 4.94 10E6/UL (ref 4.4–5.9)
SODIUM SERPL-SCNC: 141 MMOL/L (ref 136–145)
TACROLIMUS BLD-MCNC: 5.1 UG/L (ref 5–15)
TME LAST DOSE: NORMAL H
TME LAST DOSE: NORMAL H
WBC # BLD AUTO: 4.9 10E3/UL (ref 4–11)

## 2023-05-18 PROCEDURE — 80048 BASIC METABOLIC PNL TOTAL CA: CPT

## 2023-05-18 PROCEDURE — 80197 ASSAY OF TACROLIMUS: CPT

## 2023-05-18 PROCEDURE — 85025 COMPLETE CBC W/AUTO DIFF WBC: CPT

## 2023-05-18 PROCEDURE — 84100 ASSAY OF PHOSPHORUS: CPT

## 2023-05-18 PROCEDURE — 83735 ASSAY OF MAGNESIUM: CPT

## 2023-05-18 PROCEDURE — 87799 DETECT AGENT NOS DNA QUANT: CPT

## 2023-05-18 PROCEDURE — 36415 COLL VENOUS BLD VENIPUNCTURE: CPT

## 2023-05-19 ENCOUNTER — TELEPHONE (OUTPATIENT)
Dept: TRANSPLANT | Facility: CLINIC | Age: 38
End: 2023-05-19
Payer: MEDICARE

## 2023-05-19 DIAGNOSIS — Z94.0 KIDNEY TRANSPLANT RECIPIENT: ICD-10-CM

## 2023-05-19 DIAGNOSIS — E87.6 HYPOKALEMIA: ICD-10-CM

## 2023-05-19 LAB
BKV DNA # SPEC NAA+PROBE: NOT DETECTED COPIES/ML
CMV DNA SPEC NAA+PROBE-ACNC: NOT DETECTED IU/ML

## 2023-05-19 RX ORDER — POTASSIUM CHLORIDE 1500 MG/1
20 TABLET, EXTENDED RELEASE ORAL 2 TIMES DAILY
Qty: 180 TABLET | Refills: 0 | Status: SHIPPED | OUTPATIENT
Start: 2023-05-19 | End: 2023-06-05

## 2023-05-19 RX ORDER — MYCOPHENOLATE MOFETIL 250 MG/1
500 CAPSULE ORAL 2 TIMES DAILY
Qty: 360 CAPSULE | Refills: 3 | Status: SHIPPED | OUTPATIENT
Start: 2023-05-19 | End: 2023-09-15

## 2023-05-19 NOTE — TELEPHONE ENCOUNTER
ISSUE:  K 3.0    Spoke with Kailash, says he ran out of potassium supplement and didn't refill because there weren't refills left. Sent refill to local pharmacy, he will restart.

## 2023-05-31 DIAGNOSIS — Z94.0 HTN, KIDNEY TRANSPLANT RELATED: Primary | ICD-10-CM

## 2023-05-31 DIAGNOSIS — I15.1 HTN, KIDNEY TRANSPLANT RELATED: Primary | ICD-10-CM

## 2023-05-31 RX ORDER — LOSARTAN POTASSIUM 25 MG/1
25 TABLET ORAL AT BEDTIME
Qty: 90 TABLET | Refills: 0 | Status: SHIPPED | OUTPATIENT
Start: 2023-05-31 | End: 2023-06-05

## 2023-06-05 ENCOUNTER — OFFICE VISIT (OUTPATIENT)
Dept: NEPHROLOGY | Facility: CLINIC | Age: 38
End: 2023-06-05
Attending: INTERNAL MEDICINE
Payer: MEDICARE

## 2023-06-05 VITALS
SYSTOLIC BLOOD PRESSURE: 161 MMHG | OXYGEN SATURATION: 95 % | DIASTOLIC BLOOD PRESSURE: 114 MMHG | TEMPERATURE: 97.8 F | WEIGHT: 197.4 LBS | HEART RATE: 77 BPM | BODY MASS INDEX: 31.86 KG/M2

## 2023-06-05 DIAGNOSIS — Z94.0 KIDNEY REPLACED BY TRANSPLANT: Primary | ICD-10-CM

## 2023-06-05 DIAGNOSIS — Z94.0 KIDNEY REPLACED BY TRANSPLANT: ICD-10-CM

## 2023-06-05 DIAGNOSIS — D84.9 IMMUNOSUPPRESSED STATUS (H): ICD-10-CM

## 2023-06-05 DIAGNOSIS — E55.9 VITAMIN D DEFICIENCY: ICD-10-CM

## 2023-06-05 DIAGNOSIS — I15.1 HTN, KIDNEY TRANSPLANT RELATED: Primary | ICD-10-CM

## 2023-06-05 DIAGNOSIS — E87.6 HYPOKALEMIA: ICD-10-CM

## 2023-06-05 DIAGNOSIS — Z29.89 NEED FOR PNEUMOCYSTIS PROPHYLAXIS: ICD-10-CM

## 2023-06-05 DIAGNOSIS — Z48.298 AFTERCARE FOLLOWING ORGAN TRANSPLANT: ICD-10-CM

## 2023-06-05 DIAGNOSIS — Z94.0 HTN, KIDNEY TRANSPLANT RELATED: Primary | ICD-10-CM

## 2023-06-05 DIAGNOSIS — E83.42 HYPOMAGNESEMIA: ICD-10-CM

## 2023-06-05 PROCEDURE — G0463 HOSPITAL OUTPT CLINIC VISIT: HCPCS | Performed by: INTERNAL MEDICINE

## 2023-06-05 PROCEDURE — 99214 OFFICE O/P EST MOD 30 MIN: CPT | Mod: 24 | Performed by: INTERNAL MEDICINE

## 2023-06-05 RX ORDER — SWAB
1 SWAB, NON-MEDICATED MISCELLANEOUS DAILY
Qty: 90 CAPSULE | Refills: 3 | Status: SHIPPED | OUTPATIENT
Start: 2023-06-05 | End: 2024-06-10

## 2023-06-05 RX ORDER — AMLODIPINE BESYLATE 5 MG/1
5 TABLET ORAL AT BEDTIME
Qty: 90 TABLET | Refills: 3 | Status: SHIPPED | OUTPATIENT
Start: 2023-06-05 | End: 2024-05-29

## 2023-06-05 RX ORDER — LOSARTAN POTASSIUM 50 MG/1
50 TABLET ORAL DAILY
Qty: 90 TABLET | Refills: 3 | Status: SHIPPED | OUTPATIENT
Start: 2023-06-05 | End: 2024-05-29

## 2023-06-05 RX ORDER — POTASSIUM CHLORIDE 1500 MG/1
20 TABLET, EXTENDED RELEASE ORAL 2 TIMES DAILY
Qty: 180 TABLET | Refills: 2 | Status: SHIPPED | OUTPATIENT
Start: 2023-06-05 | End: 2023-09-08

## 2023-06-05 ASSESSMENT — PAIN SCALES - GENERAL: PAINLEVEL: NO PAIN (0)

## 2023-06-05 NOTE — LETTER
6/5/2023      RE: Kailash Sorto  [address]  [place] [address]       TRANSPLANT NEPHROLOGY CHRONIC POST TRANSPLANT VISIT    Assessment & Plan   # DDKT: Stable   - Baseline Creatinine:  ~ 0.9-1.1   - Proteinuria: Not checked recently   - Date DSA Last Checked: Nov/2022      Latest DSA: No cPRA: 2%   - BK Viremia: No   - Kidney Tx Biopsy: No    # Immunosuppression: Tacrolimus immediate release (goal 4-6) and Mycophenolate mofetil (dose 500 mg every 12 hours)   - Continue with intensive monitoring of immunosuppression for efficacy and toxicity.   - Changes: No    # Infection Prophylaxis:   Last CD4 Level: Not checked.  - PJP: None    # Hypertension: Borderline control;  Goal BP: < 130/80   - Changes: Yes - Would recommend increasing losartan to 50 mg daily and starting amlodipine to 5 mg nightly.    - With continued low potassium level, may consider starting spironolactone as an option.   - Recommend patient check blood pressure at home on a regular basis, such as once every week or two, and follow up with primary Nephrologist or PCP if above goal.    # Mineral Bone Disorder:   - Secondary renal hyperparathyroidism; PTH level: Minimally elevated ( pg/ml)        On treatment: None  - Vitamin D; level: Low        On supplement: No  - Calcium; level: Normal        On supplement: No    # Electrolytes:   - Potassium; level: Low        On supplement: Yes; However, patient recently ran out of oral potassium supplement recently.  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    # Obesity, Class I (BMI = 31.8): Weight is increased by ~ 10 lbs and up ~ 25-30 lbs since transplant.   - Recommend weight loss for overall health by increasing exercise and watching caloric intake.    # Right Upper Extremity Neuropathy: Stable symptoms following right UE AV fistula  ligation.    # Skin Cancer Risk:    - Discussed sun protection and recommend regular follow up with Dermatology.    # Medical Compliance: No.  Evidence of infrequent transplant follow-up.  - Discussed importance of checking labs regularly as recommended, taking medications as prescribed and attending scheduled medical appointments.    # Health Maintenance and Vaccination Review: Not Reviewed    # Transplant History:  Etiology of Kidney Failure: Unknown etiology  Tx: DDKT  Transplant: 9/13/2021 (Kidney)  Significant changes in immunosuppression: None  Significant transplant-related complications: CMV Viremia    Transplant Office Phone Number: [phone]    Assessment and plan was discussed with the patient and he voiced his understanding and agreement.    Return visit: Return in about 6 months (around 12/5/2023).    Yoni Abdi MD    Chief Complaint   Mr. Sorto is a 37 year old here for kidney transplant and immunosuppression management.    History of Present Illness    Mr. Sorto reports feeling good overall with some medical complaints.  Since last clinic visit, patient reports no hospitalizations or new medical complaints and has been doing well overall.  His energy level is good and remains high, normal.  He is active and does get some exercise.  Denies any chest pain or shortness of breath with exertion.  He notes a little leg swelling at times, usually right more than left.    Appetite is good and he has gained ~ 10 lbs lately and about 25-30 lbs since transplant.  No nausea, vomiting or diarrhea.  No fever, sweats or chills.  No night sweats.    Home BP: Not checked    Problem List   Patient Active Problem List   Diagnosis     Hyperlipidemia LDL goal <100     HTN, kidney transplant related     AVF (arteriovenous fistula) (H)     Kidney replaced by transplant     Neutropenia (H)     Aftercare following organ transplant     Need for pneumocystis prophylaxis     Immunosuppressed status (H)      Vitamin D deficiency     Hypomagnesemia     Cytomegalovirus (CMV) viremia (H)     Secondary renal hyperparathyroidism (H)     Obesity       Allergies   No Known Allergies    Medications   Current Outpatient Medications   Medication Sig     amLODIPine (NORVASC) 5 MG tablet Take 1 tablet (5 mg) by mouth At Bedtime     atorvastatin (LIPITOR) 10 MG tablet Take 1 tablet (10 mg) by mouth daily     gabapentin (NEURONTIN) 100 MG capsule Take 1 capsule (100 mg) by mouth 2 times daily as needed for neuropathic pain     losartan (COZAAR) 50 MG tablet Take 1 tablet (50 mg) by mouth daily     mycophenolate (GENERIC EQUIVALENT) 250 MG capsule Take 2 capsules (500 mg) by mouth 2 times daily     potassium chloride ER (KLOR-CON M) 20 MEQ CR tablet Take 1 tablet (20 mEq) by mouth 2 times daily     tacrolimus (GENERIC EQUIVALENT) 0.5 MG capsule Take 1 capsule (0.5 mg) by mouth every morning Total dose = 1.5 mg in AM, 1 mg in PM     tacrolimus (GENERIC EQUIVALENT) 1 MG capsule Take 1 capsule (1 mg) by mouth 2 times daily Total dose = 1.5 mg in AM, 1 mg in PM     vitamin D3 (CHOLECALCIFEROL) 10 MCG (400 UNIT) capsule Take 1 capsule (400 Units) by mouth daily     No current facility-administered medications for this visit.     Medications Discontinued During This Encounter   Medication Reason     losartan (COZAAR) 25 MG tablet      potassium chloride ER (KLOR-CON M) 20 MEQ CR tablet Reorder (No AVS / No eCancel)     magnesium oxide (MAG-OX) 400 MG tablet        Physical Exam   Vital Signs: BP (!) 161/114 (BP Location: Left arm, Patient Position: Sitting, Cuff Size: Adult Regular)   Pulse 77   Temp 97.8  F (36.6  C) (Oral)   Wt 89.5 kg (197 lb 6.4 oz)   SpO2 95%   BMI 31.86 kg/m      GENERAL APPEARANCE: alert and no distress  HENT: mouth without ulcers or lesions  LYMPHATICS: no cervical or supraclavicular nodes  RESP: lungs clear to auscultation - no rales, rhonchi or wheezes  CV: regular rhythm, normal rate, no rub, no  murmur  EDEMA: no LE edema bilaterally  ABDOMEN: soft, nondistended, nontender, bowel sounds normal  MS: extremities normal - no gross deformities noted, no evidence of inflammation in joints, no muscle tenderness  SKIN: no rash  TX KIDNEY: normal  DIALYSIS ACCESS:  RUE AV fistula with NO thrill (fistula ligated)      Data         Latest Ref Rng & Units 5/18/2023     9:18 AM 4/20/2023     9:20 AM 3/24/2023     9:29 AM   Renal   Sodium 136 - 145 mmol/L 141  136  142    K 3.4 - 5.3 mmol/L 3.0  3.3  3.7    Cl 98 - 107 mmol/L 103  100  105    Cl (external) 98 - 107 mmol/L 103  100  105    CO2 22 - 29 mmol/L 25  23  24    Urea Nitrogen 6.0 - 20.0 mg/dL 10.2  10.2  13.1    Creatinine 0.67 - 1.17 mg/dL 0.99  1.04  1.16    Glucose 70 - 99 mg/dL 94  103  101    Calcium 8.6 - 10.0 mg/dL 9.5  10.1  9.7    Magnesium 1.7 - 2.3 mg/dL 1.5  1.8  1.9          Latest Ref Rng & Units 5/18/2023     9:18 AM 4/20/2023     9:20 AM 3/24/2023     9:29 AM   Bone Health   Phosphorus 2.5 - 4.5 mg/dL 2.9  3.0  3.5          Latest Ref Rng & Units 5/18/2023     9:18 AM 4/20/2023     9:20 AM 3/24/2023     9:29 AM   Heme   WBC 4.0 - 11.0 10e3/uL 4.9  5.5  5.3    Hgb 13.3 - 17.7 g/dL 14.2  15.1  14.9    Plt 150 - 450 10e3/uL 221  285  262          Latest Ref Rng & Units 9/22/2022     9:10 AM 3/10/2022     8:59 AM 9/13/2021     5:00 AM   Liver   AP 40 - 150 U/L 121  143  122    TBili 0.2 - 1.3 mg/dL 0.7  0.6  0.4    Bilirubin Direct 0.0 - 0.2 mg/dL 0.2  0.2     ALT 0 - 70 U/L 28  54  24    AST 0 - 45 U/L 14  21  15    Tot Protein 6.8 - 8.8 g/dL 7.5  7.3  7.7    Albumin 3.4 - 5.0 g/dL 3.8  3.9  3.8          Latest Ref Rng & Units 9/22/2022     9:10 AM 3/10/2022     8:59 AM 9/13/2021     5:00 AM   Pancreas   A1C 0.0 - 5.6 % 5.2  5.5  5.3          Latest Ref Rng & Units 9/17/2021     6:56 AM 9/14/2021     4:49 AM 5/11/2016     4:50 AM   Iron studies   Iron 35 - 180 ug/dL 123  18  38    Iron Saturation Index 15 - 46 % 69  10  17    Ferritin 26 - 388  ng/mL 770            Latest Ref Rng & Units 5/18/2023     9:18 AM 4/20/2023     9:20 AM 3/24/2023     9:29 AM   [facility] Txp Virology   CMV QUANT IU/ML Not Detected IU/mL Not Detected  Not Detected  Not Detected         Recent Labs   Lab Test 03/24/23  0929 04/20/23  0920 05/18/23  0918   DOSTAC 3/23/2023 4/19/2023 5/17/2023   TACROL 7.4 3.7* 5.1     Recent Labs   Lab Test 08/11/22  0926 09/08/22  0857 10/06/22  0910   DOSMPA 8/10/2022   8:40 PM 9/7/2022   8:40 PM 10/5/2022   8:45 PM   MPACID 1.60 1.57 1.12   MPAG 19.2* 23.4* 19.8*       Yoni Abdi MD

## 2023-06-05 NOTE — LETTER
6/5/2023       RE: Kailash Sorto  [address]  [facility] [address]     Dear Colleague,    Thank you for referring your patient, Kailash Sorto, to the [facility] [place] [facility]. Please see a copy of my visit note below.    TRANSPLANT NEPHROLOGY CHRONIC POST TRANSPLANT VISIT    Assessment & Plan   # DDKT: Stable   - Baseline Creatinine:  ~ 0.9-1.1   - Proteinuria: Not checked recently   - Date DSA Last Checked: Nov/2022      Latest DSA: No cPRA: 2%   - BK Viremia: No   - Kidney Tx Biopsy: No    # Immunosuppression: Tacrolimus immediate release (goal 4-6) and Mycophenolate mofetil (dose 500 mg every 12 hours)   - Continue with intensive monitoring of immunosuppression for efficacy and toxicity.   - Changes: No    # Infection Prophylaxis:   Last CD4 Level: Not checked.  - PJP: None    # Hypertension: Borderline control;  Goal BP: < 130/80   - Changes: Yes - Would recommend increasing losartan to 50 mg daily and starting amlodipine to 5 mg nightly.    - With continued low potassium level, may consider starting spironolactone as an option.   - Recommend patient check blood pressure at home on a regular basis, such as once every week or two, and follow up with primary Nephrologist or PCP if above goal.    # Mineral Bone Disorder:   - Secondary renal hyperparathyroidism; PTH level: Minimally elevated ( pg/ml)        On treatment: None  - Vitamin D; level: Low        On supplement: No  - Calcium; level: Normal        On supplement: No    # Electrolytes:   - Potassium; level: Low        On supplement: Yes; However, patient recently ran out of oral potassium supplement recently.  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    # Obesity, Class I (BMI = 31.8): Weight is  increased by ~ 10 lbs and up ~ 25-30 lbs since transplant.   - Recommend weight loss for overall health by increasing exercise and watching caloric intake.    # Right Upper Extremity Neuropathy: Stable symptoms following right UE AV fistula ligation.    # Skin Cancer Risk:    - Discussed sun protection and recommend regular follow up with Dermatology.    # Medical Compliance: No.  Evidence of infrequent transplant follow-up.  - Discussed importance of checking labs regularly as recommended, taking medications as prescribed and attending scheduled medical appointments.    # Health Maintenance and Vaccination Review: Not Reviewed    # Transplant History:  Etiology of Kidney Failure: Unknown etiology  Tx: DDKT  Transplant: 9/13/2021 (Kidney)  Significant changes in immunosuppression: None  Significant transplant-related complications: CMV Viremia    Transplant Office Phone Number: [phone]    Assessment and plan was discussed with the patient and he voiced his understanding and agreement.    Return visit: Return in about 6 months (around 12/5/2023).    Yoni Abdi MD    Chief Complaint   Mr. Sorto is a 37 year old here for kidney transplant and immunosuppression management.    History of Present Illness    Mr. Sorto reports feeling good overall with some medical complaints.  Since last clinic visit, patient reports no hospitalizations or new medical complaints and has been doing well overall.  His energy level is good and remains high, normal.  He is active and does get some exercise.  Denies any chest pain or shortness of breath with exertion.  He notes a little leg swelling at times, usually right more than left.    Appetite is good and he has gained ~ 10 lbs lately and about 25-30 lbs since transplant.  No nausea, vomiting or diarrhea.  No fever, sweats or chills.  No night sweats.    Home BP: Not checked    Problem List   Patient Active Problem List   Diagnosis     Hyperlipidemia LDL goal <100      HTN, kidney transplant related     AVF (arteriovenous fistula) (H)     Kidney replaced by transplant     Neutropenia (H)     Aftercare following organ transplant     Need for pneumocystis prophylaxis     Immunosuppressed status (H)     Vitamin D deficiency     Hypomagnesemia     Cytomegalovirus (CMV) viremia (H)     Secondary renal hyperparathyroidism (H)     Obesity       Allergies   No Known Allergies    Medications   Current Outpatient Medications   Medication Sig     amLODIPine (NORVASC) 5 MG tablet Take 1 tablet (5 mg) by mouth At Bedtime     atorvastatin (LIPITOR) 10 MG tablet Take 1 tablet (10 mg) by mouth daily     gabapentin (NEURONTIN) 100 MG capsule Take 1 capsule (100 mg) by mouth 2 times daily as needed for neuropathic pain     losartan (COZAAR) 50 MG tablet Take 1 tablet (50 mg) by mouth daily     mycophenolate (GENERIC EQUIVALENT) 250 MG capsule Take 2 capsules (500 mg) by mouth 2 times daily     potassium chloride ER (KLOR-CON M) 20 MEQ CR tablet Take 1 tablet (20 mEq) by mouth 2 times daily     tacrolimus (GENERIC EQUIVALENT) 0.5 MG capsule Take 1 capsule (0.5 mg) by mouth every morning Total dose = 1.5 mg in AM, 1 mg in PM     tacrolimus (GENERIC EQUIVALENT) 1 MG capsule Take 1 capsule (1 mg) by mouth 2 times daily Total dose = 1.5 mg in AM, 1 mg in PM     vitamin D3 (CHOLECALCIFEROL) 10 MCG (400 UNIT) capsule Take 1 capsule (400 Units) by mouth daily     No current facility-administered medications for this visit.     Medications Discontinued During This Encounter   Medication Reason     losartan (COZAAR) 25 MG tablet      potassium chloride ER (KLOR-CON M) 20 MEQ CR tablet Reorder (No AVS / No eCancel)     magnesium oxide (MAG-OX) 400 MG tablet        Physical Exam   Vital Signs: BP (!) 161/114 (BP Location: Left arm, Patient Position: Sitting, Cuff Size: Adult Regular)   Pulse 77   Temp 97.8  F (36.6  C) (Oral)   Wt 89.5 kg (197 lb 6.4 oz)   SpO2 95%   BMI 31.86 kg/m      GENERAL  APPEARANCE: alert and no distress  HENT: mouth without ulcers or lesions  LYMPHATICS: no cervical or supraclavicular nodes  RESP: lungs clear to auscultation - no rales, rhonchi or wheezes  CV: regular rhythm, normal rate, no rub, no murmur  EDEMA: no LE edema bilaterally  ABDOMEN: soft, nondistended, nontender, bowel sounds normal  MS: extremities normal - no gross deformities noted, no evidence of inflammation in joints, no muscle tenderness  SKIN: no rash  TX KIDNEY: normal  DIALYSIS ACCESS:  RUE AV fistula with NO thrill (fistula ligated)      Data         Latest Ref Rng & Units 5/18/2023     9:18 AM 4/20/2023     9:20 AM 3/24/2023     9:29 AM   Renal   Sodium 136 - 145 mmol/L 141  136  142    K 3.4 - 5.3 mmol/L 3.0  3.3  3.7    Cl 98 - 107 mmol/L 103  100  105    Cl (external) 98 - 107 mmol/L 103  100  105    CO2 22 - 29 mmol/L 25  23  24    Urea Nitrogen 6.0 - 20.0 mg/dL 10.2  10.2  13.1    Creatinine 0.67 - 1.17 mg/dL 0.99  1.04  1.16    Glucose 70 - 99 mg/dL 94  103  101    Calcium 8.6 - 10.0 mg/dL 9.5  10.1  9.7    Magnesium 1.7 - 2.3 mg/dL 1.5  1.8  1.9          Latest Ref Rng & Units 5/18/2023     9:18 AM 4/20/2023     9:20 AM 3/24/2023     9:29 AM   Bone Health   Phosphorus 2.5 - 4.5 mg/dL 2.9  3.0  3.5          Latest Ref Rng & Units 5/18/2023     9:18 AM 4/20/2023     9:20 AM 3/24/2023     9:29 AM   Heme   WBC 4.0 - 11.0 10e3/uL 4.9  5.5  5.3    Hgb 13.3 - 17.7 g/dL 14.2  15.1  14.9    Plt 150 - 450 10e3/uL 221  285  262          Latest Ref Rng & Units 9/22/2022     9:10 AM 3/10/2022     8:59 AM 9/13/2021     5:00 AM   Liver   AP 40 - 150 U/L 121  143  122    TBili 0.2 - 1.3 mg/dL 0.7  0.6  0.4    Bilirubin Direct 0.0 - 0.2 mg/dL 0.2  0.2     ALT 0 - 70 U/L 28  54  24    AST 0 - 45 U/L 14  21  15    Tot Protein 6.8 - 8.8 g/dL 7.5  7.3  7.7    Albumin 3.4 - 5.0 g/dL 3.8  3.9  3.8          Latest Ref Rng & Units 9/22/2022     9:10 AM 3/10/2022     8:59 AM 9/13/2021     5:00 AM   Pancreas   A1C 0.0 - 5.6  % 5.2  5.5  5.3          Latest Ref Rng & Units 9/17/2021     6:56 AM 9/14/2021     4:49 AM 5/11/2016     4:50 AM   Iron studies   Iron 35 - 180 ug/dL 123  18  38    Iron Saturation Index 15 - 46 % 69  10  17    Ferritin 26 - 388 ng/mL 770            Latest Ref Rng & Units 5/18/2023     9:18 AM 4/20/2023     9:20 AM 3/24/2023     9:29 AM   [facility] Txp Virology   CMV QUANT IU/ML Not Detected IU/mL Not Detected  Not Detected  Not Detected         Recent Labs   Lab Test 03/24/23  0929 04/20/23  0920 05/18/23  0918   DOSTAC 3/23/2023 4/19/2023 5/17/2023   TACROL 7.4 3.7* 5.1     Recent Labs   Lab Test 08/11/22  0926 09/08/22  0857 10/06/22  0910   DOSMPA 8/10/2022   8:40 PM 9/7/2022   8:40 PM 10/5/2022   8:45 PM   MPACID 1.60 1.57 1.12   MPAG 19.2* 23.4* 19.8*       Again, thank you for allowing me to participate in the care of your patient.      Sincerely,    Yoni Abdi MD

## 2023-06-05 NOTE — NURSING NOTE
Chief Complaint   Patient presents with     RECHECK     BP (!) 161/114 (BP Location: Left arm, Patient Position: Sitting, Cuff Size: Adult Regular)   Pulse 77   Temp 97.8  F (36.6  C) (Oral)   Wt 89.5 kg (197 lb 6.4 oz)   SpO2 95%   BMI 31.86 kg/m    [facility]ren Mult[facility]

## 2023-06-23 DIAGNOSIS — Z94.0 KIDNEY TRANSPLANTED: Primary | ICD-10-CM

## 2023-06-23 PROBLEM — E66.9 OBESITY: Status: ACTIVE | Noted: 2023-06-23

## 2023-06-23 NOTE — PROGRESS NOTES
TRANSPLANT NEPHROLOGY CHRONIC POST TRANSPLANT VISIT    Assessment & Plan   # DDKT: Stable   - Baseline Creatinine:  ~ 0.9-1.1   - Proteinuria: Not checked recently   - Date DSA Last Checked: Nov/2022      Latest DSA: No cPRA: 2%   - BK Viremia: No   - Kidney Tx Biopsy: No    # Immunosuppression: Tacrolimus immediate release (goal 4-6) and Mycophenolate mofetil (dose 500 mg every 12 hours)   - Continue with intensive monitoring of immunosuppression for efficacy and toxicity.   - Changes: No    # Infection Prophylaxis:   Last CD4 Level: Not checked.  - PJP: None    # Hypertension: Borderline control;  Goal BP: < 130/80   - Changes: Yes - Would recommend increasing losartan to 50 mg daily and starting amlodipine to 5 mg nightly.    - With continued low potassium level, may consider starting spironolactone as an option.   - Recommend patient check blood pressure at home on a regular basis, such as once every week or two, and follow up with primary Nephrologist or PCP if above goal.    # Mineral Bone Disorder:   - Secondary renal hyperparathyroidism; PTH level: Minimally elevated ( pg/ml)        On treatment: None  - Vitamin D; level: Low        On supplement: No  - Calcium; level: Normal        On supplement: No    # Electrolytes:   - Potassium; level: Low        On supplement: Yes; However, patient recently ran out of oral potassium supplement recently.  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    # Obesity, Class I (BMI = 31.8): Weight is increased by ~ 10 lbs and up ~ 25-30 lbs since transplant.   - Recommend weight loss for overall health by increasing exercise and watching caloric intake.    # Right Upper Extremity Neuropathy: Stable symptoms following right UE AV fistula ligation.    # Skin Cancer Risk:    - Discussed sun protection and recommend regular follow up  with Dermatology.    # Medical Compliance: No.  Evidence of infrequent transplant follow-up.  - Discussed importance of checking labs regularly as recommended, taking medications as prescribed and attending scheduled medical appointments.    # Health Maintenance and Vaccination Review: Not Reviewed    # Transplant History:  Etiology of Kidney Failure: Unknown etiology  Tx: DDKT  Transplant: 9/13/2021 (Kidney)  Significant changes in immunosuppression: None  Significant transplant-related complications: CMV Viremia    Transplant Office Phone Number: [phone]    Assessment and plan was discussed with the patient and he voiced his understanding and agreement.    Return visit: Return in about 6 months (around 12/5/2023).    Yoni Abdi MD    Chief Complaint   Mr. Sorto is a 37 year old here for kidney transplant and immunosuppression management.    History of Present Illness    Mr. Sorto reports feeling good overall with some medical complaints.  Since last clinic visit, patient reports no hospitalizations or new medical complaints and has been doing well overall.  His energy level is good and remains high, normal.  He is active and does get some exercise.  Denies any chest pain or shortness of breath with exertion.  He notes a little leg swelling at times, usually right more than left.    Appetite is good and he has gained ~ 10 lbs lately and about 25-30 lbs since transplant.  No nausea, vomiting or diarrhea.  No fever, sweats or chills.  No night sweats.    Home BP: Not checked    Problem List   Patient Active Problem List   Diagnosis     Hyperlipidemia LDL goal <100     HTN, kidney transplant related     AVF (arteriovenous fistula) (H)     Kidney replaced by transplant     Neutropenia (H)     Aftercare following organ transplant     Need for pneumocystis prophylaxis     Immunosuppressed status (H)     Vitamin D deficiency     Hypomagnesemia     Cytomegalovirus (CMV) viremia (H)     Secondary renal  hyperparathyroidism (H)     Obesity       Allergies   No Known Allergies    Medications   Current Outpatient Medications   Medication Sig     amLODIPine (NORVASC) 5 MG tablet Take 1 tablet (5 mg) by mouth At Bedtime     atorvastatin (LIPITOR) 10 MG tablet Take 1 tablet (10 mg) by mouth daily     gabapentin (NEURONTIN) 100 MG capsule Take 1 capsule (100 mg) by mouth 2 times daily as needed for neuropathic pain     losartan (COZAAR) 50 MG tablet Take 1 tablet (50 mg) by mouth daily     mycophenolate (GENERIC EQUIVALENT) 250 MG capsule Take 2 capsules (500 mg) by mouth 2 times daily     potassium chloride ER (KLOR-CON M) 20 MEQ CR tablet Take 1 tablet (20 mEq) by mouth 2 times daily     tacrolimus (GENERIC EQUIVALENT) 0.5 MG capsule Take 1 capsule (0.5 mg) by mouth every morning Total dose = 1.5 mg in AM, 1 mg in PM     tacrolimus (GENERIC EQUIVALENT) 1 MG capsule Take 1 capsule (1 mg) by mouth 2 times daily Total dose = 1.5 mg in AM, 1 mg in PM     vitamin D3 (CHOLECALCIFEROL) 10 MCG (400 UNIT) capsule Take 1 capsule (400 Units) by mouth daily     No current facility-administered medications for this visit.     Medications Discontinued During This Encounter   Medication Reason     losartan (COZAAR) 25 MG tablet      potassium chloride ER (KLOR-CON M) 20 MEQ CR tablet Reorder (No AVS / No eCancel)     magnesium oxide (MAG-OX) 400 MG tablet        Physical Exam   Vital Signs: BP (!) 161/114 (BP Location: Left arm, Patient Position: Sitting, Cuff Size: Adult Regular)   Pulse 77   Temp 97.8  F (36.6  C) (Oral)   Wt 89.5 kg (197 lb 6.4 oz)   SpO2 95%   BMI 31.86 kg/m      GENERAL APPEARANCE: alert and no distress  HENT: mouth without ulcers or lesions  LYMPHATICS: no cervical or supraclavicular nodes  RESP: lungs clear to auscultation - no rales, rhonchi or wheezes  CV: regular rhythm, normal rate, no rub, no murmur  EDEMA: no LE edema bilaterally  ABDOMEN: soft, nondistended, nontender, bowel sounds normal  MS:  extremities normal - no gross deformities noted, no evidence of inflammation in joints, no muscle tenderness  SKIN: no rash  TX KIDNEY: normal  DIALYSIS ACCESS:  RUE AV fistula with NO thrill (fistula ligated)      Data         Latest Ref Rng & Units 5/18/2023     9:18 AM 4/20/2023     9:20 AM 3/24/2023     9:29 AM   Renal   Sodium 136 - 145 mmol/L 141  136  142    K 3.4 - 5.3 mmol/L 3.0  3.3  3.7    Cl 98 - 107 mmol/L 103  100  105    Cl (external) 98 - 107 mmol/L 103  100  105    CO2 22 - 29 mmol/L 25  23  24    Urea Nitrogen 6.0 - 20.0 mg/dL 10.2  10.2  13.1    Creatinine 0.67 - 1.17 mg/dL 0.99  1.04  1.16    Glucose 70 - 99 mg/dL 94  103  101    Calcium 8.6 - 10.0 mg/dL 9.5  10.1  9.7    Magnesium 1.7 - 2.3 mg/dL 1.5  1.8  1.9          Latest Ref Rng & Units 5/18/2023     9:18 AM 4/20/2023     9:20 AM 3/24/2023     9:29 AM   Bone Health   Phosphorus 2.5 - 4.5 mg/dL 2.9  3.0  3.5          Latest Ref Rng & Units 5/18/2023     9:18 AM 4/20/2023     9:20 AM 3/24/2023     9:29 AM   Heme   WBC 4.0 - 11.0 10e3/uL 4.9  5.5  5.3    Hgb 13.3 - 17.7 g/dL 14.2  15.1  14.9    Plt 150 - 450 10e3/uL 221  285  262          Latest Ref Rng & Units 9/22/2022     9:10 AM 3/10/2022     8:59 AM 9/13/2021     5:00 AM   Liver   AP 40 - 150 U/L 121  143  122    TBili 0.2 - 1.3 mg/dL 0.7  0.6  0.4    Bilirubin Direct 0.0 - 0.2 mg/dL 0.2  0.2     ALT 0 - 70 U/L 28  54  24    AST 0 - 45 U/L 14  21  15    Tot Protein 6.8 - 8.8 g/dL 7.5  7.3  7.7    Albumin 3.4 - 5.0 g/dL 3.8  3.9  3.8          Latest Ref Rng & Units 9/22/2022     9:10 AM 3/10/2022     8:59 AM 9/13/2021     5:00 AM   Pancreas   A1C 0.0 - 5.6 % 5.2  5.5  5.3          Latest Ref Rng & Units 9/17/2021     6:56 AM 9/14/2021     4:49 AM 5/11/2016     4:50 AM   Iron studies   Iron 35 - 180 ug/dL 123  18  38    Iron Saturation Index 15 - 46 % 69  10  17    Ferritin 26 - 388 ng/mL 770            Latest Ref Rng & Units 5/18/2023     9:18 AM 4/20/2023     9:20 AM 3/24/2023     9:29 AM    [facility] Txp Virology   CMV QUANT IU/ML Not Detected IU/mL Not Detected  Not Detected  Not Detected         Recent Labs   Lab Test 03/24/23  0929 04/20/23  0920 05/18/23  0918   DOSTAC 3/23/2023 4/19/2023 5/17/2023   TACROL 7.4 3.7* 5.1     Recent Labs   Lab Test 08/11/22  0926 09/08/22  0857 10/06/22  0910   DOSMPA 8/10/2022   8:40 PM 9/7/2022   8:40 PM 10/5/2022   8:45 PM   MPACID 1.60 1.57 1.12   MPAG 19.2* 23.4* 19.8*

## 2023-06-29 ENCOUNTER — LAB (OUTPATIENT)
Dept: LAB | Facility: CLINIC | Age: 38
End: 2023-06-29
Payer: MEDICARE

## 2023-06-29 DIAGNOSIS — Z94.0 KIDNEY REPLACED BY TRANSPLANT: ICD-10-CM

## 2023-06-29 DIAGNOSIS — Z94.0 KIDNEY TRANSPLANTED: ICD-10-CM

## 2023-06-29 DIAGNOSIS — B25.9 CMV (CYTOMEGALOVIRUS INFECTION) (H): ICD-10-CM

## 2023-06-29 DIAGNOSIS — Z48.298 AFTERCARE FOLLOWING ORGAN TRANSPLANT: ICD-10-CM

## 2023-06-29 LAB
ALBUMIN MFR UR ELPH: 10 MG/DL
ANION GAP SERPL CALCULATED.3IONS-SCNC: 12 MMOL/L (ref 7–15)
BASOPHILS # BLD AUTO: 0 10E3/UL (ref 0–0.2)
BASOPHILS NFR BLD AUTO: 0 %
BUN SERPL-MCNC: 12 MG/DL (ref 6–20)
CALCIUM SERPL-MCNC: 9.7 MG/DL (ref 8.6–10)
CD3 CELLS # BLD: 1855 CELLS/UL (ref 603–2990)
CD3 CELLS NFR BLD: 75 % (ref 49–84)
CD3+CD4+ CELLS # BLD: 458 CELLS/UL (ref 441–2156)
CD3+CD4+ CELLS NFR BLD: 19 % (ref 28–63)
CD3+CD4+ CELLS/CD3+CD8+ CLL BLD: 0.26 % (ref 1.4–2.6)
CD3+CD8+ CELLS # BLD: 1740 CELLS/UL (ref 125–1312)
CD3+CD8+ CELLS NFR BLD: 70 % (ref 10–40)
CHLORIDE SERPL-SCNC: 107 MMOL/L (ref 98–107)
CREAT SERPL-MCNC: 0.99 MG/DL (ref 0.67–1.17)
CREAT UR-MCNC: 101 MG/DL
DEPRECATED HCO3 PLAS-SCNC: 24 MMOL/L (ref 22–29)
EOSINOPHIL # BLD AUTO: 0.1 10E3/UL (ref 0–0.7)
EOSINOPHIL NFR BLD AUTO: 2 %
ERYTHROCYTE [DISTWIDTH] IN BLOOD BY AUTOMATED COUNT: 11.8 % (ref 10–15)
GFR SERPL CREATININE-BSD FRML MDRD: >90 ML/MIN/1.73M2
GLUCOSE SERPL-MCNC: 101 MG/DL (ref 70–99)
HCT VFR BLD AUTO: 41.2 % (ref 40–53)
HGB BLD-MCNC: 14.5 G/DL (ref 13.3–17.7)
IMM GRANULOCYTES # BLD: 0 10E3/UL
IMM GRANULOCYTES NFR BLD: 0 %
LYMPHOCYTES # BLD AUTO: 2.2 10E3/UL (ref 0.8–5.3)
LYMPHOCYTES NFR BLD AUTO: 48 %
MAGNESIUM SERPL-MCNC: 1.5 MG/DL (ref 1.7–2.3)
MCH RBC QN AUTO: 29.4 PG (ref 26.5–33)
MCHC RBC AUTO-ENTMCNC: 35.2 G/DL (ref 31.5–36.5)
MCV RBC AUTO: 84 FL (ref 78–100)
MONOCYTES # BLD AUTO: 0.4 10E3/UL (ref 0–1.3)
MONOCYTES NFR BLD AUTO: 9 %
NEUTROPHILS # BLD AUTO: 1.8 10E3/UL (ref 1.6–8.3)
NEUTROPHILS NFR BLD AUTO: 41 %
PHOSPHATE SERPL-MCNC: 2.9 MG/DL (ref 2.5–4.5)
PLATELET # BLD AUTO: 244 10E3/UL (ref 150–450)
POTASSIUM SERPL-SCNC: 3.5 MMOL/L (ref 3.4–5.3)
PROT/CREAT 24H UR: 0.1 MG/MG CR (ref 0–0.2)
RBC # BLD AUTO: 4.93 10E6/UL (ref 4.4–5.9)
SODIUM SERPL-SCNC: 143 MMOL/L (ref 136–145)
T CELL COMMENT: ABNORMAL
TACROLIMUS BLD-MCNC: 5.8 UG/L (ref 5–15)
TME LAST DOSE: NORMAL H
TME LAST DOSE: NORMAL H
WBC # BLD AUTO: 4.5 10E3/UL (ref 4–11)

## 2023-06-29 PROCEDURE — 85025 COMPLETE CBC W/AUTO DIFF WBC: CPT

## 2023-06-29 PROCEDURE — 80048 BASIC METABOLIC PNL TOTAL CA: CPT

## 2023-06-29 PROCEDURE — 36415 COLL VENOUS BLD VENIPUNCTURE: CPT

## 2023-06-29 PROCEDURE — 86359 T CELLS TOTAL COUNT: CPT

## 2023-06-29 PROCEDURE — 84100 ASSAY OF PHOSPHORUS: CPT

## 2023-06-29 PROCEDURE — 87799 DETECT AGENT NOS DNA QUANT: CPT

## 2023-06-29 PROCEDURE — 84156 ASSAY OF PROTEIN URINE: CPT

## 2023-06-29 PROCEDURE — 83735 ASSAY OF MAGNESIUM: CPT

## 2023-06-29 PROCEDURE — 86360 T CELL ABSOLUTE COUNT/RATIO: CPT

## 2023-06-29 PROCEDURE — 80197 ASSAY OF TACROLIMUS: CPT

## 2023-06-30 LAB
BKV DNA # SPEC NAA+PROBE: NOT DETECTED COPIES/ML
CMV DNA SPEC NAA+PROBE-ACNC: NOT DETECTED IU/ML

## 2023-07-27 ENCOUNTER — LAB (OUTPATIENT)
Dept: LAB | Facility: CLINIC | Age: 38
End: 2023-07-27
Payer: MEDICARE

## 2023-07-27 DIAGNOSIS — Z48.298 AFTERCARE FOLLOWING ORGAN TRANSPLANT: ICD-10-CM

## 2023-07-27 DIAGNOSIS — B25.9 CMV (CYTOMEGALOVIRUS INFECTION) (H): ICD-10-CM

## 2023-07-27 LAB
ANION GAP SERPL CALCULATED.3IONS-SCNC: 11 MMOL/L (ref 7–15)
BASOPHILS # BLD AUTO: 0 10E3/UL (ref 0–0.2)
BASOPHILS NFR BLD AUTO: 1 %
BUN SERPL-MCNC: 10.6 MG/DL (ref 6–20)
CALCIUM SERPL-MCNC: 9.5 MG/DL (ref 8.6–10)
CHLORIDE SERPL-SCNC: 106 MMOL/L (ref 98–107)
CMV DNA SPEC NAA+PROBE-ACNC: NOT DETECTED IU/ML
CREAT SERPL-MCNC: 1.07 MG/DL (ref 0.67–1.17)
DEPRECATED HCO3 PLAS-SCNC: 24 MMOL/L (ref 22–29)
EOSINOPHIL # BLD AUTO: 0.1 10E3/UL (ref 0–0.7)
EOSINOPHIL NFR BLD AUTO: 2 %
ERYTHROCYTE [DISTWIDTH] IN BLOOD BY AUTOMATED COUNT: 11.9 % (ref 10–15)
GFR SERPL CREATININE-BSD FRML MDRD: >90 ML/MIN/1.73M2
GLUCOSE SERPL-MCNC: 97 MG/DL (ref 70–99)
HCT VFR BLD AUTO: 41.1 % (ref 40–53)
HGB BLD-MCNC: 14.3 G/DL (ref 13.3–17.7)
IMM GRANULOCYTES # BLD: 0 10E3/UL
IMM GRANULOCYTES NFR BLD: 0 %
LYMPHOCYTES # BLD AUTO: 2 10E3/UL (ref 0.8–5.3)
LYMPHOCYTES NFR BLD AUTO: 46 %
MAGNESIUM SERPL-MCNC: 1.6 MG/DL (ref 1.7–2.3)
MCH RBC QN AUTO: 29.1 PG (ref 26.5–33)
MCHC RBC AUTO-ENTMCNC: 34.8 G/DL (ref 31.5–36.5)
MCV RBC AUTO: 84 FL (ref 78–100)
MONOCYTES # BLD AUTO: 0.5 10E3/UL (ref 0–1.3)
MONOCYTES NFR BLD AUTO: 12 %
NEUTROPHILS # BLD AUTO: 1.7 10E3/UL (ref 1.6–8.3)
NEUTROPHILS NFR BLD AUTO: 39 %
PHOSPHATE SERPL-MCNC: 2.9 MG/DL (ref 2.5–4.5)
PLATELET # BLD AUTO: 252 10E3/UL (ref 150–450)
POTASSIUM SERPL-SCNC: 3.4 MMOL/L (ref 3.4–5.3)
RBC # BLD AUTO: 4.91 10E6/UL (ref 4.4–5.9)
SODIUM SERPL-SCNC: 141 MMOL/L (ref 136–145)
TACROLIMUS BLD-MCNC: 5 UG/L (ref 5–15)
TME LAST DOSE: NORMAL H
TME LAST DOSE: NORMAL H
WBC # BLD AUTO: 4.3 10E3/UL (ref 4–11)

## 2023-07-27 PROCEDURE — 80197 ASSAY OF TACROLIMUS: CPT

## 2023-07-27 PROCEDURE — 84100 ASSAY OF PHOSPHORUS: CPT

## 2023-07-27 PROCEDURE — 85025 COMPLETE CBC W/AUTO DIFF WBC: CPT

## 2023-07-27 PROCEDURE — 83735 ASSAY OF MAGNESIUM: CPT

## 2023-07-27 PROCEDURE — 80048 BASIC METABOLIC PNL TOTAL CA: CPT

## 2023-07-27 PROCEDURE — 36415 COLL VENOUS BLD VENIPUNCTURE: CPT

## 2023-07-27 PROCEDURE — 87799 DETECT AGENT NOS DNA QUANT: CPT

## 2023-07-28 LAB — BKV DNA # SPEC NAA+PROBE: NOT DETECTED COPIES/ML

## 2023-08-31 DIAGNOSIS — Z94.0 KIDNEY REPLACED BY TRANSPLANT: Primary | ICD-10-CM

## 2023-08-31 DIAGNOSIS — Z94.0 KIDNEY TRANSPLANT RECIPIENT: ICD-10-CM

## 2023-08-31 DIAGNOSIS — Z48.298 AFTERCARE FOLLOWING ORGAN TRANSPLANT: ICD-10-CM

## 2023-08-31 NOTE — TELEPHONE ENCOUNTER
Provider Call: General  Route to LPN    Reason for call: Rx refill  Tacro 0.5 mg- Medicare Compliance     Call back needed? Yes    Return Call Needed  Same as documented in contacts section  When to return call?: Same day: Route High Priority

## 2023-09-01 RX ORDER — TACROLIMUS 0.5 MG/1
0.5 CAPSULE ORAL EVERY MORNING
Qty: 30 CAPSULE | Refills: 0 | Status: SHIPPED | OUTPATIENT
Start: 2023-09-01 | End: 2023-09-28

## 2023-09-01 RX ORDER — TACROLIMUS 1 MG/1
1 CAPSULE ORAL 2 TIMES DAILY
Qty: 60 CAPSULE | Refills: 0 | Status: SHIPPED | OUTPATIENT
Start: 2023-09-01 | End: 2023-09-28

## 2023-09-08 DIAGNOSIS — E87.6 HYPOKALEMIA: Primary | ICD-10-CM

## 2023-09-08 RX ORDER — POTASSIUM CHLORIDE 1500 MG/1
20 TABLET, EXTENDED RELEASE ORAL 2 TIMES DAILY
Qty: 180 TABLET | Refills: 0 | Status: SHIPPED | OUTPATIENT
Start: 2023-09-08 | End: 2024-01-26

## 2023-09-15 DIAGNOSIS — Z94.0 KIDNEY TRANSPLANT RECIPIENT: Primary | ICD-10-CM

## 2023-09-15 RX ORDER — MYCOPHENOLATE MOFETIL 250 MG/1
500 CAPSULE ORAL 2 TIMES DAILY
Qty: 360 CAPSULE | Refills: 3 | Status: SHIPPED | OUTPATIENT
Start: 2023-09-15 | End: 2024-01-12

## 2023-09-28 DIAGNOSIS — Z94.0 KIDNEY REPLACED BY TRANSPLANT: ICD-10-CM

## 2023-09-28 DIAGNOSIS — Z94.0 KIDNEY TRANSPLANT RECIPIENT: ICD-10-CM

## 2023-09-28 DIAGNOSIS — Z48.298 AFTERCARE FOLLOWING ORGAN TRANSPLANT: ICD-10-CM

## 2023-09-28 RX ORDER — TACROLIMUS 1 MG/1
1 CAPSULE ORAL 2 TIMES DAILY
Qty: 60 CAPSULE | Refills: 11 | Status: SHIPPED | OUTPATIENT
Start: 2023-09-28 | End: 2024-09-13

## 2023-09-28 RX ORDER — TACROLIMUS 0.5 MG/1
0.5 CAPSULE ORAL EVERY MORNING
Qty: 30 CAPSULE | Refills: 11 | Status: SHIPPED | OUTPATIENT
Start: 2023-09-28 | End: 2024-09-13

## 2023-12-06 NOTE — PLAN OF CARE
HISTORY OF PRESENT ILLNESS     Zuleyka Loaiza is a 13 year old old female, (here with mother) who presented today to urgent care with a chief complaint of urinary frequency and burning sensation that occurred for 1 day. Does not have the symptoms today. Denies abdominal pain, back pain, nausea, vomiting, fevers. Is currently on her menstrual cycle, started on 12/3/23 - currently. Does not wear tampons, uses pads. Last BM was yesterday and it was normal. No constipation or diarrhea. Denies vaginal discharge or itching or odor.     PAST MEDICAL, FAMILY AND SOCIAL HISTORY       Medications and Allergies  Medications:  No current outpatient medications on file.     No current facility-administered medications for this visit.       Allergies:  ALLERGIES:  No Known Allergies    Past Medical History  No past medical history on file.    Past Surgical History  No past surgical history on file.    Social and Family History  Social History:  Social History     Socioeconomic History    Marital status: Single     Spouse name: Not on file    Number of children: Not on file    Years of education: Not on file    Highest education level: Not on file   Occupational History    Not on file   Tobacco Use    Smoking status: Never     Passive exposure: Never    Smokeless tobacco: Never   Substance and Sexual Activity    Alcohol use: Not on file    Drug use: Not on file    Sexual activity: Not on file   Other Topics Concern    Not on file   Social History Narrative    Not on file     Social Determinants of Health     Financial Resource Strain: Not on file   Food Insecurity: Not on file   Transportation Needs: Not on file   Physical Activity: Not on file   Stress: Not on file   Social Connections: Not on file   Intimate Partner Violence: Not on file       Family History:  No family history on file.      REVIEW OF SYSTEMS     Pertinent ROS discussed in HPI. The remaining systems have been reviewed and are negative.     PHYSICAL EXAM  "VS: BP (!) 153/89 (BP Location: Left arm)   Pulse 84   Temp 98.4  F (36.9  C) (Oral)   Resp 18   Ht 1.702 m (5' 7\")   Wt 83.9 kg (185 lb)   SpO2 98%   BMI 28.98 kg/m      Cares: [id number]    Current condition: Stable on RA  Neuro: WDL  Cardio: WDL  Respiratory: WDL  GI/: Voiding spontaneously, last BM 9/16  Skin: WDL  Diet:   Regular, fair appetite.   Labs: Creat: 1.64  LDA:  CVC saline locked. PIV saline locked. Abd inc dermabonded.   Mobility:  UAL  Pain: denies.   PRN medications: none given.    Changes: Research IV medication given per research team. Pt had no reaction. Labs drawn for research. Discharge orders placed.   Plan of Care: Plan for discharge to home.   "     Physical Exam  Vitals and nursing note reviewed.   Constitutional:       General: She is not in acute distress.     Appearance: She is not toxic-appearing.   HENT:      Neck: Full passive range of motion without pain.   Cardiovascular:      Rate and Rhythm: Normal rate.   Pulmonary:      Effort: Pulmonary effort is normal.   Abdominal:      General: Abdomen is flat.      Palpations: Abdomen is soft.      Tenderness: There is no abdominal tenderness. There is no right CVA tenderness or left CVA tenderness.   Skin:     General: Skin is warm.      Capillary Refill: Capillary refill takes less than 2 seconds.      Findings: No bruising, erythema, rash or wound.   Neurological:      Mental Status: She is alert and oriented for age. Mental status is at baseline.         Vitals:  Visit Vitals  Vitals:    12/06/23 0902   BP: 117/65   BP Location: RUE - Right upper extremity   Patient Position: Sitting   Cuff Size: Regular   Pulse: 75   Resp: 18   Temp: 97.6 °F (36.4 °C)   TempSrc: Oral   SpO2: 100%   Weight: 63.5 kg (140 lb)   Height: 5' 4.5\" (1.638 m)   LMP: 12/02/2023       Labs:  Walk In on 12/06/2023   Component Date Value Ref Range Status    POCT Color 12/06/2023 Yellow   Final    POCT Appearance 12/06/2023 Clear   Final    POCT Glucose Urine 12/06/2023 Negative  Negative mg/dL Final    POCT Bilirubin 12/06/2023 Negative  Negative Final    POCT Ketones 12/06/2023 Negative  Negative, 5 mg/dL, 160 mg/dL mg/dL Final    POCT Specific [place] 12/06/2023 1.015  1.000, 1.005, 1.010, 1.015, 1.020, 1.025, 1.030, <= 1.005 Final    POCT Occult Blood 12/06/2023 Moderate (A)  Negative Final    POCT pH 12/06/2023 6.0  5.0, 5.5, 6.0, 6.5, 7.0, 7.5, 8.0, 8.5 Final    POCT Protein 12/06/2023 Negative  Negative mg/dL Final    POCT Urobilinogen 12/06/2023 0.2  0.2, 1.0 mg/dL Final    Urine Nitrite 12/06/2023 Negative  Negative Final    WBC (Leukocyte) Esterase POC 12/06/2023 Negative  Negative Final      I have reviewed the  laboratory results for the studies I have ordered that were available at the time of patient disposition. These results were discussed with the patient.    EKG:   No EKGs were ordered during this visit.    Imaging:  No imaging studies were ordered during this visit.    ASSESSMENT/PLAN     Zuleyka Loaiza is a 13 year old female with urinary symptoms of one day.   Physical exam does not reveal abdominal tenderness or CVA tenderness.     Patient is non toxic appearing, stable vitals, afebrile. Differential diagnosis include acute cystitis, pyelonephritis, ureter stone, vaginitis, diabetes mellitus, drug induced among others. UA does not show infection, will send it for culture. Discussed with mother unless the urine culture grows a bacteria, will wait on treating with antibiotics. Discussed this can be of vulva vaginitis origin vs irritation while on menstrual cycle. Discussed hygiene and supportive management and follow up with pediatrician if symptoms continue. All concerns were addressed and questions answered. Patient and parent agreeable with plan of care and verbalized understanding. Instructed when to seek sooner care if symptoms are not improving or worsening. Written instructions provided via AVS.    1. Lower urinary tract symptoms (LUTS)  - POCT Urine Dip Auto  - Urine, Bacterial Culture      AMARI Irwin  12/06/23

## 2024-01-12 ENCOUNTER — TELEPHONE (OUTPATIENT)
Dept: TRANSPLANT | Facility: CLINIC | Age: 39
End: 2024-01-12
Payer: MEDICARE

## 2024-01-12 DIAGNOSIS — Z94.0 KIDNEY TRANSPLANT RECIPIENT: Primary | ICD-10-CM

## 2024-01-12 RX ORDER — MYCOPHENOLATE MOFETIL 250 MG/1
500 CAPSULE ORAL 2 TIMES DAILY
Qty: 360 CAPSULE | Refills: 0 | Status: SHIPPED | OUTPATIENT
Start: 2024-01-12 | End: 2024-02-09

## 2024-01-22 ENCOUNTER — DOCUMENTATION ONLY (OUTPATIENT)
Dept: NEPHROLOGY | Facility: CLINIC | Age: 39
End: 2024-01-22
Payer: MEDICARE

## 2024-01-22 DIAGNOSIS — Z94.0 KIDNEY REPLACED BY TRANSPLANT: Primary | ICD-10-CM

## 2024-01-22 DIAGNOSIS — Z48.298 AFTERCARE FOLLOWING ORGAN TRANSPLANT: ICD-10-CM

## 2024-01-22 DIAGNOSIS — Z79.899 ENCOUNTER FOR LONG-TERM CURRENT USE OF MEDICATION: ICD-10-CM

## 2024-01-22 DIAGNOSIS — Z98.890 OTHER SPECIFIED POSTPROCEDURAL STATES: ICD-10-CM

## 2024-01-22 NOTE — PROGRESS NOTES
Kailash Sorto has an upcoming lab appointment:    Future Appointments   Date Time Provider Department [facility]   [facility]     Patient is scheduled for the following lab(s): Labs    There is no order available. Please review and place either future orders or HMPO (Review of Health Maintenance Protocol Orders), as appropriate.    Health Maintenance Due   Topic    ANNUAL REVIEW OF HM ORDERS      Abner Cochran

## 2024-01-25 ENCOUNTER — LAB (OUTPATIENT)
Dept: LAB | Facility: CLINIC | Age: 39
End: 2024-01-25
Payer: MEDICARE

## 2024-01-25 DIAGNOSIS — Z48.298 AFTERCARE FOLLOWING ORGAN TRANSPLANT: ICD-10-CM

## 2024-01-25 DIAGNOSIS — Z79.899 ENCOUNTER FOR LONG-TERM CURRENT USE OF MEDICATION: ICD-10-CM

## 2024-01-25 DIAGNOSIS — Z98.890 OTHER SPECIFIED POSTPROCEDURAL STATES: ICD-10-CM

## 2024-01-25 DIAGNOSIS — Z94.0 KIDNEY REPLACED BY TRANSPLANT: ICD-10-CM

## 2024-01-25 LAB
ALBUMIN MFR UR ELPH: 12.2 MG/DL
ANION GAP SERPL CALCULATED.3IONS-SCNC: 12 MMOL/L (ref 7–15)
BUN SERPL-MCNC: 10.3 MG/DL (ref 6–20)
CALCIUM SERPL-MCNC: 9.5 MG/DL (ref 8.6–10)
CHLORIDE SERPL-SCNC: 104 MMOL/L (ref 98–107)
CREAT SERPL-MCNC: 0.94 MG/DL (ref 0.67–1.17)
CREAT UR-MCNC: 147 MG/DL
DEPRECATED HCO3 PLAS-SCNC: 25 MMOL/L (ref 22–29)
EGFRCR SERPLBLD CKD-EPI 2021: >90 ML/MIN/1.73M2
ERYTHROCYTE [DISTWIDTH] IN BLOOD BY AUTOMATED COUNT: 11.8 % (ref 10–15)
GLUCOSE SERPL-MCNC: 95 MG/DL (ref 70–99)
HCT VFR BLD AUTO: 42.7 % (ref 40–53)
HGB BLD-MCNC: 14.7 G/DL (ref 13.3–17.7)
MCH RBC QN AUTO: 28.8 PG (ref 26.5–33)
MCHC RBC AUTO-ENTMCNC: 34.4 G/DL (ref 31.5–36.5)
MCV RBC AUTO: 84 FL (ref 78–100)
PLATELET # BLD AUTO: 286 10E3/UL (ref 150–450)
POTASSIUM SERPL-SCNC: 3.2 MMOL/L (ref 3.4–5.3)
PROT/CREAT 24H UR: 0.08 MG/MG CR (ref 0–0.2)
RBC # BLD AUTO: 5.1 10E6/UL (ref 4.4–5.9)
SODIUM SERPL-SCNC: 141 MMOL/L (ref 135–145)
TACROLIMUS BLD-MCNC: 5.6 UG/L (ref 5–15)
TME LAST DOSE: NORMAL H
TME LAST DOSE: NORMAL H
WBC # BLD AUTO: 6.2 10E3/UL (ref 4–11)

## 2024-01-25 PROCEDURE — 84156 ASSAY OF PROTEIN URINE: CPT

## 2024-01-25 PROCEDURE — 86833 HLA CLASS II HIGH DEFIN QUAL: CPT

## 2024-01-25 PROCEDURE — 80197 ASSAY OF TACROLIMUS: CPT

## 2024-01-25 PROCEDURE — 36415 COLL VENOUS BLD VENIPUNCTURE: CPT

## 2024-01-25 PROCEDURE — 86832 HLA CLASS I HIGH DEFIN QUAL: CPT

## 2024-01-25 PROCEDURE — 80048 BASIC METABOLIC PNL TOTAL CA: CPT

## 2024-01-25 PROCEDURE — 85027 COMPLETE CBC AUTOMATED: CPT

## 2024-01-26 ENCOUNTER — TELEPHONE (OUTPATIENT)
Dept: TRANSPLANT | Facility: CLINIC | Age: 39
End: 2024-01-26
Payer: MEDICARE

## 2024-01-26 DIAGNOSIS — E87.6 HYPOKALEMIA: ICD-10-CM

## 2024-01-26 RX ORDER — POTASSIUM CHLORIDE 1500 MG/1
20 TABLET, EXTENDED RELEASE ORAL 2 TIMES DAILY
Qty: 180 TABLET | Refills: 0 | Status: SHIPPED | OUTPATIENT
Start: 2024-01-26 | End: 2024-01-29

## 2024-01-26 NOTE — TELEPHONE ENCOUNTER
Rober Goodwin MD Poucher, Jessica, RN  Increase Kcl to 40meq bid if taking 20meq bid    Kailash said he stopped the potassium supplement a couple of months ago. Will resume 20 meq bid. Script sent.

## 2024-01-29 DIAGNOSIS — E87.6 HYPOKALEMIA: ICD-10-CM

## 2024-01-29 RX ORDER — POTASSIUM CHLORIDE 1500 MG/1
20 TABLET, EXTENDED RELEASE ORAL 2 TIMES DAILY
Qty: 180 TABLET | Refills: 0 | Status: SHIPPED | OUTPATIENT
Start: 2024-01-29 | End: 2024-08-13

## 2024-02-01 LAB
DONOR IDENTIFICATION: NORMAL
DSA COMMENTS: NORMAL
DSA PRESENT: NO
DSA TEST METHOD: NORMAL
ORGAN: NORMAL
SA 1 CELL: NORMAL
SA 1 TEST METHOD: NORMAL
SA 2 CELL: NORMAL
SA 2 TEST METHOD: NORMAL
SA1 HI RISK ABY: NORMAL
SA1 MOD RISK ABY: NORMAL
SA2 HI RISK ABY: NORMAL
SA2 MOD RISK ABY: NORMAL
UNACCEPTABLE ANTIGENS: NORMAL
UNOS CPRA: 11
ZZZSA 1  COMMENTS: NORMAL
ZZZSA 2 COMMENTS: NORMAL

## 2024-02-09 DIAGNOSIS — Z94.0 KIDNEY TRANSPLANT RECIPIENT: Primary | ICD-10-CM

## 2024-02-09 RX ORDER — MYCOPHENOLATE MOFETIL 250 MG/1
500 CAPSULE ORAL 2 TIMES DAILY
Qty: 360 CAPSULE | Refills: 3 | Status: SHIPPED | OUTPATIENT
Start: 2024-02-09 | End: 2024-06-17

## 2024-05-16 ENCOUNTER — LAB (OUTPATIENT)
Dept: LAB | Facility: CLINIC | Age: 39
End: 2024-05-16
Payer: MEDICARE

## 2024-05-16 DIAGNOSIS — Z94.0 KIDNEY REPLACED BY TRANSPLANT: ICD-10-CM

## 2024-05-16 DIAGNOSIS — Z98.890 OTHER SPECIFIED POSTPROCEDURAL STATES: ICD-10-CM

## 2024-05-16 DIAGNOSIS — Z79.899 ENCOUNTER FOR LONG-TERM CURRENT USE OF MEDICATION: ICD-10-CM

## 2024-05-16 DIAGNOSIS — Z48.298 AFTERCARE FOLLOWING ORGAN TRANSPLANT: ICD-10-CM

## 2024-05-16 LAB
ERYTHROCYTE [DISTWIDTH] IN BLOOD BY AUTOMATED COUNT: 12.3 % (ref 10–15)
HCT VFR BLD AUTO: 41.5 % (ref 40–53)
HGB BLD-MCNC: 14.1 G/DL (ref 13.3–17.7)
MCH RBC QN AUTO: 28.5 PG (ref 26.5–33)
MCHC RBC AUTO-ENTMCNC: 34 G/DL (ref 31.5–36.5)
MCV RBC AUTO: 84 FL (ref 78–100)
PLATELET # BLD AUTO: 262 10E3/UL (ref 150–450)
RBC # BLD AUTO: 4.95 10E6/UL (ref 4.4–5.9)
TACROLIMUS BLD-MCNC: 4.9 UG/L (ref 5–15)
TME LAST DOSE: ABNORMAL H
TME LAST DOSE: ABNORMAL H
WBC # BLD AUTO: 5 10E3/UL (ref 4–11)

## 2024-05-16 PROCEDURE — 36415 COLL VENOUS BLD VENIPUNCTURE: CPT

## 2024-05-16 PROCEDURE — 80197 ASSAY OF TACROLIMUS: CPT

## 2024-05-16 PROCEDURE — 85027 COMPLETE CBC AUTOMATED: CPT

## 2024-05-16 PROCEDURE — 80048 BASIC METABOLIC PNL TOTAL CA: CPT

## 2024-05-17 LAB
ANION GAP SERPL CALCULATED.3IONS-SCNC: 13 MMOL/L (ref 7–15)
BUN SERPL-MCNC: 11.5 MG/DL (ref 6–20)
CALCIUM SERPL-MCNC: 9.2 MG/DL (ref 8.6–10)
CHLORIDE SERPL-SCNC: 106 MMOL/L (ref 98–107)
CREAT SERPL-MCNC: 0.93 MG/DL (ref 0.67–1.17)
DEPRECATED HCO3 PLAS-SCNC: 22 MMOL/L (ref 22–29)
EGFRCR SERPLBLD CKD-EPI 2021: >90 ML/MIN/1.73M2
GLUCOSE SERPL-MCNC: 98 MG/DL (ref 70–99)
POTASSIUM SERPL-SCNC: 3.5 MMOL/L (ref 3.4–5.3)
SODIUM SERPL-SCNC: 141 MMOL/L (ref 135–145)

## 2024-05-29 DIAGNOSIS — Z94.0 HTN, KIDNEY TRANSPLANT RELATED: Primary | ICD-10-CM

## 2024-05-29 DIAGNOSIS — I15.1 HTN, KIDNEY TRANSPLANT RELATED: Primary | ICD-10-CM

## 2024-05-29 RX ORDER — LOSARTAN POTASSIUM 50 MG/1
50 TABLET ORAL DAILY
Qty: 90 TABLET | Refills: 0 | Status: SHIPPED | OUTPATIENT
Start: 2024-05-29 | End: 2024-08-30

## 2024-05-29 RX ORDER — AMLODIPINE BESYLATE 5 MG/1
5 TABLET ORAL AT BEDTIME
Qty: 90 TABLET | Refills: 0 | Status: SHIPPED | OUTPATIENT
Start: 2024-05-29 | End: 2024-08-30

## 2024-06-08 DIAGNOSIS — E55.9 VITAMIN D DEFICIENCY: ICD-10-CM

## 2024-06-10 RX ORDER — OMEGA-3S/DHA/EPA/FISH OIL/D3 300MG-1000
CAPSULE ORAL DAILY
Qty: 90 TABLET | Refills: 3 | Status: SHIPPED | OUTPATIENT
Start: 2024-06-10

## 2024-06-17 DIAGNOSIS — Z94.0 KIDNEY TRANSPLANT RECIPIENT: ICD-10-CM

## 2024-06-18 RX ORDER — MYCOPHENOLATE MOFETIL 250 MG/1
500 CAPSULE ORAL 2 TIMES DAILY
Qty: 360 CAPSULE | Refills: 3 | Status: SHIPPED | OUTPATIENT
Start: 2024-06-18

## 2024-06-23 ENCOUNTER — HEALTH MAINTENANCE LETTER (OUTPATIENT)
Age: 39
End: 2024-06-23

## 2024-08-13 ENCOUNTER — MYC REFILL (OUTPATIENT)
Dept: TRANSPLANT | Facility: CLINIC | Age: 39
End: 2024-08-13
Payer: MEDICARE

## 2024-08-13 DIAGNOSIS — E87.6 HYPOKALEMIA: ICD-10-CM

## 2024-08-13 RX ORDER — POTASSIUM CHLORIDE 1500 MG/1
20 TABLET, EXTENDED RELEASE ORAL 2 TIMES DAILY
Qty: 180 TABLET | Refills: 0 | Status: SHIPPED | OUTPATIENT
Start: 2024-08-13

## 2024-08-30 DIAGNOSIS — I15.1 HTN, KIDNEY TRANSPLANT RELATED: ICD-10-CM

## 2024-08-30 DIAGNOSIS — Z94.0 HTN, KIDNEY TRANSPLANT RELATED: ICD-10-CM

## 2024-08-30 RX ORDER — LOSARTAN POTASSIUM 50 MG/1
50 TABLET ORAL DAILY
Qty: 90 TABLET | Refills: 0 | Status: SHIPPED | OUTPATIENT
Start: 2024-08-30

## 2024-08-30 RX ORDER — AMLODIPINE BESYLATE 5 MG/1
5 TABLET ORAL AT BEDTIME
Qty: 90 TABLET | Refills: 0 | Status: SHIPPED | OUTPATIENT
Start: 2024-08-30

## 2024-09-13 DIAGNOSIS — Z94.0 KIDNEY REPLACED BY TRANSPLANT: ICD-10-CM

## 2024-09-13 DIAGNOSIS — Z94.0 KIDNEY TRANSPLANT RECIPIENT: ICD-10-CM

## 2024-09-13 DIAGNOSIS — Z48.298 AFTERCARE FOLLOWING ORGAN TRANSPLANT: ICD-10-CM

## 2024-09-13 RX ORDER — TACROLIMUS 1 MG/1
1 CAPSULE ORAL 2 TIMES DAILY
Qty: 60 CAPSULE | Refills: 0 | Status: SHIPPED | OUTPATIENT
Start: 2024-09-13 | End: 2024-10-07

## 2024-09-13 RX ORDER — TACROLIMUS 0.5 MG/1
0.5 CAPSULE ORAL EVERY MORNING
Qty: 30 CAPSULE | Refills: 0 | Status: SHIPPED | OUTPATIENT
Start: 2024-09-13 | End: 2024-10-07

## 2024-10-04 NOTE — TELEPHONE ENCOUNTER
CMV and elevated BPs reviewed with Dr. Goodwin.     PLAN:   -stop amlodipine, start nifedipine 30 mg daily. Increase to 60 mg daily for ongoing HTN  -increase Valcyte to 900 mg bid  -check IgG  -keep tac ~8    Kailash verbalized understanding of plan.   
yes

## 2024-10-07 DIAGNOSIS — Z94.0 KIDNEY REPLACED BY TRANSPLANT: ICD-10-CM

## 2024-10-07 DIAGNOSIS — Z94.0 KIDNEY TRANSPLANT RECIPIENT: ICD-10-CM

## 2024-10-07 DIAGNOSIS — Z48.298 AFTERCARE FOLLOWING ORGAN TRANSPLANT: ICD-10-CM

## 2024-10-07 RX ORDER — TACROLIMUS 0.5 MG/1
0.5 CAPSULE ORAL EVERY MORNING
Qty: 30 CAPSULE | Refills: 0 | Status: SHIPPED | OUTPATIENT
Start: 2024-10-07 | End: 2024-11-12

## 2024-10-07 RX ORDER — TACROLIMUS 1 MG/1
1 CAPSULE ORAL 2 TIMES DAILY
Qty: 60 CAPSULE | Refills: 0 | Status: SHIPPED | OUTPATIENT
Start: 2024-10-07 | End: 2024-11-12

## 2024-10-10 DIAGNOSIS — Z94.0 KIDNEY TRANSPLANT RECIPIENT: ICD-10-CM

## 2024-10-10 RX ORDER — MYCOPHENOLATE MOFETIL 250 MG/1
500 CAPSULE ORAL 2 TIMES DAILY
Qty: 360 CAPSULE | Refills: 3 | Status: SHIPPED | OUTPATIENT
Start: 2024-10-10

## 2024-11-08 ENCOUNTER — TELEPHONE (OUTPATIENT)
Dept: TRANSPLANT | Facility: CLINIC | Age: 39
End: 2024-11-08
Payer: MEDICARE

## 2024-11-08 DIAGNOSIS — E87.6 HYPOKALEMIA: ICD-10-CM

## 2024-11-08 DIAGNOSIS — Z48.298 AFTERCARE FOLLOWING ORGAN TRANSPLANT: ICD-10-CM

## 2024-11-08 DIAGNOSIS — Z94.0 HTN, KIDNEY TRANSPLANT RELATED: Primary | ICD-10-CM

## 2024-11-08 DIAGNOSIS — Z94.0 KIDNEY REPLACED BY TRANSPLANT: ICD-10-CM

## 2024-11-08 DIAGNOSIS — I15.1 HTN, KIDNEY TRANSPLANT RELATED: Primary | ICD-10-CM

## 2024-11-08 RX ORDER — POTASSIUM CHLORIDE 1500 MG/1
20 TABLET, EXTENDED RELEASE ORAL 2 TIMES DAILY
Qty: 180 TABLET | Refills: 0 | OUTPATIENT
Start: 2024-11-08

## 2024-11-08 NOTE — TELEPHONE ENCOUNTER
ISSUE:  Overdue for labs and follow up.    Lab orders updated. Sent appt request to [facility] schedulers.   Kailash notified of need for lab draw.

## 2024-11-12 DIAGNOSIS — Z48.298 AFTERCARE FOLLOWING ORGAN TRANSPLANT: ICD-10-CM

## 2024-11-12 DIAGNOSIS — Z94.0 KIDNEY TRANSPLANT RECIPIENT: ICD-10-CM

## 2024-11-12 DIAGNOSIS — Z94.0 HTN, KIDNEY TRANSPLANT RELATED: Primary | ICD-10-CM

## 2024-11-12 DIAGNOSIS — I15.1 HTN, KIDNEY TRANSPLANT RELATED: Primary | ICD-10-CM

## 2024-11-12 DIAGNOSIS — Z94.0 KIDNEY REPLACED BY TRANSPLANT: ICD-10-CM

## 2024-11-12 RX ORDER — TACROLIMUS 1 MG/1
1 CAPSULE ORAL 2 TIMES DAILY
Qty: 60 CAPSULE | Refills: 0 | Status: SHIPPED | OUTPATIENT
Start: 2024-11-12

## 2024-11-12 RX ORDER — TACROLIMUS 0.5 MG/1
0.5 CAPSULE ORAL EVERY MORNING
Qty: 30 CAPSULE | Refills: 0 | Status: SHIPPED | OUTPATIENT
Start: 2024-11-12

## 2024-11-13 ENCOUNTER — LAB (OUTPATIENT)
Dept: LAB | Facility: CLINIC | Age: 39
End: 2024-11-13
Payer: MEDICARE

## 2024-11-13 DIAGNOSIS — Z94.0 KIDNEY REPLACED BY TRANSPLANT: ICD-10-CM

## 2024-11-13 DIAGNOSIS — Z48.298 AFTERCARE FOLLOWING ORGAN TRANSPLANT: ICD-10-CM

## 2024-11-13 LAB
ALBUMIN MFR UR ELPH: 12.8 MG/DL
ANION GAP SERPL CALCULATED.3IONS-SCNC: 12 MMOL/L (ref 7–15)
BUN SERPL-MCNC: 12.7 MG/DL (ref 6–20)
CALCIUM SERPL-MCNC: 9.5 MG/DL (ref 8.8–10.4)
CHLORIDE SERPL-SCNC: 99 MMOL/L (ref 98–107)
CREAT SERPL-MCNC: 1.15 MG/DL (ref 0.67–1.17)
CREAT UR-MCNC: 88.3 MG/DL
EGFRCR SERPLBLD CKD-EPI 2021: 84 ML/MIN/1.73M2
ERYTHROCYTE [DISTWIDTH] IN BLOOD BY AUTOMATED COUNT: 12 % (ref 10–15)
GLUCOSE SERPL-MCNC: 100 MG/DL (ref 70–99)
HCO3 SERPL-SCNC: 29 MMOL/L (ref 22–29)
HCT VFR BLD AUTO: 42.3 % (ref 40–53)
HGB BLD-MCNC: 15 G/DL (ref 13.3–17.7)
MCH RBC QN AUTO: 29.1 PG (ref 26.5–33)
MCHC RBC AUTO-ENTMCNC: 35.5 G/DL (ref 31.5–36.5)
MCV RBC AUTO: 82 FL (ref 78–100)
PLATELET # BLD AUTO: 279 10E3/UL (ref 150–450)
POTASSIUM SERPL-SCNC: 2.8 MMOL/L (ref 3.4–5.3)
PROT/CREAT 24H UR: 0.14 MG/MG CR (ref 0–0.2)
RBC # BLD AUTO: 5.15 10E6/UL (ref 4.4–5.9)
SODIUM SERPL-SCNC: 140 MMOL/L (ref 135–145)
TACROLIMUS BLD-MCNC: 6.2 UG/L (ref 5–15)
TME LAST DOSE: NORMAL H
TME LAST DOSE: NORMAL H
WBC # BLD AUTO: 6.1 10E3/UL (ref 4–11)

## 2024-11-13 PROCEDURE — 36415 COLL VENOUS BLD VENIPUNCTURE: CPT

## 2024-11-13 PROCEDURE — 85027 COMPLETE CBC AUTOMATED: CPT

## 2024-11-13 PROCEDURE — 84156 ASSAY OF PROTEIN URINE: CPT

## 2024-11-13 PROCEDURE — 80048 BASIC METABOLIC PNL TOTAL CA: CPT

## 2024-11-13 PROCEDURE — 80197 ASSAY OF TACROLIMUS: CPT

## 2024-11-14 ENCOUNTER — TELEPHONE (OUTPATIENT)
Dept: TRANSPLANT | Facility: CLINIC | Age: 39
End: 2024-11-14
Payer: MEDICARE

## 2024-11-14 DIAGNOSIS — E87.6 HYPOKALEMIA: ICD-10-CM

## 2024-11-14 DIAGNOSIS — Z94.0 HTN, KIDNEY TRANSPLANT RELATED: Primary | ICD-10-CM

## 2024-11-14 DIAGNOSIS — I15.1 HTN, KIDNEY TRANSPLANT RELATED: Primary | ICD-10-CM

## 2024-11-14 RX ORDER — POTASSIUM CHLORIDE 1500 MG/1
20 TABLET, EXTENDED RELEASE ORAL 2 TIMES DAILY
Qty: 180 TABLET | Refills: 2 | Status: SHIPPED | OUTPATIENT
Start: 2024-11-14

## 2024-11-14 NOTE — TELEPHONE ENCOUNTER
ISSUE:  Caleb K    Spoke with Kailash, ran out of supplement. Potassium refilled. Reviewed high potassium diet.

## 2024-12-02 DIAGNOSIS — I15.1 HTN, KIDNEY TRANSPLANT RELATED: ICD-10-CM

## 2024-12-02 DIAGNOSIS — Z94.0 HTN, KIDNEY TRANSPLANT RELATED: ICD-10-CM

## 2024-12-02 RX ORDER — AMLODIPINE BESYLATE 5 MG/1
5 TABLET ORAL AT BEDTIME
Qty: 90 TABLET | Refills: 0 | Status: SHIPPED | OUTPATIENT
Start: 2024-12-02

## 2024-12-05 DIAGNOSIS — Z48.298 AFTERCARE FOLLOWING ORGAN TRANSPLANT: ICD-10-CM

## 2024-12-05 DIAGNOSIS — Z94.0 KIDNEY TRANSPLANT RECIPIENT: ICD-10-CM

## 2024-12-05 DIAGNOSIS — Z94.0 KIDNEY REPLACED BY TRANSPLANT: ICD-10-CM

## 2024-12-05 RX ORDER — TACROLIMUS 0.5 MG/1
0.5 CAPSULE ORAL EVERY MORNING
Qty: 30 CAPSULE | Refills: 5 | Status: SHIPPED | OUTPATIENT
Start: 2024-12-05

## 2024-12-05 RX ORDER — TACROLIMUS 1 MG/1
1 CAPSULE ORAL 2 TIMES DAILY
Qty: 60 CAPSULE | Refills: 5 | Status: SHIPPED | OUTPATIENT
Start: 2024-12-05

## 2025-01-27 NOTE — TELEPHONE ENCOUNTER
"Spoke to Eduardo MCKENZIE. She and Dr. Mohamud are concerned about Kailash and his dialysis attendance. Kailash often will miss a treatment once a week. Kailash wants to try to eat clean to improve his kidney function. He is newly  with a child on the way and he feels he is too young to be in HD 3 times a week.   Yesterday Dr. Mohamud hada \"adela\" conversation with him to say that Kailash' labs indicate he needs to have HD 3 times a week.   RN shasha will review attendance records from April until now. And then review again in 1 month post discussion with Dr. PAL and if still missing take to committee for compliance contract.   "
Patient Call: General  Route to LPN    Reason for call: Call from Shelli   Needs updates on pt     Call back needed? Yes    Return Call Needed  Same as documented in contacts section  When to return call?: Greater than one day: Route standard priority  
Detail Level: Detailed
Patient Specific Counseling (Will Not Stick From Patient To Patient): after discussing excision vs. ed&c pt opted for ed&c due to other health conditions and not wanting sutures

## 2025-02-12 DIAGNOSIS — Z94.0 KIDNEY TRANSPLANT RECIPIENT: ICD-10-CM

## 2025-02-12 DIAGNOSIS — Z94.0 HTN, KIDNEY TRANSPLANT RELATED: Primary | ICD-10-CM

## 2025-02-12 DIAGNOSIS — I15.1 HTN, KIDNEY TRANSPLANT RELATED: Primary | ICD-10-CM

## 2025-02-12 RX ORDER — MYCOPHENOLATE MOFETIL 250 MG/1
500 CAPSULE ORAL 2 TIMES DAILY
Qty: 360 CAPSULE | Refills: 3 | Status: SHIPPED | OUTPATIENT
Start: 2025-02-12

## 2025-03-01 DIAGNOSIS — Z94.0 HTN, KIDNEY TRANSPLANT RELATED: ICD-10-CM

## 2025-03-01 DIAGNOSIS — I15.1 HTN, KIDNEY TRANSPLANT RELATED: ICD-10-CM

## 2025-03-03 RX ORDER — AMLODIPINE BESYLATE 5 MG/1
5 TABLET ORAL AT BEDTIME
Qty: 90 TABLET | Refills: 0 | Status: SHIPPED | OUTPATIENT
Start: 2025-03-03

## 2025-05-22 ENCOUNTER — VIRTUAL VISIT (OUTPATIENT)
Dept: TRANSPLANT | Facility: CLINIC | Age: 40
End: 2025-05-22
Attending: INTERNAL MEDICINE
Payer: COMMERCIAL

## 2025-05-22 DIAGNOSIS — Z94.0 HTN, KIDNEY TRANSPLANT RELATED: Primary | ICD-10-CM

## 2025-05-22 DIAGNOSIS — E87.6 HYPOKALEMIA: ICD-10-CM

## 2025-05-22 DIAGNOSIS — Z48.298 AFTERCARE FOLLOWING ORGAN TRANSPLANT: ICD-10-CM

## 2025-05-22 DIAGNOSIS — N18.2 CKD (CHRONIC KIDNEY DISEASE) STAGE 2, GFR 60-89 ML/MIN: ICD-10-CM

## 2025-05-22 DIAGNOSIS — D84.9 IMMUNOSUPPRESSED STATUS: ICD-10-CM

## 2025-05-22 DIAGNOSIS — I15.1 HTN, KIDNEY TRANSPLANT RELATED: Primary | ICD-10-CM

## 2025-05-22 DIAGNOSIS — Z94.0 KIDNEY REPLACED BY TRANSPLANT: ICD-10-CM

## 2025-05-22 PROCEDURE — 98006 SYNCH AUDIO-VIDEO EST MOD 30: CPT | Performed by: INTERNAL MEDICINE

## 2025-05-22 PROCEDURE — 1126F AMNT PAIN NOTED NONE PRSNT: CPT | Mod: 95 | Performed by: INTERNAL MEDICINE

## 2025-05-22 PROCEDURE — G2211 COMPLEX E/M VISIT ADD ON: HCPCS | Performed by: INTERNAL MEDICINE

## 2025-05-22 NOTE — PROGRESS NOTES
Virtual Visit Details    Type of service:  Video Visit   Video Start Time: 1632  Video End Time:1654    Originating Location (pt. Location): Home  Distant Location (provider location):  Off-site  Platform used for Video Visit: [facility]      TRANSPLANT NEPHROLOGY CLINIC VISIT     Assessment & Plan   # DDKT: CKD Stage 2 - Stable with last check, but no recent labs in the last 6 months.   - Baseline Creatinine: ~ 0.9-1.1   - Proteinuria: Normal (<0.2 grams)   - DSA Hx: No DSA   - Last cPRA: 11%   - BK Viremia: No   - Kidney Tx Biopsy Hx: No biopsy history.   - Primary Nephrologist: None.    # Immunosuppression: Tacrolimus immediate release (goal 4-6) and Mycophenolate mofetil (dose 500 mg every 12 hours)   - Induction with Recent Transplant:  Intermediate Intensity Protocol   - Continue with intensive monitoring of immunosuppression for efficacy and toxicity.   - Historical Changes in Immunosuppression: Slightly decreased mycophenolate dose due to leukopenia and CMV viremia.   - Changes: No    # Infection Prevention:   Last CD4 Level: 458 (6/2023)  - PJP: None      - CMV IgG Ab High Risk Discordance (D+/R-) at time of transplant: Yes  Present CMV Serostatus: Positive  - EBV IgG Ab High Risk Discordance (D+/R-) at time of transplant: No  Present EBV Serostatus: Positive    # Hypertension: Not checked recently;  Goal BP: < 130/80   - Changes: Not at this time; Recommend patient check blood pressure at home on a regular basis, such as once every week or two, and follow up with primary Nephrologist and/or PCP if above goal.    # Mineral Bone Disorder:    - Vitamin D; level: Not checked recently        On supplement: No  - Calcium; level: Normal        On supplement: No    # Electrolytes:  - Potassium; level: Low        On supplement: Yes; Of note, patient ran out of oral potassium supplement prior to last lab draw, but is back on them now.  - Magnesium; level: Not checked recently        On supplement: No  - Bicarbonate;  "level: Normal        On supplement: No    # Obesity, Class I (BMI = 30.4): Weight is down ~ 10 lbs.   - Recommend weight loss for overall health by increasing exercise and watching caloric intake.    # Syncope?: Patient with what appears to have been a syncopal episode from dehydration last year.  No work up at that time and no episodes since.  Description does not sound like a seizure or arrhythmia.   - If symptoms or episodes recur, recommend further evaluation with PCP.   - Discussed importance of staying hydrated.    # Other Significant PMH:   - None:     # Skin Cancer Risk:    - Discussed sun protection and recommend regular follow up with Dermatology.    # Medical Compliance: No.  Evidence of labs less than recommended and infrequent transplant follow-up.  - Discussed importance of checking labs regularly as recommended, taking medications as prescribed and attending scheduled medical appointments.    # Transplant History:  Etiology of Kidney Failure: Unknown etiology  Tx: DDKT  Transplant: 9/13/2021 (Kidney)  Significant transplant-related complications: CMV Viremia    Transplant Office Phone Number: [phone]    Assessment and plan was discussed with the patient and he voiced his understanding and agreement.    Return visit: Return in about 1 year (around 5/22/2026).    Yoni Abdi MD    The longitudinal plan of care for the diagnosis(es)/condition(s) as documented were addressed during this visit. Due to the added complexity in care, I will continue to support Kailash in the subsequent management and with ongoing continuity of care.      Chief Complaint   Mr. Sorto is a 39 year old here for kidney transplant and immunosuppression management.     History of Present Illness    Mr. Sorto reports feeling good overall.  Since last clinic visit:   Hospitalizations: No   New Medical Issues: Yes; Patient does report having an episode of \"passing out\" about a year ago or so.  He had been exercising and " does admit to not keeping up with hydration at the time.  He reports he felt a bit lightheaded and then passed out for a short time.  Denies any loss of bladder or bowel function.  He felt he woke right up after it happened.  No episodes since.  Active/Exercise: Yes  Chest pain or shortness of breath: No  Lower extremity swelling: Yes; He reports slight swelling of his right foot at times, which is not new.  Weight change: Yes; Weight is down ~ 10 lbs overall, but more stable lately   Appetite: Good   Nausea and vomiting: No  Diarrhea: No  Heartburn symptoms: No  Fever, sweats or chills: No  Night sweats: No  Urinary complaints: No    Home BP: Not checked    Problem List   Patient Active Problem List   Diagnosis    Hyperlipidemia LDL goal <100    HTN, kidney transplant related    AVF (arteriovenous fistula)    Kidney replaced by transplant    Aftercare following organ transplant    Immunosuppressed status    Vitamin D deficiency    Hypomagnesemia    Cytomegalovirus (CMV) viremia (H)    Secondary renal hyperparathyroidism    Obesity    CKD (chronic kidney disease) stage 2, GFR 60-89 ml/min    Hypokalemia       Allergies   No Known Allergies    Medications   Current Outpatient Medications   Medication Sig Dispense Refill    amLODIPine (NORVASC) 5 MG tablet TAKE 1 TABLET BY MOUTH AT BEDTIME 90 tablet 0    atorvastatin (LIPITOR) 10 MG tablet Take 1 tablet (10 mg) by mouth daily 30 tablet 11    gabapentin (NEURONTIN) 100 MG capsule Take 1 capsule (100 mg) by mouth 2 times daily as needed for neuropathic pain 60 capsule 0    losartan (COZAAR) 50 MG tablet TAKE 1 TABLET BY MOUTH EVERY DAY 90 tablet 0    mycophenolate (GENERIC EQUIVALENT) 250 MG capsule Take 2 capsules (500 mg) by mouth 2 times daily. 360 capsule 3    potassium chloride elizabeth ER (KLOR-CON M20) 20 MEQ CR tablet Take 1 tablet (20 mEq) by mouth 2 times daily. 180 tablet 2    tacrolimus (GENERIC EQUIVALENT) 0.5 MG capsule Take 1 capsule (0.5 mg) by mouth every  morning. Take 1.5 mg every AM, 1 mg every PM 30 capsule 5    tacrolimus (GENERIC EQUIVALENT) 1 MG capsule Take 1 capsule (1 mg) by mouth 2 times daily. Take 1.5 mg every AM, 1 mg in every PM 60 capsule 5     No current facility-administered medications for this visit.     Medications Discontinued During This Encounter   Medication Reason    Vitamin D3 (VITAMIN D) 10 MCG (400 UNIT) tablet        Physical Exam   Vital Signs: There were no vitals taken for this visit.    GENERAL APPEARANCE: alert and no distress  HENT: no obvious abnormalities on appearance  RESP: breathing appears unremarkable with normal rate, no audible wheezing or cough and no apparent shortness of breath with conversation  MS: extremities normal - no gross deformities noted  SKIN: no apparent rash and normal skin tone  NEURO: speech is clear with no obvious neurological deficits  PSYCH: mentation appears normal and affect normal    Data         Latest Ref Rng & Units 11/13/2024     8:17 AM 5/16/2024     9:49 AM 1/25/2024     9:03 AM   Renal   Sodium 135 - 145 mmol/L 140  141  141    K 3.4 - 5.3 mmol/L 2.8  3.5  3.2    Cl 98 - 107 mmol/L 99  106  104    Cl (external) 98 - 107 mmol/L 99  106  104    CO2 22 - 29 mmol/L 29  22  25    Urea Nitrogen 6.0 - 20.0 mg/dL 12.7  11.5  10.3    Creatinine 0.67 - 1.17 mg/dL 1.15  0.93  0.94    Glucose 70 - 99 mg/dL 100  98  95    Calcium 8.8 - 10.4 mg/dL 9.5  9.2  9.5          Latest Ref Rng & Units 7/27/2023     9:38 AM 6/29/2023     9:40 AM 5/18/2023     9:18 AM   Bone Health   Phosphorus 2.5 - 4.5 mg/dL 2.9  2.9  2.9          Latest Ref Rng & Units 11/13/2024     8:17 AM 5/16/2024     9:49 AM 1/25/2024     9:03 AM   Heme   WBC 4.0 - 11.0 10e3/uL 6.1  5.0  6.2    Hgb 13.3 - 17.7 g/dL 15.0  14.1  14.7    Plt 150 - 450 10e3/uL 279  262  286          Latest Ref Rng & Units 9/22/2022     9:10 AM 3/10/2022     8:59 AM 9/13/2021     5:00 AM   Liver   AP 40 - 150 U/L 121  143  122    TBili 0.2 - 1.3 mg/dL 0.7  0.6   0.4    Bilirubin Direct 0.0 - 0.2 mg/dL 0.2  0.2     ALT 0 - 70 U/L 28  54  24    AST 0 - 45 U/L 14  21  15    Tot Protein 6.8 - 8.8 g/dL 7.5  7.3  7.7    Albumin 3.4 - 5.0 g/dL 3.8  3.9  3.8          Latest Ref Rng & Units 9/22/2022     9:10 AM 3/10/2022     8:59 AM 9/13/2021     5:00 AM   Pancreas   A1C 0.0 - 5.6 % 5.2  5.5  5.3          Latest Ref Rng & Units 9/17/2021     6:56 AM 9/14/2021     4:49 AM 5/11/2016     4:50 AM   Iron studies   Iron 35 - 180 ug/dL 123  18  38    Iron Saturation Index 15 - 46 % 69  10  17    Ferritin 26 - 388 ng/mL 770            Latest Ref Rng & Units 6/29/2023     9:40 AM 5/18/2023     9:18 AM 4/20/2023     9:20 AM   [facility] Txp Virology   CMV QUANT IU/ML Not Detected IU/mL Not Detected  Not Detected  Not Detected      Failed to redirect to the Timeline version of the REVFS SmartLink.  Recent Labs   Lab Test 01/25/24  0903 05/16/24  0949 11/13/24  0817   DOSTAC 1/24/2024 5/15/2024 11/12/2024   TACROL 5.6 4.9* 6.2     Recent Labs   Lab Test 08/11/22  0926 09/08/22  0857 10/06/22  0910   DOSMPA 8/10/2022   8:40 PM 9/7/2022   8:40 PM 10/5/2022   8:45 PM   MPACID 1.60 1.57 1.12   MPAG 19.2* 23.4* 19.8*

## 2025-05-22 NOTE — LETTER
5/22/2025      RE: Kailash Sorto  [address]  [facility] [address]       Virtual Visit Details    Type of service:  Video Visit   Video Start Time: 1632  Video End Time:1654    Originating Location (pt. Location): Home  Distant Location (provider location):  Off-site  Platform used for Video Visit: [facility]      TRANSPLANT NEPHROLOGY CLINIC VISIT     Assessment & Plan  # DDKT: CKD Stage 2 - Stable with last check, but no recent labs in the last 6 months.   - Baseline Creatinine: ~ 0.9-1.1   - Proteinuria: Normal (<0.2 grams)   - DSA Hx: No DSA   - Last cPRA: 11%   - BK Viremia: No   - Kidney Tx Biopsy Hx: No biopsy history.   - Primary Nephrologist: None.    # Immunosuppression: Tacrolimus immediate release (goal 4-6) and Mycophenolate mofetil (dose 500 mg every 12 hours)   - Induction with Recent Transplant:  Intermediate Intensity Protocol   - Continue with intensive monitoring of immunosuppression for efficacy and toxicity.   - Historical Changes in Immunosuppression: Slightly decreased mycophenolate dose due to leukopenia and CMV viremia.   - Changes: No    # Infection Prevention:   Last CD4 Level: 458 (6/2023)  - PJP: None      - CMV IgG Ab High Risk Discordance (D+/R-) at time of transplant: Yes  Present CMV Serostatus: Positive  - EBV IgG Ab High Risk Discordance (D+/R-) at time of transplant: No  Present EBV Serostatus: Positive    # Hypertension: Not checked recently;  Goal BP: < 130/80   - Changes: Not at this time; Recommend patient check blood pressure at home on a regular basis, such as once every week or two, and follow up with primary Nephrologist and/or PCP if above goal.    # Mineral Bone Disorder:    - Vitamin D; level: Not checked recently        On supplement: No  - Calcium; level: Normal        On supplement: No    # Electrolytes:  - Potassium; level: Low        On supplement: Yes; Of note, patient ran out of oral potassium supplement prior to last lab draw, but is back on them  now.  - Magnesium; level: Not checked recently        On supplement: No  - Bicarbonate; level: Normal        On supplement: No    # Obesity, Class I (BMI = 30.4): Weight is down ~ 10 lbs.   - Recommend weight loss for overall health by increasing exercise and watching caloric intake.    # Syncope?: Patient with what appears to have been a syncopal episode from dehydration last year.  No work up at that time and no episodes since.  Description does not sound like a seizure or arrhythmia.   - If symptoms or episodes recur, recommend further evaluation with PCP.   - Discussed importance of staying hydrated.    # Other Significant PMH:   - None:     # Skin Cancer Risk:    - Discussed sun protection and recommend regular follow up with Dermatology.    # Medical Compliance: No.  Evidence of labs less than recommended and infrequent transplant follow-up.  - Discussed importance of checking labs regularly as recommended, taking medications as prescribed and attending scheduled medical appointments.    # Transplant History:  Etiology of Kidney Failure: Unknown etiology  Tx: DDKT  Transplant: 9/13/2021 (Kidney)  Significant transplant-related complications: CMV Viremia    Transplant Office Phone Number: [phone]    Assessment and plan was discussed with the patient and he voiced his understanding and agreement.    Return visit: Return in about 1 year (around 5/22/2026).    Yoni Abdi MD    The longitudinal plan of care for the diagnosis(es)/condition(s) as documented were addressed during this visit. Due to the added complexity in care, I will continue to support Kailash in the subsequent management and with ongoing continuity of care.      Chief Complaint  Mr. Sorto is a 39 year old here for kidney transplant and immunosuppression management.     History of Present Illness   Mr. Sorto reports feeling good overall.  Since last clinic visit:   Hospitalizations: No   New Medical Issues: Yes; Patient does report  "having an episode of \"passing out\" about a year ago or so.  He had been exercising and does admit to not keeping up with hydration at the time.  He reports he felt a bit lightheaded and then passed out for a short time.  Denies any loss of bladder or bowel function.  He felt he woke right up after it happened.  No episodes since.  Active/Exercise: Yes  Chest pain or shortness of breath: No  Lower extremity swelling: Yes; He reports slight swelling of his right foot at times, which is not new.  Weight change: Yes; Weight is down ~ 10 lbs overall, but more stable lately   Appetite: Good   Nausea and vomiting: No  Diarrhea: No  Heartburn symptoms: No  Fever, sweats or chills: No  Night sweats: No  Urinary complaints: No    Home BP: Not checked    Problem List  Patient Active Problem List   Diagnosis     Hyperlipidemia LDL goal <100     HTN, kidney transplant related     AVF (arteriovenous fistula)     Kidney replaced by transplant     Aftercare following organ transplant     Immunosuppressed status     Vitamin D deficiency     Hypomagnesemia     Cytomegalovirus (CMV) viremia (H)     Secondary renal hyperparathyroidism     Obesity     CKD (chronic kidney disease) stage 2, GFR 60-89 ml/min     Hypokalemia       Allergies  No Known Allergies    Medications  Current Outpatient Medications   Medication Sig Dispense Refill     amLODIPine (NORVASC) 5 MG tablet TAKE 1 TABLET BY MOUTH AT BEDTIME 90 tablet 0     atorvastatin (LIPITOR) 10 MG tablet Take 1 tablet (10 mg) by mouth daily 30 tablet 11     gabapentin (NEURONTIN) 100 MG capsule Take 1 capsule (100 mg) by mouth 2 times daily as needed for neuropathic pain 60 capsule 0     losartan (COZAAR) 50 MG tablet TAKE 1 TABLET BY MOUTH EVERY DAY 90 tablet 0     mycophenolate (GENERIC EQUIVALENT) 250 MG capsule Take 2 capsules (500 mg) by mouth 2 times daily. 360 capsule 3     potassium chloride elizabeth ER (KLOR-CON M20) 20 MEQ CR tablet Take 1 tablet (20 mEq) by mouth 2 times daily. " 180 tablet 2     tacrolimus (GENERIC EQUIVALENT) 0.5 MG capsule Take 1 capsule (0.5 mg) by mouth every morning. Take 1.5 mg every AM, 1 mg every PM 30 capsule 5     tacrolimus (GENERIC EQUIVALENT) 1 MG capsule Take 1 capsule (1 mg) by mouth 2 times daily. Take 1.5 mg every AM, 1 mg in every PM 60 capsule 5     No current facility-administered medications for this visit.     Medications Discontinued During This Encounter   Medication Reason     Vitamin D3 (VITAMIN D) 10 MCG (400 UNIT) tablet        Physical Exam  Vital Signs: There were no vitals taken for this visit.    GENERAL APPEARANCE: alert and no distress  HENT: no obvious abnormalities on appearance  RESP: breathing appears unremarkable with normal rate, no audible wheezing or cough and no apparent shortness of breath with conversation  MS: extremities normal - no gross deformities noted  SKIN: no apparent rash and normal skin tone  NEURO: speech is clear with no obvious neurological deficits  PSYCH: mentation appears normal and affect normal    Data        Latest Ref Rng & Units 11/13/2024     8:17 AM 5/16/2024     9:49 AM 1/25/2024     9:03 AM   Renal   Sodium 135 - 145 mmol/L 140  141  141    K 3.4 - 5.3 mmol/L 2.8  3.5  3.2    Cl 98 - 107 mmol/L 99  106  104    Cl (external) 98 - 107 mmol/L 99  106  104    CO2 22 - 29 mmol/L 29  22  25    Urea Nitrogen 6.0 - 20.0 mg/dL 12.7  11.5  10.3    Creatinine 0.67 - 1.17 mg/dL 1.15  0.93  0.94    Glucose 70 - 99 mg/dL 100  98  95    Calcium 8.8 - 10.4 mg/dL 9.5  9.2  9.5          Latest Ref Rng & Units 7/27/2023     9:38 AM 6/29/2023     9:40 AM 5/18/2023     9:18 AM   Bone Health   Phosphorus 2.5 - 4.5 mg/dL 2.9  2.9  2.9          Latest Ref Rng & Units 11/13/2024     8:17 AM 5/16/2024     9:49 AM 1/25/2024     9:03 AM   Heme   WBC 4.0 - 11.0 10e3/uL 6.1  5.0  6.2    Hgb 13.3 - 17.7 g/dL 15.0  14.1  14.7    Plt 150 - 450 10e3/uL 279  262  286          Latest Ref Rng & Units 9/22/2022     9:10 AM 3/10/2022     8:59  AM 9/13/2021     5:00 AM   Liver   AP 40 - 150 U/L 121  143  122    TBili 0.2 - 1.3 mg/dL 0.7  0.6  0.4    Bilirubin Direct 0.0 - 0.2 mg/dL 0.2  0.2     ALT 0 - 70 U/L 28  54  24    AST 0 - 45 U/L 14  21  15    Tot Protein 6.8 - 8.8 g/dL 7.5  7.3  7.7    Albumin 3.4 - 5.0 g/dL 3.8  3.9  3.8          Latest Ref Rng & Units 9/22/2022     9:10 AM 3/10/2022     8:59 AM 9/13/2021     5:00 AM   Pancreas   A1C 0.0 - 5.6 % 5.2  5.5  5.3          Latest Ref Rng & Units 9/17/2021     6:56 AM 9/14/2021     4:49 AM 5/11/2016     4:50 AM   Iron studies   Iron 35 - 180 ug/dL 123  18  38    Iron Saturation Index 15 - 46 % 69  10  17    Ferritin 26 - 388 ng/mL 770            Latest Ref Rng & Units 6/29/2023     9:40 AM 5/18/2023     9:18 AM 4/20/2023     9:20 AM   [facility] Txp Virology   CMV QUANT IU/ML Not Detected IU/mL Not Detected  Not Detected  Not Detected      Failed to redirect to the Timeline version of the REVFS SmartLink.  Recent Labs   Lab Test 01/25/24  0903 05/16/24  0949 11/13/24  0817   DOSTAC 1/24/2024 5/15/2024 11/12/2024   TACROL 5.6 4.9* 6.2     Recent Labs   Lab Test 08/11/22  0926 09/08/22  0857 10/06/22  0910   DOSMPA 8/10/2022   8:40 PM 9/7/2022   8:40 PM 10/5/2022   8:45 PM   MPACID 1.60 1.57 1.12   MPAG 19.2* 23.4* 19.8*       Yoni Abdi MD

## 2025-05-22 NOTE — LETTER
5/22/2025      Kailash Sorto  [address] [place] [id number]      Dear Colleague,    Thank you for referring your patient, Kailash Sorto, to the [facility]. Please see a copy of my visit note below.    Virtual Visit Details    Type of service:  Video Visit   Video Start Time: 1632  Video End Time:1654    Originating Location (pt. Location): Home  Distant Location (provider location):  Off-site  Platform used for Video Visit: [facility]      TRANSPLANT NEPHROLOGY CLINIC VISIT     Assessment & Plan  # DDKT: CKD Stage 2 - Stable with last check, but no recent labs in the last 6 months.   - Baseline Creatinine: ~ 0.9-1.1   - Proteinuria: Normal (<0.2 grams)   - DSA Hx: No DSA   - Last cPRA: 11%   - BK Viremia: No   - Kidney Tx Biopsy Hx: No biopsy history.   - Primary Nephrologist: None.    # Immunosuppression: Tacrolimus immediate release (goal 4-6) and Mycophenolate mofetil (dose 500 mg every 12 hours)   - Induction with Recent Transplant:  Intermediate Intensity Protocol   - Continue with intensive monitoring of immunosuppression for efficacy and toxicity.   - Historical Changes in Immunosuppression: Slightly decreased mycophenolate dose due to leukopenia and CMV viremia.   - Changes: No    # Infection Prevention:   Last CD4 Level: 458 (6/2023)  - PJP: None      - CMV IgG Ab High Risk Discordance (D+/R-) at time of transplant: Yes  Present CMV Serostatus: Positive  - EBV IgG Ab High Risk Discordance (D+/R-) at time of transplant: No  Present EBV Serostatus: Positive    # Hypertension: Not checked recently;  Goal BP: < 130/80   - Changes: Not at this time; Recommend patient check blood pressure at home on a regular basis, such as once every week or two, and follow up with primary Nephrologist and/or PCP if above goal.    # Mineral Bone Disorder:    - Vitamin D; level: Not checked recently        On supplement: No  - Calcium; level: Normal        On supplement: No    #  Electrolytes:  - Potassium; level: Low        On supplement: Yes; Of note, patient ran out of oral potassium supplement prior to last lab draw, but is back on them now.  - Magnesium; level: Not checked recently        On supplement: No  - Bicarbonate; level: Normal        On supplement: No    # Obesity, Class I (BMI = 30.4): Weight is down ~ 10 lbs.   - Recommend weight loss for overall health by increasing exercise and watching caloric intake.    # Syncope?: Patient with what appears to have been a syncopal episode from dehydration last year.  No work up at that time and no episodes since.  Description does not sound like a seizure or arrhythmia.   - If symptoms or episodes recur, recommend further evaluation with PCP.   - Discussed importance of staying hydrated.    # Other Significant PMH:   - None:     # Skin Cancer Risk:    - Discussed sun protection and recommend regular follow up with Dermatology.    # Medical Compliance: No.  Evidence of labs less than recommended and infrequent transplant follow-up.  - Discussed importance of checking labs regularly as recommended, taking medications as prescribed and attending scheduled medical appointments.    # Transplant History:  Etiology of Kidney Failure: Unknown etiology  Tx: DDKT  Transplant: 9/13/2021 (Kidney)  Significant transplant-related complications: CMV Viremia    Transplant Office Phone Number: [phone]    Assessment and plan was discussed with the patient and he voiced his understanding and agreement.    Return visit: Return in about 1 year (around 5/22/2026).    Yoni Abdi MD    The longitudinal plan of care for the diagnosis(es)/condition(s) as documented were addressed during this visit. Due to the added complexity in care, I will continue to support Kailash in the subsequent management and with ongoing continuity of care.      Chief Complaint  Mr. Sorto is a 39 year old here for kidney transplant and immunosuppression management.  "    History of Present Illness   Mr. Sorto reports feeling good overall.  Since last clinic visit:   Hospitalizations: No   New Medical Issues: Yes; Patient does report having an episode of \"passing out\" about a year ago or so.  He had been exercising and does admit to not keeping up with hydration at the time.  He reports he felt a bit lightheaded and then passed out for a short time.  Denies any loss of bladder or bowel function.  He felt he woke right up after it happened.  No episodes since.  Active/Exercise: Yes  Chest pain or shortness of breath: No  Lower extremity swelling: Yes; He reports slight swelling of his right foot at times, which is not new.  Weight change: Yes; Weight is down ~ 10 lbs overall, but more stable lately   Appetite: Good   Nausea and vomiting: No  Diarrhea: No  Heartburn symptoms: No  Fever, sweats or chills: No  Night sweats: No  Urinary complaints: No    Home BP: Not checked    Problem List  Patient Active Problem List   Diagnosis     Hyperlipidemia LDL goal <100     HTN, kidney transplant related     AVF (arteriovenous fistula)     Kidney replaced by transplant     Aftercare following organ transplant     Immunosuppressed status     Vitamin D deficiency     Hypomagnesemia     Cytomegalovirus (CMV) viremia (H)     Secondary renal hyperparathyroidism     Obesity     CKD (chronic kidney disease) stage 2, GFR 60-89 ml/min     Hypokalemia       Allergies  No Known Allergies    Medications  Current Outpatient Medications   Medication Sig Dispense Refill     amLODIPine (NORVASC) 5 MG tablet TAKE 1 TABLET BY MOUTH AT BEDTIME 90 tablet 0     atorvastatin (LIPITOR) 10 MG tablet Take 1 tablet (10 mg) by mouth daily 30 tablet 11     gabapentin (NEURONTIN) 100 MG capsule Take 1 capsule (100 mg) by mouth 2 times daily as needed for neuropathic pain 60 capsule 0     losartan (COZAAR) 50 MG tablet TAKE 1 TABLET BY MOUTH EVERY DAY 90 tablet 0     mycophenolate (GENERIC EQUIVALENT) 250 MG capsule " Take 2 capsules (500 mg) by mouth 2 times daily. 360 capsule 3     potassium chloride elizabeth ER (KLOR-CON M20) 20 MEQ CR tablet Take 1 tablet (20 mEq) by mouth 2 times daily. 180 tablet 2     tacrolimus (GENERIC EQUIVALENT) 0.5 MG capsule Take 1 capsule (0.5 mg) by mouth every morning. Take 1.5 mg every AM, 1 mg every PM 30 capsule 5     tacrolimus (GENERIC EQUIVALENT) 1 MG capsule Take 1 capsule (1 mg) by mouth 2 times daily. Take 1.5 mg every AM, 1 mg in every PM 60 capsule 5     No current facility-administered medications for this visit.     Medications Discontinued During This Encounter   Medication Reason     Vitamin D3 (VITAMIN D) 10 MCG (400 UNIT) tablet        Physical Exam  Vital Signs: There were no vitals taken for this visit.    GENERAL APPEARANCE: alert and no distress  HENT: no obvious abnormalities on appearance  RESP: breathing appears unremarkable with normal rate, no audible wheezing or cough and no apparent shortness of breath with conversation  MS: extremities normal - no gross deformities noted  SKIN: no apparent rash and normal skin tone  NEURO: speech is clear with no obvious neurological deficits  PSYCH: mentation appears normal and affect normal    Data        Latest Ref Rng & Units 11/13/2024     8:17 AM 5/16/2024     9:49 AM 1/25/2024     9:03 AM   Renal   Sodium 135 - 145 mmol/L 140  141  141    K 3.4 - 5.3 mmol/L 2.8  3.5  3.2    Cl 98 - 107 mmol/L 99  106  104    Cl (external) 98 - 107 mmol/L 99  106  104    CO2 22 - 29 mmol/L 29  22  25    Urea Nitrogen 6.0 - 20.0 mg/dL 12.7  11.5  10.3    Creatinine 0.67 - 1.17 mg/dL 1.15  0.93  0.94    Glucose 70 - 99 mg/dL 100  98  95    Calcium 8.8 - 10.4 mg/dL 9.5  9.2  9.5          Latest Ref Rng & Units 7/27/2023     9:38 AM 6/29/2023     9:40 AM 5/18/2023     9:18 AM   Bone Health   Phosphorus 2.5 - 4.5 mg/dL 2.9  2.9  2.9          Latest Ref Rng & Units 11/13/2024     8:17 AM 5/16/2024     9:49 AM 1/25/2024     9:03 AM   Heme   WBC 4.0 - 11.0  10e3/uL 6.1  5.0  6.2    Hgb 13.3 - 17.7 g/dL 15.0  14.1  14.7    Plt 150 - 450 10e3/uL 279  262  286          Latest Ref Rng & Units 9/22/2022     9:10 AM 3/10/2022     8:59 AM 9/13/2021     5:00 AM   Liver   AP 40 - 150 U/L 121  143  122    TBili 0.2 - 1.3 mg/dL 0.7  0.6  0.4    Bilirubin Direct 0.0 - 0.2 mg/dL 0.2  0.2     ALT 0 - 70 U/L 28  54  24    AST 0 - 45 U/L 14  21  15    Tot Protein 6.8 - 8.8 g/dL 7.5  7.3  7.7    Albumin 3.4 - 5.0 g/dL 3.8  3.9  3.8          Latest Ref Rng & Units 9/22/2022     9:10 AM 3/10/2022     8:59 AM 9/13/2021     5:00 AM   Pancreas   A1C 0.0 - 5.6 % 5.2  5.5  5.3          Latest Ref Rng & Units 9/17/2021     6:56 AM 9/14/2021     4:49 AM 5/11/2016     4:50 AM   Iron studies   Iron 35 - 180 ug/dL 123  18  38    Iron Saturation Index 15 - 46 % 69  10  17    Ferritin 26 - 388 ng/mL 770            Latest Ref Rng & Units 6/29/2023     9:40 AM 5/18/2023     9:18 AM 4/20/2023     9:20 AM   [facility] Txp Virology   CMV QUANT IU/ML Not Detected IU/mL Not Detected  Not Detected  Not Detected      Failed to redirect to the Timeline version of the REVFS SmartLink.  Recent Labs   Lab Test 01/25/24  0903 05/16/24  0949 11/13/24  0817   DOSTAC 1/24/2024 5/15/2024 11/12/2024   TACROL 5.6 4.9* 6.2     Recent Labs   Lab Test 08/11/22  0926 09/08/22  0857 10/06/22  0910   DOSMPA 8/10/2022   8:40 PM 9/7/2022   8:40 PM 10/5/2022   8:45 PM   MPACID 1.60 1.57 1.12   MPAG 19.2* 23.4* 19.8*       Again, thank you for allowing me to participate in the care of your patient.        Sincerely,        Yoni Abdi MD    Electronically signed

## 2025-05-22 NOTE — NURSING NOTE
Current patient location: [address] [facility]   [facility] [address]    Is the patient currently in the state of [place]? YES    Visit mode: VIDEO    If the visit is dropped, the patient can be reconnected by:VIDEO VISIT: Send to e-mail at: [email or web address]    Will anyone else be joining the visit? NO  (If patient encounters technical issues they should call [phone]    Are changes needed to the allergy or medication list? No    Are refills needed on medications prescribed by this physician? NO    Rooming Documentation:  Questionnaire(s) completed    Reason for visit: RECHECK    Melina STONE

## 2025-05-26 PROBLEM — E87.6 HYPOKALEMIA: Status: ACTIVE | Noted: 2025-05-26

## 2025-05-26 PROBLEM — D70.9 NEUTROPENIA: Status: RESOLVED | Noted: 2021-12-20 | Resolved: 2025-05-26

## 2025-05-26 PROBLEM — Z29.89 NEED FOR PNEUMOCYSTIS PROPHYLAXIS: Status: RESOLVED | Noted: 2022-03-31 | Resolved: 2025-05-26

## 2025-05-26 PROBLEM — N18.2 CKD (CHRONIC KIDNEY DISEASE) STAGE 2, GFR 60-89 ML/MIN: Status: ACTIVE | Noted: 2025-05-26

## 2025-05-26 NOTE — PATIENT INSTRUCTIONS
Patient Recommendations:  - Recommend checking blood pressure at home on a regular basis, such as once every week or two, and follow up with primary Nephrologist or PCP if above goal of less than 130/80.  - Recommend checking routine transplant labs every 2 months.   - Recommend routine follow up with [facility] in [place] in 4-6 months or so to resume care with a goal of ongoing co-management between your primary Nephrologist and the Transplant Team.     Transplant Patient Information  Your Post Transplant Coordinator is: Shira Patel  For non urgent items, we encourage you to contact your coordinator/care team online via [employer]  You and your care team can also contact your transplant coordinator Monday - Friday, 8am - 5pm at [phone] (Option 2 to reach the coordinator or Option 4 to schedule an appointment).  After hours for urgent matters, please call [facility] at [phone].

## 2025-05-27 ENCOUNTER — TELEPHONE (OUTPATIENT)
Dept: TRANSPLANT | Facility: CLINIC | Age: 40
End: 2025-05-27
Payer: COMMERCIAL

## 2025-05-27 DIAGNOSIS — Z94.0 KIDNEY REPLACED BY TRANSPLANT: Primary | ICD-10-CM

## 2025-05-27 NOTE — TELEPHONE ENCOUNTER
----- Message from Yoni Abdi sent at 5/26/2025 11:00 AM CDT -----  Regarding: Clinic visit  Besides being way past due for routine transplant labs, please check the following labs: Vitamin D, Magnesium, UPC, and DSA.    Jamie

## 2025-05-29 ENCOUNTER — LAB (OUTPATIENT)
Dept: LAB | Facility: CLINIC | Age: 40
End: 2025-05-29
Payer: COMMERCIAL

## 2025-05-29 DIAGNOSIS — I15.1 HTN, KIDNEY TRANSPLANT RELATED: ICD-10-CM

## 2025-05-29 DIAGNOSIS — Z48.298 AFTERCARE FOLLOWING ORGAN TRANSPLANT: ICD-10-CM

## 2025-05-29 DIAGNOSIS — Z94.0 HTN, KIDNEY TRANSPLANT RELATED: ICD-10-CM

## 2025-05-29 DIAGNOSIS — Z94.0 KIDNEY REPLACED BY TRANSPLANT: ICD-10-CM

## 2025-05-29 LAB
ALBUMIN MFR UR ELPH: 10.7 MG/DL
CREAT UR-MCNC: 103 MG/DL
ERYTHROCYTE [DISTWIDTH] IN BLOOD BY AUTOMATED COUNT: 12 % (ref 10–15)
HCT VFR BLD AUTO: 42.8 % (ref 40–53)
HGB BLD-MCNC: 14.9 G/DL (ref 13.3–17.7)
MCH RBC QN AUTO: 28.5 PG (ref 26.5–33)
MCHC RBC AUTO-ENTMCNC: 34.8 G/DL (ref 31.5–36.5)
MCV RBC AUTO: 82 FL (ref 78–100)
PLATELET # BLD AUTO: 261 10E3/UL (ref 150–450)
PROT/CREAT 24H UR: 0.1 MG/MG CR (ref 0–0.2)
RBC # BLD AUTO: 5.22 10E6/UL (ref 4.4–5.9)
TACROLIMUS BLD-MCNC: 4.1 UG/L (ref 5–15)
TME LAST DOSE: ABNORMAL H
TME LAST DOSE: ABNORMAL H
WBC # BLD AUTO: 5.5 10E3/UL (ref 4–11)

## 2025-05-29 RX ORDER — AMLODIPINE BESYLATE 5 MG/1
5 TABLET ORAL AT BEDTIME
Qty: 90 TABLET | Refills: 0 | Status: SHIPPED | OUTPATIENT
Start: 2025-05-29

## 2025-05-31 ENCOUNTER — HEALTH MAINTENANCE LETTER (OUTPATIENT)
Age: 40
End: 2025-05-31

## 2025-06-09 LAB
DONOR IDENTIFICATION: NORMAL
DSA COMMENTS: NORMAL
DSA PRESENT: NO
DSA TEST METHOD: NORMAL
ORGAN: NORMAL
SA 1  COMMENTS: NORMAL
SA 1 CELL: NORMAL
SA 1 TEST METHOD: NORMAL
SA 2 CELL: NORMAL
SA 2 COMMENTS: NORMAL
SA 2 TEST METHOD: NORMAL
SA1 HI RISK ABY: NORMAL
SA1 MOD RISK ABY: NORMAL
SA2 HI RISK ABY: NORMAL
SA2 MOD RISK ABY: NORMAL
UNACCEPTABLE ANTIGENS: NORMAL
UNOS CPRA: 12

## 2025-06-17 DIAGNOSIS — Z48.298 AFTERCARE FOLLOWING ORGAN TRANSPLANT: ICD-10-CM

## 2025-06-17 DIAGNOSIS — Z94.0 KIDNEY TRANSPLANT RECIPIENT: ICD-10-CM

## 2025-06-17 DIAGNOSIS — Z94.0 KIDNEY REPLACED BY TRANSPLANT: ICD-10-CM

## 2025-06-17 RX ORDER — TACROLIMUS 1 MG/1
CAPSULE ORAL
Qty: 60 CAPSULE | Refills: 3 | Status: SHIPPED | OUTPATIENT
Start: 2025-06-17

## 2025-06-17 RX ORDER — TACROLIMUS 0.5 MG/1
CAPSULE ORAL
Qty: 30 CAPSULE | Refills: 3 | Status: SHIPPED | OUTPATIENT
Start: 2025-06-17

## 2025-08-27 ENCOUNTER — DOCUMENTATION ONLY (OUTPATIENT)
Dept: TRANSPLANT | Facility: CLINIC | Age: 40
End: 2025-08-27
Payer: COMMERCIAL

## 2025-08-27 DIAGNOSIS — I15.1 HTN, KIDNEY TRANSPLANT RELATED: Primary | ICD-10-CM

## 2025-08-27 DIAGNOSIS — Z94.0 HTN, KIDNEY TRANSPLANT RELATED: Primary | ICD-10-CM

## 2025-08-27 ASSESSMENT — ENCOUNTER SYMPTOMS: NEW SYMPTOMS OF CORONARY ARTERY DISEASE: 0

## 2025-08-28 DIAGNOSIS — E87.6 HYPOKALEMIA: Primary | ICD-10-CM

## 2025-08-28 DIAGNOSIS — I15.1 HTN, KIDNEY TRANSPLANT RELATED: Primary | ICD-10-CM

## 2025-08-28 DIAGNOSIS — Z94.0 HTN, KIDNEY TRANSPLANT RELATED: ICD-10-CM

## 2025-08-28 DIAGNOSIS — I15.1 HTN, KIDNEY TRANSPLANT RELATED: ICD-10-CM

## 2025-08-28 DIAGNOSIS — Z94.0 HTN, KIDNEY TRANSPLANT RELATED: Primary | ICD-10-CM

## 2025-08-28 RX ORDER — AMLODIPINE BESYLATE 5 MG/1
5 TABLET ORAL AT BEDTIME
Qty: 90 TABLET | Refills: 0 | Status: SHIPPED | OUTPATIENT
Start: 2025-08-28

## 2025-08-28 RX ORDER — POTASSIUM CHLORIDE 1500 MG/1
20 TABLET, EXTENDED RELEASE ORAL 2 TIMES DAILY
Qty: 180 TABLET | Refills: 0 | Status: SHIPPED | OUTPATIENT
Start: 2025-08-28

## (undated) DEVICE — SOL NACL 0.9% INJ 250ML BAG 2B1322Q

## (undated) DEVICE — PREP CHLORAPREP 26ML TINTED HI-LITE ORANGE 930815

## (undated) DEVICE — ESU GROUND PAD UNIVERSAL W/O CORD

## (undated) DEVICE — DECANTER VIAL 2006S

## (undated) DEVICE — NDL 19GA 1.5"

## (undated) DEVICE — SOL NACL 0.9% INJ 1000ML BAG 2B1324X

## (undated) DEVICE — LINEN TOWEL PACK X6 WHITE 5487

## (undated) DEVICE — SU SILK 4-0 TIE 12X30" A303H

## (undated) DEVICE — SURGICEL ABSORBABLE HEMOSTAT SNOW 4"X4" 2083

## (undated) DEVICE — SURGICEL HEMOSTAT 4X8" 1952

## (undated) DEVICE — DRAPE SLUSH/WARMER 66X44" ORS-320

## (undated) DEVICE — SYR 10ML SLIP TIP W/O NDL

## (undated) DEVICE — SU SILK 4-0 TIE 24" SA73H

## (undated) DEVICE — DECANTER BAG 2002S

## (undated) DEVICE — BASIN SET SINGLE STERILE 13752-624

## (undated) DEVICE — DRSG STERI STRIP 1/2X4" R1547

## (undated) DEVICE — SOL WATER IRRIG 1000ML BOTTLE 2F7114

## (undated) DEVICE — PREP CHLORAPREP 26ML TINTED ORANGE  260815

## (undated) DEVICE — SU PROLENE 5-0 C-1DA 36" 8720H

## (undated) DEVICE — INSERT FOGARTY 33MM TRACTION HYDRAJAW HYDRA33

## (undated) DEVICE — SU PDS II 6-0 RB-2DA 30" Z149H

## (undated) DEVICE — SYR 20ML LL W/O NDL 302830

## (undated) DEVICE — SU SILK 2-0 TIE 12X30" A305H

## (undated) DEVICE — SUCTION CANISTER MEDIVAC LINER 3000ML W/LID 65651-530

## (undated) DEVICE — SOL NACL 0.9% IRRIG 1000ML BOTTLE 2F7124

## (undated) DEVICE — SU SILK 3-0 SH CR 8X18" C013D

## (undated) DEVICE — SU SILK 3-0 TIE 24" SA74H

## (undated) DEVICE — SU PROLENE 6-0 C-1DA 30" 8706H

## (undated) DEVICE — DRAIN PENROSE 0.50"X18" LATEX FREE GR203

## (undated) DEVICE — SU SILK 1 TIE 6X30" A307H

## (undated) DEVICE — SU PROLENE 5-0 RB-1DA 36"  8556H

## (undated) DEVICE — SYR 03ML LL W/O NDL 309657

## (undated) DEVICE — NDL ANGIOCATH 20GA 1.25" 4056

## (undated) DEVICE — Device

## (undated) DEVICE — PACK AV FISTULA CUSTOM SCV15AVFS1

## (undated) DEVICE — SU PDS II 5-0 RB-1 DA 30" Z320H

## (undated) DEVICE — SU SILK 0 TIE 6X30" A306H

## (undated) DEVICE — SU PDS II 0 TP-1 60" Z991G

## (undated) DEVICE — SU PDS II 5-0 RB-1 27" Z303H

## (undated) DEVICE — SU SILK 3-0 TIE 12X30" A304H

## (undated) DEVICE — SYR 01ML 27GA 0.5" NDL TBC 309623

## (undated) DEVICE — SYR BULB IRRIG 50ML LATEX FREE 0035280

## (undated) DEVICE — SPONGE RAY-TEC 4X8" 7318

## (undated) DEVICE — DRSG TEGADERM 2 1/2X 2 3/4"

## (undated) DEVICE — BNDG ROLLER GAUZE CONFORM 4"X4YD 41-54

## (undated) DEVICE — LINEN TOWEL PACK X5 5464

## (undated) DEVICE — SU VICRYL 3-0 SH 27" J316H

## (undated) DEVICE — CATH FOLEY 3WAY 16FR 30ML SIL 73016SI

## (undated) DEVICE — SU MONOCRYL 4-0 PS-2 18" UND Y496G

## (undated) DEVICE — CATH PLUG W/CAP 000076

## (undated) DEVICE — WIPES FOLEY CARE SURESTEP PROVON DFC100

## (undated) DEVICE — SURGICEL HEMOSTAT 2X3" 1953

## (undated) DEVICE — VESSEL LOOPS RED MAXI

## (undated) DEVICE — GLOVE PROTEXIS W/NEU-THERA 7.5  2D73TE75

## (undated) DEVICE — GLOVE BIOGEL PI ULTRATOUCH SZ 7.5 41175

## (undated) DEVICE — SU VICRYL 3-0 SH 27" UND J416H

## (undated) DEVICE — DRAPE IOBAN INCISE 23X17" 6650EZ

## (undated) DEVICE — SUCTION MANIFOLD NEPTUNE 2 SYS 4 PORT 0702-020-000

## (undated) DEVICE — DEVICE CATH STABILIZATION STATLOCK FOLEY 3-WAY FOL0105

## (undated) DEVICE — SU MONOCRYL 4-0 PS-2 27" UND Y426H

## (undated) DEVICE — TUBING IRRIG CYSTO/BLADDER SET 81" LF 2C4040

## (undated) DEVICE — SU PROLENE 6-0 RB-2DA 30" 8711H

## (undated) DEVICE — SU PDS II 4-0 RB-1 27" Z304H

## (undated) DEVICE — SU VICRYL 2-0 CT-1 27" UND J259H

## (undated) DEVICE — LINEN TOWEL PACK X30 5481

## (undated) DEVICE — DRAIN PENROSE 0.75"X18" LATEX FREE GR205

## (undated) DEVICE — DRAPE LAP W/ARMBOARD 29410

## (undated) RX ORDER — GLYCOPYRROLATE 0.2 MG/ML
INJECTION, SOLUTION INTRAMUSCULAR; INTRAVENOUS
Status: DISPENSED
Start: 2020-08-25

## (undated) RX ORDER — HYDROCODONE BITARTRATE AND ACETAMINOPHEN 5; 325 MG/1; MG/1
TABLET ORAL
Status: DISPENSED
Start: 2020-08-25

## (undated) RX ORDER — FENTANYL CITRATE 50 UG/ML
INJECTION, SOLUTION INTRAMUSCULAR; INTRAVENOUS
Status: DISPENSED
Start: 2023-04-07

## (undated) RX ORDER — FENTANYL CITRATE 50 UG/ML
INJECTION, SOLUTION INTRAMUSCULAR; INTRAVENOUS
Status: DISPENSED
Start: 2019-05-28

## (undated) RX ORDER — HYDROCODONE BITARTRATE AND ACETAMINOPHEN 5; 325 MG/1; MG/1
TABLET ORAL
Status: DISPENSED
Start: 2019-05-28

## (undated) RX ORDER — LABETALOL HYDROCHLORIDE 5 MG/ML
INJECTION, SOLUTION INTRAVENOUS
Status: DISPENSED
Start: 2023-04-07

## (undated) RX ORDER — PROPOFOL 10 MG/ML
INJECTION, EMULSION INTRAVENOUS
Status: DISPENSED
Start: 2020-08-25

## (undated) RX ORDER — FENTANYL CITRATE 50 UG/ML
INJECTION, SOLUTION INTRAMUSCULAR; INTRAVENOUS
Status: DISPENSED
Start: 2021-09-13

## (undated) RX ORDER — PROPOFOL 10 MG/ML
INJECTION, EMULSION INTRAVENOUS
Status: DISPENSED
Start: 2019-05-28

## (undated) RX ORDER — HYDROMORPHONE HYDROCHLORIDE 1 MG/ML
INJECTION, SOLUTION INTRAMUSCULAR; INTRAVENOUS; SUBCUTANEOUS
Status: DISPENSED
Start: 2023-04-07

## (undated) RX ORDER — SODIUM CHLORIDE 9 MG/ML
INJECTION, SOLUTION INTRAVENOUS
Status: DISPENSED
Start: 2021-09-13

## (undated) RX ORDER — VERAPAMIL HYDROCHLORIDE 2.5 MG/ML
INJECTION, SOLUTION INTRAVENOUS
Status: DISPENSED
Start: 2021-09-13

## (undated) RX ORDER — DEXAMETHASONE SODIUM PHOSPHATE 4 MG/ML
INJECTION, SOLUTION INTRA-ARTICULAR; INTRALESIONAL; INTRAMUSCULAR; INTRAVENOUS; SOFT TISSUE
Status: DISPENSED
Start: 2021-09-13

## (undated) RX ORDER — PROPOFOL 10 MG/ML
INJECTION, EMULSION INTRAVENOUS
Status: DISPENSED
Start: 2021-09-13

## (undated) RX ORDER — LIDOCAINE HYDROCHLORIDE 10 MG/ML
INJECTION, SOLUTION INFILTRATION; PERINEURAL
Status: DISPENSED
Start: 2019-05-28

## (undated) RX ORDER — ACETAMINOPHEN 325 MG/1
TABLET ORAL
Status: DISPENSED
Start: 2021-09-13

## (undated) RX ORDER — DEXTROSE, SODIUM CHLORIDE, SODIUM LACTATE, POTASSIUM CHLORIDE, AND CALCIUM CHLORIDE 5; .6; .31; .03; .02 G/100ML; G/100ML; G/100ML; G/100ML; G/100ML
INJECTION, SOLUTION INTRAVENOUS
Status: DISPENSED
Start: 2021-09-13

## (undated) RX ORDER — LIDOCAINE HYDROCHLORIDE 20 MG/ML
INJECTION, SOLUTION EPIDURAL; INFILTRATION; INTRACAUDAL; PERINEURAL
Status: DISPENSED
Start: 2021-09-13

## (undated) RX ORDER — CEFAZOLIN SODIUM 2 G/100ML
INJECTION, SOLUTION INTRAVENOUS
Status: DISPENSED
Start: 2019-05-28

## (undated) RX ORDER — FENTANYL CITRATE 50 UG/ML
INJECTION, SOLUTION INTRAMUSCULAR; INTRAVENOUS
Status: DISPENSED
Start: 2020-08-25

## (undated) RX ORDER — BUPIVACAINE HYDROCHLORIDE 5 MG/ML
INJECTION, SOLUTION EPIDURAL; INTRACAUDAL
Status: DISPENSED
Start: 2019-05-28

## (undated) RX ORDER — HYDROMORPHONE HYDROCHLORIDE 1 MG/ML
INJECTION, SOLUTION INTRAMUSCULAR; INTRAVENOUS; SUBCUTANEOUS
Status: DISPENSED
Start: 2021-09-13

## (undated) RX ORDER — FENTANYL CITRATE 0.05 MG/ML
INJECTION, SOLUTION INTRAMUSCULAR; INTRAVENOUS
Status: DISPENSED
Start: 2023-04-07

## (undated) RX ORDER — ALBUMIN, HUMAN INJ 5% 5 %
SOLUTION INTRAVENOUS
Status: DISPENSED
Start: 2021-09-13

## (undated) RX ORDER — HEPARIN SODIUM 1000 [USP'U]/ML
INJECTION, SOLUTION INTRAVENOUS; SUBCUTANEOUS
Status: DISPENSED
Start: 2019-05-28

## (undated) RX ORDER — CEFAZOLIN SODIUM 2 G/100ML
INJECTION, SOLUTION INTRAVENOUS
Status: DISPENSED
Start: 2020-08-25

## (undated) RX ORDER — MANNITOL 20 G/100ML
INJECTION, SOLUTION INTRAVENOUS
Status: DISPENSED
Start: 2021-09-13

## (undated) RX ORDER — ONDANSETRON 2 MG/ML
INJECTION INTRAMUSCULAR; INTRAVENOUS
Status: DISPENSED
Start: 2021-09-13

## (undated) RX ORDER — CEFAZOLIN SODIUM/WATER 2 G/20 ML
SYRINGE (ML) INTRAVENOUS
Status: DISPENSED
Start: 2023-04-07

## (undated) RX ORDER — NEOSTIGMINE METHYLSULFATE 1 MG/ML
VIAL (ML) INJECTION
Status: DISPENSED
Start: 2020-08-25

## (undated) RX ORDER — DEXTROSE MONOHYDRATE 50 MG/ML
INJECTION, SOLUTION INTRAVENOUS
Status: DISPENSED
Start: 2021-09-13

## (undated) RX ORDER — PROPOFOL 10 MG/ML
INJECTION, EMULSION INTRAVENOUS
Status: DISPENSED
Start: 2023-04-07

## (undated) RX ORDER — SODIUM CHLORIDE 450 MG/100ML
INJECTION, SOLUTION INTRAVENOUS
Status: DISPENSED
Start: 2021-09-13

## (undated) RX ORDER — PAPAVERINE HYDROCHLORIDE 30 MG/ML
INJECTION INTRAMUSCULAR; INTRAVENOUS
Status: DISPENSED
Start: 2021-09-13

## (undated) RX ORDER — CEFUROXIME SODIUM 1.5 G/16ML
INJECTION, POWDER, FOR SOLUTION INTRAVENOUS
Status: DISPENSED
Start: 2021-09-13

## (undated) RX ORDER — HEPARIN SODIUM 1000 [USP'U]/ML
INJECTION, SOLUTION INTRAVENOUS; SUBCUTANEOUS
Status: DISPENSED
Start: 2021-09-13